# Patient Record
Sex: MALE | Race: WHITE | NOT HISPANIC OR LATINO | Employment: FULL TIME | URBAN - METROPOLITAN AREA
[De-identification: names, ages, dates, MRNs, and addresses within clinical notes are randomized per-mention and may not be internally consistent; named-entity substitution may affect disease eponyms.]

---

## 2017-05-05 ENCOUNTER — HOSPITAL ENCOUNTER (EMERGENCY)
Facility: CLINIC | Age: 57
Discharge: HOME | End: 2017-05-05
Attending: FAMILY MEDICINE | Admitting: FAMILY MEDICINE

## 2017-05-05 VITALS
TEMPERATURE: 97.9 F | OXYGEN SATURATION: 95 % | WEIGHT: 190 LBS | DIASTOLIC BLOOD PRESSURE: 105 MMHG | SYSTOLIC BLOOD PRESSURE: 153 MMHG | BODY MASS INDEX: 25.07 KG/M2 | RESPIRATION RATE: 18 BRPM

## 2017-05-05 DIAGNOSIS — R04.0 EPISTAXIS: ICD-10-CM

## 2017-05-05 DIAGNOSIS — I10 ESSENTIAL HYPERTENSION: ICD-10-CM

## 2017-05-05 LAB
ANION GAP SERPL CALCULATED.3IONS-SCNC: 9 MMOL/L (ref 3–14)
BASOPHILS # BLD AUTO: 0 10E9/L (ref 0–0.2)
BASOPHILS NFR BLD AUTO: 0.8 %
BUN SERPL-MCNC: 18 MG/DL (ref 7–30)
CALCIUM SERPL-MCNC: 8.9 MG/DL (ref 8.5–10.1)
CHLORIDE SERPL-SCNC: 102 MMOL/L (ref 94–109)
CO2 SERPL-SCNC: 27 MMOL/L (ref 20–32)
CREAT SERPL-MCNC: 0.85 MG/DL (ref 0.66–1.25)
DIFFERENTIAL METHOD BLD: ABNORMAL
EOSINOPHIL # BLD AUTO: 0.1 10E9/L (ref 0–0.7)
EOSINOPHIL NFR BLD AUTO: 1.7 %
ERYTHROCYTE [DISTWIDTH] IN BLOOD BY AUTOMATED COUNT: 13 % (ref 10–15)
GFR SERPL CREATININE-BSD FRML MDRD: ABNORMAL ML/MIN/1.7M2
GLUCOSE SERPL-MCNC: 103 MG/DL (ref 70–99)
HCT VFR BLD AUTO: 42.2 % (ref 40–53)
HGB BLD-MCNC: 14.3 G/DL (ref 13.3–17.7)
IMM GRANULOCYTES # BLD: 0 10E9/L (ref 0–0.4)
IMM GRANULOCYTES NFR BLD: 0.2 %
INR PPP: 1.06 (ref 0.86–1.14)
LYMPHOCYTES # BLD AUTO: 0.8 10E9/L (ref 0.8–5.3)
LYMPHOCYTES NFR BLD AUTO: 14.4 %
MCH RBC QN AUTO: 33.3 PG (ref 26.5–33)
MCHC RBC AUTO-ENTMCNC: 33.9 G/DL (ref 31.5–36.5)
MCV RBC AUTO: 98 FL (ref 78–100)
MONOCYTES # BLD AUTO: 0.5 10E9/L (ref 0–1.3)
MONOCYTES NFR BLD AUTO: 8.8 %
NEUTROPHILS # BLD AUTO: 3.9 10E9/L (ref 1.6–8.3)
NEUTROPHILS NFR BLD AUTO: 74.1 %
PLATELET # BLD AUTO: 177 10E9/L (ref 150–450)
POTASSIUM SERPL-SCNC: 4.3 MMOL/L (ref 3.4–5.3)
RBC # BLD AUTO: 4.29 10E12/L (ref 4.4–5.9)
SODIUM SERPL-SCNC: 138 MMOL/L (ref 133–144)
TSH SERPL DL<=0.05 MIU/L-ACNC: 0.88 MU/L (ref 0.4–4)
WBC # BLD AUTO: 5.2 10E9/L (ref 4–11)

## 2017-05-05 PROCEDURE — 99284 EMERGENCY DEPT VISIT MOD MDM: CPT | Mod: 25 | Performed by: FAMILY MEDICINE

## 2017-05-05 PROCEDURE — 80048 BASIC METABOLIC PNL TOTAL CA: CPT | Performed by: FAMILY MEDICINE

## 2017-05-05 PROCEDURE — 93010 ELECTROCARDIOGRAM REPORT: CPT | Performed by: FAMILY MEDICINE

## 2017-05-05 PROCEDURE — 85025 COMPLETE CBC W/AUTO DIFF WBC: CPT | Performed by: FAMILY MEDICINE

## 2017-05-05 PROCEDURE — 84443 ASSAY THYROID STIM HORMONE: CPT | Performed by: FAMILY MEDICINE

## 2017-05-05 PROCEDURE — 85610 PROTHROMBIN TIME: CPT | Performed by: FAMILY MEDICINE

## 2017-05-05 PROCEDURE — 99284 EMERGENCY DEPT VISIT MOD MDM: CPT

## 2017-05-05 PROCEDURE — 25000132 ZZH RX MED GY IP 250 OP 250 PS 637: Performed by: FAMILY MEDICINE

## 2017-05-05 PROCEDURE — 93005 ELECTROCARDIOGRAM TRACING: CPT

## 2017-05-05 RX ORDER — METOPROLOL SUCCINATE 50 MG/1
50 TABLET, EXTENDED RELEASE ORAL DAILY
Qty: 30 TABLET | Refills: 0 | Status: SHIPPED | OUTPATIENT
Start: 2017-05-05 | End: 2017-05-19

## 2017-05-05 RX ORDER — OXYMETAZOLINE HYDROCHLORIDE 0.05 G/100ML
2 SPRAY NASAL 2 TIMES DAILY
Status: DISCONTINUED | OUTPATIENT
Start: 2017-05-05 | End: 2017-05-05 | Stop reason: HOSPADM

## 2017-05-05 RX ADMIN — OXYMETAZOLINE HYDROCHLORIDE 2 SPRAY: 5 SPRAY NASAL at 10:27

## 2017-05-05 ASSESSMENT — ENCOUNTER SYMPTOMS
BLOOD IN STOOL: 0
ABDOMINAL PAIN: 0
SHORTNESS OF BREATH: 0
COUGH: 0
FREQUENCY: 0
DIAPHORESIS: 0
VOMITING: 0
CHILLS: 0
WHEEZING: 0
PALPITATIONS: 0
SORE THROAT: 0
SINUS PRESSURE: 0
NAUSEA: 0
DIARRHEA: 0
FEVER: 0
HEADACHES: 0
CONSTIPATION: 0
DYSURIA: 0

## 2017-05-05 NOTE — ED AVS SNAPSHOT
[facility] Emergency Department    [address] [facility] [address]    Phone:  [phone]    Fax:  [phone]                                       Roge Agarwal   MRN: [id number]    Department:  [facility]   Date of Visit:  5/5/2017           Patient Information     Date Of Birth          1960        Your diagnoses for this visit were:     Epistaxis This appears to  be controlled with afrin.  Although this can cause elevated blood pressure, short-term use should be safe.  Use the afrin twice daily for 3 days and then stop.  have the clamp available in case of rebleeding.  Avoid anything inside the nose.  avoid blowing nose.    Essential hypertension blood pressure was very high on presentation (>200). There are signs on EKG c/w LVH and this emphasizes the need for better control soon.  Stay on lisinopril and hctz and if the 2 meds you are taking are lisinopril and hctz (not carvedilol or metoprolol), then add back metoprolol xl in the evening at 50 mg. follow-up clinic.  due to the tachycardia and hypertension, electrolytes, thyroid and blood count were rechecked today and were reassuring.       You were seen by Caden Sanchez MD.      Follow-up Information     Follow up with primary doctor In 1 week.        Follow up with [facility].    Specialty:  EMERGENCY MEDICINE    Why:  As needed, If symptoms worsen    Contact information:    [address] [place] [id number] [phone]    Additional information:    The medical center is located at   [address] [facility]. (between [address]   [address] in [place], [place] miles north   of [place]).        Discharge Instructions         ICD-10-CM    1. Epistaxis R04.0 TSH    This appears to  be controlled with afrin.  Although this can cause elevated blood pressure, short-term use should be safe.  Use the afrin twice daily for 3 days and then stop.  have the clamp available in case of  rebleeding.  Avoid anything inside the nose.  avoid blowing nose.   2. Essential hypertension I10     blood pressure was very high on presentation (>200). There are signs on EKG c/w LVH and this emphasizes the need for better control soon.  Stay on lisinopril and hctz and if the 2 meds you are taking are lisinopril and hctz (not carvedilol or metoprolol), then add back metoprolol xl in the evening at 50 mg. follow-up clinic.  due to the tachycardia and hypertension, electrolytes, thyroid and blood count were rechecked today and were reassuring.         Discharge Instructions: Taking Your Blood Pressure  Blood pressure is the force of blood as it moves from the heart through the blood vessels. You can take your own blood pressure reading using a digital monitor. Take readings as often as your health care provider instructs. Take your readings each time in the same way, using the same arm. Here are guidelines for taking your blood pressure.  1. Relax      Wait at least a half hour after smoking, eating, or exercising. Do not drink coffee, tea, soda, or other caffeinated beverages before checking your blood pressure.     Sit comfortably at a table. Place the monitor near you.    Rest for a few minutes before you begin.  2. Wrap the cuff      Place your arm on the table, palm up. Put your arm in a position that is level with your heart. Wrap the cuff around your upper arm, just above your elbow. It s best to wrap the cuff on bare skin, not over clothing.    Make sure your cuff fits. If it doesn t wrap around your upper arm, order a larger cuff. A cuff that is too large or too small can result in an inaccurate blood pressure reading.  3. Inflate the cuff      Pump the cuff until the scale reads 200. If you have a self-inflating cuff, push the button that starts the pump.    The cuff will tighten, then loosen.    The numbers will change. When they stop changing, your blood pressure reading will appear.    If you get a  reading that is too high or too low for you, relax for a few minutes. Then do the test again.    4. Write down the results    Write down your blood pressure numbers. Kb the date and time. Keep your results in one place, such as a notebook.    Remove the cuff from your arm. Turn off the machine.       2941-3355 [employer]. [address] [place] [address], [place] [address]. All rights reserved. This information is not intended as a substitute for professional medical care. Always follow your healthcare professional's instructions.          Nosebleed (Adult)    Bleeding from the nose most commonly occurs due to injury or drying and cracking of the inner lining of the nose. Most nosebleeds are due to dry air or nose-picking. They can occur during a common cold or an allergy attack. They can also occur on a very hot day, or from dry air in the winter.  If the bleeding site is found, it may be cauterized (treated to cause a blood clot to form). This may be done with a chemical, heat, or electricity. If the bleeding continues after the site is cauterized, or if the site cannot be found, packing may be placed in your nose. This is to apply pressure and stop the bleeding. The packing may be made of gauze or sponge. A small balloon catheter is sometimes used. These must be removed by your doctor. Some types of packing dissolve on their own.  Home care    If packing was put in your nose, unless told otherwise, do not pull on it or try to remove it yourself. You will be given an appointment to have it removed. You may also have been given antibiotics to prevent a sinus infection. If so, finish all of the medicine.    Do not blow your nose for 12 hours after the bleeding stops. This will allow a strong blood clot to form. Do not pick your nose. This may restart bleeding.    Avoid drinking alcohol and hot liquids for the next two days. Alcohol or hot liquids in your mouth can dilate blood vessels in your nose. This can  cause bleeding to start again.    Do not take ibuprofen, naproxen, or aspirin-containing medicines. These thin the blood and may promote nose bleeding. You may take acetaminophen for pain, unless another pain medicine was prescribed.    If the bleeding starts again, sit up and lean forward to prevent swallowing blood. Pinch your nose tightly on both sides, as depicted above, for 10 to 15 minutes.  Time yourself, and don t release the pressure on your nose until 10 minutes is up. If bleeding is not controlled, continue to pinch your nose and call your health care provider or return to this facility.    If you have a cold, allergies, or dry nasal membranes, lubricate the nasal passages. Apply a small amount of petroleum jelly inside the nose with a cotton swab twice a day (morning and night).    Avoid overheating your home, which can dry the air and worsen your condition.    A humidifier in the room where you sleep will add moisture to the air.    Use a saline nasal spray to keep nasal passages moist.    Do not pick your nose. Keep fingernails trimmed to decrease risk of bleeds.  Follow-up care  Follow up with your health care provider, or as advised. Nasal packing should be rechecked or removed within 2 to 3 days.  When to seek medical advice  Call your health care provider right away if any of these occur.    Another nosebleed that you cannot control    Dizziness, weakness or fainting    You become tired or confused    Fever of 100.4 F (38 C) or higher, or as directed by your health care provider    Headache    Sinus or facial pain    Shortness of breath or trouble breathing    3800-6621 [employer] [address] [place] [address], [place] [address]. All rights reserved. This information is not intended as a substitute for professional medical care. Always follow your healthcare professional's instructions.          Future Appointments        Provider Department Dept Phone Center    5/9/2017 9:00 AM Thang Dawson  MD [facility] [phone] [facility]      24 Hour Appointment Hotline       To make an appointment at any [facility], call [phone] ([phone]). If you don't have a family doctor or clinic, we will help you find one. [facility] are conveniently located to serve the needs of you and your family.             Review of your medicines      START taking        Dose / Directions Last dose taken    metoprolol 50 MG 24 hr tablet   Commonly known as:  TOPROL-XL   Dose:  50 mg   Quantity:  30 tablet        Take 1 tablet (50 mg) by mouth daily   Refills:  0          Our records show that you are taking the medicines listed below. If these are incorrect, please call your family doctor or clinic.        Dose / Directions Last dose taken    HYDROCHLOROTHIAZIDE PO   Dose:  25 mg        Take 25 mg by mouth daily   Refills:  0        IBUPROFEN PO   Dose:  800 mg        Take 800 mg by mouth once   Refills:  0        LISINOPRIL PO   Dose:  20 mg        Take 20 mg by mouth daily   Refills:  0        meclizine 25 MG tablet   Commonly known as:  ANTIVERT   Dose:  25-50 mg   Quantity:  20 tablet        Take 1-2 tablets (25-50 mg) by mouth 3 times daily as needed for dizziness   Refills:  0                Prescriptions were sent or printed at these locations (1 Prescription)                   [id number] [place] [id number]    Telephone:  [phone]   Fax:  [phone]   Hours:                  E-Prescribed (1 of 1)         metoprolol (TOPROL-XL) 50 MG 24 hr tablet                Procedures and tests performed during your visit     Basic metabolic panel    CBC with platelets differential    EKG 12 lead    INR    TSH      Orders Needing Specimen Collection     None      Pending Results     No orders found from 5/3/2017 to 5/6/2017.            Pending Culture Results     No orders found from 5/3/2017 to 5/6/2017.            Pending  Results Instructions     If you had any lab results that were not finalized at the time of your Discharge, you can call the ED Lab Result RN at [phone]. You will be contacted by this team for any positive Lab results or changes in treatment. The nurses are available 7 days a week from 10A to 6:30P.  You can leave a message 24 hours per day and they will return your call.        Test Results From Your Hospital Stay        5/5/2017 10:54 AM      Component Results     Component Value Ref Range & Units Status    WBC 5.2 4.0 - 11.0 10e9/L Final    RBC Count 4.29 (L) 4.4 - 5.9 10e12/L Final    Hemoglobin 14.3 13.3 - 17.7 g/dL Final    Hematocrit 42.2 40.0 - 53.0 % Final    MCV 98 78 - 100 fl Final    MCH 33.3 (H) 26.5 - 33.0 pg Final    MCHC 33.9 31.5 - 36.5 g/dL Final    RDW 13.0 10.0 - 15.0 % Final    Platelet Count 177 150 - 450 10e9/L Final    Diff Method Automated Method  Final    % Neutrophils 74.1 % Final    % Lymphocytes 14.4 % Final    % Monocytes 8.8 % Final    % Eosinophils 1.7 % Final    % Basophils 0.8 % Final    % Immature Granulocytes 0.2 % Final    Absolute Neutrophil 3.9 1.6 - 8.3 10e9/L Final    Absolute Lymphocytes 0.8 0.8 - 5.3 10e9/L Final    Absolute Monocytes 0.5 0.0 - 1.3 10e9/L Final    Absolute Eosinophils 0.1 0.0 - 0.7 10e9/L Final    Absolute Basophils 0.0 0.0 - 0.2 10e9/L Final    Abs Immature Granulocytes 0.0 0 - 0.4 10e9/L Final         5/5/2017 11:03 AM      Component Results     Component Value Ref Range & Units Status    Sodium 138 133 - 144 mmol/L Final    Potassium 4.3 3.4 - 5.3 mmol/L Final    Chloride 102 94 - 109 mmol/L Final    Carbon Dioxide 27 20 - 32 mmol/L Final    Anion Gap 9 3 - 14 mmol/L Final    Glucose 103 (H) 70 - 99 mg/dL Final    Urea Nitrogen 18 7 - 30 mg/dL Final    Creatinine 0.85 0.66 - 1.25 mg/dL Final    GFR Estimate >90  Non  GFR Calc   >60 mL/min/1.7m2 Final    GFR Estimate If Black >90   GFR Calc   >60 mL/min/1.7m2 Final     "Calcium 8.9 8.5 - 10.1 mg/dL Final         2017 11:14 AM      Component Results     Component Value Ref Range & Units Status    TSH 0.88 0.40 - 4.00 mU/L Final         2017 11:00 AM      Component Results     Component Value Ref Range & Units Status    INR 1.06 0.86 - 1.14 Final                Thank you for choosing [facility]       Thank you for choosing [facility] for your care. Our goal is always to provide you with excellent care. Hearing back from our patients is one way we can continue to improve our services. Please take a few minutes to complete the written survey that you may receive in the mail after you visit with us. Thank you!        [employer] Information     [employer] lets you send messages to your doctor, view your test results, renew your prescriptions, schedule appointments and more. To sign up, go to [email or web address] . Click on \"Log in\" on the left side of the screen, which will take you to the Welcome page. Then click on \"Sign up Now\" on the right side of the page.     You will be asked to enter the access code listed below, as well as some personal information. Please follow the directions to create your username and password.     Your access code is: [email or web address]  Expires: 8/3/2017 11:30 AM     Your access code will  in 90 days. If you need help or a new code, please call your [facility] clinic or [phone].        Care EveryWhere ID     This is your Care EveryWhere ID. This could be used by other organizations to access your [facility] medical records  [id number]        After Visit Summary       This is your record. Keep this with you and show to your community pharmacist(s) and doctor(s) at your next visit.                  "

## 2017-05-05 NOTE — DISCHARGE INSTRUCTIONS
ICD-10-CM    1. Epistaxis R04.0 TSH    This appears to  be controlled with afrin.  Although this can cause elevated blood pressure, short-term use should be safe.  Use the afrin twice daily for 3 days and then stop.  have the clamp available in case of rebleeding.  Avoid anything inside the nose.  avoid blowing nose.   2. Essential hypertension I10     blood pressure was very high on presentation (>200). There are signs on EKG c/w LVH and this emphasizes the need for better control soon.  Stay on lisinopril and hctz and if the 2 meds you are taking are lisinopril and hctz (not carvedilol or metoprolol), then add back metoprolol xl in the evening at 50 mg. follow-up clinic.  due to the tachycardia and hypertension, electrolytes, thyroid and blood count were rechecked today and were reassuring.         Discharge Instructions: Taking Your Blood Pressure  Blood pressure is the force of blood as it moves from the heart through the blood vessels. You can take your own blood pressure reading using a digital monitor. Take readings as often as your health care provider instructs. Take your readings each time in the same way, using the same arm. Here are guidelines for taking your blood pressure.  1. Relax      Wait at least a half hour after smoking, eating, or exercising. Do not drink coffee, tea, soda, or other caffeinated beverages before checking your blood pressure.     Sit comfortably at a table. Place the monitor near you.    Rest for a few minutes before you begin.  2. Wrap the cuff      Place your arm on the table, palm up. Put your arm in a position that is level with your heart. Wrap the cuff around your upper arm, just above your elbow. It s best to wrap the cuff on bare skin, not over clothing.    Make sure your cuff fits. If it doesn t wrap around your upper arm, order a larger cuff. A cuff that is too large or too small can result in an inaccurate blood pressure reading.  3. Inflate the cuff      Pump the  cuff until the scale reads 200. If you have a self-inflating cuff, push the button that starts the pump.    The cuff will tighten, then loosen.    The numbers will change. When they stop changing, your blood pressure reading will appear.    If you get a reading that is too high or too low for you, relax for a few minutes. Then do the test again.    4. Write down the results    Write down your blood pressure numbers. Kb the date and time. Keep your results in one place, such as a notebook.    Remove the cuff from your arm. Turn off the machine.       3017-7390 [employer]. [address] [place] [address]. All rights reserved. This information is not intended as a substitute for professional medical care. Always follow your healthcare professional's instructions.          Nosebleed (Adult)    Bleeding from the nose most commonly occurs due to injury or drying and cracking of the inner lining of the nose. Most nosebleeds are due to dry air or nose-picking. They can occur during a common cold or an allergy attack. They can also occur on a very hot day, or from dry air in the winter.  If the bleeding site is found, it may be cauterized (treated to cause a blood clot to form). This may be done with a chemical, heat, or electricity. If the bleeding continues after the site is cauterized, or if the site cannot be found, packing may be placed in your nose. This is to apply pressure and stop the bleeding. The packing may be made of gauze or sponge. A small balloon catheter is sometimes used. These must be removed by your doctor. Some types of packing dissolve on their own.  Home care    If packing was put in your nose, unless told otherwise, do not pull on it or try to remove it yourself. You will be given an appointment to have it removed. You may also have been given antibiotics to prevent a sinus infection. If so, finish all of the medicine.    Do not blow your nose for 12 hours after the bleeding  stops. This will allow a strong blood clot to form. Do not pick your nose. This may restart bleeding.    Avoid drinking alcohol and hot liquids for the next two days. Alcohol or hot liquids in your mouth can dilate blood vessels in your nose. This can cause bleeding to start again.    Do not take ibuprofen, naproxen, or aspirin-containing medicines. These thin the blood and may promote nose bleeding. You may take acetaminophen for pain, unless another pain medicine was prescribed.    If the bleeding starts again, sit up and lean forward to prevent swallowing blood. Pinch your nose tightly on both sides, as depicted above, for 10 to 15 minutes.  Time yourself, and don t release the pressure on your nose until 10 minutes is up. If bleeding is not controlled, continue to pinch your nose and call your health care provider or return to this facility.    If you have a cold, allergies, or dry nasal membranes, lubricate the nasal passages. Apply a small amount of petroleum jelly inside the nose with a cotton swab twice a day (morning and night).    Avoid overheating your home, which can dry the air and worsen your condition.    A humidifier in the room where you sleep will add moisture to the air.    Use a saline nasal spray to keep nasal passages moist.    Do not pick your nose. Keep fingernails trimmed to decrease risk of bleeds.  Follow-up care  Follow up with your health care provider, or as advised. Nasal packing should be rechecked or removed within 2 to 3 days.  When to seek medical advice  Call your health care provider right away if any of these occur.    Another nosebleed that you cannot control    Dizziness, weakness or fainting    You become tired or confused    Fever of 100.4 F (38 C) or higher, or as directed by your health care provider    Headache    Sinus or facial pain    Shortness of breath or trouble breathing    2976-9841 [employer]. [address] [place] [address], [place] [address]. All rights  reserved. This information is not intended as a substitute for professional medical care. Always follow your healthcare professional's instructions.

## 2017-05-05 NOTE — ED PROVIDER NOTES
History     Chief Complaint   Patient presents with     Epistaxis     off and on x 3 days     HPI  Roge Agarwal is a 56 year old male who presents with on and off epistaxis for the last 3 days without nasal trauma and only from the left nare.  He has been placing a tissue within the nose and this is stopped the clots.  He then rebleeds and passes clots after removal.  This is happening recurrently during this time.  He has no bleeding from other sites.  Denies oral bleeding rectal bleeding or hematuria.  His right nare has not bled.   He has no associated upper respiratory congestion.  No known bleeding disorders.  He has a history of prior hemorrhagic CVA - but his insurance and has not been seen recently.  He is still on lisinopril and hydrochlorothiazide  but is not taking beta blocker previously prescribed.    Patient Active Problem List   Diagnosis     Alcohol abuse     Essential hypertension     Cerebral hemorrhage (H)     Tobacco use     Multiple-type hyperlipidemia     Current Outpatient Prescriptions   Medication Sig Dispense Refill     LISINOPRIL PO Take 20 mg by mouth daily       HYDROCHLOROTHIAZIDE PO Take 25 mg by mouth daily       IBUPROFEN PO Take 800 mg by mouth once       meclizine (ANTIVERT) 25 MG tablet Take 1-2 tablets (25-50 mg) by mouth 3 times daily as needed for dizziness 20 tablet 0      No Known Allergies      I have reviewed the Medications, Allergies, Past Medical and Surgical History, and Social History in the Epic system.    Review of Systems   Constitutional: Negative for chills, diaphoresis and fever.   HENT: Positive for nosebleeds. Negative for ear pain, sinus pressure and sore throat.    Eyes: Negative for visual disturbance.   Respiratory: Negative for cough, shortness of breath and wheezing.    Cardiovascular: Negative for chest pain and palpitations.   Gastrointestinal: Negative for abdominal pain, blood in stool, constipation, diarrhea, nausea and vomiting.   Genitourinary:  Negative for dysuria, frequency and urgency.   Skin: Negative for rash.   Neurological: Negative for headaches.   All other systems reviewed and are negative.      Physical Exam   BP: (!) 203/88  Heart Rate: 109  Temp: 97.9  F (36.6  C)  Resp: 18  Weight: 86.2 kg (190 lb)  SpO2: 97 %  Physical Exam   Constitutional: He is oriented to person, place, and time. No distress.   HENT:   Head: Atraumatic.   Nose: No rhinorrhea or nasal septal hematoma. No epistaxis.   Mouth/Throat: Oropharynx is clear and moist.   Eyes: Conjunctivae are normal.   Neck: Normal range of motion. Neck supple.   Cardiovascular: Normal rate, regular rhythm and normal heart sounds.  Exam reveals no gallop and no friction rub.    No murmur heard.  Pulmonary/Chest: Effort normal and breath sounds normal. No respiratory distress. He has no wheezes. He has no rales. He exhibits no tenderness.   Abdominal: Soft.   Musculoskeletal: He exhibits no edema.   Lymphadenopathy:     He has no cervical adenopathy.   Neurological: He is alert and oriented to person, place, and time.   Skin: Skin is warm and dry. No petechiae, no purpura and no rash noted. He is not diaphoretic.   Psychiatric: He has a normal mood and affect.   Vitals reviewed.   no obvious nasal lesions or sources of bleeding.      ED Course     ED Course     Procedures             Critical Care time:  none               Results for orders placed or performed during the hospital encounter of 05/05/17   CBC with platelets differential   Result Value Ref Range    WBC 5.2 4.0 - 11.0 10e9/L    RBC Count 4.29 (L) 4.4 - 5.9 10e12/L    Hemoglobin 14.3 13.3 - 17.7 g/dL    Hematocrit 42.2 40.0 - 53.0 %    MCV 98 78 - 100 fl    MCH 33.3 (H) 26.5 - 33.0 pg    MCHC 33.9 31.5 - 36.5 g/dL    RDW 13.0 10.0 - 15.0 %    Platelet Count 177 150 - 450 10e9/L    Diff Method Automated Method     % Neutrophils 74.1 %    % Lymphocytes 14.4 %    % Monocytes 8.8 %    % Eosinophils 1.7 %    % Basophils 0.8 %    %  Immature Granulocytes 0.2 %    Absolute Neutrophil 3.9 1.6 - 8.3 10e9/L    Absolute Lymphocytes 0.8 0.8 - 5.3 10e9/L    Absolute Monocytes 0.5 0.0 - 1.3 10e9/L    Absolute Eosinophils 0.1 0.0 - 0.7 10e9/L    Absolute Basophils 0.0 0.0 - 0.2 10e9/L    Abs Immature Granulocytes 0.0 0 - 0.4 10e9/L   Basic metabolic panel   Result Value Ref Range    Sodium 138 133 - 144 mmol/L    Potassium 4.3 3.4 - 5.3 mmol/L    Chloride 102 94 - 109 mmol/L    Carbon Dioxide 27 20 - 32 mmol/L    Anion Gap 9 3 - 14 mmol/L    Glucose 103 (H) 70 - 99 mg/dL    Urea Nitrogen 18 7 - 30 mg/dL    Creatinine 0.85 0.66 - 1.25 mg/dL    GFR Estimate >90  Non  GFR Calc   >60 mL/min/1.7m2    GFR Estimate If Black >90   GFR Calc   >60 mL/min/1.7m2    Calcium 8.9 8.5 - 10.1 mg/dL   TSH   Result Value Ref Range    TSH 0.88 0.40 - 4.00 mU/L   INR   Result Value Ref Range    INR 1.06 0.86 - 1.14     Patient Vitals for the past 24 hrs:   BP Temp Heart Rate Resp SpO2 Weight   05/05/17 1115 (!) 153/105 - - - 95 % -   05/05/17 1100 (!) 178/113 - - - 96 % -   05/05/17 1045 (!) 162/113 - - - 97 % -   05/05/17 1031 - - - - 96 % -   05/05/17 1030 (!) 170/107 - - - - -   05/05/17 1025 (!) 198/117 - - - - -   05/05/17 0933 (!) 203/88 97.9  F (36.6  C) 109 18 97 % 86.2 kg (190 lb)         EKG at 1028 hrs. demonstrates a sinus rhythm at 90 bpm with a forward axis and no ST change.  No T wave changes.  Poor R-wave progression V1 through V3 with no Q waves.  Normal conduction.  However there is possible LVH present.  Intervals are normal with exception of a slightly long .  No ectopy.  Impression sinus rhythm 90 beats per minute with first-degree AV block and no acute changes and no change from EKG done December 2016.  There is possible LVH.    Assessments & Plan (with Medical Decision Making)     MDM: Roge Agarwal is a 56 year old male Who presented with epistaxis that had been on and off for the last three days.  He had only  bleeding from the left near and no other bleeding sites. He has a history of daily alcohol use with alcohol abuse on his history at [facility]. On his presentation he had no active bleeding but had tissue within the left nare. This was removed and the clamp was applied.  Afrin nasal spray was applied within the nares and the clamp was removed and he was observed for re-bleeding. This did not occur. Examination of the nostril revealed no obvious source of bleeding or site that could be cauterized.      During this time I also observed elevated blood pressures that were As high as 203 systolic.  Although I do not suspect this is the reason for his nasal bleeding, he is not reestablish care with primary care and is not been taking all of the antihypertensives he was previously prescribed.  He has a history of prior CVA.On his presentation he was tachycardic to 109, and although his auscultated heart rate appeared to be regular I recommended that we check an EKG and basic labs including a thyroid, chemistry panel, hemoglobin.  These were reassuring. An EKG was also performed I restarted his metoprolol and asked him to follow up with primary care and we assisted him in making an appointment.     I have reviewed the nursing notes.    I have reviewed the findings, diagnosis, plan and need for follow up with the patient.    New Prescriptions    No medications on file       Final diagnoses:   Epistaxis - This appears to  be controlled with afrin.  Although this can cause elevated blood pressure, short-term use should be safe.  Use the afrin twice daily for 3 days and then stop.  have the clamp available in case of rebleeding.  Avoid anything inside the nose.  avoid blowing nose.   Essential hypertension - blood pressure was very high on presentation (>200). There are signs on EKG c/w LVH and this emphasizes the need for better control soon.  Stay on lisinopril and hctz and if the 2 meds you are taking are lisinopril and hctz (not  carvedilol or metoprolol), then add back metoprolol xl in the evening at 50 mg. follow-up clinic.  due to the tachycardia and hypertension, electrolytes, thyroid and blood count were rechecked today and were reassuring.       5/5/2017   [facility] EMERGENCY DEPARTMENT     Caden Sanchez MD  05/05/17 2003

## 2017-05-05 NOTE — ED AVS SNAPSHOT
[facility] Emergency Department    [address] [address]    Phone:  [phone]    Fax:  [phone]                                       Roge Agarwal   MRN: [id number]    Department:  [facility] Emergency Department   Date of Visit:  5/5/2017           After Visit Summary Signature Page     I have received my discharge instructions, and my questions have been answered. I have discussed any challenges I see with this plan with the nurse or doctor.    ..........................................................................................................................................  Patient/Patient Representative Signature      ..........................................................................................................................................  Patient Representative Print Name and Relationship to Patient    ..................................................               ................................................  Date                                            Time    ..........................................................................................................................................  Reviewed by Signature/Title    ...................................................              ..............................................  Date                                                            Time

## 2017-05-19 ENCOUNTER — OFFICE VISIT (OUTPATIENT)
Dept: FAMILY MEDICINE | Facility: CLINIC | Age: 57
End: 2017-05-19

## 2017-05-19 VITALS
DIASTOLIC BLOOD PRESSURE: 93 MMHG | HEART RATE: 73 BPM | BODY MASS INDEX: 27.13 KG/M2 | SYSTOLIC BLOOD PRESSURE: 179 MMHG | WEIGHT: 205.6 LBS

## 2017-05-19 DIAGNOSIS — I10 BENIGN ESSENTIAL HYPERTENSION: ICD-10-CM

## 2017-05-19 DIAGNOSIS — I50.22 CHRONIC SYSTOLIC CONGESTIVE HEART FAILURE (H): ICD-10-CM

## 2017-05-19 DIAGNOSIS — I48.0 PAROXYSMAL ATRIAL FIBRILLATION (H): ICD-10-CM

## 2017-05-19 DIAGNOSIS — R04.0 EPISTAXIS: Primary | ICD-10-CM

## 2017-05-19 PROCEDURE — 99203 OFFICE O/P NEW LOW 30 MIN: CPT | Performed by: INTERNAL MEDICINE

## 2017-05-19 RX ORDER — METOPROLOL SUCCINATE 50 MG/1
50 TABLET, EXTENDED RELEASE ORAL DAILY
Qty: 30 TABLET | Refills: 11 | Status: ON HOLD | OUTPATIENT
Start: 2017-05-19 | End: 2020-01-01

## 2017-05-19 RX ORDER — LOSARTAN POTASSIUM 50 MG/1
50 TABLET ORAL DAILY
Qty: 30 TABLET | Refills: 11 | Status: ON HOLD | OUTPATIENT
Start: 2017-05-19 | End: 2020-01-01

## 2017-05-19 RX ORDER — HYDROCHLOROTHIAZIDE 25 MG/1
25 TABLET ORAL DAILY
Qty: 30 TABLET | Refills: 11 | Status: ON HOLD | OUTPATIENT
Start: 2017-05-19 | End: 2020-01-01

## 2017-05-19 NOTE — PATIENT INSTRUCTIONS
You can try either a saline nasal spray to try to keep the nasal passage moist or Flonase which would help with allergic type symptoms.    You can use an over the counter antihistamine such as cetirizine (Zyrtec), loratadine (Claritin), or fexofenadine (Allegra) to help with allergy symptoms as well.        Return in about two weeks after starting your losartan to have labs drawn (electrolytes and kidney function) and for a nurse BP check.       Thank you for choosing [facility].  You may be receiving a survey in the mail from [employer] regarding your visit today.  Please take a few minutes to complete and return the survey to let us know how we are doing.  Our Clinic hours are:  Mondays    7:20 am - 7 pm  Tues -  Fri  7:20 am - 5 pm  Clinic Phone: [phone]  The clinic lab opens at 7:30 am Mon - Fri and appointments are required.  [facility]. [phone]  Monday-Thursday 8 am - 7pm  Tues/Wed/Fri 8 am - 5:30 pm

## 2017-05-19 NOTE — MR AVS SNAPSHOT
After Visit Summary   5/19/2017    Roge Agarwal    MRN: [id number]           Patient Information     Date Of Birth          1960        Visit Information        Provider Department      5/19/2017 7:20 AM Thang Hays MD [facility]        Today's Diagnoses     Epistaxis    -  1    Benign essential hypertension          Care Instructions    You can try either a saline nasal spray to try to keep the nasal passage moist or Flonase which would help with allergic type symptoms.    You can use an over the counter antihistamine such as cetirizine (Zyrtec), loratadine (Claritin), or fexofenadine (Allegra) to help with allergy symptoms as well.        Return in about two weeks after starting your losartan to have labs drawn (electrolytes and kidney function) and for a nurse BP check.       Thank you for choosing [facility].  You may be receiving a survey in the mail from [employer] [facility] regarding your visit today.  Please take a few minutes to complete and return the survey to let us know how we are doing.  Our Clinic hours are:  Mondays    7:20 am - 7 pm  Tues -  Fri  7:20 am - 5 pm  Clinic Phone: [phone]  The clinic lab opens at 7:30 am Mon - Fri and appointments are required.  [facility]. [phone]  Monday-Thursday 8 am - 7pm  Tues/Wed/Fri 8 am - 5:30 pm        Follow-ups after your visit        Additional Services     OTOLARYNGOLOGY REFERRAL       Your provider has referred you to: [facility]: [facility] [phone]   [email or web address]    Please be aware that coverage of these services is subject to the terms and limitations of your health insurance plan.  Call member services at your health plan with any benefit or coverage questions.      Please bring the following with you to your appointment:    (1) Any X-Rays, CTs or MRIs which have been performed.  Contact the facility where they were done to arrange for  " prior to your scheduled appointment.   (2) List of current medications  (3) This referral request   (4) Any documents/labs given to you for this referral                  Future tests that were ordered for you today     Open Future Orders        Priority Expected Expires Ordered    **Basic metabolic panel FUTURE 14d Routine 2017            Who to contact     If you have questions or need follow up information about today's clinic visit or your schedule please contact [facility] directly at [phone].  Normal or non-critical lab and imaging results will be communicated to you by [employer], letter or phone within 4 business days after the clinic has received the results. If you do not hear from us within 7 days, please contact the clinic through "[employer]t or phone. If you have a critical or abnormal lab result, we will notify you by phone as soon as possible.  Submit refill requests through [employer] or call your pharmacy and they will forward the refill request to us. Please allow 3 business days for your refill to be completed.          Additional Information About Your Visit        [employer] Information     [employer] lets you send messages to your doctor, view your test results, renew your prescriptions, schedule appointments and more. To sign up, go to [email or web address] . Click on \"Log in\" on the left side of the screen, which will take you to the Welcome page. Then click on \"Sign up Now\" on the right side of the page.     You will be asked to enter the access code listed below, as well as some personal information. Please follow the directions to create your username and password.     Your access code is: [email or web address]  Expires: 8/3/2017 11:30 AM     Your access code will  in 90 days. If you need help or a new code, please call your [facility] or [phone].        Care EveryWhere ID     This is your Care EveryWhere ID. This could be used by other " organizations to access your [employer] medical records  [id number]        Your Vitals Were     Pulse BMI (Body Mass Index)                73 27.13 kg/m2           Blood Pressure from Last 3 Encounters:   05/19/17 (!) 179/93   05/05/17 (!) 153/105   12/09/16 (!) 163/96    Weight from Last 3 Encounters:   05/19/17 205 lb 9.6 oz (93.3 kg)   05/05/17 190 lb (86.2 kg)   12/09/16 195 lb (88.5 kg)              We Performed the Following     OTOLARYNGOLOGY REFERRAL          Today's Medication Changes          These changes are accurate as of: 5/19/17  8:01 AM.  If you have any questions, ask your nurse or doctor.               Start taking these medicines.        Dose/Directions    losartan 50 MG tablet   Commonly known as:  COZAAR   Used for:  Benign essential hypertension        Dose:  50 mg   Take 1 tablet (50 mg) by mouth daily   Quantity:  30 tablet   Refills:  11         These medicines have changed or have updated prescriptions.        Dose/Directions    hydrochlorothiazide 25 MG tablet   Commonly known as:  HYDRODIURIL   This may have changed:  medication strength   Used for:  Benign essential hypertension        Dose:  25 mg   Take 1 tablet (25 mg) by mouth daily   Quantity:  30 tablet   Refills:  11         Stop taking these medicines if you haven't already. Please contact your care team if you have questions.     LISINOPRIL PO                Where to get your medicines      These medications were sent to [id number] - [place], [place] - [address] [place] [address]  [address] [address]     Phone:  [phone]     hydrochlorothiazide 25 MG tablet    losartan 50 MG tablet    metoprolol 50 MG 24 hr tablet                Primary Care Provider    Unknown Primary MD Andrea       No address on file        Thank you!     Thank you for choosing [facility]  for your care. Our goal is always to provide you with excellent care. Hearing back from our patients is one way we can continue to  improve our services. Please take a few minutes to complete the written survey that you may receive in the mail after your visit with us. Thank you!             Your Updated Medication List - Protect others around you: Learn how to safely use, store and throw away your medicines at www.disposemymeds.org.          This list is accurate as of: 5/19/17  8:01 AM.  Always use your most recent med list.                   Brand Name Dispense Instructions for use    hydrochlorothiazide 25 MG tablet    HYDRODIURIL    30 tablet    Take 1 tablet (25 mg) by mouth daily       IBUPROFEN PO      Take 800 mg by mouth once       losartan 50 MG tablet    COZAAR    30 tablet    Take 1 tablet (50 mg) by mouth daily       meclizine 25 MG tablet    ANTIVERT    20 tablet    Take 1-2 tablets (25-50 mg) by mouth 3 times daily as needed for dizziness       metoprolol 50 MG 24 hr tablet    TOPROL-XL    30 tablet    Take 1 tablet (50 mg) by mouth daily

## 2017-05-19 NOTE — PROGRESS NOTES
SUBJECTIVE:                                                    Roge Agarwal is a 56 year old male who presents to clinic today for the following health issues:    Chief Complaint   Patient presents with     RECHECK     ED follow up from 5/5/17 with left nose bleed x3 days. He did not experience any other bleeding but on monday this week he noticed slight blood in stool and then had another nose bleed on wednesday.      ED/ Followup:  Facility:  [place]  Date of visit: 5/5/17   Reason for visit: Left nose bleed on and off x3 days  Current Status: had blood in his stool on Monday and another nose bleed on Wednesday while at work.        Aamir was seen in the ED on 5/5 for recurrent epistaxis.  He was not bleeding at that time and no source of bleeding was found, so no packing or other treatment done at that time.  Labs were unremarkable.  His BP was quite elevated to 203/88.  He was taking lisinopril and HCTZ but not his beta blocker at that time, so the ED resumed his metoprolol.  Aamir has a history of hemorrhagic stroke 9 years ago.  He had an episode of atrial fibrillation requiring cardioversion last year, has not been known to be in a fib since.  His daughter reports he had a TTE with an EF of 30% last year, Care Everywhere notes say it was 15%.      Aamir has had recurrent epistaxis since leaving the ED.  These will last about 1-1.5 hours.  He did have some slight blood in the water in the toilet after having a BM a couple days ago- no pain, no further blood since.  ED recommended Afrin nasal spray for 3 days, so he did do that.      His BPs have always been high despite meds.  Reports a dry cough for years.      Problem list and histories reviewed & adjusted, as indicated.  Additional history: as documented    Current Outpatient Prescriptions   Medication Sig Dispense Refill     losartan (COZAAR) 50 MG tablet Take 1 tablet (50 mg) by mouth daily 30 tablet 11     metoprolol (TOPROL-XL) 50 MG 24 hr tablet Take 1  tablet (50 mg) by mouth daily 30 tablet 11     hydrochlorothiazide (HYDRODIURIL) 25 MG tablet Take 1 tablet (25 mg) by mouth daily 30 tablet 11     IBUPROFEN PO Take 800 mg by mouth once       [DISCONTINUED] metoprolol (TOPROL-XL) 50 MG 24 hr tablet Take 1 tablet (50 mg) by mouth daily 30 tablet 0     meclizine (ANTIVERT) 25 MG tablet Take 1-2 tablets (25-50 mg) by mouth 3 times daily as needed for dizziness (Patient not taking: Reported on 5/19/2017) 20 tablet 0     [DISCONTINUED] HYDROCHLOROTHIAZIDE PO Take 25 mg by mouth daily       No Known Allergies    Reviewed and updated as needed this visit by clinical staff  Allergies  Meds       Reviewed and updated as needed this visit by Provider         ROS:  Constitutional, HEENT, cardiovascular systems are negative, except as otherwise noted.    OBJECTIVE:                                                    BP (!) 179/93 (BP Location: Right arm, Patient Position: Chair, Cuff Size: Adult Regular)  Pulse 73  Wt 205 lb 9.6 oz (93.3 kg)  BMI 27.13 kg/m2  Body mass index is 27.13 kg/(m^2).  GENERAL: healthy, alert and no distress  HENT: right nare appears to have a bit of blood in it, but he reports that the nose bleeds are actually on the left where I can see no abnormalities  RESP: lungs clear to auscultation - no rales, rhonchi or wheezes  CV: regular rate and rhythm, normal S1 S2, no S3 or S4, no murmur, click or rub    Diagnostic Test Results:  none      ASSESSMENT/PLAN:                                                        1. Epistaxis    He continues to have recurrent epistaxis on the left side- I see no lesions on exam.  Blood work normal in ED.  Recommended trying saline nasal spray to see if that helps with bleeding.  (He also is concerned about allergies causing congestions, so can try Flonase and OTC antihistamine for that.)  Recommended f/u with ENT if continued bleeding.      - OTOLARYNGOLOGY REFERRAL    He did have one episode of noticing blood in the  toilet after a BM, very small amount in the water.  Will continue to monitor this and work-up further if it recurs.     2. Benign essential hypertension    BP not controlled and he is having a dry cough for many years- possibly from lisinopril.  Will stop lisinopril and try losartan instead.  Continue HCTZ and metoprolol.  Follow-up in 2 weeks for labs and RN BP check.      - losartan (COZAAR) 50 MG tablet; Take 1 tablet (50 mg) by mouth daily  Dispense: 30 tablet; Refill: 11  - metoprolol (TOPROL-XL) 50 MG 24 hr tablet; Take 1 tablet (50 mg) by mouth daily  Dispense: 30 tablet; Refill: 11  - hydrochlorothiazide (HYDRODIURIL) 25 MG tablet; Take 1 tablet (25 mg) by mouth daily  Dispense: 30 tablet; Refill: 11  - **Basic metabolic panel FUTURE 14d; Future    3. Paroxysmal atrial fibrillation (H)  4. Chronic systolic congestive heart failure (H)    His daughter reports that he has a follow-up TTE and cardiology visit coming up in the next week or so.      Thang Hays MD  [facility]

## 2017-05-19 NOTE — NURSING NOTE
"Chief Complaint   Patient presents with     RECHECK     ED follow up from 5/5/17 with left nose bleed x3 days. He did not experience any other bleeding but on monday this week he noticed slight blood in stool and then had another nose bleed on wednesday.      Initial BP (!) 179/93 (BP Location: Right arm, Patient Position: Chair, Cuff Size: Adult Regular)  Pulse 73  Wt 205 lb 9.6 oz (93.3 kg)  BMI 27.13 kg/m2 Estimated body mass index is 27.13 kg/(m^2) as calculated from the following:    Height as of 12/9/16: 6' 1\" (1.854 m).    Weight as of this encounter: 205 lb 9.6 oz (93.3 kg).  Medication Reconciliation: complete   Genie Aguillon CMA    "

## 2017-10-03 ENCOUNTER — APPOINTMENT (OUTPATIENT)
Dept: ULTRASOUND IMAGING | Facility: CLINIC | Age: 57
End: 2017-10-03
Attending: EMERGENCY MEDICINE

## 2017-10-03 ENCOUNTER — HOSPITAL ENCOUNTER (EMERGENCY)
Facility: CLINIC | Age: 57
Discharge: HOME | End: 2017-10-03
Attending: EMERGENCY MEDICINE | Admitting: EMERGENCY MEDICINE

## 2017-10-03 VITALS
WEIGHT: 218 LBS | OXYGEN SATURATION: 97 % | HEART RATE: 74 BPM | SYSTOLIC BLOOD PRESSURE: 133 MMHG | BODY MASS INDEX: 28.76 KG/M2 | DIASTOLIC BLOOD PRESSURE: 76 MMHG | RESPIRATION RATE: 20 BRPM | TEMPERATURE: 98.1 F

## 2017-10-03 DIAGNOSIS — R31.0 GROSS HEMATURIA: ICD-10-CM

## 2017-10-03 LAB
ALBUMIN SERPL-MCNC: 3.9 G/DL (ref 3.4–5)
ALBUMIN UR-MCNC: NEGATIVE MG/DL
ALP SERPL-CCNC: 62 U/L (ref 40–150)
ALT SERPL W P-5'-P-CCNC: 50 U/L (ref 0–70)
ANION GAP SERPL CALCULATED.3IONS-SCNC: 6 MMOL/L (ref 3–14)
APPEARANCE UR: CLEAR
APTT PPP: 26 SEC (ref 22–37)
APTT PPP: NORMAL SEC (ref 22–37)
AST SERPL W P-5'-P-CCNC: 40 U/L (ref 0–45)
BASOPHILS # BLD AUTO: 0 10E9/L (ref 0–0.2)
BASOPHILS # BLD AUTO: 0 10E9/L (ref 0–0.2)
BASOPHILS NFR BLD AUTO: 0.3 %
BASOPHILS NFR BLD AUTO: 0.3 %
BILIRUB SERPL-MCNC: 0.7 MG/DL (ref 0.2–1.3)
BILIRUB UR QL STRIP: NEGATIVE
BUN SERPL-MCNC: 15 MG/DL (ref 7–30)
CALCIUM SERPL-MCNC: 9.1 MG/DL (ref 8.5–10.1)
CHLORIDE SERPL-SCNC: 99 MMOL/L (ref 94–109)
CO2 SERPL-SCNC: 27 MMOL/L (ref 20–32)
COLOR UR AUTO: YELLOW
CREAT SERPL-MCNC: 0.85 MG/DL (ref 0.66–1.25)
DIFFERENTIAL METHOD BLD: ABNORMAL
DIFFERENTIAL METHOD BLD: ABNORMAL
EOSINOPHIL # BLD AUTO: 0.1 10E9/L (ref 0–0.7)
EOSINOPHIL # BLD AUTO: 0.1 10E9/L (ref 0–0.7)
EOSINOPHIL NFR BLD AUTO: 0.7 %
EOSINOPHIL NFR BLD AUTO: 0.8 %
ERYTHROCYTE [DISTWIDTH] IN BLOOD BY AUTOMATED COUNT: 12.2 % (ref 10–15)
ERYTHROCYTE [DISTWIDTH] IN BLOOD BY AUTOMATED COUNT: 12.6 % (ref 10–15)
GFR SERPL CREATININE-BSD FRML MDRD: >90 ML/MIN/1.7M2
GLUCOSE SERPL-MCNC: 101 MG/DL (ref 70–99)
GLUCOSE UR STRIP-MCNC: NEGATIVE MG/DL
HCT VFR BLD AUTO: 42.8 % (ref 40–53)
HCT VFR BLD AUTO: 46.8 % (ref 40–53)
HGB BLD-MCNC: 14.9 G/DL (ref 13.3–17.7)
HGB BLD-MCNC: 15.8 G/DL (ref 13.3–17.7)
HGB UR QL STRIP: ABNORMAL
IMM GRANULOCYTES # BLD: 0 10E9/L (ref 0–0.4)
IMM GRANULOCYTES # BLD: 0 10E9/L (ref 0–0.4)
IMM GRANULOCYTES NFR BLD: 0.3 %
IMM GRANULOCYTES NFR BLD: 0.5 %
INR PPP: 1 (ref 0.86–1.14)
INR PPP: NORMAL (ref 0.86–1.14)
KETONES UR STRIP-MCNC: NEGATIVE MG/DL
LEUKOCYTE ESTERASE UR QL STRIP: NEGATIVE
LYMPHOCYTES # BLD AUTO: 0.8 10E9/L (ref 0.8–5.3)
LYMPHOCYTES # BLD AUTO: 0.9 10E9/L (ref 0.8–5.3)
LYMPHOCYTES NFR BLD AUTO: 11.9 %
LYMPHOCYTES NFR BLD AUTO: 13.2 %
MCH RBC QN AUTO: 34.6 PG (ref 26.5–33)
MCH RBC QN AUTO: 34.7 PG (ref 26.5–33)
MCHC RBC AUTO-ENTMCNC: 33.8 G/DL (ref 31.5–36.5)
MCHC RBC AUTO-ENTMCNC: 34.8 G/DL (ref 31.5–36.5)
MCV RBC AUTO: 100 FL (ref 78–100)
MCV RBC AUTO: 102 FL (ref 78–100)
MONOCYTES # BLD AUTO: 0.5 10E9/L (ref 0–1.3)
MONOCYTES # BLD AUTO: 0.7 10E9/L (ref 0–1.3)
MONOCYTES NFR BLD AUTO: 8.4 %
MONOCYTES NFR BLD AUTO: 9.4 %
NEUTROPHILS # BLD AUTO: 4.6 10E9/L (ref 1.6–8.3)
NEUTROPHILS # BLD AUTO: 5.5 10E9/L (ref 1.6–8.3)
NEUTROPHILS NFR BLD AUTO: 76.8 %
NEUTROPHILS NFR BLD AUTO: 77.4 %
NITRATE UR QL: NEGATIVE
PH UR STRIP: 7 PH (ref 5–7)
PLATELET # BLD AUTO: 145 10E9/L (ref 150–450)
PLATELET # BLD AUTO: 187 10E9/L (ref 150–450)
POTASSIUM SERPL-SCNC: 4.3 MMOL/L (ref 3.4–5.3)
PROT SERPL-MCNC: 8.2 G/DL (ref 6.8–8.8)
RBC # BLD AUTO: 4.3 10E12/L (ref 4.4–5.9)
RBC # BLD AUTO: 4.57 10E12/L (ref 4.4–5.9)
RBC #/AREA URNS AUTO: 41 /HPF (ref 0–2)
SODIUM SERPL-SCNC: 132 MMOL/L (ref 133–144)
SOURCE: ABNORMAL
SP GR UR STRIP: 1.01 (ref 1–1.03)
UROBILINOGEN UR STRIP-MCNC: NORMAL MG/DL (ref 0–2)
WBC # BLD AUTO: 6.1 10E9/L (ref 4–11)
WBC # BLD AUTO: 7.1 10E9/L (ref 4–11)
WBC #/AREA URNS AUTO: 0 /HPF (ref 0–2)

## 2017-10-03 PROCEDURE — 87086 URINE CULTURE/COLONY COUNT: CPT | Performed by: EMERGENCY MEDICINE

## 2017-10-03 PROCEDURE — 99284 EMERGENCY DEPT VISIT MOD MDM: CPT | Performed by: EMERGENCY MEDICINE

## 2017-10-03 PROCEDURE — 76770 US EXAM ABDO BACK WALL COMP: CPT

## 2017-10-03 PROCEDURE — 80053 COMPREHEN METABOLIC PANEL: CPT | Performed by: EMERGENCY MEDICINE

## 2017-10-03 PROCEDURE — 85025 COMPLETE CBC W/AUTO DIFF WBC: CPT | Performed by: EMERGENCY MEDICINE

## 2017-10-03 PROCEDURE — 85610 PROTHROMBIN TIME: CPT | Performed by: EMERGENCY MEDICINE

## 2017-10-03 PROCEDURE — 85730 THROMBOPLASTIN TIME PARTIAL: CPT | Performed by: EMERGENCY MEDICINE

## 2017-10-03 PROCEDURE — 99284 EMERGENCY DEPT VISIT MOD MDM: CPT | Mod: 25

## 2017-10-03 PROCEDURE — 81001 URINALYSIS AUTO W/SCOPE: CPT | Performed by: EMERGENCY MEDICINE

## 2017-10-03 RX ORDER — HYDROCODONE BITARTRATE AND ACETAMINOPHEN 5; 325 MG/1; MG/1
1-2 TABLET ORAL EVERY 4 HOURS PRN
Qty: 15 TABLET | Refills: 0 | Status: SHIPPED | OUTPATIENT
Start: 2017-10-03 | End: 2019-12-31

## 2017-10-03 RX ORDER — CIPROFLOXACIN 500 MG/1
500 TABLET, FILM COATED ORAL 2 TIMES DAILY
Qty: 14 TABLET | Refills: 0 | Status: SHIPPED | OUTPATIENT
Start: 2017-10-03 | End: 2017-10-10

## 2017-10-03 RX ORDER — PHENAZOPYRIDINE HYDROCHLORIDE 200 MG/1
200 TABLET, FILM COATED ORAL 3 TIMES DAILY
Qty: 15 TABLET | Refills: 0 | Status: SHIPPED | OUTPATIENT
Start: 2017-10-03 | End: 2017-10-08

## 2017-10-03 NOTE — ED AVS SNAPSHOT
[facility] Emergency Department    [address] [address]    Phone:  [phone]    Fax:  [phone]                                       Roge Agarwal   MRN: [id number]    Department:  [facility] Emergency Department   Date of Visit:  10/3/2017           Patient Information     Date Of Birth          1960        Your diagnoses for this visit were:     Gross hematuria        You were seen by Caden Vanessa MD and Uday Hsu MD.      Follow-up Information     Schedule an appointment as soon as possible for a visit with [facility].    Specialty:  Urology    Contact information:    [address] [place]  [facility] [address] [phone]    Additional information:    The medical center is located at   [address] [place] [address] (between [address] in [place], [place] miles north   of [place]).        Schedule an appointment as soon as possible for a visit with Primary care clinic.        Discharge Instructions         Blood in the Urine    Blood in the urine (hematuria) has many possible causes. If it occurs after an injury (such as a car accident or fall), it is most often a sign of bruising to the kidney or bladder. Common causes of blood in the urine include urinary tract infections, kidney stones, inflammation, tumors, or certain other diseases of the kidney or bladder. Menstruation can cause blood to appear in the urine sample, although it is not coming from the urinary tract.  If only a trace amount of blood is present, it will show up on the urine test, even though the urine may be yellow and not pink or red. This may occur with any of the above conditions, as well as heavy exercise or high fever. In this case, your doctor may want to repeat the urine test on another day. This will show if the blood is still present. If it is, then other tests can be done to find out the cause.  Home care  Follow these home care guidelines:    If your  urine does not appear bloody (pink, brown or red) then you do not need to restrict your activity in any way.    If you can see blood in your urine, rest and avoid heavy exertion until your next exam. Do not use aspirin, blood thinners, or anti-platelet or anti-inflammatory medicines. These include ibuprofen and naproxen. These thin the blood and may increase bleeding.  Follow-up care  Follow up with your healthcare provider, or as advised. If you were injured and had blood in your urine, you should have a repeat urine test in 1 to 2 days. Contact your doctor for this test.  A radiologist will review any X-rays that were taken. You will be told of any new findings that may affect your care.  When to seek medical advice  Call your healthcare provider right away if any of these occur:    Bright red blood or blood clots in the urine (if you did not have this before)    Weakness, dizziness or fainting    New groin, abdominal, or back pain    Fever of 100.4 F (38 C) or higher, or as directed by your healthcare provider    Repeated vomiting    Bleeding from the nose or gums or easy bruising  Date Last Reviewed: 9/1/2016 2000-2017 [email or web address]. [address] [place] [address]. All rights reserved. This information is not intended as a substitute for professional medical care. Always follow your healthcare professional's instructions.          Hematuria: Possible Causes     Many things can lead to blood in the urine (hematuria). The blood may be seen with the eye (macroscopic or gross hematuria). Or it may only be seen when the urine is looked at under a microscope (microscopic hematuria). Some of the most common causes of blood in the urine are listed below. Often, no cause for the blood can be found. This is called idiopathic hematuria.    Kidney or bladder stones are collections of crystals. They form in the urine. Stones may be found anywhere in the urinary tract. But they form most often in the  kidneys or bladder. In addition to blood in the urine, they can cause severe pain.    BPH stands for benign prostatic hyperplasia. It is enlargement of the prostate gland. It happens as men age. BPH often causes problems with urination. It sometimes causes blood in the urine.    A urinary tract infection (UTI) is due to bacteria growing in the urinary tract. It can cause blood in the urine. Other symptoms include burning or pain with urination. You may need to urinate often or urgently. You may also have a fever.    Damage to the urinary tract may cause blood in the urine. This damage may be due to a blow or accident. It may also result from the use of a urinary catheter. Very hard exercise may sometimes irritate the urinary tract and cause bleeding.    Cancer may occur anywhere in the urinary tract. A tumor may sometimes cause no symptoms other than bleeding.     Other possible causes of bleeding include:    Prostatitis (infection of the prostate gland)    Taking anticoagulants    Blockage in the urinary tract    Disease or inflammation of the kidney    Cystic diseases of the kidneys    Sickle cell anemia    Vigorous exercise    Endometriosis  Date Last Reviewed: 12/1/2016 2000-2017 [employer]. [address] [place] [address]. All rights reserved. This information is not intended as a substitute for professional medical care. Always follow your healthcare professional's instructions.          Anatomy of the Male Urinary Tract  Your urinary tract helps to get rid of your body s liquid waste. The kidneys constantly filter the blood to collect unneeded chemicals and water, making urine. Urine travels through the ureters to the bladder. The bladder fills with urine, holding it until you re ready to release it. Signals from the brain tell the sphincter (muscles around the opening of the bladder) when to let urine flow out of the bladder. The urethra is the tube that carries urine from the  bladder out of the body. In men, the prostate gland wraps around the urethra near the bladder.     Front view of male urinary tract.     Date Last Reviewed: 1/1/2017 2000-2017 [employer]. [address] [place] [address], [place] [address]. All rights reserved. This information is not intended as a substitute for professional medical care. Always follow your healthcare professional's instructions.          What is Hematuria?  Blood in your urine is a condition known as hematuria. Most of the time, the cause of hematuria is not serious. But, never ignore blood in the urine. Your doctor can evaluate you to find the cause of the bleeding and treat it, if needed.  Types of hematuria    Gross hematuria means that the blood can be seen by the naked eye. The urine may look pinkish, brownish, or bright red.    Microscopic hematuria means that the urine is clear, but blood cells can be seen when urine is looked at under a microscope or tested in a lab.  Both types of hematuria can have the same causes. Neither one is necessarily more serious than the other. With either type, you may have other symptoms, such as pain, pressure, or burning when you urinate, abdominal pain, or back pain. Or, you may not have any other symptoms. No matter how much blood is found, the cause of the bleeding needs to be identified.  Finding the cause of hematuria  To evaluate your condition, your doctor will first confirm that blood is indeed present. Then other tests are done to pinpoint where the blood is coming from and why. Your doctor will decide which tests will best determine the cause of your hematuria. Some common tests are listed below.    History and physical exam    Lab tests may include urinalysis, a urine culture, a urine cytology, and various blood tests    Cystoscopy    Computed tomography (CT) or CT urography    Magnetic resonance imaging (MRI) or MR urography    Ultrasound of the kidney    Kidney biopsy  Causes of  hematuria include the very benign (exercised induced hematuria) to the very severe (cancer of the urinary system). A variety of treatments are available depending on the cause.  Date Last Reviewed: 12/2/2016 2000-2017 [employer]. [address] [place] [address], [place] [address]. All rights reserved. This information is not intended as a substitute for professional medical care. Always follow your healthcare professional's instructions.          Discharge References/Attachments     BLADDER INFECTION, MALE (ADULT) (ENGLISH)      24 Hour Appointment Hotline       To make an appointment at any [facility], call [phone] ([phone]). If you don't have a family doctor or clinic, we will help you find one. [facility] clinics are conveniently located to serve the needs of you and your family.             Review of your medicines      START taking        Dose / Directions Last dose taken    ciprofloxacin 500 MG tablet   Commonly known as:  CIPRO   Dose:  500 mg   Quantity:  14 tablet        Take 1 tablet (500 mg) by mouth 2 times daily for 7 days   Refills:  0        phenazopyridine 200 MG tablet   Commonly known as:  PYRIDIUM   Dose:  200 mg   Quantity:  15 tablet        Take 1 tablet (200 mg) by mouth 3 times daily for 15 doses   Refills:  0          Our records show that you are taking the medicines listed below. If these are incorrect, please call your family doctor or clinic.        Dose / Directions Last dose taken    hydrochlorothiazide 25 MG tablet   Commonly known as:  HYDRODIURIL   Dose:  25 mg   Quantity:  30 tablet        Take 1 tablet (25 mg) by mouth daily   Refills:  11        IBUPROFEN PO   Dose:  800 mg        Take 800 mg by mouth once   Refills:  0        losartan 50 MG tablet   Commonly known as:  COZAAR   Dose:  50 mg   Quantity:  30 tablet        Take 1 tablet (50 mg) by mouth daily   Refills:  11        metoprolol 50 MG 24 hr tablet   Commonly known as:  TOPROL-XL   Dose:  50 mg    Quantity:  30 tablet        Take 1 tablet (50 mg) by mouth daily   Refills:  11                Prescriptions were sent or printed at these locations (2 Prescriptions)                   [id number] - [place], [place] - [address] [facility]   [address] [address]    Telephone:  [phone]   Fax:  [phone]   Hours:                  E-Prescribed (2 of 2)         ciprofloxacin (CIPRO) 500 MG tablet               phenazopyridine (PYRIDIUM) 200 MG tablet                Procedures and tests performed during your visit     Procedure/Test Number of Times Performed    CBC with platelets, differential 2    Comprehensive metabolic panel 1    INR 2    PTT 2    Retroperitoneal US 1    UA reflex to Microscopic 1    Urine Culture 1      Orders Needing Specimen Collection     None      Pending Results     Date and Time Order Name Status Description    10/3/2017 1225 Urine Culture In process             Pending Culture Results     Date and Time Order Name Status Description    10/3/2017 1225 Urine Culture In process             Pending Results Instructions     If you had any lab results that were not finalized at the time of your Discharge, you can call the ED Lab Result RN at [phone]. You will be contacted by this team for any positive Lab results or changes in treatment. The nurses are available 7 days a week from 10A to 6:30P.  You can leave a message 24 hours per day and they will return your call.        Test Results From Your Hospital Stay        10/3/2017 12:19 PM      Component Results     Component Value Ref Range & Units Status    Color Urine Yellow  Final    Appearance Urine Clear  Final    Glucose Urine Negative NEG^Negative mg/dL Final    Bilirubin Urine Negative NEG^Negative Final    Ketones Urine Negative NEG^Negative mg/dL Final    Specific Gravity Urine 1.008 1.003 - 1.035 Final    Blood Urine Moderate (A) NEG^Negative Final    pH Urine 7.0 5.0 - 7.0 pH Final    Protein Albumin  Urine Negative NEG^Negative mg/dL Final    Urobilinogen mg/dL Normal 0.0 - 2.0 mg/dL Final    Nitrite Urine Negative NEG^Negative Final    Leukocyte Esterase Urine Negative NEG^Negative Final    Source Midstream Urine  Final    RBC Urine 41 (H) 0 - 2 /HPF Final    WBC Urine 0 0 - 2 /HPF Final         10/3/2017  1:14 PM      Component Results     Component Value Ref Range & Units Status    WBC 6.1 4.0 - 11.0 10e9/L Final    RBC Count 4.57 4.4 - 5.9 10e12/L Final    Hemoglobin 15.8 13.3 - 17.7 g/dL Final    Hematocrit 46.8 40.0 - 53.0 % Final     (H) 78 - 100 fl Final    MCH 34.6 (H) 26.5 - 33.0 pg Final    MCHC 33.8 31.5 - 36.5 g/dL Final    RDW 12.6 10.0 - 15.0 % Final    Platelet Count 145 (L) 150 - 450 10e9/L Final    Diff Method Automated Method  Final    % Neutrophils 76.8 % Final    % Lymphocytes 13.2 % Final    % Monocytes 8.4 % Final    % Eosinophils 0.8 % Final    % Basophils 0.3 % Final    % Immature Granulocytes 0.5 % Final    Absolute Neutrophil 4.6 1.6 - 8.3 10e9/L Final    Absolute Lymphocytes 0.8 0.8 - 5.3 10e9/L Final    Absolute Monocytes 0.5 0.0 - 1.3 10e9/L Final    Absolute Eosinophils 0.1 0.0 - 0.7 10e9/L Final    Absolute Basophils 0.0 0.0 - 0.2 10e9/L Final    Abs Immature Granulocytes 0.0 0 - 0.4 10e9/L Final         10/3/2017  1:28 PM      Component Results     Component Value Ref Range & Units Status    Sodium 132 (L) 133 - 144 mmol/L Final    Potassium 4.3 3.4 - 5.3 mmol/L Final    Specimen slightly hemolyzed, potassium may be falsely elevated    Chloride 99 94 - 109 mmol/L Final    Carbon Dioxide 27 20 - 32 mmol/L Final    Anion Gap 6 3 - 14 mmol/L Final    Glucose 101 (H) 70 - 99 mg/dL Final    Urea Nitrogen 15 7 - 30 mg/dL Final    Creatinine 0.85 0.66 - 1.25 mg/dL Final    GFR Estimate >90 >60 mL/min/1.7m2 Final    Non  GFR Calc    GFR Estimate If Black >90 >60 mL/min/1.7m2 Final    African American GFR Calc    Calcium 9.1 8.5 - 10.1 mg/dL Final    Bilirubin Total  0.7 0.2 - 1.3 mg/dL Final    Albumin 3.9 3.4 - 5.0 g/dL Final    Protein Total 8.2 6.8 - 8.8 g/dL Final    Alkaline Phosphatase 62 40 - 150 U/L Final    ALT 50 0 - 70 U/L Final    AST 40 0 - 45 U/L Final         10/3/2017 12:52 PM      Component Results     Component Value Ref Range & Units Status    INR Canceled, Test credited 0.86 - 1.14 Final    Quantity not sufficient         10/3/2017 12:52 PM      Component Results     Component Value Ref Range & Units Status    PTT Canceled, Test credited 22 - 37 sec Final    Quantity not sufficient         10/3/2017 12:33 PM         10/3/2017  1:59 PM      Narrative     US RENAL COMPLETE 10/3/2017 1:55 PM    HISTORY: Back pain. Hematuria.    COMPARISON: None.    FINDINGS: The right kidney measures 11.3 x 7.4 x 6.8 cm with a  cortical thickness of 1.2 cm.  The left kidney measures 11.6 x 6.7 x  5.7cm with a cortical thickness of 1.2 cm. The kidneys are normal in  appearance for the patient's age. There is no hydronephrosis. The  bladder is moderately well distended and shows no gross abnormality.        Impression     IMPRESSION: Normal renal ultrasound for the patient's age.    BAYLEE CASAS MD         10/3/2017  1:40 PM      Component Results     Component Value Ref Range & Units Status    PTT 26 22 - 37 sec Final         10/3/2017  1:40 PM      Component Results     Component Value Ref Range & Units Status    INR 1.00 0.86 - 1.14 Final         10/3/2017  1:37 PM      Component Results     Component Value Ref Range & Units Status    WBC 7.1 4.0 - 11.0 10e9/L Final    RBC Count 4.30 (L) 4.4 - 5.9 10e12/L Final    Hemoglobin 14.9 13.3 - 17.7 g/dL Final    Hematocrit 42.8 40.0 - 53.0 % Final     78 - 100 fl Final    MCH 34.7 (H) 26.5 - 33.0 pg Final    MCHC 34.8 31.5 - 36.5 g/dL Final    RDW 12.2 10.0 - 15.0 % Final    Platelet Count 187 150 - 450 10e9/L Final    Diff Method Automated Method  Final    % Neutrophils 77.4 % Final    % Lymphocytes 11.9 % Final    %  "Monocytes 9.4 % Final    % Eosinophils 0.7 % Final    % Basophils 0.3 % Final    % Immature Granulocytes 0.3 % Final    Absolute Neutrophil 5.5 1.6 - 8.3 10e9/L Final    Absolute Lymphocytes 0.9 0.8 - 5.3 10e9/L Final    Absolute Monocytes 0.7 0.0 - 1.3 10e9/L Final    Absolute Eosinophils 0.1 0.0 - 0.7 10e9/L Final    Absolute Basophils 0.0 0.0 - 0.2 10e9/L Final    Abs Immature Granulocytes 0.0 0 - 0.4 10e9/L Final                Thank you for choosing [facility]       Thank you for choosing [facility] for your care. Our goal is always to provide you with excellent care. Hearing back from our patients is one way we can continue to improve our services. Please take a few minutes to complete the written survey that you may receive in the mail after you visit with us. Thank you!        [facility] Information     [employer] lets you send messages to your doctor, view your test results, renew your prescriptions, schedule appointments and more. To sign up, go to [email or web address] . Click on \"Log in\" on the left side of the screen, which will take you to the Welcome page. Then click on \"Sign up Now\" on the right side of the page.     You will be asked to enter the access code listed below, as well as some personal information. Please follow the directions to create your username and password.     Your access code is: [email or web address]  Expires: 2018  2:00 PM     Your access code will  in 90 days. If you need help or a new code, please call your [facility] clinic or [phone].        Care EveryWhere ID     This is your Care EveryWhere ID. This could be used by other organizations to access your [facility] medical records  [id number]        Equal Access to Services     MARINE CARRENO : Dawn Patel, wamario weiss, qakori gomez, bhavesh myles. [facility] [phone].    ATENCIÓN: Si habla español, tiene a foster disposición servicios gratuitos de asistencia lingüística. " Vahid borrego [phone].    We comply with applicable federal civil rights laws and [place] laws. We do not discriminate on the basis of race, color, national origin, age, disability, sex, sexual orientation, or gender identity.            After Visit Summary       This is your record. Keep this with you and show to your community pharmacist(s) and doctor(s) at your next visit.

## 2017-10-03 NOTE — ED NOTES
"Pt began experiencing bilateral flank pain on Sunday. On Monday he noticed blood in his urine and was having pain with urination. He is also experiencing frequency, urgency and hesitancy although he says this is not new for him. Pt takes \"a green natural pill\" for the urinate frequency.   "

## 2017-10-03 NOTE — ED PROVIDER NOTES
History     Chief Complaint   Patient presents with     Hematuria     bilat flank pain Sunday, blood in urine     HPI  History is obtained through the patient, daughter and review of previous records.   Roge Agarwal is a 57 year old male who presents to the Emergency Deparment for concerns of hematuria and low back pain. Patient developed mild intermittent, dull, achy, bilateral low back pain two nights ago. The pain was severe enough to keep him up at night Sunday night. Yesterday, patient developed intermittent hematuria. There is associated dysuria, and patient reports chronic urinary frequency, urgency and hesitancy. Patient's back pain was absent this morning, but was recurrent when he urinated in the ED. He denies lower abdominal pain. He denies fever, chills, cough, sore throat, diarrhea, constipation, vomiting or rash. Patient denies history of hematuria. He reports no family history of kidney, bladder or prostate cancer. Patient reports positive family history of kidney stones, daughter.     Previous Records Reviewed:   Patient seen 5/5/17 for nosebleeds with normal CBC and INR. Patient seen 12/9/16 for dysequilibrium and his workup included normal INR and PTT.     I have reviewed the Medications, Allergies, Past Medical and Surgical History, and Social History in the Epic system.    Patient Active Problem List   Diagnosis     Alcohol abuse     Essential hypertension     Cerebral hemorrhage (H)     Tobacco use     Multiple-type hyperlipidemia     Atrial fibrillation (H)     Systolic CHF (H)       Current Outpatient Prescriptions   Medication Sig Dispense Refill     ciprofloxacin (CIPRO) 500 MG tablet Take 1 tablet (500 mg) by mouth 2 times daily for 7 days 14 tablet 0     phenazopyridine (PYRIDIUM) 200 MG tablet Take 1 tablet (200 mg) by mouth 3 times daily for 15 doses 15 tablet 0     HYDROcodone-acetaminophen (NORCO) 5-325 MG per tablet Take 1-2 tablets by mouth every 4 hours as needed for moderate to  severe pain 15 tablet 0     losartan (COZAAR) 50 MG tablet Take 1 tablet (50 mg) by mouth daily 30 tablet 11     metoprolol (TOPROL-XL) 50 MG 24 hr tablet Take 1 tablet (50 mg) by mouth daily 30 tablet 11     hydrochlorothiazide (HYDRODIURIL) 25 MG tablet Take 1 tablet (25 mg) by mouth daily 30 tablet 11     IBUPROFEN PO Take 800 mg by mouth once         No Known Allergies    Social History   Substance Use Topics     Smoking status: Never Smoker     Smokeless tobacco: Current User     Types: Chew     Alcohol use Not on file       Review of Systems  As mentioned above in the history present illness. All other systems were reviewed and are negative.    Physical Exam   BP: (!) 186/119  Pulse: 74  RR: 16  Temp: 98.1  F (36.7  C)  Weight: 98.9 kg (218 lb)  SpO2: 98 %    Physical Exam   Constitutional: He is oriented to person, place, and time. He appears well-developed and well-nourished. No distress.   HENT:   Head: Normocephalic and atraumatic.   Mouth/Throat: Oropharynx is clear and moist.   Eyes: Conjunctivae and EOM are normal. No scleral icterus.   Neck: Normal range of motion. Neck supple.   Cardiovascular: Normal rate, regular rhythm and normal heart sounds.  Exam reveals no gallop and no friction rub.    No murmur heard.  Pulmonary/Chest: Effort normal and breath sounds normal. No respiratory distress. He has no wheezes. He has no rales.   Abdominal: Soft. Bowel sounds are normal. He exhibits no distension. There is no tenderness. There is no CVA tenderness.   Musculoskeletal: Normal range of motion. He exhibits no edema.   Neurological: He is alert and oriented to person, place, and time.   Skin: Skin is warm and dry. No rash noted. He is not diaphoretic. No erythema. No pallor.   Psychiatric: He has a normal mood and affect. His behavior is normal.   Nursing note and vitals reviewed.      ED Course     ED Course     Procedures         Results for orders placed or performed during the hospital encounter of  10/03/17   Retroperitoneal US    Narrative    US RENAL COMPLETE 10/3/2017 1:55 PM    HISTORY: Back pain. Hematuria.    COMPARISON: None.    FINDINGS: The right kidney measures 11.3 x 7.4 x 6.8 cm with a  cortical thickness of 1.2 cm.  The left kidney measures 11.6 x 6.7 x  5.7cm with a cortical thickness of 1.2 cm. The kidneys are normal in  appearance for the patient's age. There is no hydronephrosis. The  bladder is moderately well distended and shows no gross abnormality.      Impression    IMPRESSION: Normal renal ultrasound for the patient's age.    BAYLEE CASAS MD   UA reflex to Microscopic   Result Value Ref Range    Color Urine Yellow     Appearance Urine Clear     Glucose Urine Negative NEG^Negative mg/dL    Bilirubin Urine Negative NEG^Negative    Ketones Urine Negative NEG^Negative mg/dL    Specific Gravity Urine 1.008 1.003 - 1.035    Blood Urine Moderate (A) NEG^Negative    pH Urine 7.0 5.0 - 7.0 pH    Protein Albumin Urine Negative NEG^Negative mg/dL    Urobilinogen mg/dL Normal 0.0 - 2.0 mg/dL    Nitrite Urine Negative NEG^Negative    Leukocyte Esterase Urine Negative NEG^Negative    Source Midstream Urine     RBC Urine 41 (H) 0 - 2 /HPF    WBC Urine 0 0 - 2 /HPF   CBC with platelets, differential   Result Value Ref Range    WBC 6.1 4.0 - 11.0 10e9/L    RBC Count 4.57 4.4 - 5.9 10e12/L    Hemoglobin 15.8 13.3 - 17.7 g/dL    Hematocrit 46.8 40.0 - 53.0 %     (H) 78 - 100 fl    MCH 34.6 (H) 26.5 - 33.0 pg    MCHC 33.8 31.5 - 36.5 g/dL    RDW 12.6 10.0 - 15.0 %    Platelet Count 145 (L) 150 - 450 10e9/L    Diff Method Automated Method     % Neutrophils 76.8 %    % Lymphocytes 13.2 %    % Monocytes 8.4 %    % Eosinophils 0.8 %    % Basophils 0.3 %    % Immature Granulocytes 0.5 %    Absolute Neutrophil 4.6 1.6 - 8.3 10e9/L    Absolute Lymphocytes 0.8 0.8 - 5.3 10e9/L    Absolute Monocytes 0.5 0.0 - 1.3 10e9/L    Absolute Eosinophils 0.1 0.0 - 0.7 10e9/L    Absolute Basophils 0.0 0.0 - 0.2 10e9/L     Abs Immature Granulocytes 0.0 0 - 0.4 10e9/L   Comprehensive metabolic panel   Result Value Ref Range    Sodium 132 (L) 133 - 144 mmol/L    Potassium 4.3 3.4 - 5.3 mmol/L    Chloride 99 94 - 109 mmol/L    Carbon Dioxide 27 20 - 32 mmol/L    Anion Gap 6 3 - 14 mmol/L    Glucose 101 (H) 70 - 99 mg/dL    Urea Nitrogen 15 7 - 30 mg/dL    Creatinine 0.85 0.66 - 1.25 mg/dL    GFR Estimate >90 >60 mL/min/1.7m2    GFR Estimate If Black >90 >60 mL/min/1.7m2    Calcium 9.1 8.5 - 10.1 mg/dL    Bilirubin Total 0.7 0.2 - 1.3 mg/dL    Albumin 3.9 3.4 - 5.0 g/dL    Protein Total 8.2 6.8 - 8.8 g/dL    Alkaline Phosphatase 62 40 - 150 U/L    ALT 50 0 - 70 U/L    AST 40 0 - 45 U/L   INR   Result Value Ref Range    INR Canceled, Test credited 0.86 - 1.14   PTT   Result Value Ref Range    PTT Canceled, Test credited 22 - 37 sec   PTT   Result Value Ref Range    PTT 26 22 - 37 sec   INR   Result Value Ref Range    INR 1.00 0.86 - 1.14   CBC with platelets, differential   Result Value Ref Range    WBC 7.1 4.0 - 11.0 10e9/L    RBC Count 4.30 (L) 4.4 - 5.9 10e12/L    Hemoglobin 14.9 13.3 - 17.7 g/dL    Hematocrit 42.8 40.0 - 53.0 %     78 - 100 fl    MCH 34.7 (H) 26.5 - 33.0 pg    MCHC 34.8 31.5 - 36.5 g/dL    RDW 12.2 10.0 - 15.0 %    Platelet Count 187 150 - 450 10e9/L    Diff Method Automated Method     % Neutrophils 77.4 %    % Lymphocytes 11.9 %    % Monocytes 9.4 %    % Eosinophils 0.7 %    % Basophils 0.3 %    % Immature Granulocytes 0.3 %    Absolute Neutrophil 5.5 1.6 - 8.3 10e9/L    Absolute Lymphocytes 0.9 0.8 - 5.3 10e9/L    Absolute Monocytes 0.7 0.0 - 1.3 10e9/L    Absolute Eosinophils 0.1 0.0 - 0.7 10e9/L    Absolute Basophils 0.0 0.0 - 0.2 10e9/L    Abs Immature Granulocytes 0.0 0 - 0.4 10e9/L   Urine Culture   Result Value Ref Range    Specimen Description Unspecified Urine     Special Requests Specimen received in preservative     Culture Micro No growth           Medications - No data to display      Assessments &  Plan (with Medical Decision Making)   57-year-old male with gross hematuria with dysuria which is probably secondary to the hematuria itself.  Doubt UTI.  Chronic symptoms suggestive of BPH/prostatic hypertrophy.  No CVA tenderness and negative renal ultrasound study today.  Laboratory evaluation was unremarkable. No coagulopathy.  Do not feel that CT imaging is emergently indicated and he appears stable for discharge home with Urology clinic follow-up.  Doubt kidney stone or pyelonephritis.  I will rx prophylactic Cipro x 7 days (and Pyridium for comfort) for possible hemorrhagic cystitis. Rx Norco to use for low pain refractory to NSAID.  I recommended primary care clinic follow-up in the next week to review urine culture results and to facilitate his further workup and follow-up.  Patient was provided instructions for supportive care and will return as needed for worsened condition or worsening symptoms, or new problems or concerns.    I have reviewed the nursing notes.    I have reviewed the findings, diagnosis, plan and need for follow up with the patient.      Discharge Medication List as of 10/3/2017  2:04 PM      START taking these medications    Details   ciprofloxacin (CIPRO) 500 MG tablet Take 1 tablet (500 mg) by mouth 2 times daily for 7 days, Disp-14 tablet, R-0, E-Prescribe      phenazopyridine (PYRIDIUM) 200 MG tablet Take 1 tablet (200 mg) by mouth 3 times daily for 15 doses, Disp-15 tablet, R-0, E-Prescribe             Final diagnoses:   Gross hematuria     This document serves as a record of the services and decisions personally performed and made by Uday Hsu MD. It was created on his behalf by Yasmeen Avila, a trained medical scribe. The creation of this document is based the provider's statements to the medical scribe.  Yasmeen Avila 12:22 PM 10/3/2017    Provider:   The information in this document, created by the medical scribe for me, accurately reflects the services I  personally performed and the decisions made by me. I have reviewed and approved this document for accuracy prior to leaving the patient care area.  Uday Hsu MD 12:22 PM 10/3/2017    10/3/2017   [facility] EMERGENCY DEPARTMENT     Uday Hsu MD  10/06/17 [id number]

## 2017-10-03 NOTE — ED NOTES
Pt started having some pain in both flanks on Sunday.  Noticed blood tinge to urine.  Pain kept him up on Sunday night.  Pain tolerable right now.  Drinking normal amount of fluids.  Eating as normal for him.  Pt wouldn't be here except he had blood in his urine.  Doesn't always see blood in urine.

## 2017-10-03 NOTE — ED AVS SNAPSHOT
[facility] Emergency Department    [address] [address]    Phone:  [phone]    Fax:  [phone]                                       Roge Agarwal   MRN: [id number]    Department:  [facility] Emergency Department   Date of Visit:  10/3/2017           After Visit Summary Signature Page     I have received my discharge instructions, and my questions have been answered. I have discussed any challenges I see with this plan with the nurse or doctor.    ..........................................................................................................................................  Patient/Patient Representative Signature      ..........................................................................................................................................  Patient Representative Print Name and Relationship to Patient    ..................................................               ................................................  Date                                            Time    ..........................................................................................................................................  Reviewed by Signature/Title    ...................................................              ..............................................  Date                                                            Time

## 2017-10-03 NOTE — DISCHARGE INSTRUCTIONS
Blood in the Urine    Blood in the urine (hematuria) has many possible causes. If it occurs after an injury (such as a car accident or fall), it is most often a sign of bruising to the kidney or bladder. Common causes of blood in the urine include urinary tract infections, kidney stones, inflammation, tumors, or certain other diseases of the kidney or bladder. Menstruation can cause blood to appear in the urine sample, although it is not coming from the urinary tract.  If only a trace amount of blood is present, it will show up on the urine test, even though the urine may be yellow and not pink or red. This may occur with any of the above conditions, as well as heavy exercise or high fever. In this case, your doctor may want to repeat the urine test on another day. This will show if the blood is still present. If it is, then other tests can be done to find out the cause.  Home care  Follow these home care guidelines:    If your urine does not appear bloody (pink, brown or red) then you do not need to restrict your activity in any way.    If you can see blood in your urine, rest and avoid heavy exertion until your next exam. Do not use aspirin, blood thinners, or anti-platelet or anti-inflammatory medicines. These include ibuprofen and naproxen. These thin the blood and may increase bleeding.  Follow-up care  Follow up with your healthcare provider, or as advised. If you were injured and had blood in your urine, you should have a repeat urine test in 1 to 2 days. Contact your doctor for this test.  A radiologist will review any X-rays that were taken. You will be told of any new findings that may affect your care.  When to seek medical advice  Call your healthcare provider right away if any of these occur:    Bright red blood or blood clots in the urine (if you did not have this before)    Weakness, dizziness or fainting    New groin, abdominal, or back pain    Fever of 100.4 F (38 C) or higher, or as directed by  your healthcare provider    Repeated vomiting    Bleeding from the nose or gums or easy bruising  Date Last Reviewed: 9/1/2016 2000-2017 [employer]. [address] [place] [address], [place] [address]. All rights reserved. This information is not intended as a substitute for professional medical care. Always follow your healthcare professional's instructions.          Hematuria: Possible Causes     Many things can lead to blood in the urine (hematuria). The blood may be seen with the eye (macroscopic or gross hematuria). Or it may only be seen when the urine is looked at under a microscope (microscopic hematuria). Some of the most common causes of blood in the urine are listed below. Often, no cause for the blood can be found. This is called idiopathic hematuria.    Kidney or bladder stones are collections of crystals. They form in the urine. Stones may be found anywhere in the urinary tract. But they form most often in the kidneys or bladder. In addition to blood in the urine, they can cause severe pain.    BPH stands for benign prostatic hyperplasia. It is enlargement of the prostate gland. It happens as men age. BPH often causes problems with urination. It sometimes causes blood in the urine.    A urinary tract infection (UTI) is due to bacteria growing in the urinary tract. It can cause blood in the urine. Other symptoms include burning or pain with urination. You may need to urinate often or urgently. You may also have a fever.    Damage to the urinary tract may cause blood in the urine. This damage may be due to a blow or accident. It may also result from the use of a urinary catheter. Very hard exercise may sometimes irritate the urinary tract and cause bleeding.    Cancer may occur anywhere in the urinary tract. A tumor may sometimes cause no symptoms other than bleeding.     Other possible causes of bleeding include:    Prostatitis (infection of the prostate gland)    Taking  anticoagulants    Blockage in the urinary tract    Disease or inflammation of the kidney    Cystic diseases of the kidneys    Sickle cell anemia    Vigorous exercise    Endometriosis  Date Last Reviewed: 12/1/2016 2000-2017 [employer]. [address] [place] [address]. All rights reserved. This information is not intended as a substitute for professional medical care. Always follow your healthcare professional's instructions.          Anatomy of the Male Urinary Tract  Your urinary tract helps to get rid of your body s liquid waste. The kidneys constantly filter the blood to collect unneeded chemicals and water, making urine. Urine travels through the ureters to the bladder. The bladder fills with urine, holding it until you re ready to release it. Signals from the brain tell the sphincter (muscles around the opening of the bladder) when to let urine flow out of the bladder. The urethra is the tube that carries urine from the bladder out of the body. In men, the prostate gland wraps around the urethra near the bladder.     Front view of male urinary tract.     Date Last Reviewed: 1/1/2017 2000-2017 [employer]. [address] [place] [address]. All rights reserved. This information is not intended as a substitute for professional medical care. Always follow your healthcare professional's instructions.          What is Hematuria?  Blood in your urine is a condition known as hematuria. Most of the time, the cause of hematuria is not serious. But, never ignore blood in the urine. Your doctor can evaluate you to find the cause of the bleeding and treat it, if needed.  Types of hematuria    Gross hematuria means that the blood can be seen by the naked eye. The urine may look pinkish, brownish, or bright red.    Microscopic hematuria means that the urine is clear, but blood cells can be seen when urine is looked at under a microscope or tested in a lab.  Both types of  hematuria can have the same causes. Neither one is necessarily more serious than the other. With either type, you may have other symptoms, such as pain, pressure, or burning when you urinate, abdominal pain, or back pain. Or, you may not have any other symptoms. No matter how much blood is found, the cause of the bleeding needs to be identified.  Finding the cause of hematuria  To evaluate your condition, your doctor will first confirm that blood is indeed present. Then other tests are done to pinpoint where the blood is coming from and why. Your doctor will decide which tests will best determine the cause of your hematuria. Some common tests are listed below.    History and physical exam    Lab tests may include urinalysis, a urine culture, a urine cytology, and various blood tests    Cystoscopy    Computed tomography (CT) or CT urography    Magnetic resonance imaging (MRI) or MR urography    Ultrasound of the kidney    Kidney biopsy  Causes of hematuria include the very benign (exercised induced hematuria) to the very severe (cancer of the urinary system). A variety of treatments are available depending on the cause.  Date Last Reviewed: 12/2/2016 2000-2017 [employer]. [address] [place] [address], [place] [address]. All rights reserved. This information is not intended as a substitute for professional medical care. Always follow your healthcare professional's instructions.

## 2017-10-04 LAB
BACTERIA SPEC CULT: NO GROWTH
Lab: NORMAL
SPECIMEN SOURCE: NORMAL

## 2017-10-05 ENCOUNTER — HOSPITAL ENCOUNTER (EMERGENCY)
Facility: CLINIC | Age: 57
Discharge: HOME | End: 2017-10-05
Attending: FAMILY MEDICINE | Admitting: FAMILY MEDICINE

## 2017-10-05 ENCOUNTER — TELEPHONE (OUTPATIENT)
Dept: UROLOGY | Facility: CLINIC | Age: 57
End: 2017-10-05

## 2017-10-05 ENCOUNTER — APPOINTMENT (OUTPATIENT)
Dept: CT IMAGING | Facility: CLINIC | Age: 57
End: 2017-10-05
Attending: FAMILY MEDICINE

## 2017-10-05 VITALS
WEIGHT: 213 LBS | TEMPERATURE: 98.7 F | RESPIRATION RATE: 16 BRPM | HEIGHT: 73 IN | BODY MASS INDEX: 28.23 KG/M2 | DIASTOLIC BLOOD PRESSURE: 91 MMHG | SYSTOLIC BLOOD PRESSURE: 158 MMHG | OXYGEN SATURATION: 92 %

## 2017-10-05 DIAGNOSIS — N32.89 BLADDER MASS: ICD-10-CM

## 2017-10-05 DIAGNOSIS — N28.89 KIDNEY MASS: Primary | ICD-10-CM

## 2017-10-05 DIAGNOSIS — R31.9 HEMATURIA, UNSPECIFIED TYPE: ICD-10-CM

## 2017-10-05 DIAGNOSIS — N28.89 RENAL MASS: ICD-10-CM

## 2017-10-05 LAB
ALBUMIN UR-MCNC: NEGATIVE MG/DL
APPEARANCE UR: CLEAR
BILIRUB UR QL STRIP: NEGATIVE
COLOR UR AUTO: ABNORMAL
GLUCOSE UR STRIP-MCNC: NEGATIVE MG/DL
HGB UR QL STRIP: ABNORMAL
KETONES UR STRIP-MCNC: NEGATIVE MG/DL
LEUKOCYTE ESTERASE UR QL STRIP: NEGATIVE
MUCOUS THREADS #/AREA URNS LPF: PRESENT /LPF
NITRATE UR QL: NEGATIVE
PH UR STRIP: 6 PH (ref 5–7)
RBC #/AREA URNS AUTO: >182 /HPF (ref 0–2)
SOURCE: ABNORMAL
SP GR UR STRIP: 1.01 (ref 1–1.03)
UROBILINOGEN UR STRIP-MCNC: NORMAL MG/DL (ref 0–2)
WBC #/AREA URNS AUTO: 0 /HPF (ref 0–2)

## 2017-10-05 PROCEDURE — 99284 EMERGENCY DEPT VISIT MOD MDM: CPT | Mod: 25

## 2017-10-05 PROCEDURE — 99285 EMERGENCY DEPT VISIT HI MDM: CPT | Performed by: FAMILY MEDICINE

## 2017-10-05 PROCEDURE — 87086 URINE CULTURE/COLONY COUNT: CPT | Performed by: FAMILY MEDICINE

## 2017-10-05 PROCEDURE — 74176 CT ABD & PELVIS W/O CONTRAST: CPT

## 2017-10-05 PROCEDURE — 81001 URINALYSIS AUTO W/SCOPE: CPT | Performed by: FAMILY MEDICINE

## 2017-10-05 NOTE — DISCHARGE INSTRUCTIONS
Return to the Emergency Room if the following occurs:     Worsened pain, fever >101, or for any concern at anytime.    Or, follow-up with the following provider as we discussed:     Return to urology as discussed/scheduled.    Medications discussed:    None new.  No changes.    If you received pain-relieving or sedating medication during your time in the ER, avoid alcohol, driving automobiles, or working with machinery.  Also, a responsible adult must stay with you.      If you had X-rays or labs done we will attempt to contact you if there is a change needed in your care.      Call the Nurse Advice Line at [phone] or [phone] for any concern at anytime.

## 2017-10-05 NOTE — ED NOTES
Had bilateral flank pain on Sunday. Minimal pain.  Monday noted small amount of blood in urine. Seen on Tuesday, started on antibiotics. Since then has noted increase blood in urine. Having clots in urine. Not on blood thinners, does not take asa.  Minimizes any  flank pain. No abd pain but feels he is bloated. No diarrhea or constipation. No fever.

## 2017-10-05 NOTE — ED AVS SNAPSHOT
[facility] Emergency Department    [address] [address]    Phone:  [phone]    Fax:  [phone]                                       Roge Agarwal   MRN: [id number]    Department:  [facility] Emergency Department   Date of Visit:  10/5/2017           After Visit Summary Signature Page     I have received my discharge instructions, and my questions have been answered. I have discussed any challenges I see with this plan with the nurse or doctor.    ..........................................................................................................................................  Patient/Patient Representative Signature      ..........................................................................................................................................  Patient Representative Print Name and Relationship to Patient    ..................................................               ................................................  Date                                            Time    ..........................................................................................................................................  Reviewed by Signature/Title    ...................................................              ..............................................  Date                                                            Time

## 2017-10-05 NOTE — ED AVS SNAPSHOT
[facility] Emergency Department    [address] [address]    Phone:  [phone]    Fax:  [phone]                                       Roge Agarwal   MRN: [id number]    Department:  [facility] Emergency Department   Date of Visit:  10/5/2017           Patient Information     Date Of Birth          1960        Your diagnoses for this visit were:     Bladder mass     Hematuria, unspecified type     Renal mass        You were seen by Johnny Chacko MD.        Discharge Instructions       Return to the Emergency Room if the following occurs:     Worsened pain, fever >101, or for any concern at anytime.    Or, follow-up with the following provider as we discussed:     Return to urology as discussed/scheduled.    Medications discussed:    None new.  No changes.    If you received pain-relieving or sedating medication during your time in the ER, avoid alcohol, driving automobiles, or working with machinery.  Also, a responsible adult must stay with you.      If you had X-rays or labs done we will attempt to contact you if there is a change needed in your care.      Call the Nurse Advice Line at [phone] or [phone] for any concern at anytime.      24 Hour Appointment Hotline       To make an appointment at any [facility] clinic, call [phone] ([phone]). If you don't have a family doctor or clinic, we will help you find one. [facility] clinics are conveniently located to serve the needs of you and your family.             Review of your medicines      Our records show that you are taking the medicines listed below. If these are incorrect, please call your family doctor or clinic.        Dose / Directions Last dose taken    ciprofloxacin 500 MG tablet   Commonly known as:  CIPRO   Dose:  500 mg   Quantity:  14 tablet        Take 1 tablet (500 mg) by mouth 2 times daily for 7 days   Refills:  0        hydrochlorothiazide 25 MG tablet   Commonly known as:   HYDRODIURIL   Dose:  25 mg   Quantity:  30 tablet        Take 1 tablet (25 mg) by mouth daily   Refills:  11        HYDROcodone-acetaminophen 5-325 MG per tablet   Commonly known as:  NORCO   Dose:  1-2 tablet   Quantity:  15 tablet        Take 1-2 tablets by mouth every 4 hours as needed for moderate to severe pain   Refills:  0        IBUPROFEN PO   Dose:  800 mg        Take 800 mg by mouth once   Refills:  0        losartan 50 MG tablet   Commonly known as:  COZAAR   Dose:  50 mg   Quantity:  30 tablet        Take 1 tablet (50 mg) by mouth daily   Refills:  11        metoprolol 50 MG 24 hr tablet   Commonly known as:  TOPROL-XL   Dose:  50 mg   Quantity:  30 tablet        Take 1 tablet (50 mg) by mouth daily   Refills:  11        phenazopyridine 200 MG tablet   Commonly known as:  PYRIDIUM   Dose:  200 mg   Quantity:  15 tablet        Take 1 tablet (200 mg) by mouth 3 times daily for 15 doses   Refills:  0                Procedures and tests performed during your visit     Abd/pelvis CT - no contrast - Stone Protocol    UA with Microscopic    Urine Culture Aerobic Bacterial      Orders Needing Specimen Collection     None      Pending Results     Date and Time Order Name Status Description    10/5/2017 1114 Urine Culture Aerobic Bacterial In process             Pending Culture Results     Date and Time Order Name Status Description    10/5/2017 1114 Urine Culture Aerobic Bacterial In process             Pending Results Instructions     If you had any lab results that were not finalized at the time of your Discharge, you can call the ED Lab Result RN at [phone]. You will be contacted by this team for any positive Lab results or changes in treatment. The nurses are available 7 days a week from 10A to 6:30P.  You can leave a message 24 hours per day and they will return your call.        Test Results From Your Hospital Stay        10/5/2017 12:26 PM      Narrative     CT ABDOMEN PELVIS W/O CONTRAST 10/5/2017  11:31 AM    HISTORY: Hematuria.    TECHNIQUE: CT imaging of the abdomen and pelvis is performed without  IV contrast.    This study is particularly suited for assessing for urolithiasis or  other pathological calcifications. Abdominal organs, pelvic organs,  bowel, aorta, retroperitoneum, and abdominal wall are also assessed to  the limits of no IV contrast.  Pelvic anatomy is also assessed to the  limits of no IV contrast.    Radiation dose for this scan is reduced using automated exposure  control, adjustment of the mA and/ kV according to patient size, or  iterative reconstruction technique.     COMPARISON:  None.    FINDINGS:    Abdomen: There is a a 4 cm right kidney lesion on axial image 30. A 2  mm calcified fecalith is suggested in the proximal to mid appendix.  The appendix is of normal size with no periappendiceal inflammatory  change. There is no urolithiasis or hydronephrosis.    Pelvis : No bladder calculi are demonstrated. A 3.2 cm right  posterolateral bladder diverticulum is present. On image #74 there is  some vague nodularity within the bladder diverticulum that measures  0.9 cm. Bilateral fat-containing inguinal hernias are present.        Impression     IMPRESSION:    1. Nodularity in a right sided bladder diverticulum could relate to a  mass and/or adherent debris.  2. There is an indeterminant 4 cm right kidney lesion. Correlation  with ultrasound is recommended.    BAYLEE CASAS MD         10/5/2017 11:18 AM         10/5/2017 11:51 AM      Component Results     Component Value Ref Range & Units Status    Color Urine Orange  Final    Appearance Urine Clear  Final    Glucose Urine Negative NEG^Negative mg/dL Final    Bilirubin Urine Negative NEG^Negative Final    Ketones Urine Negative NEG^Negative mg/dL Final    Specific Gravity Urine 1.006 1.003 - 1.035 Final    Blood Urine Moderate (A) NEG^Negative Final    pH Urine 6.0 5.0 - 7.0 pH Final    Protein Albumin Urine Negative NEG^Negative mg/dL  "Final    Urobilinogen mg/dL Normal 0.0 - 2.0 mg/dL Final    Nitrite Urine Negative NEG^Negative Final    Leukocyte Esterase Urine Negative NEG^Negative Final    Source Unspecified Urine  Final    WBC Urine 0 0 - 2 /HPF Final    RBC Urine >182 (H) 0 - 2 /HPF Final    Mucous Urine Present (A) NEG^Negative /LPF Final                Thank you for choosing [facility]       Thank you for choosing [facility] for your care. Our goal is always to provide you with excellent care. Hearing back from our patients is one way we can continue to improve our services. Please take a few minutes to complete the written survey that you may receive in the mail after you visit with us. Thank you!        [employer] Information     [employer] lets you send messages to your doctor, view your test results, renew your prescriptions, schedule appointments and more. To sign up, go to [email or web address] . Click on \"Log in\" on the left side of the screen, which will take you to the Welcome page. Then click on \"Sign up Now\" on the right side of the page.     You will be asked to enter the access code listed below, as well as some personal information. Please follow the directions to create your username and password.     Your access code is: [email or web address]  Expires: 2018  2:00 PM     Your access code will  in 90 days. If you need help or a new code, please call your [facility] clinic or [phone].        Care EveryWhere ID     This is your Care EveryWhere ID. This could be used by other organizations to access your [facility] medical records  [id number]        Equal Access to Services     MARINE CARRENO : Hadii tiarra bankso Soaster, waaxda luqadaha, qaybta kaalmada adebriannayarena, bhavesh myles. So [facility] [phone].    ATENCIÓN: Si habla español, tiene a foster disposición servicios gratuitos de asistencia lingüística. Llame al [phone].    We comply with applicable federal civil rights laws and [place] laws. We do " not discriminate on the basis of race, color, national origin, age, disability, sex, sexual orientation, or gender identity.            After Visit Summary       This is your record. Keep this with you and show to your community pharmacist(s) and doctor(s) at your next visit.

## 2017-10-05 NOTE — TELEPHONE ENCOUNTER
I left a message on pts daughters phone to call our clinic, he needs a ct urogram and a cysto. . jeannine pacheco LPN

## 2017-10-05 NOTE — ED PROVIDER NOTES
"HPI  Patient is a 57-year-old male presenting with persistent/worsened hematuria.  He was seen here in the ED three days ago for similar.  His urinalysis showed obvious blood.  There was no evidence for leukocytosis or other indications of UTI.  However, the patient was having urgency and frequency and some mild burning so ciprofloxacin was prescribed.  He has taken three doses.  No prior history of ureteral stones.  No prior history of seeing a urologist.  He does have some urgency intermittently at baseline.    The patient describes persistent hematuria.  His urine is pink in color.  He has seen small clots.  The clots are new.  He reports persistent low back discomfort.  This is been present since four days ago.  No dysuria.  He does have urgency and mild frequency.  No changes in bowels.  He does feel bloated.  No flank pain described.  No fever.  No nausea or vomiting.  He denies trauma or injury.    ROS: All other review of systems are negative other than that noted above.   PMH: Reviewed.  SH: Reviewed.  FH: Reviewed.      PHYSICAL  BP (!) 194/105  Temp 98.7  F (37.1  C) (Oral)  Resp 16  Ht 1.854 m (6' 1\")  Wt 96.6 kg (213 lb)  SpO2 99%  BMI 28.1 kg/m2  General: Patient is alert and in mild to moderate distress.  Concerned.  Neurological: Alert.  Moving upper and lower extremities equally, bilaterally.  Head / Neck: Atraumatic.  Ears: Not done.  Eyes: Pupils are equal, round, and reactive.  Normal conjunctiva.  Nose: Midline.  No epistaxis.  Mouth / Throat: No ulcerations or lesions.  Upper pharynx is not erythematous.  Moist.  Respiratory: No respiratory distress. CTA B.  Cardiovascular: Regular rhythm.  Peripheral extremities are warm.  No edema.  No calf tenderness.  Abdomen / Pelvis: Mild tenderness in the pelvis.  Mild distention.  Soft throughout.  Genitalia: Not done.  Musculoskeletal: No tenderness over major muscles and joints.  Skin: No evidence of rash or trauma.        PHYSICIAN  1116.  The " patient has persistent/worsened hematuria.  Repeat urinalysis pending.  He scan without contrast pending.    Labs Ordered and Resulted from Time of ED Arrival Up to the Time of Departure from the ED   ROUTINE UA WITH MICROSCOPIC - Abnormal; Notable for the following:        Result Value    Blood Urine Moderate (*)     RBC Urine >182 (*)     Mucous Urine Present (*)     All other components within normal limits   URINE CULTURE AEROBIC BACTERIAL       IMAGING  Images reviewed by me.  Radiology report also reviewed.  Abd/pelvis CT - no contrast - Stone Protocol   Final Result   IMPRESSION:     1. Nodularity in a right sided bladder diverticulum could relate to a   mass and/or adherent debris.   2. There is an indeterminant 4 cm right kidney lesion. Correlation   with ultrasound is recommended.      BAYLEE CASAS MD        1241.  CT imaging shows a bladder mass and a renal mass.  Low concern for urinary retention.  He needs follow up with urology, appointment made here in the ED.  No prescription medicines added.  I did recommend he discontinue the ciprofloxacin as the urine culture was negative and we have an alternative reason for his hematuria.      IMPRESSION    ICD-10-CM    1. Bladder mass N32.89    2. Hematuria, unspecified type R31.9    3. Renal mass N28.89            Critical Care time:  none                      Johnny Chacko MD  10/05/17 1816

## 2017-10-06 LAB
BACTERIA SPEC CULT: NO GROWTH
Lab: NORMAL
SPECIMEN SOURCE: NORMAL

## 2017-10-06 NOTE — TELEPHONE ENCOUNTER
Daughter advised of the scheduled appt on Thursday next week for the Cysto in clinic.    Paper message placed on [employer] desk to keep track of patient appts and what they are aware of at this time.  Mariam Arzate RN  [facility]

## 2017-10-12 ENCOUNTER — OFFICE VISIT (OUTPATIENT)
Dept: UROLOGY | Facility: CLINIC | Age: 57
End: 2017-10-12

## 2017-10-12 DIAGNOSIS — R31.0 GROSS HEMATURIA: Primary | ICD-10-CM

## 2017-10-12 PROCEDURE — 88112 CYTOPATH CELL ENHANCE TECH: CPT | Performed by: UROLOGY

## 2017-10-12 PROCEDURE — 88305 TISSUE EXAM BY PATHOLOGIST: CPT | Performed by: UROLOGY

## 2017-10-12 PROCEDURE — 51798 US URINE CAPACITY MEASURE: CPT | Performed by: UROLOGY

## 2017-10-12 PROCEDURE — 99203 OFFICE O/P NEW LOW 30 MIN: CPT | Mod: 25 | Performed by: UROLOGY

## 2017-10-12 NOTE — MR AVS SNAPSHOT
"              After Visit Summary   10/12/2017    Roge Agarwal    MRN: [id number]           Patient Information     Date Of Birth          1960        Visit Information        Provider Department      10/12/2017 12:00 PM Brijesh Courtney MD [facility]        Today's Diagnoses     Gross hematuria    -  1       Follow-ups after your visit        Your next 10 appointments already scheduled     Nov 02, 2017  9:30 AM CDT   [facility] Visit with Brijesh Courtney MD   [facility]    [address] [address]   [phone]              Who to contact     If you have questions or need follow up information about today's clinic visit or your schedule please contact [facility] directly at [phone].  Normal or non-critical lab and imaging results will be communicated to you by evOLEDhart, letter or phone within 4 business days after the clinic has received the results. If you do not hear from us within 7 days, please contact the clinic through MyChart or phone. If you have a critical or abnormal lab result, we will notify you by phone as soon as possible.  Submit refill requests through [employer] or call your pharmacy and they will forward the refill request to us. Please allow 3 business days for your refill to be completed.          Additional Information About Your Visit        MyChart Information     [employer] lets you send messages to your doctor, view your test results, renew your prescriptions, schedule appointments and more. To sign up, go to [email or web address] . Click on \"Log in\" on the left side of the screen, which will take you to the Welcome page. Then click on \"Sign up Now\" on the right side of the page.     You will be asked to enter the access code listed below, as well as some personal information. Please follow the directions to create your username and password.     Your access code is: " [email or web address]  Expires: 2018  2:00 PM     Your access code will  in 90 days. If you need help or a new code, please call your [facility] clinic or [phone].        Care EveryWhere ID     This is your Care EveryWhere ID. This could be used by other organizations to access your [facility] medical records  [id number]         Blood Pressure from Last 3 Encounters:   10/05/17 (!) 158/91   10/03/17 133/76   17 (!) 179/93    Weight from Last 3 Encounters:   10/05/17 96.6 kg (213 lb)   10/03/17 98.9 kg (218 lb)   17 93.3 kg (205 lb 9.6 oz)              We Performed the Following     Cystoscopy w/ Biopsy (25852)     Cytology non gyn     Cytology non gyn     MEASURE POST-VOID RESIDUAL URINE/BLADDER CAPACITY, US NON-IMAGING     Surgical pathology exam [[id number]]        Primary Care Provider    None Specified       No primary provider on file.        Equal Access to Services     MARINE CARRENO : Hadii tiarra ku hadasho Soomaali, waaxda luqadaha, qaybta kaalmada adeegyada, waxay srinivas alvarado . So [facility] [phone].    ATENCIÓN: Si habla español, tiene a foster disposición servicios gratuitos de asistencia lingüística. Llame al [phone].    We comply with applicable federal civil rights laws and [place] laws. We do not discriminate on the basis of race, color, national origin, age, disability, sex, sexual orientation, or gender identity.            Thank you!     Thank you for choosing [facility]  for your care. Our goal is always to provide you with excellent care. Hearing back from our patients is one way we can continue to improve our services. Please take a few minutes to complete the written survey that you may receive in the mail after your visit with us. Thank you!             Your Updated Medication List - Protect others around you: Learn how to safely use, store and throw away your medicines at www.disposemymeds.org.          This list is accurate as of: 10/12/17   7:02 PM.  Always use your most recent med list.                   Brand Name Dispense Instructions for use Diagnosis    hydrochlorothiazide 25 MG tablet    HYDRODIURIL    30 tablet    Take 1 tablet (25 mg) by mouth daily    Benign essential hypertension       HYDROcodone-acetaminophen 5-325 MG per tablet    NORCO    15 tablet    Take 1-2 tablets by mouth every 4 hours as needed for moderate to severe pain        IBUPROFEN PO      Take 800 mg by mouth once        losartan 50 MG tablet    COZAAR    30 tablet    Take 1 tablet (50 mg) by mouth daily    Benign essential hypertension       metoprolol 50 MG 24 hr tablet    TOPROL-XL    30 tablet    Take 1 tablet (50 mg) by mouth daily    Benign essential hypertension

## 2017-10-12 NOTE — NURSING NOTE
Active order to obtain bladder scan? Yes   Name of ordering provider:  Dr Courtney  Bladder scan preformed post void Yes: 200ml.  Bladder scan reveled 66ML  Provider notified?  Yes    Faina Carepnter CMA

## 2017-10-13 NOTE — PROGRESS NOTES
PROCEDURE NOTE:  Roge Agarwal.  After informed consent was obtained, the patient was prepped and draped in standard sterile fashion.  A flexible cystoscope was introduced into the patient's bladder without difficulty.  Inspection of the bladder revealed orthotopic UOs.  Off the right lateral wall, there was a diverticulum that we were able to drive into.  There were no papillary growths noted within the diverticulum, though there was some ruffled mucosa in a couple of locations.  We attempted to take bladder biopsies with a cold cup biopsy forceps but we were only able to get minimal tissue, but there were again no jose alfredo papillary growths noted.  The remainder of the bladder showed some mild to moderate trabeculations.  There were no other mucosal lesions, stones or foreign objects in the bladder.  There were no strictures in the urethra.      ASSESSMENT AND PLAN:  I suggested to Mr. Agarwal that we are pleased to see that there were no obvious papillary growths within his bladder.  The patient may simply have spontaneous bleed within the bladder for some reason and that this clot that was interpreted as possible mass.  In any case, the patient will call us back next week to get the results of his cytology and his bladder biopsies if they are interpretable.  The patient does also have some significant lower urinary tract symptoms demonstrated by an American Urological Association symptom score today at 23/35 with a bothersome index of 6.  The patient did void 200 today and a postvoid residual was measured at 66 cc.  The patient notes he has a longstanding history of urgency, frequency and sometimes voids 6-7 times an hour.  We will see the patient back in several weeks for further discussions of his lower urinary tract symptoms as well.         PAOLO MCCORD MD             D: 10/12/2017 16:35   T: 10/13/2017 09:44   MT: [id number]      Name:     ROGE AGARWAL   MRN:      [id number]        Account:       [id number]   :      1960           Visit Date:   10/12/2017      Document: [id number]

## 2017-10-13 NOTE — PROGRESS NOTES
REASON FOR VISIT TODAY:  Gross hematuria.      BRIEF COURSE:  Mr. Roge Agarwal is a 57-year-old gentleman who comes to see us today for a history of gross hematuria.  The patient was seen in the ER on 10/05/2017 for gross hematuria.  He underwent a CT scan in the ER that noted a right-sided bladder diverticulum with some degree of debris concerning for mass inside the diverticulum.  There is also a 4 cm indeterminate kidney lesion.  The patient presents today in followup.      PAST MEDICAL HISTORY:  The patient's past medical history is otherwise significant for hypertension as well as a history of stroke.  He has had CHF and afib in the past, but not currently, history of alcohol abuse.      PAST SURGICAL HISTORY:  Includes surgery to fix amputated fingers from a work injury.      MEDICATIONS:  Norco, Cozaar, Toprol, hydrochlorothiazide, ibuprofen.      ALLERGIES:  No known drug allergies.      FAMILY HISTORY:  Negative for urologic malignancies.      SOCIAL HISTORY:  The patient does not smoke tobacco but chews tobacco and again has a history of alcohol use, currently drinking 4-5 beers per day.      REVIEW OF SYSTEMS:  Negative for fevers, chills, sweats, nausea, vomiting or unexplained weight changes.      PHYSICAL EXAMINATION:   VITAL SIGNS:  On exam his blood pressure is 179/93, pulse is 73.   GENERAL:  In no acute distress.      CT scan from 10/05/2017 is as noted above.  The patient also has a renal ultrasound from 10/03/2017 that does not show any renal masses.  The patient has a negative urine culture from 10/05/2017.  UA from that day showed greater than 182 red blood cells and 0 white blood cells.      ASSESSMENT AND PLAN:  Over half of today's 30-minute visit was spent counseling the patient regarding his gross hematuria.  I suggested to Mr. Agarwal that when we do need to go ahead and collect a urine for cytology today and we need to perform a cystoscopy to complete his gross hematuria eval.  The  patient is in agreement with the plan.       We were able to find a separate time to do a cystoscopy to complete the patient's hematuria evaluation.         PAOLO MCCORD MD             D: 10/12/2017 16:35   T: 10/13/2017 09:36   MT: [id number]      Name:     BRENNEN MONTES   MRN:      [id number]        Account:      [id number]   :      1960           Visit Date:   10/12/2017      Document: [id number]

## 2017-10-16 LAB — COPATH REPORT: NORMAL

## 2017-10-17 LAB — COPATH REPORT: NORMAL

## 2017-10-26 ENCOUNTER — TELEPHONE (OUTPATIENT)
Dept: UROLOGY | Facility: CLINIC | Age: 57
End: 2017-10-26

## 2017-11-02 ENCOUNTER — OFFICE VISIT (OUTPATIENT)
Dept: UROLOGY | Facility: CLINIC | Age: 57
End: 2017-11-02

## 2017-11-02 VITALS
RESPIRATION RATE: 16 BRPM | WEIGHT: 213 LBS | HEART RATE: 83 BPM | DIASTOLIC BLOOD PRESSURE: 94 MMHG | HEIGHT: 73 IN | SYSTOLIC BLOOD PRESSURE: 189 MMHG | BODY MASS INDEX: 28.23 KG/M2

## 2017-11-02 DIAGNOSIS — N40.0 BENIGN PROSTATIC HYPERPLASIA, UNSPECIFIED WHETHER LOWER URINARY TRACT SYMPTOMS PRESENT: Primary | ICD-10-CM

## 2017-11-02 PROCEDURE — 99213 OFFICE O/P EST LOW 20 MIN: CPT | Performed by: UROLOGY

## 2017-11-02 RX ORDER — TAMSULOSIN HYDROCHLORIDE 0.4 MG/1
0.4 CAPSULE ORAL DAILY
Qty: 90 CAPSULE | Refills: 3 | Status: SHIPPED | OUTPATIENT
Start: 2017-11-02 | End: 2019-12-31

## 2017-11-02 NOTE — NURSING NOTE
"Chief Complaint   Patient presents with     RECHECK     hematuria, lower urinary tract sx       Initial BP (!) 189/94 (BP Location: Left arm, Patient Position: Chair, Cuff Size: Adult Large)  Pulse 83  Resp 16  Ht 1.854 m (6' 1\")  Wt 96.6 kg (213 lb)  BMI 28.1 kg/m2 Estimated body mass index is 28.1 kg/(m^2) as calculated from the following:    Height as of this encounter: 1.854 m (6' 1\").    Weight as of this encounter: 96.6 kg (213 lb).  BP completed using cuff size: large      Faina Carpenter CMA     "

## 2017-11-02 NOTE — PROGRESS NOTES
REASONS FOR VISIT TODAY:  Hematuria and lower urinary tract symptoms.      BRIEF COURSE:  Mr. Roge Agarwal is a 57-year-old gentleman who has been followed in our clinic previously for gross hematuria.  The patient has some lower urinary tract symptoms as well and is noted to have a relatively large bladder diverticulum.  There was some ruffled mucosa in the base of the diverticulum which was biopsied when the patient was here last; however, the biopsy was nondiagnostic.  The patient comes in today noting that he continues to have blood intermittently in his urine, including passing of a few small clots since we last saw him.  These episodes remain painless.      PHYSICAL EXAMINATION:   VITAL:  His blood pressure is 189/94, heart rate 83.   GENERAL:  He is in no acute distress.       LABORATORY DATA:  The patient's urine cytology from 10/12/2017 was negative for high-grade malignancy, and again, biopsy from 10/12/2017 was nondiagnostic.      ASSESSMENT AND PLAN:  Over half of today's 15-minute visit was spent counseling the patient regarding his hematuria and lower urinary tract symptoms.  I suggested to Mr. Agarwal that we will go ahead and get him started on Flomax to help with his lower urinary tract symptoms and he was given a prescription today for 0.4 mg p.o. daily.  In addition, the patient will go ahead and get his CT urogram scheduled for the next couple of weeks to provide a formal evaluation of the patient's upper tracts.  He did have an indeterminate lesion seen on a noncontrast CT scan, though a followup ultrasound did not show a worrisome lesion.  However, given the fact that the patient's hematuria continues to occur, a full CT urogram would be very reasonable at this time.  We will plan on seeing the patient back in the next few weeks after he has had a CT and been on the Flomax for a few weeks.         PAOLO MCCORD MD             D: 11/02/2017 10:20   T: 11/02/2017 14:59   MT: [id number]      Name:      JYOTI BRENNEN   MRN:      [id number]        Account:      [id number]   :      1960           Visit Date:   2017      Document: [id number]

## 2017-11-02 NOTE — MR AVS SNAPSHOT
"              After Visit Summary   2017    Roge Agarwal    MRN: [id number]           Patient Information     Date Of Birth          1960        Visit Information        Provider Department      2017 9:30 AM Brijesh Courtnye MD [facility]        Today's Diagnoses     Benign prostatic hyperplasia, unspecified whether lower urinary tract symptoms present    -  1      Care Instructions    Per Physician's instructions            Follow-ups after your visit        Who to contact     If you have questions or need follow up information about today's clinic visit or your schedule please contact [facility] directly at [phone].  Normal or non-critical lab and imaging results will be communicated to you by QuadWranglehart, letter or phone within 4 business days after the clinic has received the results. If you do not hear from us within 7 days, please contact the clinic through MyChart or phone. If you have a critical or abnormal lab result, we will notify you by phone as soon as possible.  Submit refill requests through [employer] or call your pharmacy and they will forward the refill request to us. Please allow 3 business days for your refill to be completed.          Additional Information About Your Visit        MyChart Information     [employer] lets you send messages to your doctor, view your test results, renew your prescriptions, schedule appointments and more. To sign up, go to [email or web address] . Click on \"Log in\" on the left side of the screen, which will take you to the Welcome page. Then click on \"Sign up Now\" on the right side of the page.     You will be asked to enter the access code listed below, as well as some personal information. Please follow the directions to create your username and password.     Your access code is: [email or web address]  Expires: 2018  2:00 PM     Your access code will  in 90 days. If you need help or a new code, please call your " "[facility] or [phone].        Care EveryWhere ID     This is your Care EveryWhere ID. This could be used by other organizations to access your [place] medical records  [id number]        Your Vitals Were     Pulse Respirations Height BMI (Body Mass Index)          83 16 1.854 m (6' 1\") 28.1 kg/m2         Blood Pressure from Last 3 Encounters:   11/02/17 (!) 189/94   10/05/17 (!) 158/91   10/03/17 133/76    Weight from Last 3 Encounters:   11/02/17 96.6 kg (213 lb)   10/05/17 96.6 kg (213 lb)   10/03/17 98.9 kg (218 lb)              Today, you had the following     No orders found for display         Today's Medication Changes          These changes are accurate as of: 11/2/17 10:21 AM.  If you have any questions, ask your nurse or doctor.               Start taking these medicines.        Dose/Directions    tamsulosin 0.4 MG capsule   Commonly known as:  FLOMAX   Used for:  Benign prostatic hyperplasia, unspecified whether lower urinary tract symptoms present   Started by:  Brijesh Courtney MD        Dose:  0.4 mg   Take 1 capsule (0.4 mg) by mouth daily   Quantity:  90 capsule   Refills:  3            Where to get your medicines      These medications were sent to [id number] - [place], [place] - [address] [place] [id number]     Phone:  [phone]     tamsulosin 0.4 MG capsule                Primary Care Provider Office Phone # Fax #    [facility] [phone] [address] [facility] [address]        Equal Access to Services     NASIR CARRENO AH: Hadii aad ku hadasho Soomaali, waaxda luqadaha, qaybta kaalmada bhavesh gomez. So [facility] [phone].    ATENCIÓN: Si habla español, tiene a foster disposición servicios gratuitos de asistencia lingüística. Llame al [phone].    We comply with applicable federal civil rights laws and [place] laws. We do not discriminate on the basis " of race, color, national origin, age, disability, sex, sexual orientation, or gender identity.            Thank you!     Thank you for choosing [facility]  for your care. Our goal is always to provide you with excellent care. Hearing back from our patients is one way we can continue to improve our services. Please take a few minutes to complete the written survey that you may receive in the mail after your visit with us. Thank you!             Your Updated Medication List - Protect others around you: Learn how to safely use, store and throw away your medicines at www.disposemymeds.org.          This list is accurate as of: 11/2/17 10:21 AM.  Always use your most recent med list.                   Brand Name Dispense Instructions for use Diagnosis    hydrochlorothiazide 25 MG tablet    HYDRODIURIL    30 tablet    Take 1 tablet (25 mg) by mouth daily    Benign essential hypertension       HYDROcodone-acetaminophen 5-325 MG per tablet    NORCO    15 tablet    Take 1-2 tablets by mouth every 4 hours as needed for moderate to severe pain        IBUPROFEN PO      Take 800 mg by mouth once        losartan 50 MG tablet    COZAAR    30 tablet    Take 1 tablet (50 mg) by mouth daily    Benign essential hypertension       metoprolol 50 MG 24 hr tablet    TOPROL-XL    30 tablet    Take 1 tablet (50 mg) by mouth daily    Benign essential hypertension       tamsulosin 0.4 MG capsule    FLOMAX    90 capsule    Take 1 capsule (0.4 mg) by mouth daily    Benign prostatic hyperplasia, unspecified whether lower urinary tract symptoms present

## 2019-03-29 NOTE — TELEPHONE ENCOUNTER
[place] - daughter calling to let the MD know that the RX-flomax never got called in at Aamir's last office visit.    Please advise -    Jewels Corrigan  [facility] [place]    
RN unable to find any mention of medication in notes.  Advised that no HIPPA found and RN unable to share but did advise it will need to await provider return on Nov 2nd.    Daughter states they have appt on Nov 2nd and will discuss at the appt.  Mariam Arzate RN  [facility]    
None

## 2019-12-31 ENCOUNTER — HOSPITAL ENCOUNTER (INPATIENT)
Facility: CLINIC | Age: 59
LOS: 1 days | Discharge: ANOTHER HOSPITAL | End: 2020-01-03
Attending: FAMILY MEDICINE | Admitting: INTERNAL MEDICINE
Payer: MEDICAID

## 2019-12-31 ENCOUNTER — APPOINTMENT (OUTPATIENT)
Dept: GENERAL RADIOLOGY | Facility: CLINIC | Age: 59
End: 2019-12-31
Attending: FAMILY MEDICINE
Payer: MEDICAID

## 2019-12-31 ENCOUNTER — APPOINTMENT (OUTPATIENT)
Dept: CT IMAGING | Facility: CLINIC | Age: 59
End: 2019-12-31
Attending: FAMILY MEDICINE
Payer: MEDICAID

## 2019-12-31 DIAGNOSIS — I48.92 ATRIAL FLUTTER WITH RAPID VENTRICULAR RESPONSE (H): Primary | ICD-10-CM

## 2019-12-31 DIAGNOSIS — I48.91 ATRIAL FIBRILLATION WITH RVR (H): ICD-10-CM

## 2019-12-31 DIAGNOSIS — F10.10 ALCOHOL ABUSE: ICD-10-CM

## 2019-12-31 DIAGNOSIS — R79.89 ELEVATED TROPONIN: ICD-10-CM

## 2019-12-31 DIAGNOSIS — I48.20 CHRONIC ATRIAL FIBRILLATION (H): ICD-10-CM

## 2019-12-31 PROBLEM — R04.0 EPISTAXIS: Status: ACTIVE | Noted: 2017-06-02

## 2019-12-31 PROBLEM — R07.89 ATYPICAL CHEST PAIN: Status: ACTIVE | Noted: 2019-12-31

## 2019-12-31 LAB
ALBUMIN SERPL-MCNC: 3.9 G/DL (ref 3.4–5)
ALP SERPL-CCNC: 68 U/L (ref 40–150)
ALT SERPL W P-5'-P-CCNC: 37 U/L (ref 0–70)
ANION GAP SERPL CALCULATED.3IONS-SCNC: 8 MMOL/L (ref 3–14)
AST SERPL W P-5'-P-CCNC: 32 U/L (ref 0–45)
BASOPHILS # BLD AUTO: 0.1 10E9/L (ref 0–0.2)
BASOPHILS NFR BLD AUTO: 0.8 %
BILIRUB SERPL-MCNC: 1.1 MG/DL (ref 0.2–1.3)
BUN SERPL-MCNC: 17 MG/DL (ref 7–30)
CALCIUM SERPL-MCNC: 9.2 MG/DL (ref 8.5–10.1)
CHLORIDE SERPL-SCNC: 100 MMOL/L (ref 94–109)
CO2 SERPL-SCNC: 26 MMOL/L (ref 20–32)
CREAT SERPL-MCNC: 0.93 MG/DL (ref 0.66–1.25)
D DIMER PPP FEU-MCNC: 0.9 UG/ML FEU (ref 0–0.5)
DIFFERENTIAL METHOD BLD: ABNORMAL
EOSINOPHIL # BLD AUTO: 0 10E9/L (ref 0–0.7)
EOSINOPHIL NFR BLD AUTO: 0.3 %
ERYTHROCYTE [DISTWIDTH] IN BLOOD BY AUTOMATED COUNT: 13.9 % (ref 10–15)
GFR SERPL CREATININE-BSD FRML MDRD: 89 ML/MIN/{1.73_M2}
GLUCOSE SERPL-MCNC: 94 MG/DL (ref 70–99)
HCT VFR BLD AUTO: 45.7 % (ref 40–53)
HGB BLD-MCNC: 15.7 G/DL (ref 13.3–17.7)
IMM GRANULOCYTES # BLD: 0 10E9/L (ref 0–0.4)
IMM GRANULOCYTES NFR BLD: 0.3 %
LACTATE BLD-SCNC: 2.3 MMOL/L (ref 0.7–2)
LIPASE SERPL-CCNC: 43 U/L (ref 73–393)
LYMPHOCYTES # BLD AUTO: 0.7 10E9/L (ref 0.8–5.3)
LYMPHOCYTES NFR BLD AUTO: 7.3 %
MCH RBC QN AUTO: 34.4 PG (ref 26.5–33)
MCHC RBC AUTO-ENTMCNC: 34.4 G/DL (ref 31.5–36.5)
MCV RBC AUTO: 100 FL (ref 78–100)
MONOCYTES # BLD AUTO: 0.7 10E9/L (ref 0–1.3)
MONOCYTES NFR BLD AUTO: 7.4 %
NEUTROPHILS # BLD AUTO: 7.7 10E9/L (ref 1.6–8.3)
NEUTROPHILS NFR BLD AUTO: 83.9 %
NRBC # BLD AUTO: 0 10*3/UL
NRBC BLD AUTO-RTO: 0 /100
NT-PROBNP SERPL-MCNC: 2401 PG/ML (ref 0–900)
PLATELET # BLD AUTO: 150 10E9/L (ref 150–450)
PLATELET # BLD AUTO: 181 10E9/L (ref 150–450)
POTASSIUM SERPL-SCNC: 4.6 MMOL/L (ref 3.4–5.3)
PROT SERPL-MCNC: 8.2 G/DL (ref 6.8–8.8)
RBC # BLD AUTO: 4.57 10E12/L (ref 4.4–5.9)
SODIUM SERPL-SCNC: 134 MMOL/L (ref 133–144)
TROPONIN I SERPL-MCNC: 0.04 UG/L (ref 0–0.04)
TROPONIN I SERPL-MCNC: 0.05 UG/L (ref 0–0.04)
TSH SERPL DL<=0.005 MIU/L-ACNC: 1.29 MU/L (ref 0.4–4)
WBC # BLD AUTO: 9.2 10E9/L (ref 4–11)

## 2019-12-31 PROCEDURE — 25000125 ZZHC RX 250: Performed by: FAMILY MEDICINE

## 2019-12-31 PROCEDURE — 99220 ZZC INITIAL OBSERVATION CARE,LEVL III: CPT | Performed by: PHYSICIAN ASSISTANT

## 2019-12-31 PROCEDURE — 99285 EMERGENCY DEPT VISIT HI MDM: CPT | Mod: 25

## 2019-12-31 PROCEDURE — 93005 ELECTROCARDIOGRAM TRACING: CPT

## 2019-12-31 PROCEDURE — 84484 ASSAY OF TROPONIN QUANT: CPT | Performed by: FAMILY MEDICINE

## 2019-12-31 PROCEDURE — 83690 ASSAY OF LIPASE: CPT | Performed by: FAMILY MEDICINE

## 2019-12-31 PROCEDURE — G0378 HOSPITAL OBSERVATION PER HR: HCPCS

## 2019-12-31 PROCEDURE — 25000128 H RX IP 250 OP 636: Performed by: FAMILY MEDICINE

## 2019-12-31 PROCEDURE — 85379 FIBRIN DEGRADATION QUANT: CPT | Performed by: FAMILY MEDICINE

## 2019-12-31 PROCEDURE — 96366 THER/PROPH/DIAG IV INF ADDON: CPT

## 2019-12-31 PROCEDURE — 36415 COLL VENOUS BLD VENIPUNCTURE: CPT | Performed by: PHYSICIAN ASSISTANT

## 2019-12-31 PROCEDURE — 25000128 H RX IP 250 OP 636: Performed by: PHYSICIAN ASSISTANT

## 2019-12-31 PROCEDURE — 83605 ASSAY OF LACTIC ACID: CPT | Performed by: PHYSICIAN ASSISTANT

## 2019-12-31 PROCEDURE — 71275 CT ANGIOGRAPHY CHEST: CPT

## 2019-12-31 PROCEDURE — 76604 US EXAM CHEST: CPT | Mod: 26 | Performed by: FAMILY MEDICINE

## 2019-12-31 PROCEDURE — 84484 ASSAY OF TROPONIN QUANT: CPT | Performed by: PHYSICIAN ASSISTANT

## 2019-12-31 PROCEDURE — 25000132 ZZH RX MED GY IP 250 OP 250 PS 637: Performed by: FAMILY MEDICINE

## 2019-12-31 PROCEDURE — 84443 ASSAY THYROID STIM HORMONE: CPT | Performed by: FAMILY MEDICINE

## 2019-12-31 PROCEDURE — 85049 AUTOMATED PLATELET COUNT: CPT | Performed by: PHYSICIAN ASSISTANT

## 2019-12-31 PROCEDURE — 40000275 ZZH STATISTIC RCP TIME EA 10 MIN

## 2019-12-31 PROCEDURE — 71046 X-RAY EXAM CHEST 2 VIEWS: CPT

## 2019-12-31 PROCEDURE — 76604 US EXAM CHEST: CPT

## 2019-12-31 PROCEDURE — 25800030 ZZH RX IP 258 OP 636: Performed by: FAMILY MEDICINE

## 2019-12-31 PROCEDURE — 80053 COMPREHEN METABOLIC PANEL: CPT | Performed by: FAMILY MEDICINE

## 2019-12-31 PROCEDURE — 96365 THER/PROPH/DIAG IV INF INIT: CPT

## 2019-12-31 PROCEDURE — 93010 ELECTROCARDIOGRAM REPORT: CPT | Mod: Z6 | Performed by: FAMILY MEDICINE

## 2019-12-31 PROCEDURE — 96375 TX/PRO/DX INJ NEW DRUG ADDON: CPT

## 2019-12-31 PROCEDURE — 99285 EMERGENCY DEPT VISIT HI MDM: CPT | Mod: 25 | Performed by: FAMILY MEDICINE

## 2019-12-31 PROCEDURE — 85025 COMPLETE CBC W/AUTO DIFF WBC: CPT | Performed by: FAMILY MEDICINE

## 2019-12-31 PROCEDURE — 83880 ASSAY OF NATRIURETIC PEPTIDE: CPT | Performed by: FAMILY MEDICINE

## 2019-12-31 RX ORDER — ONDANSETRON 2 MG/ML
4 INJECTION INTRAMUSCULAR; INTRAVENOUS EVERY 6 HOURS PRN
Status: DISCONTINUED | OUTPATIENT
Start: 2019-12-31 | End: 2020-01-03 | Stop reason: HOSPADM

## 2019-12-31 RX ORDER — PROCHLORPERAZINE 25 MG
25 SUPPOSITORY, RECTAL RECTAL EVERY 12 HOURS PRN
Status: DISCONTINUED | OUTPATIENT
Start: 2019-12-31 | End: 2020-01-03 | Stop reason: HOSPADM

## 2019-12-31 RX ORDER — ACETAMINOPHEN 650 MG/1
650 SUPPOSITORY RECTAL EVERY 4 HOURS PRN
Status: DISCONTINUED | OUTPATIENT
Start: 2019-12-31 | End: 2020-01-03 | Stop reason: HOSPADM

## 2019-12-31 RX ORDER — ONDANSETRON 4 MG/1
4 TABLET, ORALLY DISINTEGRATING ORAL EVERY 6 HOURS PRN
Status: DISCONTINUED | OUTPATIENT
Start: 2019-12-31 | End: 2020-01-03 | Stop reason: HOSPADM

## 2019-12-31 RX ORDER — SODIUM CHLORIDE 9 MG/ML
1000 INJECTION, SOLUTION INTRAVENOUS CONTINUOUS
Status: DISCONTINUED | OUTPATIENT
Start: 2019-12-31 | End: 2020-01-03 | Stop reason: HOSPADM

## 2019-12-31 RX ORDER — IOPAMIDOL 755 MG/ML
80 INJECTION, SOLUTION INTRAVASCULAR ONCE
Status: COMPLETED | OUTPATIENT
Start: 2019-12-31 | End: 2019-12-31

## 2019-12-31 RX ORDER — METOPROLOL TARTRATE 1 MG/ML
5 INJECTION, SOLUTION INTRAVENOUS EVERY 5 MIN PRN
Status: DISCONTINUED | OUTPATIENT
Start: 2019-12-31 | End: 2020-01-03 | Stop reason: HOSPADM

## 2019-12-31 RX ORDER — DILTIAZEM HYDROCHLORIDE 5 MG/ML
20 INJECTION INTRAVENOUS ONCE
Status: COMPLETED | OUTPATIENT
Start: 2019-12-31 | End: 2019-12-31

## 2019-12-31 RX ORDER — SODIUM CHLORIDE 9 MG/ML
1000 INJECTION, SOLUTION INTRAVENOUS CONTINUOUS
Status: DISCONTINUED | OUTPATIENT
Start: 2019-12-31 | End: 2019-12-31

## 2019-12-31 RX ORDER — DILTIAZEM HCL 60 MG
120 TABLET ORAL EVERY 6 HOURS SCHEDULED
Status: DISCONTINUED | OUTPATIENT
Start: 2020-01-01 | End: 2020-01-01

## 2019-12-31 RX ORDER — SODIUM CHLORIDE 9 MG/ML
INJECTION, SOLUTION INTRAVENOUS CONTINUOUS
Status: DISCONTINUED | OUTPATIENT
Start: 2019-12-31 | End: 2019-12-31

## 2019-12-31 RX ORDER — NALOXONE HYDROCHLORIDE 0.4 MG/ML
.1-.4 INJECTION, SOLUTION INTRAMUSCULAR; INTRAVENOUS; SUBCUTANEOUS
Status: DISCONTINUED | OUTPATIENT
Start: 2019-12-31 | End: 2020-01-03 | Stop reason: HOSPADM

## 2019-12-31 RX ORDER — PROCHLORPERAZINE MALEATE 10 MG
10 TABLET ORAL EVERY 6 HOURS PRN
Status: DISCONTINUED | OUTPATIENT
Start: 2019-12-31 | End: 2020-01-03 | Stop reason: HOSPADM

## 2019-12-31 RX ORDER — METOPROLOL TARTRATE 50 MG
50 TABLET ORAL ONCE
Status: COMPLETED | OUTPATIENT
Start: 2019-12-31 | End: 2019-12-31

## 2019-12-31 RX ORDER — DIPHENHYDRAMINE HCL 25 MG
50 CAPSULE ORAL
Status: DISCONTINUED | OUTPATIENT
Start: 2019-12-31 | End: 2019-12-31 | Stop reason: CLARIF

## 2019-12-31 RX ORDER — ACETAMINOPHEN 325 MG/1
650 TABLET ORAL EVERY 4 HOURS PRN
Status: DISCONTINUED | OUTPATIENT
Start: 2019-12-31 | End: 2020-01-03 | Stop reason: HOSPADM

## 2019-12-31 RX ADMIN — DILTIAZEM HYDROCHLORIDE 20 MG: 5 INJECTION INTRAVENOUS at 13:34

## 2019-12-31 RX ADMIN — METOPROLOL TARTRATE 50 MG: 50 TABLET ORAL at 11:30

## 2019-12-31 RX ADMIN — IOPAMIDOL 80 ML: 755 INJECTION, SOLUTION INTRAVENOUS at 12:17

## 2019-12-31 RX ADMIN — METOPROLOL TARTRATE 5 MG: 5 INJECTION INTRAVENOUS at 11:32

## 2019-12-31 RX ADMIN — SODIUM CHLORIDE 1000 ML: 9 INJECTION, SOLUTION INTRAVENOUS at 12:40

## 2019-12-31 RX ADMIN — SODIUM CHLORIDE 80 ML: 9 INJECTION, SOLUTION INTRAVENOUS at 12:17

## 2019-12-31 RX ADMIN — ENOXAPARIN SODIUM 90 MG: 100 INJECTION SUBCUTANEOUS at 20:34

## 2019-12-31 RX ADMIN — SODIUM CHLORIDE 1000 ML: 9 INJECTION, SOLUTION INTRAVENOUS at 15:25

## 2019-12-31 RX ADMIN — METOPROLOL TARTRATE 5 MG: 5 INJECTION INTRAVENOUS at 11:42

## 2019-12-31 RX ADMIN — DILTIAZEM HYDROCHLORIDE 10 MG/HR: 5 INJECTION INTRAVENOUS at 12:40

## 2019-12-31 ASSESSMENT — ENCOUNTER SYMPTOMS
FREQUENCY: 0
SINUS PRESSURE: 0
FEVER: 0
BLOOD IN STOOL: 0
PALPITATIONS: 1
NAUSEA: 0
CHILLS: 0
DYSURIA: 0
DIARRHEA: 0
COUGH: 0
CONSTIPATION: 0
HEADACHES: 0
SHORTNESS OF BREATH: 1
SORE THROAT: 0
VOMITING: 0
DIAPHORESIS: 0
ABDOMINAL PAIN: 0
WHEEZING: 0

## 2019-12-31 ASSESSMENT — MIFFLIN-ST. JEOR: SCORE: 1747.88

## 2019-12-31 ASSESSMENT — PAIN DESCRIPTION - DESCRIPTORS: DESCRIPTORS: HEAVINESS

## 2019-12-31 NOTE — PROGRESS NOTES
Notified PA at 1620 PM regarding lab results.      Lactate 2.3    Spoke with: LUCRECIA Macdonald    Orders: Protocols and plan of care is in place.       Bernadette Tomlin RN on 12/31/2019 at 4:21 PM

## 2019-12-31 NOTE — LETTER
[facility]  Medical U  [phone]    2020    Roge Aagrwal  [address] [id number]  [phone] (home)     : 1960          To Whom it May Concern:    Roge Agarwal  Was admitted to the hospital from 2019 to .       Sincerely,       Nina Love MD

## 2019-12-31 NOTE — PROGRESS NOTES
Sepsis Evaluation Progress Note    I was called to see Roge Agarwal due to abnormal vital signs triggering the Sepsis SIRS screening alert. He is not known to have an infection.     Physical Exam   Vital Signs:  Temp: 97.8  F (36.6  C) Temp src: Axillary BP: 99/86 Pulse: 112 Heart Rate: 112 Resp: 22 SpO2: 96 % O2 Device: None (Room air)      Lab:  Lactate for Sepsis Protocol   Date Value Ref Range Status   12/31/2019 2.3 (H) 0.7 - 2.0 mmol/L Final     Comment:     Significant value called to and read back by  MARYBETH SOMMER 12/31/19 1618 AK         The patient is at baseline mental status.     The rest of their physical exam is significant for atrial flutter, previously in RVR but now controlled.  Lungs are clear.  Fingers and toes are pink and warm.    Assessment & Plan   NO EVIDENCE OF SEPSIS at this time.  Vital sign, physical exam, and lab findings are likely due to Atrial flutter with RVR and hypotension from diltiazem.    Disposition: The patient will remain on the current unit. We will continue to monitor this patient closely.  Cornel Zamora PA-C    Sepsis Criteria   Sepsis: 2+ SIRS criteria due to infection  Severe Sepsis: Sepsis AND 1+ new sign of acute organ dysfunction (Note: lactate >2 is organ dysfunction)  Septic Shock: Sepsis AND hypotension despite volume resuscitation with 30 ml/kg crystalloid

## 2019-12-31 NOTE — PROGRESS NOTES
"Patient initially hypotensive upon arrival to ICU, (SBP 60-70), patient asymptomatic with these blood pressures.  BP taken on opposite arm with normotensive reading.      Patient has ~10-15 pt difference in SBP between right and left upper extremities.      Right upper extremity with notably higher SBP than left upper extremity.      120/90's in RUE  /80's in LUE    Manual BP taken on LUE with reading of 102/88.      Patient states, \"my mother was the same way.\"      LUCRECIA Scott has been updated regarding above.      Bernadette Tomlin RN on 12/31/2019 at 4:48 PM    "

## 2019-12-31 NOTE — LETTER
[facility]  Medical ICU  [phone]    2020    Roge Agarwal  [address] [id number] [phone] (home)     : 1960          To Whom it May Concern:     Roge Agarwal was in the hospital from 2019-. He can return to work on 2020.          Sincerely,     Nina Love MD

## 2019-12-31 NOTE — PROGRESS NOTES
[facility] [place]    Patient admitted to room 1004 at approximately 1545 via cart from emergency room. Patient was accompanied by nurse and daughter.     Verbal SBAR report received from JOHANA Dey prior to patient arrival.     Patient trasferred to bed via self. Patient alert and oriented X 3. The patient is not having any pain.  . Admission vital signs: Blood pressure (!) 68/56, pulse 63, temperature 98.6  F (37  C), temperature source Oral, resp. rate 20, weight 96.6 kg (213 lb), SpO2 97 %. Patient was oriented to plan of care, call light, bed controls, tv, telephone, bathroom and visiting hours.     Risk Assessment    The following safety risks were identified during admission: none. Yellow risk band applied: NO.     Skin Initial Assessment    This writer admitted this patient and completed a full skin assessment and Kaushal score in the Adult PCS flowsheet. Appropriate interventions initiated as needed.     Secondary skin check completed by JOHANA Smith.             Bernadette Tomlin RN

## 2019-12-31 NOTE — H&P
[facility]    History and Physical - Hospitalist Service       Date of Admission:  12/31/2019    Assessment & Plan   Roge Agarwal is a 59 year old male admitted on 12/31/2019. He presents with chest pain and dyspnea.    Atrial flutter with rapid ventricular response  H/O atrial fibrillation  Unknown how long he was tachycardic. Denies palpitations. Dyspnea and pleuritic chest pain since last night.  No response to metoprolol in the ED.  Heart rate improved with diltiazem but blood pressure dropped to 70s/80s and diltiazem was stopped. Currently HR is in the 80s off diltiazem.  With a history of chronic alcohol use and mildly elevated serum lactate, there is some suspicion that he may be somewhat hypovolemic although no history suggestive of this.    Only known previous episode of A. fib was during a hospitalization in 2016 for traumatic amputation of the fingers, was found to be in RVR. Cardioverted after RAISSA. EF at that time was 15%, with RVR a likely culprit.  Extremely inconsistent in taking his metoprolol.      Not currently anticoagulated. [id number] of 1 or 2 (pending results of echo). Distant h/o spontaneous ICH, thought to be due to uncontrolled hypertension.  - Telemetry  - Lovenox 1 mg/kg q12h  - Echo   - 500 mL bolus, then resume diltiazem at lowest rate  - If BP drops again with diltiazem, will try amiodarone (150 mg bolus, then 1 mg/min x 6 hrs, then 0.5 mg/min.  - Addendum 11PM: On diltiazem 15 mg/min, heart rate now 70s to 80s with good blood pressure.  Will initiate oral diltiazem at 120 mg q6h and begin titrating down the drip.      Systolic CHF  Non-ischemic cardiomyopathy  Echo 6/1/2017 shows normal LV size with EF of 50%, significantly improved from the 2016 echo which showed an EF of 15%. At that time, cardiology suspected multifactorial cardiomyopathy due to alcohol abuse, physiologic stress (traumatic finger amputation), and tachycardia. He was cardioverted after  the 2016 echo.  Currently acknowledges mild dyspnea but lungs are clear, flat neck veins, and no dependent edema. CT shows possible interstitial edema and small bilateral pleural effusions. NT-BNP 2401.  - Echo    Atypical chest pain  Troponin 0.044.  ECG is nonischemic.  Acknowledged sharp left-sided chest pain that was aggravated with deep inspiration, rotation of the torso, or direct pressure.  Almost completely gone now.  Troponin elevation likely due to strain from RVR.  - Follow troponin    Elevated lactic acid  Lactic acid 2.3, etiology unclear, does not appear to be sepsis. Hypotension and tachycardia triggered sepsis BPA. No fevers, chills, sweats. No infectious symptoms.     Elevated D-Dimer  D-Dimer 0.9. CTA PE study negative for clot. No lower extremity pain or swelling.  - PE ruled out, no further action.    Essential hypertension  Poorly controlled.  Hypertensive on arrival to the ED, hypotensive after receiving diltiazem.  Home medications include hydrochlorothiazide 25 mg, losartan 50 mg, and metoprolol succinate 50 mg.  He takes his medications very infrequently, likely 1-2 times per week.  His current supply was filled January 2019 with a 90-day supply.  - Holding home meds due to hypotension.    Alcohol use  Daily alcohol consumption, 3-4 beers per day.  No history of withdrawal.  No evidence of active withdrawal.  Last drink was last night. Appears well-nourished.  Normal electrolytes. Normal LFTs. No withdrawal during last hospitalization in 2016. No indication for CIWA or vitamin supplementation at this time.   - Monitor for signs of withdrawal.    Tobacco abuse  Daily use of chewing tobacco. Has never smoked.          Diet: Regular  DVT Prophylaxis: Enoxaparin (treatment dose, for a-flutter)  Lima Catheter: not present  Code Status: Full    Disposition Plan   Expected discharge: Tomorrow, recommended to prior living arrangement once HR well controlled.  Entered: Cornel Zamora PA-C  12/31/2019, 3:10 PM     The patient's care was discussed with the Attending Physician, Dr. Nina Love, Patient and Patient's Family.    Cornel Zamora PA-C  [facility]    ______________________________________________________________________    Chief Complaint   Chest pain and dyspnea    History is obtained from the patient    History of Present Illness   Roge Agarwal is a 59 year old male with a history of poorly controlled hypertension, daily alcohol use who presents with dyspnea and chest pain.    He was in his normal state of health until last night when he felt restless, eventually becoming dyspneic, worse with exertion.  Denies palpitations.  By morning he developed sharp pain in his left chest that was aggravated with deep inspiration, rotation of the torso, or direct pressure.  He continued with his normal morning activities but was visibly dyspneic and coworkers told him he must go to the hospital.  On arrival in the ED he exhibited a heart rate in the 130s that appear to be atrial fib versus atrial flutter.  He received oral and IV metoprolol with no change in his heart rate.  He then received diltiazem bolus and drip, which successfully brought his heart rate down into the 60s.  Unfortunately he also became hypotensive, with SBP in the 70s to 80s.    Denies recent fevers, chills, sweats, nausea, vomiting, diarrhea, abdominal pain, dysuria, or other symptoms concerning for infection.    He continues to chew tobacco daily and drinks 3-4 beers every evening.    Works as a .    Review of Systems    The 10 point Review of Systems is negative other than noted in the HPI or here.     Past Medical History    I have reviewed this patient's medical history and updated it with pertinent information if needed.   Past Medical History:   Diagnosis Date     Atrial fibrillation (H) 2016    One time episode noted after his finger accident, required cardioversion      Hemorrhagic stroke (H) 12/16/2007    2.3 X 1.5 CM PARENCHYMAL HEMORRHAGE IN THE RIGHT BASAL GANGLIA     HTN (hypertension)      Systolic CHF (H)     EF of 15-30% in 2016       Past Surgical History   I have reviewed this patient's surgical history and updated it with pertinent information if needed.  Past Surgical History:   Procedure Laterality Date     AMPUTATE FINGER(S) Right     Right 3rd distal finger after work injury       Social History   I have reviewed this patient's social history and updated it with pertinent information if needed.  Social History     Tobacco Use     Smoking status: Never Smoker     Smokeless tobacco: Current User     Types: Chew   Substance Use Topics     Alcohol use: Yes     Drug use: Not on file       Family History   I have reviewed this patient's family history and updated it with pertinent information if needed.   Family History   Problem Relation Age of Onset     Heart Disease Mother      Osteoporosis Mother      LUNG DISEASE Mother      Heart Disease Father      Hypertension Brother        Prior to Admission Medications   Prior to Admission Medications   Prescriptions Last Dose Informant Patient Reported? Taking?   IBUPROFEN PO More than a month at Unknown time Self Yes No   Sig: Take 400 mg by mouth once    hydrochlorothiazide (HYDRODIURIL) 25 MG tablet  Self No No   Sig: Take 1 tablet (25 mg) by mouth daily   losartan (COZAAR) 50 MG tablet  Self No No   Sig: Take 1 tablet (50 mg) by mouth daily   metoprolol (TOPROL-XL) 50 MG 24 hr tablet  Self No No   Sig: Take 1 tablet (50 mg) by mouth daily      Facility-Administered Medications: None     Allergies   No Known Allergies    Physical Exam   Vital Signs: Temp: 98.6  F (37  C) Temp src: Oral BP: (!) 172/95 Pulse: 71   Resp: 16 SpO2: 96 %      Weight: 213 lbs 0 oz    General: Appears calm, comfortable, nontoxic. Answers questions quickly and appropriately with clear speech. No apparent distress.  Skin: Pink, warm, dry.  Eyes:  PERRL. Sclera are white.  HENT:  Normocephalic, atraumatic. Oropharynx is moist, without lesions or exudate.  Lymph/Hematologic: No occipital, anterior/posterior cervical, supraclavicular, or axillary lymphadenopathy.  CV: RRR, clear S1/S2 without murmur, rub, or gallop. Radial pulses equal bilaterally. No lower extremity edema.  Respiratory: CTA and equal bilaterally. Normal respiratory effort.  GI: Soft, nontender. Normal bowel sounds.  Musculoskeletal: Slight tenderness on left chest wall, level with nipple on anterior axillary line. Moving all extremities well. Normal muscle bulk and tone. Multiple finger amputations on right hand.  Neuro: Memory and cognition appear normal. CN II - XII grossly intact. Symmetrical extremity strength.  Psych: Normal mood and affect.    Data   Data reviewed today: I reviewed all medications, new labs and imaging results over the last 24 hours. I personally reviewed the EKG tracing showing a-fib/flutter with RVR.    Recent Labs   Lab 12/31/19  1130   WBC 9.2   HGB 15.7            POTASSIUM 4.6   CHLORIDE 100   CO2 26   BUN 17   CR 0.93   ANIONGAP 8   BAO 9.2   GLC 94   ALBUMIN 3.9   PROTTOTAL 8.2   BILITOTAL 1.1   ALKPHOS 68   ALT 37   AST 32   LIPASE 43*   TROPI 0.044     Recent Results (from the past 24 hour(s))   CT Chest Pulmonary Embolism w Contrast    Narrative    CT CHEST PULMONARY EMBOLISM W CONTRAST 12/31/2019 12:26 PM    HISTORY: Pleuritic chest pain.    CONTRAST:  80mL Isovue-370.    TECHNIQUE: CT of the chest is performed with IV contrast per pulmonary  embolus protocol.    This study is particularly tailored to assess the pulmonary arteries  and their branch vessels.  Other assessed structures include the  lungs, mediastinum, pleura, and chest wall.    Radiation dose for this scan is reduced using automated exposure  control, adjustment of the mA and/or kV according to patient size, or  iterative reconstruction technique.    COMPARISON:  None.    FINDINGS: Small bilateral pleural effusions are present. Moderate  coronary artery calcification is seen. There is interstitial  prominence centrally and thickening of the interlobular septa. This is  of concern for interstitial pulmonary edema, especially given other  findings. The thoracic aorta is normal in caliber. Cardiomegaly is  seen. There is reflux of contrast down the IVC into the hepatic veins.  This can be seen with right sided cardiac decompensation and/or  tricuspid valvular disease. The upper visualized portions of the  abdomen show a partially imaged cyst in the right kidney that measures  3.7 cm.      Impression    IMPRESSION:  1.  Early congestive heart failure is suggested, as described above.  2. No CT evidence of pulmonary embolus.    BAYLEE CASAS MD   XR Chest 2 Views    Narrative    XR CHEST 2 VW 12/31/2019 12:35 PM    HISTORY: Chest pain.    COMPARISON: None.      Impression    IMPRESSION: 2 views of the chest show mild cardiomegaly. A few  Kerley's B lines are questioned laterally at the right lung base.  Blunting of the posterior costophrenic angles on the lateral view  raises the possibility of minimal pleural fluid. Combination of  findings could relate to early interstitial pulmonary edema from CHF,  in the appropriate clinical setting.    BAYLEE CASAS MD

## 2019-12-31 NOTE — ED PROVIDER NOTES
History     Chief Complaint   Patient presents with     Chest Pain     HPI  Roge Agarwal is a 59 year old male who resents with a history of atrial fibrillation and on metoprolol but does not consistently use this alcohol abuse and uses at least daily alcohol 3/day as well as tobacco chewing hyperlipidemia hypertension presenting with a sense of palpitations anterior chest pain starting this morning although does not know how long is been in atrial fibrillation currently and is not on anticoagulation.  He is in his usual state of health last night.  Awoke at 430 this morning with anterior chest pain lateral aspect and pleuritic.    Significant dyspnea on exertion.  No VTE risk factors.      Denies recent prolonged travel (>3 hours) by car or plane, history or FHx of venous thromboembolism, recent surgery (last 4 weeks), active cancer history, hypercoagulable state, estrogen or other medications/conditions causing VTE or  new unilateral swelling or pain in the legs or calves.    Allergies:  No Known Allergies    Problem List:    Patient Active Problem List    Diagnosis Date Noted     Systolic CHF (H)      Priority: Medium     EF of 15-30% in 2016       Atrial fibrillation (H) 01/01/2016     Priority: Medium     One time episode noted after his finger accident, required cardioversion       Multiple-type hyperlipidemia 06/21/2011     Priority: Medium     Alcohol abuse 12/16/2007     Priority: Medium     Essential hypertension 12/16/2007     Priority: Medium     Cerebral hemorrhage (H) 12/16/2007     Priority: Medium     Overview:   CT HEAD W/O CONTRAST, 12/14/07    IMPRESSION:  1.  THERE IS A 2.3 X 1.5 CM PARENCHYMAL HEMORRHAGE IN THE RIGHT BASAL  GANGLIA WITH SURROUNDING EDEMA.       Tobacco use 12/16/2007     Priority: Medium        Past Medical History:    Past Medical History:   Diagnosis Date     Atrial fibrillation (H) 2016     Hemorrhagic stroke (H) 12/16/2007     HTN (hypertension)      Systolic CHF (H)         Past Surgical History:    Past Surgical History:   Procedure Laterality Date     AMPUTATE FINGER(S) Right     Right 3rd distal finger after work injury       Family History:    No family history on file.    Social History:  Marital Status:  Single [1]  Social History     Tobacco Use     Smoking status: Never Smoker     Smokeless tobacco: Current User     Types: Chew   Substance Use Topics     Alcohol use: Yes     Drug use: Not on file        Medications:    hydrochlorothiazide (HYDRODIURIL) 25 MG tablet  HYDROcodone-acetaminophen (NORCO) 5-325 MG per tablet  IBUPROFEN PO  losartan (COZAAR) 50 MG tablet  metoprolol (TOPROL-XL) 50 MG 24 hr tablet  tamsulosin (FLOMAX) 0.4 MG capsule          Review of Systems   Constitutional: Negative for chills, diaphoresis and fever.   HENT: Negative for ear pain, sinus pressure and sore throat.    Eyes: Negative for visual disturbance.   Respiratory: Positive for shortness of breath. Negative for cough and wheezing.    Cardiovascular: Positive for chest pain and palpitations.   Gastrointestinal: Negative for abdominal pain, blood in stool, constipation, diarrhea, nausea and vomiting.   Genitourinary: Negative for dysuria, frequency and urgency.   Skin: Negative for rash.   Neurological: Negative for headaches.   All other systems reviewed and are negative.       Physical Exam   BP: (!) 172/95  Pulse: 71  Temp: 98.6  F (37  C)  Resp: 16  Weight: 96.6 kg (213 lb)  SpO2: 96 %      Physical Exam  Constitutional:       General: He is in acute distress.      Appearance: He is not ill-appearing.   HENT:      Nose: Nose normal.      Mouth/Throat:      Pharynx: Oropharynx is clear.   Eyes:      Conjunctiva/sclera: Conjunctivae normal.   Neck:      Musculoskeletal: Neck supple.   Cardiovascular:      Rate and Rhythm: Regular rhythm. Tachycardia present.      Pulses: Normal pulses.      Heart sounds: Normal heart sounds. No murmur.   Pulmonary:      Effort: Pulmonary effort is normal.  No respiratory distress.      Breath sounds: Normal breath sounds. No stridor. No wheezing or rhonchi.   Abdominal:      General: Abdomen is flat. Bowel sounds are normal. There is no distension.      Palpations: Abdomen is soft. There is no mass.      Tenderness: There is no abdominal tenderness.   Musculoskeletal:      Right lower leg: No edema.      Left lower leg: No edema.   Skin:     Findings: No rash.   Neurological:      Mental Status: He is alert.         ED Course        Procedures                    EKG Interpretation:      Interpreted by Caden Sanchez MD  EKG done at 1111 hrs. demonstrates what appears to be in atrial fibrillation at 130 bpm with a leftward axis.  No ST change.  There is T wave flattening laterally.  Poor R progression V1 through V3.  Q waves inferiorly.  No ectopy.  Normal conduction.  Impression atrial fibrillation rapid ventricular rate and 138 bpm.        Critical Care time:  none               Results for orders placed or performed during the hospital encounter of 12/31/19 (from the past 24 hour(s))   CBC with platelets differential   Result Value Ref Range    WBC 9.2 4.0 - 11.0 10e9/L    RBC Count 4.57 4.4 - 5.9 10e12/L    Hemoglobin 15.7 13.3 - 17.7 g/dL    Hematocrit 45.7 40.0 - 53.0 %     78 - 100 fl    MCH 34.4 (H) 26.5 - 33.0 pg    MCHC 34.4 31.5 - 36.5 g/dL    RDW 13.9 10.0 - 15.0 %    Platelet Count 181 150 - 450 10e9/L    Diff Method Automated Method     % Neutrophils 83.9 %    % Lymphocytes 7.3 %    % Monocytes 7.4 %    % Eosinophils 0.3 %    % Basophils 0.8 %    % Immature Granulocytes 0.3 %    Nucleated RBCs 0 0 /100    Absolute Neutrophil 7.7 1.6 - 8.3 10e9/L    Absolute Lymphocytes 0.7 (L) 0.8 - 5.3 10e9/L    Absolute Monocytes 0.7 0.0 - 1.3 10e9/L    Absolute Eosinophils 0.0 0.0 - 0.7 10e9/L    Absolute Basophils 0.1 0.0 - 0.2 10e9/L    Abs Immature Granulocytes 0.0 0 - 0.4 10e9/L    Absolute Nucleated RBC 0.0    Comprehensive metabolic panel   Result Value  Ref Range    Sodium 134 133 - 144 mmol/L    Potassium 4.6 3.4 - 5.3 mmol/L    Chloride 100 94 - 109 mmol/L    Carbon Dioxide 26 20 - 32 mmol/L    Anion Gap 8 3 - 14 mmol/L    Glucose 94 70 - 99 mg/dL    Urea Nitrogen 17 7 - 30 mg/dL    Creatinine 0.93 0.66 - 1.25 mg/dL    GFR Estimate 89 >60 mL/min/[1.73_m2]    GFR Estimate If Black >90 >60 mL/min/[1.73_m2]    Calcium 9.2 8.5 - 10.1 mg/dL    Bilirubin Total 1.1 0.2 - 1.3 mg/dL    Albumin 3.9 3.4 - 5.0 g/dL    Protein Total 8.2 6.8 - 8.8 g/dL    Alkaline Phosphatase 68 40 - 150 U/L    ALT 37 0 - 70 U/L    AST 32 0 - 45 U/L   Troponin I   Result Value Ref Range    Troponin I ES 0.044 0.000 - 0.045 ug/L   D dimer quantitative   Result Value Ref Range    D Dimer 0.9 (H) 0.0 - 0.50 ug/ml FEU   Lipase   Result Value Ref Range    Lipase 43 (L) 73 - 393 U/L   TSH with free T4 reflex   Result Value Ref Range    TSH 1.29 0.40 - 4.00 mU/L   NT pro BNP   Result Value Ref Range    N-Terminal Pro BNP Inpatient 2,401 (H) 0 - 900 pg/mL   CT Chest Pulmonary Embolism w Contrast    Narrative    CT CHEST PULMONARY EMBOLISM W CONTRAST 12/31/2019 12:26 PM    HISTORY: Pleuritic chest pain.    CONTRAST:  80mL [id number].    TECHNIQUE: CT of the chest is performed with IV contrast per pulmonary  embolus protocol.    This study is particularly tailored to assess the pulmonary arteries  and their branch vessels.  Other assessed structures include the  lungs, mediastinum, pleura, and chest wall.    Radiation dose for this scan is reduced using automated exposure  control, adjustment of the mA and/or kV according to patient size, or  iterative reconstruction technique.    COMPARISON: None.    FINDINGS: Small bilateral pleural effusions are present. Moderate  coronary artery calcification is seen. There is interstitial  prominence centrally and thickening of the interlobular septa. This is  of concern for interstitial pulmonary edema, especially given other  findings. The thoracic aorta is  normal in caliber. Cardiomegaly is  seen. There is reflux of contrast down the IVC into the hepatic veins.  This can be seen with right sided cardiac decompensation and/or  tricuspid valvular disease. The upper visualized portions of the  abdomen show a partially imaged cyst in the right kidney that measures  3.7 cm.      Impression    IMPRESSION:  1.  Early congestive heart failure is suggested, as described above.  2. No CT evidence of pulmonary embolus.    BAYLEE CASAS MD   XR Chest 2 Views    Narrative    XR CHEST 2 VW 12/31/2019 12:35 PM    HISTORY: Chest pain.    COMPARISON: None.      Impression    IMPRESSION: 2 views of the chest show mild cardiomegaly. A few  Kerley's B lines are questioned laterally at the right lung base.  Blunting of the posterior costophrenic angles on the lateral view  raises the possibility of minimal pleural fluid. Combination of  findings could relate to early interstitial pulmonary edema from CHF,  in the appropriate clinical setting.    BAYLEE CASAS MD   POC US CHEST B-SCAN    Impression    [facility] Procedure Note      Limited Bedside ED Cardiac Ultrasound:    PROCEDURE: PERFORMED BY: Dr. Caden Sanchez MD  INDICATIONS/SYMPTOM:  Chest Pain  PROBE: Cardiac phased array probe and High frequency linear probe  BODY LOCATION: Chest  FINDINGS:   The ultrasound was performed utilizing the subcostal, parasternal long axis, parasternal short axis and apical 4 chamber views.  Cardiac contractility:  Present  Gross estimation of cardiac kinesis: hyperkinetic - but left ventricular contractility appears reduced   Pericardial Effusion:  None  RV:LV ratio: LV > RV  IVC:    Diameter:  IVC diameter expiration (IVCe) 2+ cm                                                   IVC diameter inspiration (IVCi) 1 cm                                                       Collapsibility:  IVC collapses > 50% with inspiration  INTERPRETATION:    Chamber size and motion were grossly normal with LV >  RV, asymmetric cardiac kinesis - decreased left ventricular contracility.  No pericardial effusion was found.  IVC visualized and findings indicate normovolemia.  IMAGE DOCUMENTATION: Images were archived to PACs system.        [facility] Procedure Note      Limited Bedside ED Ultrasound of Thorax:    PROCEDURE: PERFORMED BY: Dr. Caden Sanchez MD  INDICATIONS/SYMPTOM:  Chest pain  PROBE: High frequency linear probe  BODY LOCATION: Chest  FINDINGS:  Images of both lung hemithoracies taken in 2D in multiple rib spaces        Right side:  Lung sliding artifact  Present     Comet tail artifacts  Present   Left side:  Lung sliding artifact  Present     Comet tail artifacts  Absent   Hemothorax: Right side Absent     Left side Absent   Pleural effusion: Right side Absent      Left side Absent    INTERPRETATION: The exam was normal. There was no free fluid identified in the chest cavity. No evidence of pneumothorax, hemothorax or pleural effusion.  IMAGE DOCUMENTATION: Images were archived to PACs system.       Lactic acid level STAT   Result Value Ref Range    Lactate for Sepsis Protocol 2.3 (H) 0.7 - 2.0 mmol/L   Troponin I   Result Value Ref Range    Troponin I ES 0.052 (H) 0.000 - 0.045 ug/L   Platelet count   Result Value Ref Range    Platelet Count 150 150 - 450 10e9/L       Medications   metoprolol (LOPRESSOR) injection 5 mg (5 mg Intravenous Given 12/31/19 1142)   diltiazem (CARDIZEM) 125 mg in sodium chloride 0.9 % 125 mL infusion (has no administration in time range)   metoprolol tartrate (LOPRESSOR) tablet 50 mg (50 mg Oral Given 12/31/19 1130)       Assessments & Plan (with Medical Decision Making)     MDM: Roge Agarwal is a 59 year old male with presents with a history of alcohol abuse, prior atrial fibrillation who had onset this morning approximately 430 of anterior left chest pain presenting to the emergency department in atrial fibrillation with rapid ventricular rate that was refractory to  metoprolol x2 doses IV and metoprolol 50 mg orally which he is prescribed but does not take on a consistent basis.  His heart rate remained in the 130s despite this management.  His blood pressure dropped into the 110 range systolic.  He was initiated on a low-dose diltiazem drip and when his systolic blood pressure reached 140 he was given 20 mg IV diltiazem which brought his heart rate down to the 70-80 range and clearly demonstrated an atrial flutter.  The patient was not aware that he was in atrial fibrillation   and the timing of his onset is unknown and therefore synchronized cardioversion was not recommended.      While in the emergency department he was also given rally pack including thiamine.  Secondary causes for atrial fibrillation including thyroid testing were obtained.  A d-dimer was also elevated and with his initial refractory tachycardia and chest pain that was pleuritic a CT chest was performed demonstrating no obvious pulmonary embolism.  He did have signs of pulmonary congestion on his CT and a bedside ultrasound was performed demonstrating an IVC that collapsed easily with inspiration and demonstrated no signs of fluid overload.  Other findings were mixed on his bedside ultrasound demonstrating some B line artifacts on the right side compared to the left, and also with less contractility of the left ventricle.      His systolic blood pressure did drop into the 80s on his diltiazem and this was therefore held while his heart rate remained in the 60-80 range.    As a transfer to the intensive care unit for his further management I rechecked him there and his systolic blood pressure is increased to 110 and his heart rate remained in the 80/min range.  Discussed this further with Tyler, hospitalist who was seeing the patient.    I have reviewed the nursing notes.    I have reviewed the findings, diagnosis, plan and need for follow up with the patient.       ED to Inpatient Handoff:    Discussed with  Dr. Jovel   Patient accepted for Inpatient Stay  Pending studies include none  Code Status: Full Code           New Prescriptions    No medications on file       Final diagnoses:   Atrial fibrillation with RVR (H)   Alcohol abuse       12/31/2019   [facility] EMERGENCY DEPARTMENT     Caden Sanchez MD  12/31/19 2050

## 2020-01-01 LAB
ANION GAP SERPL CALCULATED.3IONS-SCNC: 7 MMOL/L (ref 3–14)
BUN SERPL-MCNC: 16 MG/DL (ref 7–30)
CALCIUM SERPL-MCNC: 8.4 MG/DL (ref 8.5–10.1)
CHLORIDE SERPL-SCNC: 102 MMOL/L (ref 94–109)
CO2 SERPL-SCNC: 25 MMOL/L (ref 20–32)
CREAT SERPL-MCNC: 0.98 MG/DL (ref 0.66–1.25)
ERYTHROCYTE [DISTWIDTH] IN BLOOD BY AUTOMATED COUNT: 13.9 % (ref 10–15)
FOLATE SERPL-MCNC: 13.6 NG/ML
GFR SERPL CREATININE-BSD FRML MDRD: 83 ML/MIN/{1.73_M2}
GLUCOSE SERPL-MCNC: 82 MG/DL (ref 70–99)
HCT VFR BLD AUTO: 39 % (ref 40–53)
HGB BLD-MCNC: 13 G/DL (ref 13.3–17.7)
LACTATE BLD-SCNC: 1 MMOL/L (ref 0.7–2)
MCH RBC QN AUTO: 33.8 PG (ref 26.5–33)
MCHC RBC AUTO-ENTMCNC: 33.3 G/DL (ref 31.5–36.5)
MCV RBC AUTO: 101 FL (ref 78–100)
MRSA DNA SPEC QL NAA+PROBE: NEGATIVE
PLATELET # BLD AUTO: 143 10E9/L (ref 150–450)
POTASSIUM SERPL-SCNC: 4.2 MMOL/L (ref 3.4–5.3)
RBC # BLD AUTO: 3.85 10E12/L (ref 4.4–5.9)
SODIUM SERPL-SCNC: 134 MMOL/L (ref 133–144)
SPECIMEN SOURCE: NORMAL
TROPONIN I SERPL-MCNC: 0.04 UG/L (ref 0–0.04)
VIT B12 SERPL-MCNC: 530 PG/ML (ref 193–986)
WBC # BLD AUTO: 6.3 10E9/L (ref 4–11)

## 2020-01-01 PROCEDURE — 25000128 H RX IP 250 OP 636: Performed by: PHYSICIAN ASSISTANT

## 2020-01-01 PROCEDURE — 85049 AUTOMATED PLATELET COUNT: CPT | Performed by: INTERNAL MEDICINE

## 2020-01-01 PROCEDURE — 25800030 ZZH RX IP 258 OP 636: Performed by: PHYSICIAN ASSISTANT

## 2020-01-01 PROCEDURE — 87640 STAPH A DNA AMP PROBE: CPT | Performed by: INTERNAL MEDICINE

## 2020-01-01 PROCEDURE — 82607 VITAMIN B-12: CPT | Performed by: INTERNAL MEDICINE

## 2020-01-01 PROCEDURE — 25000132 ZZH RX MED GY IP 250 OP 250 PS 637: Performed by: INTERNAL MEDICINE

## 2020-01-01 PROCEDURE — 36415 COLL VENOUS BLD VENIPUNCTURE: CPT | Performed by: INTERNAL MEDICINE

## 2020-01-01 PROCEDURE — 25000132 ZZH RX MED GY IP 250 OP 250 PS 637: Performed by: PHYSICIAN ASSISTANT

## 2020-01-01 PROCEDURE — 87641 MR-STAPH DNA AMP PROBE: CPT | Performed by: INTERNAL MEDICINE

## 2020-01-01 PROCEDURE — 36415 COLL VENOUS BLD VENIPUNCTURE: CPT | Performed by: PHYSICIAN ASSISTANT

## 2020-01-01 PROCEDURE — 80048 BASIC METABOLIC PNL TOTAL CA: CPT | Performed by: PHYSICIAN ASSISTANT

## 2020-01-01 PROCEDURE — 96361 HYDRATE IV INFUSION ADD-ON: CPT

## 2020-01-01 PROCEDURE — 82746 ASSAY OF FOLIC ACID SERUM: CPT | Performed by: INTERNAL MEDICINE

## 2020-01-01 PROCEDURE — G0008 ADMIN INFLUENZA VIRUS VAC: HCPCS

## 2020-01-01 PROCEDURE — G0378 HOSPITAL OBSERVATION PER HR: HCPCS

## 2020-01-01 PROCEDURE — 83605 ASSAY OF LACTIC ACID: CPT | Performed by: INTERNAL MEDICINE

## 2020-01-01 PROCEDURE — 90682 RIV4 VACC RECOMBINANT DNA IM: CPT | Performed by: INTERNAL MEDICINE

## 2020-01-01 PROCEDURE — 99226 ZZC SUBSEQUENT OBSERVATION CARE,LEVEL III: CPT | Performed by: INTERNAL MEDICINE

## 2020-01-01 PROCEDURE — 85027 COMPLETE CBC AUTOMATED: CPT | Performed by: PHYSICIAN ASSISTANT

## 2020-01-01 PROCEDURE — 84484 ASSAY OF TROPONIN QUANT: CPT | Performed by: INTERNAL MEDICINE

## 2020-01-01 PROCEDURE — 25000125 ZZHC RX 250: Performed by: PHYSICIAN ASSISTANT

## 2020-01-01 PROCEDURE — 96366 THER/PROPH/DIAG IV INF ADDON: CPT

## 2020-01-01 PROCEDURE — 25000128 H RX IP 250 OP 636: Performed by: INTERNAL MEDICINE

## 2020-01-01 RX ORDER — DILTIAZEM HYDROCHLORIDE 360 MG/1
360 CAPSULE, EXTENDED RELEASE ORAL DAILY
Qty: 30 CAPSULE | Refills: 0 | Status: SHIPPED | OUTPATIENT
Start: 2020-01-01 | End: 2020-01-03

## 2020-01-01 RX ORDER — LORAZEPAM 0.5 MG/1
0.5 TABLET ORAL EVERY 4 HOURS PRN
Status: DISCONTINUED | OUTPATIENT
Start: 2020-01-01 | End: 2020-01-03 | Stop reason: HOSPADM

## 2020-01-01 RX ORDER — DABIGATRAN ETEXILATE 75 MG/1
150 CAPSULE ORAL 2 TIMES DAILY
Status: DISCONTINUED | OUTPATIENT
Start: 2020-01-01 | End: 2020-01-03 | Stop reason: HOSPADM

## 2020-01-01 RX ORDER — ASPIRIN 81 MG/1
81 TABLET ORAL DAILY
Status: DISCONTINUED | OUTPATIENT
Start: 2020-01-01 | End: 2020-01-01

## 2020-01-01 RX ORDER — DILTIAZEM HYDROCHLORIDE 180 MG/1
360 CAPSULE, COATED, EXTENDED RELEASE ORAL DAILY
Status: DISCONTINUED | OUTPATIENT
Start: 2020-01-01 | End: 2020-01-03 | Stop reason: HOSPADM

## 2020-01-01 RX ORDER — METOPROLOL SUCCINATE 25 MG/1
25 TABLET, EXTENDED RELEASE ORAL DAILY
Status: DISCONTINUED | OUTPATIENT
Start: 2020-01-01 | End: 2020-01-02

## 2020-01-01 RX ADMIN — DABIGATRAN ETEXILATE MESYLATE 150 MG: 75 CAPSULE ORAL at 20:34

## 2020-01-01 RX ADMIN — ASPIRIN 81 MG: 81 TABLET, COATED ORAL at 09:26

## 2020-01-01 RX ADMIN — METOPROLOL SUCCINATE 25 MG: 25 TABLET, EXTENDED RELEASE ORAL at 14:08

## 2020-01-01 RX ADMIN — DILTIAZEM HYDROCHLORIDE 120 MG: 60 TABLET, FILM COATED ORAL at 00:06

## 2020-01-01 RX ADMIN — Medication 1 MG: at 00:16

## 2020-01-01 RX ADMIN — LORAZEPAM 0.5 MG: 0.5 TABLET ORAL at 02:14

## 2020-01-01 RX ADMIN — DILTIAZEM HYDROCHLORIDE 120 MG: 60 TABLET, FILM COATED ORAL at 06:11

## 2020-01-01 RX ADMIN — DILTIAZEM HYDROCHLORIDE 360 MG: 180 CAPSULE, COATED, EXTENDED RELEASE ORAL at 10:52

## 2020-01-01 RX ADMIN — INFLUENZA A VIRUS A/BRISBANE/02/2018 (H1N1) RECOMBINANT HEMAGGLUTININ ANTIGEN, INFLUENZA A VIRUS A/KANSAS/14/2017 (H3N2) RECOMBINANT HEMAGGLUTININ ANTIGEN, INFLUENZA B VIRUS B/PHUKET/3073/2013 RECOMBINANT HEMAGGLUTININ ANTIGEN, AND INFLUENZA B VIRUS B/MARYLAND/15/2016 RECOMBINANT HEMAGGLUTININ ANTIGEN 0.5 ML: 45; 45; 45; 45 INJECTION INTRAMUSCULAR at 09:22

## 2020-01-01 RX ADMIN — DILTIAZEM HYDROCHLORIDE 15 MG/HR: 5 INJECTION INTRAVENOUS at 00:06

## 2020-01-01 RX ADMIN — ENOXAPARIN SODIUM 90 MG: 100 INJECTION SUBCUTANEOUS at 08:36

## 2020-01-01 ASSESSMENT — MIFFLIN-ST. JEOR: SCORE: 1753.88

## 2020-01-01 NOTE — PROGRESS NOTES
Patient remains free of chest pain or discomfort HR tend to rise  To 120's with activity, walking in room.MD spoke to patient and daughter again , and patient decided to stay as MD recommended. Plan for cardiac  testing  tomorrow and monitor VITAL SIGNS for changes.Patient  Up and ambulatory in halls, HR now 70 to 80's after dose of metoprolol given. Will watch for any changes.

## 2020-01-01 NOTE — PROGRESS NOTES
Pt received 120 mg po Cardizem at 0000, his Cardizem drip was decreased to 10 mg/hr.  HR has been in the 70-80's with his SBP in the 1 teen's.  Will continue to monitor close for changes.

## 2020-01-01 NOTE — PROGRESS NOTES
Addendum    Pt has decided to stay in the hospital and is willing to have testing done here. I did speak to cardiologist at [facility]. He rec : controlling afib, anticoagilating as the pt will efevtually need to be cardioverted, adenosine cardiolite once aflutter controled.  Per nursing, hr 90's at rest but up to 130's with exercise. Will add metoprolol ad can titrate up as needed. Will start pradaxa which he was on in the past, stop asa. Echo abby cardiolite in am    Nina Love MD

## 2020-01-01 NOTE — PROGRESS NOTES
"Pt has been off the Cardizem drip since 0245, he has received 2 doses of oral Cardizem 120 mg each.  Pt's HR has been in the 60's-80's, he remains in A-Flutter, his SBP has been between 105-127. Pt stated he \"slept well after taking the pill you gave me (Ativan 0.5 mg po).\"   Will continue to monitor close for changes.   "

## 2020-01-01 NOTE — DISCHARGE SUMMARY
[facility]    Discharge Summary  Hospital Medicine    Date of Admission:  12/31/2019  Date of Discharge:  1/1/2020   Discharging Provider: Nina Love  Date of Service: 1/1/2020        Roge Agarwal is a 59 year old male admitted on 12/31/2019. He presents with chest pain and dyspnea.     Atrial flutter with rapid ventricular response  H/O atrial fibrillation  Unknown how long he was tachycardic. Denies palpitations. Dyspnea and pleuritic chest pain since night PTA.  No response to metoprolol in the ED.  Heart rate improved with diltiazem but blood pressure dropped to 70s/80s and diltiazem was stopped.HR subsequently increased again to 130's. He was then given IVf bolus. He was restarted on dilt drip and titrated up to 15. He then was started on oral diltiazem and was able to be weaned off dilt drip.  I have started long acting diltiazem 360 mg and pt is willing to stay until 4 pm to see if hr controlled. He says he has no cp or sob now and that it lasted until his hr slowed in ed, about 5 hours. He tells me he will leave the hospital today no matter what as he has things he has to take care of. Pt would likely benefit from an ep consult and possible ablation of his aflutter. I have rec he see his cardiologist to discuss. Of note in hospital he had nl tsh of 1.29. He had an elevated ddimer and subsequent ct pulm angio that did not show any pe.  Lytes were nl. Because of the holiday no echo was able to be obtained here. Intially he was started on lovenox. He has hx ICH spontaneous thaought related to uncontrolled htn in 2007. The risk of rebleed with anticoag is 2-3% per year. It is hard to calculate his umer vasc score but depending on his score ( 2-5) his risk of embolic cva is somewhere between 2-7% per year. At this point I have opted to not start anticoagulation and will leave that up to his cardiologist once a ramires assessment has been done and his risk can be adequately  calcultated         CHF- possible acute secondary to HFPEF  Hx Non-ischemic cardiomyopathy  Echo 6/1/2017 shows normal LV size with EF of 50%, significantly improved from the 2016 echo which showed an EF of 15%. At that time, cardiology suspected multifactorial cardiomyopathy due to alcohol abuse, physiologic stress (traumatic finger amputation), and tachycardia. He was cardioverted after the 2016 echo.  Currently acknowledges mild dyspnea but lungs are clear, flat neck veins, and no dependent edema. CT shows possible interstitial edema and small bilateral pleural effusions. NT-BNP 2401.This may be chf secondary to diastolic dysfunction and rapid aflutter. He is asymptomatic at discharge and did not receive any lasix       Elevated troponin, peak 0.52-   This was in setting of aflutter with hr 130's. He did have assoc cp that was described as pleuritic and also sob.ON review of the chart I could not find any stress testing or cor angiogram results and do not think this has ever been done. He does have coronary ca that is visible on his chest ct so it is concerning that he may have significant underlying cor disease that has previously bee undiagnosed. My plan had been to call and speak to a cardiologist on the phone and possible have pt transferred for further eval but pt refused and said he was going home today no mater what. He will be discharge home on asa. His pulse/BP has been intermittently borderline with attempts to control his aflutter so not sure he would tolerate metoprolol on top of diltiazem and the metoprolol did not control his heart rate.     Elevated lactic acid, resolved  Lactic acid 2.3. Once he got ivf and heart rate was controlled, lactate came down to 1        Essential hypertension  Poorly controlled at home. He has been irregularly taking metoprolol and hydrochlorothiazide. Not on any other drugs.  Here on diltiazem as that is the drug that controlled his aflutter rate    Alcohol use  Daily  alcohol consumption, 3-4 beers per day.  No history of withdrawal.  No evidence of active withdrawal.  Last drink was last night. Appears well-nourished.  Normal electrolytes. Normal LFTs. No withdrawal during last hospitalization in 2016. No indication for CIWA or vitamin supplementation at this time. LFT normal.  MCV borderline high. He does plan on stopping alcohol in case that is contributing to his heart problems     Macrocytosis- mcv 101- b12/folate pending. Lft, tsh ok    Tobacco abuse  Daily use of chewing tobacco. Has never smoked  Discharge Disposition   To home. discharge AMA    Discharge Orders      Reason for your hospital stay    New aflutter with rapid ventricular response, elevated troponin in setting of rapid heart rate-possible nonstemi     Follow-up and recommended labs and tests     Need to call your cardiologist 1/2/2020 and get an appointment to be seem within the week     Discharge Instructions    If recurrence of chest pain or sob, need to go to hospital     Discharge Medications   Current Discharge Medication List      START taking these medications    Details   aspirin (ASA) 81 MG EC tablet Take 1 tablet (81 mg) by mouth daily  Qty: 100 tablet, Refills: 0    Associated Diagnoses: Elevated troponin      diltiazem ER COATED BEADS (CARDIZEM CD) 360 MG 24 hr capsule Take 1 capsule (360 mg) by mouth daily  Qty: 30 capsule, Refills: 0    Associated Diagnoses: Atrial flutter with rapid ventricular response (H)         STOP taking these medications       hydrochlorothiazide (HYDRODIURIL) 25 MG tablet Comments:   Reason for Stopping:         IBUPROFEN PO Comments:   Reason for Stopping:         losartan (COZAAR) 50 MG tablet Comments:   Reason for Stopping:         metoprolol (TOPROL-XL) 50 MG 24 hr tablet Comments:   Reason for Stopping:             Allergies   No Known Allergies    Consultations This Hospital Stay     None    Significant Results and Procedures   Procedures        Data   Results  for orders placed or performed during the hospital encounter of 12/31/19   XR Chest 2 Views    Narrative    XR CHEST 2 VW 12/31/2019 12:35 PM    HISTORY: Chest pain.    COMPARISON: None.      Impression    IMPRESSION: 2 views of the chest show mild cardiomegaly. A few  Kerley's B lines are questioned laterally at the right lung base.  Blunting of the posterior costophrenic angles on the lateral view  raises the possibility of minimal pleural fluid. Combination of  findings could relate to early interstitial pulmonary edema from CHF,  in the appropriate clinical setting.    BAYLEE CASAS MD   CT Chest Pulmonary Embolism w Contrast    Narrative    CT CHEST PULMONARY EMBOLISM W CONTRAST 12/31/2019 12:26 PM    HISTORY: Pleuritic chest pain.    CONTRAST:  80mL Isovue-370.    TECHNIQUE: CT of the chest is performed with IV contrast per pulmonary  embolus protocol.    This study is particularly tailored to assess the pulmonary arteries  and their branch vessels.  Other assessed structures include the  lungs, mediastinum, pleura, and chest wall.    Radiation dose for this scan is reduced using automated exposure  control, adjustment of the mA and/or kV according to patient size, or  iterative reconstruction technique.    COMPARISON: None.    FINDINGS: Small bilateral pleural effusions are present. Moderate  coronary artery calcification is seen. There is interstitial  prominence centrally and thickening of the interlobular septa. This is  of concern for interstitial pulmonary edema, especially given other  findings. The thoracic aorta is normal in caliber. Cardiomegaly is  seen. There is reflux of contrast down the IVC into the hepatic veins.  This can be seen with right sided cardiac decompensation and/or  tricuspid valvular disease. The upper visualized portions of the  abdomen show a partially imaged cyst in the right kidney that measures  3.7 cm.      Impression    IMPRESSION:  1.  Early congestive heart failure is  suggested, as described above.  2. No CT evidence of pulmonary embolus.    BAYLEE CASAS MD   POC US CHEST B-SCAN    Impression    [facility] Procedure Note      Limited Bedside ED Cardiac Ultrasound:    PROCEDURE: PERFORMED BY: Dr. Caden Sanchez MD  INDICATIONS/SYMPTOM:  Chest Pain  PROBE: Cardiac phased array probe and High frequency linear probe  BODY LOCATION: Chest  FINDINGS:   The ultrasound was performed utilizing the subcostal, parasternal long axis, parasternal short axis and apical 4 chamber views.  Cardiac contractility:  Present  Gross estimation of cardiac kinesis: hyperkinetic - but left ventricular contractility appears reduced   Pericardial Effusion:  None  RV:LV ratio: LV > RV  IVC:    Diameter:  IVC diameter expiration (IVCe) 2+ cm                                                   IVC diameter inspiration (IVCi) 1 cm                                                       Collapsibility:  IVC collapses > 50% with inspiration  INTERPRETATION:    Chamber size and motion were grossly normal with LV > RV, asymmetric cardiac kinesis - decreased left ventricular contracility.  No pericardial effusion was found.  IVC visualized and findings indicate normovolemia.  IMAGE DOCUMENTATION: Images were archived to PACs system.        [facility] Procedure Note      Limited Bedside ED Ultrasound of Thorax:    PROCEDURE: PERFORMED BY: Dr. Caden Sanchez MD  INDICATIONS/SYMPTOM:  Chest pain  PROBE: High frequency linear probe  BODY LOCATION: Chest  FINDINGS:  Images of both lung hemithoracies taken in 2D in multiple rib spaces        Right side:  Lung sliding artifact  Present     Comet tail artifacts  Present   Left side:  Lung sliding artifact  Present     Comet tail artifacts  Absent   Hemothorax: Right side Absent     Left side Absent   Pleural effusion: Right side Absent      Left side Absent    INTERPRETATION: The exam was normal. There was no free fluid identified in the chest cavity. No  evidence of pneumothorax, hemothorax or pleural effusion.  IMAGE DOCUMENTATION: Images were archived to PACs system.           Pending Results   Unresulted Labs Ordered in the Past 30 Days of this Admission     Date and Time Order Name Status Description    1/1/2020 0716 Folate In process     1/1/2020 0716 Vitamin B12 In process           Physical Exam   Temp:  [97.7  F (36.5  C)-98.6  F (37  C)] 98.1  F (36.7  C)  Pulse:  [] 81  Heart Rate:  [] 107  Resp:  [13-38] 20  BP: ()/() 135/86  SpO2:  [93 %-98 %] 98 %  Vitals:    12/31/19 1106 12/31/19 1600 01/01/20 0600   Weight: 96.6 kg (213 lb) 88.4 kg (194 lb 14.2 oz) 89 kg (196 lb 3.4 oz)       Constitutional: nad  CV: Regular  Respiratory: CTA bilaterally  GI: Soft, nontender  Skin: Warm and dry      The discharge plan was discussed with the patient     Total time on this discharge was  40 min    Nina Love MD

## 2020-01-01 NOTE — PROGRESS NOTES
Patient using urinal at bedside independently.  Voiding small amounts, patient states this is typical for him.      HR remains afib/flutter rate 110-130bpm, diltiazem drip titrated to 15mg/hr.  SBP stable.  Patient remains asymptomatic with irregular HR.  Will continue to monitor.      Bernadette Tomlin RN on 12/31/2019 at 9:16 PM

## 2020-01-01 NOTE — PROGRESS NOTES
Patient's HR has decreased to 70's-90's aflutter.      Patient remains on 15mg/hr diltiazem.      Bernadette Tomlin RN on 12/31/2019 at 9:25 PM

## 2020-01-02 ENCOUNTER — APPOINTMENT (OUTPATIENT)
Dept: CARDIOLOGY | Facility: CLINIC | Age: 60
End: 2020-01-02
Attending: INTERNAL MEDICINE
Payer: MEDICAID

## 2020-01-02 ENCOUNTER — APPOINTMENT (OUTPATIENT)
Dept: NUCLEAR MEDICINE | Facility: CLINIC | Age: 60
End: 2020-01-02
Attending: INTERNAL MEDICINE
Payer: MEDICAID

## 2020-01-02 PROBLEM — I48.92 ATRIAL FLUTTER (H): Status: ACTIVE | Noted: 2020-01-02

## 2020-01-02 PROCEDURE — G0378 HOSPITAL OBSERVATION PER HR: HCPCS

## 2020-01-02 PROCEDURE — 25500064 ZZH RX 255 OP 636: Performed by: INTERNAL MEDICINE

## 2020-01-02 PROCEDURE — 25000132 ZZH RX MED GY IP 250 OP 250 PS 637: Performed by: INTERNAL MEDICINE

## 2020-01-02 PROCEDURE — 40000264 ECHOCARDIOGRAM COMPLETE

## 2020-01-02 PROCEDURE — 93018 CV STRESS TEST I&R ONLY: CPT | Mod: 52 | Performed by: INTERNAL MEDICINE

## 2020-01-02 PROCEDURE — 20000003 ZZH R&B ICU

## 2020-01-02 PROCEDURE — 34300033 ZZH RX 343: Performed by: INTERNAL MEDICINE

## 2020-01-02 PROCEDURE — 25800030 ZZH RX IP 258 OP 636: Performed by: INTERNAL MEDICINE

## 2020-01-02 PROCEDURE — 93306 TTE W/DOPPLER COMPLETE: CPT | Mod: 26 | Performed by: INTERNAL MEDICINE

## 2020-01-02 PROCEDURE — 78452 HT MUSCLE IMAGE SPECT MULT: CPT | Mod: 52 | Performed by: INTERNAL MEDICINE

## 2020-01-02 PROCEDURE — 99233 SBSQ HOSP IP/OBS HIGH 50: CPT | Performed by: INTERNAL MEDICINE

## 2020-01-02 PROCEDURE — 78452 HT MUSCLE IMAGE SPECT MULT: CPT | Mod: 52

## 2020-01-02 PROCEDURE — A9502 TC99M TETROFOSMIN: HCPCS | Performed by: INTERNAL MEDICINE

## 2020-01-02 PROCEDURE — 25000128 H RX IP 250 OP 636: Performed by: INTERNAL MEDICINE

## 2020-01-02 RX ORDER — LISINOPRIL 2.5 MG/1
2.5 TABLET ORAL DAILY
Status: DISCONTINUED | OUTPATIENT
Start: 2020-01-02 | End: 2020-01-02

## 2020-01-02 RX ORDER — LISINOPRIL 5 MG/1
5 TABLET ORAL DAILY
Status: DISCONTINUED | OUTPATIENT
Start: 2020-01-02 | End: 2020-01-03 | Stop reason: HOSPADM

## 2020-01-02 RX ORDER — REGADENOSON 0.08 MG/ML
0.4 INJECTION, SOLUTION INTRAVENOUS ONCE
Status: DISCONTINUED | OUTPATIENT
Start: 2020-01-02 | End: 2020-01-03 | Stop reason: HOSPADM

## 2020-01-02 RX ORDER — METOPROLOL SUCCINATE 25 MG/1
25 TABLET, EXTENDED RELEASE ORAL 2 TIMES DAILY
Status: DISCONTINUED | OUTPATIENT
Start: 2020-01-02 | End: 2020-01-02

## 2020-01-02 RX ORDER — METOPROLOL SUCCINATE 25 MG/1
25 TABLET, EXTENDED RELEASE ORAL 2 TIMES DAILY
Status: DISCONTINUED | OUTPATIENT
Start: 2020-01-02 | End: 2020-01-03

## 2020-01-02 RX ADMIN — AMIODARONE HYDROCHLORIDE 150 MG: 1.5 INJECTION, SOLUTION INTRAVENOUS at 13:28

## 2020-01-02 RX ADMIN — LORAZEPAM 0.5 MG: 0.5 TABLET ORAL at 19:44

## 2020-01-02 RX ADMIN — HUMAN ALBUMIN MICROSPHERES AND PERFLUTREN 2 ML: 10; .22 INJECTION, SOLUTION INTRAVENOUS at 08:21

## 2020-01-02 RX ADMIN — TETROFOSMIN 10.9 MCI.: 1.38 INJECTION, POWDER, LYOPHILIZED, FOR SOLUTION INTRAVENOUS at 11:23

## 2020-01-02 RX ADMIN — DILTIAZEM HYDROCHLORIDE 360 MG: 180 CAPSULE, COATED, EXTENDED RELEASE ORAL at 12:35

## 2020-01-02 RX ADMIN — METOPROLOL SUCCINATE 25 MG: 25 TABLET, EXTENDED RELEASE ORAL at 19:44

## 2020-01-02 RX ADMIN — AMIODARONE HYDROCHLORIDE 0.5 MG/MIN: 50 INJECTION, SOLUTION INTRAVENOUS at 18:23

## 2020-01-02 RX ADMIN — DABIGATRAN ETEXILATE MESYLATE 150 MG: 75 CAPSULE ORAL at 19:47

## 2020-01-02 RX ADMIN — AMIODARONE HYDROCHLORIDE 1 MG/MIN: 50 INJECTION, SOLUTION INTRAVENOUS at 13:40

## 2020-01-02 RX ADMIN — LISINOPRIL 5 MG: 5 TABLET ORAL at 13:39

## 2020-01-02 RX ADMIN — DABIGATRAN ETEXILATE MESYLATE 150 MG: 75 CAPSULE ORAL at 09:29

## 2020-01-02 RX ADMIN — METOPROLOL SUCCINATE 25 MG: 25 TABLET, EXTENDED RELEASE ORAL at 09:54

## 2020-01-02 ASSESSMENT — ACTIVITIES OF DAILY LIVING (ADL)
ADLS_ACUITY_SCORE: 10
ADLS_ACUITY_SCORE: 10

## 2020-01-02 NOTE — PROGRESS NOTES
Pt asking to utilize telemetry versus the hardwire cardiac monitoring as he does not like being tethered to the bed, verbal okay obtained Dr. Love.

## 2020-01-02 NOTE — PROGRESS NOTES
Care transitions note:    Observation Brochure     Patient and Family informed of observation status based on provider's order.  Observation brochure was given and stated understanding. Questions answered.    Patient does not have current insurance.  Call made to [employer] financial counselor ([phone]).  Edwin plans to visit with family and patient today.      Loyda CASSIDYN RN  Inpatient Care Coordinator  [facility] [phone]  [facility] [phone]

## 2020-01-02 NOTE — PROGRESS NOTES
Patient arrived for nuclear stress testing. He was placed on heart monitor for resting scans.  HR was 130's irregular but primary 130's. Discussed with YOSSI Love MD. Ok to finish resting images but patient will return to ICU following resting images and will complete test when HR is under 100 per min. GERALD Zavala RN

## 2020-01-02 NOTE — PROGRESS NOTES
Addendum    Received call from stress lab. When they initially picked up pt he was in nsr rate 90. Then in lab hr 130 and in flutter. Since pt is already going in and out of aflutter and I am having difficulty controlling hr, will start amiodarone iv. Stress lab will do rest images today. If hr can be controlled to max of 100, they can do the rest of the test.      Echo-   The visual ejection fraction is estimated at 25-30%.  Moderately decreased right ventricular systolic function  There is moderate biatrial enlargement.  There is mild (1+) mitral regurgitation.  Probable aflutter with rapid ventricular response.  There is no comparison study available    Impression  1. Aflutter, intermittent, in and out- diff to control HR. Start amiodarone. Cont toprol and dilt for now  2. Cardiomyopthy- may be from tachycardia from uncontrolled aflutter. Will garry further eval by cards and hopefully if control hr can finish stress test.  Will start lisinopril     Nina Love MD

## 2020-01-02 NOTE — PROGRESS NOTES
[facility] Medicine Progress Note  Date of Service: 01/02/2020    Roge Agarwal is a 59 year old male admitted on 12/31/2019. He presents with chest pain and dyspnea.     Atrial flutter with rapid ventricular response  H/O atrial fibrillation  Unknown how long he was tachycardic. Denies palpitations. Dyspnea and pleuritic chest pain since night PTA.  No response to metoprolol in the ED.  Heart rate improved with diltiazem but blood pressure dropped to 70s/80s and diltiazem was stopped.HR subsequently increased again to 130's. He was then given IVf bolus. He was restarted on dilt drip and titrated up to 15. He then was started on oral diltiazem and was able to be weaned off dilt drip.  I have started long acting diltiazem 360 mg . ON this HR still intermittently 110-130. I started toprol 25 mg 1/2 pm and this has helped but hr still intermittently up to 130. I will increase toprol to 25 bid today.    He says he has no cp or sob now and that it lasted until his hr slowed in ed, about 5 hours. He tells me he will leave the hospital today no matter what as he has things he has to take care of. . Of note in hospital he had nl tsh of 1.29. He had an elevated ddimer and subsequent ct pulm angio that did not show any pe.  Lytes were nl.He has hx ICH spontaneous thaought related to uncontrolled htn in 2007. The risk of rebleed with anticoag is 2-3% per year. It is hard to calculate his umer vasc score but depending on his score ( 2-5) his risk of embolic cva is somewhere between 2-7% per year. When I spoke to the cardiologist 1/1 they rec starting anticoag. Since he has been on pradaxa I past I restarted this.       Plan- cont dilt cd 360, increase toprol as tolerates to control hr, cont pradaxa,will eventually need card eval for cardioversion/ablation      CHF- possible acute secondary to HFPEF  Hx Non-ischemic cardiomyopathy  Echo 6/1/2017 shows normal LV size with EF of 50%,  significantly improved from the 2016 echo which showed an EF of 15%. At that time, cardiology suspected multifactorial cardiomyopathy due to alcohol abuse, physiologic stress (traumatic finger amputation), and tachycardia. He was cardioverted after the 2016 echo.  Currently acknowledges mild dyspnea but lungs are clear, flat neck veins, and no dependent edema. CT shows possible interstitial edema and small bilateral pleural effusions. NT-BNP 2401.This may be chf secondary to diastolic dysfunction and rapid aflutter. He is asymptomatic  and did not receive any lasix. Echo pending        Elevated troponin, peak 0.52-   This was in setting of aflutter with hr 130's. He did have assoc cp that was described as pleuritic and also sob.ON review of the chart I could not find any stress testing or cor angiogram results and do not think this has ever been done. He does have coronary ca that is visible on his chest ct so it is concerning that he may have significant underlying cor disease that has previously bee undiagnosed. I did speak to a cardiologist 1/1 who rec pharmacologic nuc study. He is now on pradaxa. Bc of his hx of hemorhgic cva 2007, I  Have stopped asa at this time. Echo and stress test pending  Elevated lactic acid, resolved  Lactic acid 2.3. Once he got ivf and heart rate was controlled, lactate came down to 1        Essential hypertension  Poorly controlled at home. He has been irregularly taking metoprolol and hydrochlorothiazide. Not on any other drugs.  Here on diltiazem as that is the drug that controlled his aflutter rate. Metoprolol added on top of this as hr not controlled and htn seems controlled     Alcohol use  Daily alcohol consumption, 3-4 beers per day.  No history of withdrawal.  No evidence of active withdrawal.  Last drink was last night. Appears well-nourished.  Normal electrolytes. Normal LFTs. No withdrawal during last hospitalization in 2016. No indication for CIWA or vitamin  supplementation at this time. LFT normal.  MCV borderline high. He does plan on stopping alcohol in case that is contributing to his heart problems      Macrocytosis, borderline- mcv 101- b12/folate  nl. Lft, tsh ok     Tobacco abuse  Daily use of chewing tobacco. Has never smoked    DVT proph- on pradaxa  Code status-full  Iv- piv    Nina Love MD       Interval History    Pt feels good. NO sob or cp    Physical Exam   Temp:  [97.6  F (36.4  C)-98.5  F (36.9  C)] 97.6  F (36.4  C)  Heart Rate:  [] 130  Resp:  [18-20] 18  BP: (112-141)/(79-98) 114/93  SpO2:  [94 %-98 %] 97 %  Tele- shows intermittent episodes of tachycardia with hr up to 130 alternating with hr as low as 40.and then this am hr consistently 110-130.    Weights:   Vitals:    12/31/19 1106 12/31/19 1600 01/01/20 0600   Weight: 96.6 kg (213 lb) 88.4 kg (194 lb 14.2 oz) 89 kg (196 lb 3.4 oz)    Body mass index is 25.89 kg/m .    Constitutional: nad  CV: Regular  Respiratory: CTA bilaterally  GI: Soft, nontender  Skin: Warm and dry  Musculoskeletal:no edema    Data   Recent Labs   Lab 01/01/20  0738 01/01/20  0454 12/31/19  1831 12/31/19  1130   WBC  --  6.3  --  9.2   HGB  --  13.0*  --  15.7   MCV  --  101*  --  100   PLT  --  143* 150 181   NA  --  134  --  134   POTASSIUM  --  4.2  --  4.6   CHLORIDE  --  102  --  100   CO2  --  25  --  26   BUN  --  16  --  17   CR  --  0.98  --  0.93   ANIONGAP  --  7  --  8   BAO  --  8.4*  --  9.2   GLC  --  82  --  94   ALBUMIN  --   --   --  3.9   PROTTOTAL  --   --   --  8.2   BILITOTAL  --   --   --  1.1   ALKPHOS  --   --   --  68   ALT  --   --   --  37   AST  --   --   --  32   LIPASE  --   --   --  43*   TROPI 0.037  --  0.052* 0.044       Recent Labs   Lab 01/01/20  0454 12/31/19  1130   GLC 82 94        Medications     diltiazem (CARDIZEM) infusion ADULT Stopped (01/01/20 0245)     sodium chloride Stopped (01/01/20 0620)       dabigatran ANTICOAGULANT  150 mg Oral BID     diltiazem ER COATED  BEADS  360 mg Oral Daily     metoprolol succinate ER  25 mg Oral BID     regadenoson  0.4 mg Intravenous Once     sodium chloride (PF)  10 mL Intracatheter Q8H       Nina Love MD

## 2020-01-02 NOTE — PLAN OF CARE
Pt stable overnight. VSS. Resting and sleeping in between cares. Pt anxious regarding testing to be done later today; emotional support given and questions answered. Will continue to monitor and reassess.

## 2020-01-03 ENCOUNTER — HOSPITAL ENCOUNTER (INPATIENT)
Facility: CLINIC | Age: 60
LOS: 5 days | Discharge: HOME | End: 2020-01-08
Attending: INTERNAL MEDICINE | Admitting: INTERNAL MEDICINE
Payer: MEDICAID

## 2020-01-03 VITALS
HEIGHT: 73 IN | OXYGEN SATURATION: 98 % | BODY MASS INDEX: 26.3 KG/M2 | SYSTOLIC BLOOD PRESSURE: 104 MMHG | WEIGHT: 198.41 LBS | HEART RATE: 118 BPM | RESPIRATION RATE: 18 BRPM | DIASTOLIC BLOOD PRESSURE: 46 MMHG | TEMPERATURE: 98.5 F

## 2020-01-03 DIAGNOSIS — Z78.9 ALCOHOL USE: ICD-10-CM

## 2020-01-03 DIAGNOSIS — I48.92 ATRIAL FLUTTER, UNSPECIFIED TYPE (H): Primary | ICD-10-CM

## 2020-01-03 DIAGNOSIS — I48.4 ATYPICAL ATRIAL FLUTTER (H): ICD-10-CM

## 2020-01-03 DIAGNOSIS — I48.92 ATRIAL FLUTTER, UNSPECIFIED TYPE (H): ICD-10-CM

## 2020-01-03 DIAGNOSIS — I50.21 ACUTE SYSTOLIC CONGESTIVE HEART FAILURE (H): ICD-10-CM

## 2020-01-03 LAB
ANION GAP SERPL CALCULATED.3IONS-SCNC: 8 MMOL/L (ref 3–14)
BUN SERPL-MCNC: 19 MG/DL (ref 7–30)
CALCIUM SERPL-MCNC: 8.8 MG/DL (ref 8.5–10.1)
CHLORIDE SERPL-SCNC: 100 MMOL/L (ref 94–109)
CO2 SERPL-SCNC: 23 MMOL/L (ref 20–32)
CREAT SERPL-MCNC: 1.02 MG/DL (ref 0.66–1.25)
ERYTHROCYTE [DISTWIDTH] IN BLOOD BY AUTOMATED COUNT: 13.9 % (ref 10–15)
GFR SERPL CREATININE-BSD FRML MDRD: 79 ML/MIN/{1.73_M2}
GLUCOSE SERPL-MCNC: 102 MG/DL (ref 70–99)
HCT VFR BLD AUTO: 44.5 % (ref 40–53)
HGB BLD-MCNC: 14.9 G/DL (ref 13.3–17.7)
INR PPP: 1.19 (ref 0.86–1.14)
MAGNESIUM SERPL-MCNC: 2 MG/DL (ref 1.6–2.3)
MCH RBC QN AUTO: 33.9 PG (ref 26.5–33)
MCHC RBC AUTO-ENTMCNC: 33.5 G/DL (ref 31.5–36.5)
MCV RBC AUTO: 101 FL (ref 78–100)
PHOSPHATE SERPL-MCNC: 3.2 MG/DL (ref 2.5–4.5)
PLATELET # BLD AUTO: 146 10E9/L (ref 150–450)
PLATELET # BLD AUTO: 150 10E9/L (ref 150–450)
POTASSIUM SERPL-SCNC: 4.3 MMOL/L (ref 3.4–5.3)
RBC # BLD AUTO: 4.4 10E12/L (ref 4.4–5.9)
SODIUM SERPL-SCNC: 131 MMOL/L (ref 133–144)
STRESS ECHO TARGET HR: 160
WBC # BLD AUTO: 9.7 10E9/L (ref 4–11)

## 2020-01-03 PROCEDURE — 25000132 ZZH RX MED GY IP 250 OP 250 PS 637: Performed by: INTERNAL MEDICINE

## 2020-01-03 PROCEDURE — 83735 ASSAY OF MAGNESIUM: CPT | Performed by: STUDENT IN AN ORGANIZED HEALTH CARE EDUCATION/TRAINING PROGRAM

## 2020-01-03 PROCEDURE — 93005 ELECTROCARDIOGRAM TRACING: CPT

## 2020-01-03 PROCEDURE — 99239 HOSP IP/OBS DSCHRG MGMT >30: CPT | Performed by: INTERNAL MEDICINE

## 2020-01-03 PROCEDURE — 84100 ASSAY OF PHOSPHORUS: CPT | Performed by: STUDENT IN AN ORGANIZED HEALTH CARE EDUCATION/TRAINING PROGRAM

## 2020-01-03 PROCEDURE — 25000132 ZZH RX MED GY IP 250 OP 250 PS 637: Performed by: STUDENT IN AN ORGANIZED HEALTH CARE EDUCATION/TRAINING PROGRAM

## 2020-01-03 PROCEDURE — 21400000 ZZH R&B CCU UMMC

## 2020-01-03 PROCEDURE — 25000128 H RX IP 250 OP 636: Performed by: INTERNAL MEDICINE

## 2020-01-03 PROCEDURE — 36415 COLL VENOUS BLD VENIPUNCTURE: CPT | Performed by: STUDENT IN AN ORGANIZED HEALTH CARE EDUCATION/TRAINING PROGRAM

## 2020-01-03 PROCEDURE — 93010 ELECTROCARDIOGRAM REPORT: CPT | Performed by: INTERNAL MEDICINE

## 2020-01-03 PROCEDURE — 25800030 ZZH RX IP 258 OP 636: Performed by: INTERNAL MEDICINE

## 2020-01-03 PROCEDURE — 85610 PROTHROMBIN TIME: CPT | Performed by: STUDENT IN AN ORGANIZED HEALTH CARE EDUCATION/TRAINING PROGRAM

## 2020-01-03 PROCEDURE — 80048 BASIC METABOLIC PNL TOTAL CA: CPT | Performed by: STUDENT IN AN ORGANIZED HEALTH CARE EDUCATION/TRAINING PROGRAM

## 2020-01-03 PROCEDURE — 40000141 ZZH STATISTIC PERIPHERAL IV START W/O US GUIDANCE

## 2020-01-03 PROCEDURE — 85027 COMPLETE CBC AUTOMATED: CPT | Performed by: STUDENT IN AN ORGANIZED HEALTH CARE EDUCATION/TRAINING PROGRAM

## 2020-01-03 RX ORDER — AMIODARONE HYDROCHLORIDE 200 MG/1
400 TABLET ORAL 2 TIMES DAILY
Status: DISCONTINUED | OUTPATIENT
Start: 2020-01-03 | End: 2020-01-03 | Stop reason: HOSPADM

## 2020-01-03 RX ORDER — ALUMINA, MAGNESIA, AND SIMETHICONE 2400; 2400; 240 MG/30ML; MG/30ML; MG/30ML
30 SUSPENSION ORAL EVERY 4 HOURS PRN
Status: DISCONTINUED | OUTPATIENT
Start: 2020-01-03 | End: 2020-01-08 | Stop reason: HOSPADM

## 2020-01-03 RX ORDER — MULTIPLE VITAMINS W/ MINERALS TAB 9MG-400MCG
1 TAB ORAL DAILY
Status: DISCONTINUED | OUTPATIENT
Start: 2020-01-04 | End: 2020-01-08 | Stop reason: HOSPADM

## 2020-01-03 RX ORDER — LIDOCAINE 40 MG/G
CREAM TOPICAL
Status: DISCONTINUED | OUTPATIENT
Start: 2020-01-03 | End: 2020-01-08 | Stop reason: HOSPADM

## 2020-01-03 RX ORDER — LISINOPRIL 5 MG/1
5 TABLET ORAL DAILY
Status: DISCONTINUED | OUTPATIENT
Start: 2020-01-04 | End: 2020-01-06

## 2020-01-03 RX ORDER — ONDANSETRON 4 MG/1
4 TABLET, ORALLY DISINTEGRATING ORAL EVERY 6 HOURS PRN
Status: DISCONTINUED | OUTPATIENT
Start: 2020-01-03 | End: 2020-01-08 | Stop reason: HOSPADM

## 2020-01-03 RX ORDER — AMIODARONE HYDROCHLORIDE 200 MG/1
400 TABLET ORAL 2 TIMES DAILY
Status: DISCONTINUED | OUTPATIENT
Start: 2020-01-03 | End: 2020-01-06

## 2020-01-03 RX ORDER — FOLIC ACID 1 MG/1
1 TABLET ORAL DAILY
Status: DISCONTINUED | OUTPATIENT
Start: 2020-01-04 | End: 2020-01-08 | Stop reason: HOSPADM

## 2020-01-03 RX ORDER — NITROGLYCERIN 0.4 MG/1
0.4 TABLET SUBLINGUAL EVERY 5 MIN PRN
Status: DISCONTINUED | OUTPATIENT
Start: 2020-01-03 | End: 2020-01-08 | Stop reason: HOSPADM

## 2020-01-03 RX ORDER — LANOLIN ALCOHOL/MO/W.PET/CERES
100 CREAM (GRAM) TOPICAL DAILY
Status: COMPLETED | OUTPATIENT
Start: 2020-01-04 | End: 2020-01-08

## 2020-01-03 RX ORDER — METOPROLOL SUCCINATE 25 MG/1
25 TABLET, EXTENDED RELEASE ORAL ONCE
Status: COMPLETED | OUTPATIENT
Start: 2020-01-03 | End: 2020-01-03

## 2020-01-03 RX ORDER — LORAZEPAM 0.5 MG/1
0.5 TABLET ORAL EVERY 4 HOURS PRN
Status: DISCONTINUED | OUTPATIENT
Start: 2020-01-03 | End: 2020-01-05

## 2020-01-03 RX ORDER — DILTIAZEM HYDROCHLORIDE 180 MG/1
360 CAPSULE, COATED, EXTENDED RELEASE ORAL DAILY
Status: DISCONTINUED | OUTPATIENT
Start: 2020-01-04 | End: 2020-01-04

## 2020-01-03 RX ORDER — ACETAMINOPHEN 325 MG/1
650 TABLET ORAL EVERY 4 HOURS PRN
Status: DISCONTINUED | OUTPATIENT
Start: 2020-01-03 | End: 2020-01-08 | Stop reason: HOSPADM

## 2020-01-03 RX ORDER — DABIGATRAN ETEXILATE 150 MG/1
150 CAPSULE ORAL 2 TIMES DAILY
Status: DISCONTINUED | OUTPATIENT
Start: 2020-01-03 | End: 2020-01-08 | Stop reason: HOSPADM

## 2020-01-03 RX ORDER — LANOLIN ALCOHOL/MO/W.PET/CERES
3 CREAM (GRAM) TOPICAL
Status: DISCONTINUED | OUTPATIENT
Start: 2020-01-03 | End: 2020-01-08 | Stop reason: HOSPADM

## 2020-01-03 RX ORDER — ONDANSETRON 2 MG/ML
4 INJECTION INTRAMUSCULAR; INTRAVENOUS EVERY 6 HOURS PRN
Status: DISCONTINUED | OUTPATIENT
Start: 2020-01-03 | End: 2020-01-08 | Stop reason: HOSPADM

## 2020-01-03 RX ORDER — METOPROLOL SUCCINATE 50 MG/1
50 TABLET, EXTENDED RELEASE ORAL 2 TIMES DAILY
Status: DISCONTINUED | OUTPATIENT
Start: 2020-01-03 | End: 2020-01-03 | Stop reason: HOSPADM

## 2020-01-03 RX ORDER — DIAZEPAM 5 MG
10 TABLET ORAL EVERY 30 MIN PRN
Status: DISCONTINUED | OUTPATIENT
Start: 2020-01-03 | End: 2020-01-05

## 2020-01-03 RX ORDER — ACETAMINOPHEN 650 MG/1
650 SUPPOSITORY RECTAL EVERY 4 HOURS PRN
Status: DISCONTINUED | OUTPATIENT
Start: 2020-01-03 | End: 2020-01-08 | Stop reason: HOSPADM

## 2020-01-03 RX ORDER — ASPIRIN 81 MG/1
324 TABLET, CHEWABLE ORAL ONCE
Status: DISCONTINUED | OUTPATIENT
Start: 2020-01-03 | End: 2020-01-03

## 2020-01-03 RX ADMIN — DABIGATRAN ETEXILATE MESYLATE 150 MG: 75 CAPSULE ORAL at 08:43

## 2020-01-03 RX ADMIN — DABIGATRAN ETEXILATE MESYLATE 150 MG: 150 CAPSULE ORAL at 22:54

## 2020-01-03 RX ADMIN — LORAZEPAM 0.5 MG: 0.5 TABLET ORAL at 22:56

## 2020-01-03 RX ADMIN — DILTIAZEM HYDROCHLORIDE 360 MG: 180 CAPSULE, COATED, EXTENDED RELEASE ORAL at 12:08

## 2020-01-03 RX ADMIN — AMIODARONE HYDROCHLORIDE 0.5 MG/MIN: 50 INJECTION, SOLUTION INTRAVENOUS at 02:55

## 2020-01-03 RX ADMIN — AMIODARONE HYDROCHLORIDE 400 MG: 200 TABLET ORAL at 08:34

## 2020-01-03 RX ADMIN — LORAZEPAM 0.5 MG: 0.5 TABLET ORAL at 02:56

## 2020-01-03 RX ADMIN — METOPROLOL SUCCINATE 25 MG: 25 TABLET, EXTENDED RELEASE ORAL at 08:34

## 2020-01-03 RX ADMIN — METOPROLOL SUCCINATE 25 MG: 25 TABLET, EXTENDED RELEASE ORAL at 10:30

## 2020-01-03 RX ADMIN — LISINOPRIL 5 MG: 5 TABLET ORAL at 08:34

## 2020-01-03 RX ADMIN — AMIODARONE HYDROCHLORIDE 400 MG: 200 TABLET ORAL at 22:54

## 2020-01-03 ASSESSMENT — ACTIVITIES OF DAILY LIVING (ADL)
ADLS_ACUITY_SCORE: 10

## 2020-01-03 ASSESSMENT — MIFFLIN-ST. JEOR: SCORE: 1763.88

## 2020-01-03 NOTE — LETTER
[address] [address]  Phone: [phone]       To whom it may concern,    Please accommodate work scheduling for Geneva Agarwal as she was a vital member of a [facility]ir care team caring for a patient from 01/03/2020-01/08/2020.     Thank you.            Radha De Leon MD  Internal Medicine

## 2020-01-03 NOTE — PROGRESS NOTES
[facility] Medicine Progress Note  Date of Service: 01/03/2020    Roge Agarwal is a 59 year old male admitted on 12/31/2019. He presents with chest pain and dyspnea.     Atrial flutter with rapid ventricular response  H/O atrial fibrillation in past- unable to control aflutter rate with Diltiazem cd 360 mg and toprol 25 mg bid, Since pt had at least one episode of conversion to nsr yesterday am noted by stress lab nurse, I decided to start him on am iodarone iv yesterday. He cont in aflutter today. HR range 90's to 120 still. Will increase toprol to 50 bid ( give extra 25 mg this am) and change to po amiodarone so that he can try to have his stress test today.  If unable to slow him enough, will need to call cardiology and discuss options  Unknown how long he was tachycardic. Denies palpitations. Dyspnea and pleuritic chest pain since night PTA.  No response to metoprolol in the ED.  Heart rate improved with diltiazem but blood pressure dropped to 70s/80s and diltiazem was stopped.HR subsequently increased again to 130's. He was then given IVf bolus. He was restarted on dilt drip and titrated up to 15. He then was started on oral diltiazem and was able to be weaned off dilt drip.  I  started long acting diltiazem 360 mg . On this HR still intermittently 110-130. I started toprol  1/1 and have been titrating up    He says he has no cp or sob now and that it lasted until his hr slowed in ed, about 5 hours.Of note in hospital he had nl tsh of 1.29. He had an elevated ddimer and subsequent ct pulm angio that did not show any pe.  Lytes were nl.He has hx ICH spontaneous thaought related to uncontrolled htn in 2007. The risk of rebleed with anticoag is 2-3% per year.umer vasc 2 score  Is 4 so his risk of embolic cva is  11 %  per year. When I spoke to the cardiologist on 1/1 they rec starting anticoag. Since he has been on pradaxa I past I restarted this.        CHF- possible  acute secondary to HFPEF with aflutter, rvr on admit, now resolved  Hx Non-ischemic cardiomyopathy- now with new decline in ef 25-30%, global  Hx 2016 cardiomyopathy 15% in setting of afib rvr. Pt was cardioverted and followed and ef returned to nl with Echo 6/1/2017 shows normal LV size with EF of 50%. At that time, cardiology suspected multifactorial cardiomyopathy due to alcohol abuse, physiologic stress (traumatic finger amputation), and tachycardia. Now with ef 20-25% that is in the setting of aflutter, rvr that existed for unclear amount of time, plus had been drinking 3-4 beers a day. Also slight trop elevation in setting of rapid afib.  Trying to obtain pharmacologic stress nuc but need hr max 100 to do this.Did have rest images 1/2. started on toprol 1/1on toprol, started lisinopril  1/2.     Elevated troponin, peak 0.52-   This was in setting of aflutter with hr 130's. He did have assoc cp that was described as pleuritic and also sob.ON review of the chart I could not find any stress testing or cor angiogram results and do not think this has ever been done. He does have coronary ca that is visible on his chest ct so it is concerning that he may have significant underlying cor disease that has previously bee undiagnosed. I did speak to a cardiologist 1/1 who rec pharmacologic nuc study. He is now on pradaxa. Bc of his hx of hemorhgic cva 2007, I  Have stopped asa at this time. Echo  With new cardiomyopathy ef 20-25%. Trying to get pharmacologic nuc testing but need hr to be 100 max and so far, diff to control hr.     Elevated lactic acid, resolved  Lactic acid 2.3. Once he got ivf and heart rate was controlled, lactate came down to 1        Essential hypertension  Poorly controlled at home. He has been irregularly taking metoprolol and hydrochlorothiazide. Not on any other drugs.  Here on diltiazem , toprol and lisinopril   Alcohol use  Daily alcohol consumption, 3-4 beers per day.  No history of  withdrawal.  No evidence of active withdrawal.  Last drink was last night. Appears well-nourished.  Normal electrolytes. Normal LFTs. No withdrawal during last hospitalization in 2016. No indication for CIWA or vitamin supplementation at this time. LFT normal.  MCV borderline high. He does plan on stopping alcohol in case that is contributing to his heart problems      Macrocytosis, borderline- mcv 101- b12/folate  nl. Lft, tsh ok     Tobacco abuse  Daily use of chewing tobacco. Has never smoked    Soc- will ask soc service to see for financial concerns/insurance    DVT proph- on pradaxa  Code status-full  Iv- piv    Nina Love MD       Interval History    Pt feels good. No sob or cp. Anxious to get eval done and get out of hospital. Has financial concerns with no insurance    Physical Exam   Temp:  [97.6  F (36.4  C)-98.2  F (36.8  C)] 97.8  F (36.6  C)  Pulse:  [] 75  Heart Rate:  [] 69  Resp:  [16-20] 18  BP: ()/() 114/101  SpO2:  [93 %-100 %] 95 %  Tele- shows aflutter hr variable with max about 120  Weights:   Vitals:    12/31/19 1600 01/01/20 0600 01/03/20 0557   Weight: 88.4 kg (194 lb 14.2 oz) 89 kg (196 lb 3.4 oz) 90 kg (198 lb 6.6 oz)    Body mass index is 26.18 kg/m .    Constitutional: nad  CV: Regular  Respiratory: CTA bilaterally  GI: Soft, nontender  Skin: Warm and dry  Musculoskeletal:no edema    Data   Recent Labs   Lab 01/01/20  0738 01/01/20  0454 12/31/19  1831 12/31/19  1130   WBC  --  6.3  --  9.2   HGB  --  13.0*  --  15.7   MCV  --  101*  --  100   PLT  --  143* 150 181   NA  --  134  --  134   POTASSIUM  --  4.2  --  4.6   CHLORIDE  --  102  --  100   CO2  --  25  --  26   BUN  --  16  --  17   CR  --  0.98  --  0.93   ANIONGAP  --  7  --  8   BAO  --  8.4*  --  9.2   GLC  --  82  --  94   ALBUMIN  --   --   --  3.9   PROTTOTAL  --   --   --  8.2   BILITOTAL  --   --   --  1.1   ALKPHOS  --   --   --  68   ALT  --   --   --  37   AST  --   --   --  32   LIPASE  --    --   --  43*   TROPI 0.037  --  0.052* 0.044       Recent Labs   Lab 01/01/20  0454 12/31/19  1130   GLC 82 94        Medications     diltiazem (CARDIZEM) infusion ADULT Stopped (01/01/20 0245)     sodium chloride Stopped (01/01/20 0620)       amiodarone  400 mg Oral BID     dabigatran ANTICOAGULANT  150 mg Oral BID     diltiazem ER COATED BEADS  360 mg Oral Daily     lisinopril  5 mg Oral Daily     metoprolol succinate ER  25 mg Oral BID     regadenoson  0.4 mg Intravenous Once     sodium chloride (PF)  10 mL Intracatheter Q8H       Nina Love MD

## 2020-01-03 NOTE — CONSULTS
"Care Transitions:    Care Transitions is consulted for no insurance listed in the EMR.  Per Edwin, Financial Counselor \"Patient Roge Agarwal in ICU room #4 is self-pay.  He is over income for [place] and [employer].\"    Chart reviewed and plan of care discussed in Interdisciplinary Rounds.  There are no discharge needs identified.    Plan:  Home      Lucero Ruiz RN, Care Coordinator [phone]  "

## 2020-01-03 NOTE — PROGRESS NOTES
"Pt voicing frustration with not being able to get HR under control, explanation given on a-fib, amio, dilt, metoprolol, education sheets also given, pt states he does not want to read them right now, \"I'm to overwhelmed\". HR remains a-flutter in the one teens. Metoprolol increased. Oral amiodarone given to pt, drip stopped at 1025, No chest pain, states he continues to have intermittent short of air with activity, sats are stable. Up ambulating in room independently.   "

## 2020-01-03 NOTE — PROGRESS NOTES
Pt had requested ativan earlier to help relax and sleep. BP tending to be soft due to ativan, Amiodarone drip and evening Metoprolol dose. Pt remains in controled Atrial Flutter at rest, and continues to have increased HR with activity. Will continue to monitor pt.

## 2020-01-03 NOTE — PROGRESS NOTES
Pt requests Ativan again, 0.5 mg po given to help him go back to sleep. Pt states comfort and denies pain. Will continue to monitor.

## 2020-01-03 NOTE — PROGRESS NOTES
Amiodarone drip started per MD orders, HR has been 70-80s at rest (a flutter). Pt continues to deny chest pain or palpitations. Hopefully he can complete stress test tomorrow. Denies any questions or concerns. Call light within reach.

## 2020-01-03 NOTE — PROGRESS NOTES
Pt states poor sleep again last night despite taking Ativan. Pt has many worries, one of which is no health insurance and mounting bills. Amiodarone drip continues at 0.5 mg/hr=30 ml/hr. HR has been 's overnight. Will continue to monitor.

## 2020-01-03 NOTE — LETTER
[address] [address]  Phone: [phone]       To whom it may concern,    Roge Agarwal was under the care of [facility] from 01/03/2020 - 01/08/2020. He may require lessen work load for medical reasons. Lifting is restricted to less than 20 lbs at this time. He is also restricted in standing time and please accommodate as able.    Thank you,         Radha De Leon MD  Internal Medicine

## 2020-01-03 NOTE — PROGRESS NOTES
Remains in a-flutter, rate one teens to low 120's. Unable to complete stress test, plan to transfer to  [facility], pt and daughter aware.

## 2020-01-03 NOTE — DISCHARGE SUMMARY
[facility]    Discharge Summary  Hospital Medicine    Date of Admission:  12/31/2019  Date of Discharge:  1/3/2020   Discharging Provider: Nina Love  Date of Service: 1/3/2020   Roge Agarwal is a 59 year old male admitted on 12/31/2019. He presents with chest pain and dyspnea.     Atrial flutter with rapid ventricular response  H/O atrial fibrillation in past- unable to control aflutter rate with Diltiazem cd 360 mg and toprol 25 mg bid, Since pt had at least one episode of conversion to nsr yesterday am noted by stress lab nurse, I decided to start him on am iodarone iv yesterday. He cont in aflutter today. HR range 90's to 120 still. Will increase toprol to 50 bid ( give extra 25 mg this am) and change to po amiodarone so that he can try to have his stress test today.  If unable to slow him enough, will need to call cardiology and discuss options  Unknown how long he was tachycardic. Denies palpitations. Dyspnea and pleuritic chest pain since night PTA.  No response to metoprolol in the ED.  Heart rate improved with diltiazem but blood pressure dropped to 70s/80s and diltiazem was stopped.HR subsequently increased again to 130's. He was then given IVf bolus. He was restarted on dilt drip and titrated up to 15. He then was started on oral diltiazem and was able to be weaned off dilt drip.  I  started long acting diltiazem 360 mg . On this HR still intermittently 110-130. I started toprol  1/1 and have been titrating up. His Hr cont very variable 's. Because I can't control his heart rate I spoke to DR Brantley cardiology. She rec tx to [facility] for cardioversion or ablation.       He says he has no cp or sob now and that it lasted until his hr slowed in ed, about 5 hours.Of note in hospital he had nl tsh of 1.29. He had an elevated ddimer and subsequent ct pulm angio that did not show any pe.  Lytes were nl.He has hx ICH spontaneous thought related to uncontrolled htn in 2007. The  risk of rebleed with anticoag is 2-3% per year.umer vasc 2 score  Is 4 so his risk of embolic cva is  11 %  per year. When I spoke to the cardiologist on 1/1 they rec starting anticoag. Since he has been on pradaxa in past I restarted this.        CHF- possible acute secondary to HFPEF with aflutter, rvr on admit, now resolved  Hx Non-ischemic cardiomyopathy- now with new decline in ef 25-30%, global  Hx 2016 cardiomyopathy 15% in setting of afib rvr. Pt was cardioverted and followed and ef returned to nl with Echo 6/1/2017 shows normal LV size with EF of 50%. At that time, cardiology suspected multifactorial cardiomyopathy due to alcohol abuse, physiologic stress (traumatic finger amputation), and tachycardia. Now with ef 20-25% that is in the setting of aflutter, rvr that existed for unclear amount of time, plus had been drinking 3-4 beers a day. Also slight trop elevation in setting of rapid afib.  Trying to obtain pharmacologic stress nuc but need hr max 100 to do this and unable to control to this extent..Did have rest images 1/2. started on toprol 1/1on toprol, started lisinopril  1/2.     Elevated troponin, peak 0.52-   This was in setting of aflutter with hr 130's. He did have assoc cp that was described as pleuritic and also sob.ON review of the chart I could not find any stress testing or cor angiogram results and do not think this has ever been done. He does have coronary ca that is visible on his chest ct so it is concerning that he may have significant underlying cor disease that has previously bee undiagnosed. I did speak to a cardiologist 1/1 who rec pharmacologic nuc study. He is now on pradaxa. Bc of his hx of hemorhgic cva 2007, I  Have stopped asa at this time. Echo  With new cardiomyopathy ef 20-25%. Trying to get pharmacologic nuc testing but need hr to be 100 max and so far, diff to control hr. Was able to get rest images but no stress images     Elevated lactic acid, resolved  Lactic acid 2.3.  Once he got ivf and heart rate was controlled, lactate came down to 1        Essential hypertension  Poorly controlled at home. He has been irregularly taking metoprolol and hydrochlorothiazide. Not on any other drugs.  Here on diltiazem , toprol and lisinopril and BP still high  Alcohol use  Daily alcohol consumption, 3-4 beers per day.  No history of withdrawal.  No evidence of active withdrawal.  Last drink was last night. Appears well-nourished.  Normal electrolytes. Normal LFTs. No withdrawal during last hospitalization in 2016. No indication for CIWA or vitamin supplementation at this time. LFT normal.  MCV borderline high. He does plan on stopping alcohol in case that is contributing to his heart problems      Macrocytosis, borderline- mcv 101- b12/folate  nl. Lft, tsh ok     Tobacco abuse  Daily use of chewing tobacco. Has never smoked     Soc-  financial concerns/ no insurance. I asked sox service to see but not sure if he will get seen before transfer  Discharge Disposition    to [facility] Cardiology      Allergies   No Known Allergies    Consultations This Hospital Stay     SOCIAL WORK IP CONSULT  CARE TRANSITION RN/SW IP CONSULT    Significant Results and Procedures   Procedures        Data   Results for orders placed or performed during the hospital encounter of 12/31/19   XR Chest 2 Views    Narrative    XR CHEST 2 VW 12/31/2019 12:35 PM    HISTORY: Chest pain.    COMPARISON: None.      Impression    IMPRESSION: 2 views of the chest show mild cardiomegaly. A few  Kerley's B lines are questioned laterally at the right lung base.  Blunting of the posterior costophrenic angles on the lateral view  raises the possibility of minimal pleural fluid. Combination of  findings could relate to early interstitial pulmonary edema from CHF,  in the appropriate clinical setting.    BAYLEE CASAS MD   CT Chest Pulmonary Embolism w Contrast    Narrative    CT CHEST PULMONARY EMBOLISM W CONTRAST 12/31/2019 12:26  PM    HISTORY: Pleuritic chest pain.    CONTRAST:  80mL [id number].    TECHNIQUE: CT of the chest is performed with IV contrast per pulmonary  embolus protocol.    This study is particularly tailored to assess the pulmonary arteries  and their branch vessels.  Other assessed structures include the  lungs, mediastinum, pleura, and chest wall.    Radiation dose for this scan is reduced using automated exposure  control, adjustment of the mA and/or kV according to patient size, or  iterative reconstruction technique.    COMPARISON: None.    FINDINGS: Small bilateral pleural effusions are present. Moderate  coronary artery calcification is seen. There is interstitial  prominence centrally and thickening of the interlobular septa. This is  of concern for interstitial pulmonary edema, especially given other  findings. The thoracic aorta is normal in caliber. Cardiomegaly is  seen. There is reflux of contrast down the IVC into the hepatic veins.  This can be seen with right sided cardiac decompensation and/or  tricuspid valvular disease. The upper visualized portions of the  abdomen show a partially imaged cyst in the right kidney that measures  3.7 cm.      Impression    IMPRESSION:  1.  Early congestive heart failure is suggested, as described above.  2. No CT evidence of pulmonary embolus.    BAYLEE CASAS MD   POC US CHEST B-SCAN    Impression    [facility] Procedure Note      Limited Bedside ED Cardiac Ultrasound:    PROCEDURE: PERFORMED BY: Dr. Caden Sanchez MD  INDICATIONS/SYMPTOM:  Chest Pain  PROBE: Cardiac phased array probe and High frequency linear probe  BODY LOCATION: Chest  FINDINGS:   The ultrasound was performed utilizing the subcostal, parasternal long axis, parasternal short axis and apical 4 chamber views.  Cardiac contractility:  Present  Gross estimation of cardiac kinesis: hyperkinetic - but left ventricular contractility appears reduced   Pericardial Effusion:  None  RV:LV ratio: LV >  RV  IVC:    Diameter:  IVC diameter expiration (IVCe) 2+ cm                                                   IVC diameter inspiration (IVCi) 1 cm                                                       Collapsibility:  IVC collapses > 50% with inspiration  INTERPRETATION:    Chamber size and motion were grossly normal with LV > RV, asymmetric cardiac kinesis - decreased left ventricular contracility.  No pericardial effusion was found.  IVC visualized and findings indicate normovolemia.  IMAGE DOCUMENTATION: Images were archived to PACs system.        [facility] Procedure Note      Limited Bedside ED Ultrasound of Thorax:    PROCEDURE: PERFORMED BY: Dr. Caden Sanchez MD  INDICATIONS/SYMPTOM:  Chest pain  PROBE: High frequency linear probe  BODY LOCATION: Chest  FINDINGS:  Images of both lung hemithoracies taken in 2D in multiple rib spaces        Right side:  Lung sliding artifact  Present     Comet tail artifacts  Present   Left side:  Lung sliding artifact  Present     Comet tail artifacts  Absent   Hemothorax: Right side Absent     Left side Absent   Pleural effusion: Right side Absent      Left side Absent    INTERPRETATION: The exam was normal. There was no free fluid identified in the chest cavity. No evidence of pneumothorax, hemothorax or pleural effusion.  IMAGE DOCUMENTATION: Images were archived to PACs system.       Echocardiogram Complete    Narrative    [id number]  [id number]  [id number]  [id number]^[id number]           [facility]  Echocardiography Laboratory  [address] [place] [address]        Name: BRENNEN MONTES  MRN: [id number]  : 1960  Study Date: 2020 08:00 AM  Age: 59 yrs  Gender: Male  Patient Location: [facility]  Reason For Study: Atrial Flutter, Other, Please Specify in Comments  Ordering Physician: NASIMA SERRANO  Performed By: Taisha Ny     BSA: 2.2 m2  Height: 73 in  Weight: 213 lb  HR: 132  BP: 81/62  mmHg  _____________________________________________________________________________  __        Procedure  Complete Portable Echo Adult. Optison (NDC #[id number]) given intravenously.  _____________________________________________________________________________  __        Interpretation Summary     The visual ejection fraction is estimated at 25-30%.  Moderately decreased right ventricular systolic function  There is moderate biatrial enlargement.  There is mild (1+) mitral regurgitation.  Probable aflutter with rapid ventricular response.  There is no comparison study available.  _____________________________________________________________________________  __        Left Ventricle  The left ventricle is mildly dilated. Left ventricular hypertrophy: asymmetric  with no LVOT obstruction. The visual ejection fraction is estimated at 25-30%.  Left ventricular diastolic function is abnormal.     Right Ventricle  The right ventricle is mild to moderately dilated. Moderately decreased right  ventricular systolic function.     Atria  There is moderate biatrial enlargement.     Mitral Valve  The mitral valve leaflets are mildly thickened. There is mild (1+) mitral  regurgitation.        Tricuspid Valve  Normal tricuspid valve. There is trace tricuspid regurgitation.     Aortic Valve  Normal tricuspid aortic valve.     Pulmonic Valve  The pulmonic valve is not well visualized.     Vessels  The aortic root is normal size. Normal size ascending aorta.     Pericardium  There is no pericardial effusion.        Rhythm  The rhythm was undetermined.  _____________________________________________________________________________  __  MMode/2D Measurements & Calculations     IVSd: 1.1 cm  LVIDd: 6.0 cm  LVIDs: 5.4 cm  LVPWd: 1.4 cm  FS: 10.0 %  LV mass(C)d: 320.5 grams  LV mass(C)dI: 145.0 grams/m2  asc Aorta Diam: 3.5 cm  LA Volume (BP): 98.0 ml  LA Volume Index (BP): 44.3 ml/m2     LA Volume Indexed (AL/bp): 46.8 ml/m2  RWT: 0.46         Doppler Measurements & Calculations  MV E max cisco: 72.1 cm/sec  MV dec time: 0.14 sec  PA acc time: 0.08 sec  E/E' av.6  Lateral E/e': 8.9  Medial E/e': 24.4              _____________________________________________________________________________  __        Report approved by: Renee Washington 2020 11:03 AM      NM MPI w Lexiscan     Value     Lab Results   Component Value Date    WBC 6.3 2020    HGB 13.0 (L) 2020    HCT 39.0 (L) 2020     (L) 2020    ALT 37 2019    AST 32 2019     2020    BUN 16 2020    CO2 25 2020    TSH 1.29 2019    INR 1.00 10/03/2017       Physical Exam   Temp:  [97.4  F (36.3  C)-98.2  F (36.8  C)] 98.2  F (36.8  C)  Pulse:  [] 120  Heart Rate:  [] 86  Resp:  [16-18] 16  BP: ()/() 130/101  SpO2:  [93 %-100 %] 97 %  Vitals:    19 1600 20 0600 20 0557   Weight: 88.4 kg (194 lb 14.2 oz) 89 kg (196 lb 3.4 oz) 90 kg (198 lb 6.6 oz)       Constitutional:  nad  CV:  Irreg, tachy  Respiratory: CTA bilaterally  GI: Soft, nontender  Skin: Warm and dry  No le edema    The discharge plan was discussed with the patient    Total time on this discharge was  40 min    Nina Love MD

## 2020-01-04 LAB
ANION GAP SERPL CALCULATED.3IONS-SCNC: 8 MMOL/L (ref 3–14)
BUN SERPL-MCNC: 15 MG/DL (ref 7–30)
CALCIUM SERPL-MCNC: 8.9 MG/DL (ref 8.5–10.1)
CHLORIDE SERPL-SCNC: 102 MMOL/L (ref 94–109)
CO2 SERPL-SCNC: 23 MMOL/L (ref 20–32)
CREAT SERPL-MCNC: 0.93 MG/DL (ref 0.66–1.25)
ERYTHROCYTE [DISTWIDTH] IN BLOOD BY AUTOMATED COUNT: 13.8 % (ref 10–15)
GFR SERPL CREATININE-BSD FRML MDRD: 89 ML/MIN/{1.73_M2}
GLUCOSE SERPL-MCNC: 91 MG/DL (ref 70–99)
HCT VFR BLD AUTO: 44.6 % (ref 40–53)
HGB BLD-MCNC: 15.3 G/DL (ref 13.3–17.7)
INR PPP: 1.31 (ref 0.86–1.14)
MCH RBC QN AUTO: 34.1 PG (ref 26.5–33)
MCHC RBC AUTO-ENTMCNC: 34.3 G/DL (ref 31.5–36.5)
MCV RBC AUTO: 99 FL (ref 78–100)
PLATELET # BLD AUTO: 153 10E9/L (ref 150–450)
POTASSIUM SERPL-SCNC: 4.4 MMOL/L (ref 3.4–5.3)
RBC # BLD AUTO: 4.49 10E12/L (ref 4.4–5.9)
SODIUM SERPL-SCNC: 133 MMOL/L (ref 133–144)
WBC # BLD AUTO: 8.8 10E9/L (ref 4–11)

## 2020-01-04 PROCEDURE — 36415 COLL VENOUS BLD VENIPUNCTURE: CPT | Performed by: PHYSICIAN ASSISTANT

## 2020-01-04 PROCEDURE — 99221 1ST HOSP IP/OBS SF/LOW 40: CPT | Mod: GC | Performed by: INTERNAL MEDICINE

## 2020-01-04 PROCEDURE — 85027 COMPLETE CBC AUTOMATED: CPT | Performed by: PHYSICIAN ASSISTANT

## 2020-01-04 PROCEDURE — 21400000 ZZH R&B CCU UMMC

## 2020-01-04 PROCEDURE — 99222 1ST HOSP IP/OBS MODERATE 55: CPT | Mod: GC | Performed by: INTERNAL MEDICINE

## 2020-01-04 PROCEDURE — 25000132 ZZH RX MED GY IP 250 OP 250 PS 637: Performed by: STUDENT IN AN ORGANIZED HEALTH CARE EDUCATION/TRAINING PROGRAM

## 2020-01-04 PROCEDURE — 85610 PROTHROMBIN TIME: CPT | Performed by: PHYSICIAN ASSISTANT

## 2020-01-04 PROCEDURE — 80048 BASIC METABOLIC PNL TOTAL CA: CPT | Performed by: PHYSICIAN ASSISTANT

## 2020-01-04 RX ORDER — METOPROLOL TARTRATE 50 MG
50 TABLET ORAL 2 TIMES DAILY
Status: DISCONTINUED | OUTPATIENT
Start: 2020-01-04 | End: 2020-01-06

## 2020-01-04 RX ADMIN — DABIGATRAN ETEXILATE MESYLATE 150 MG: 150 CAPSULE ORAL at 07:57

## 2020-01-04 RX ADMIN — Medication 100 MG: at 07:57

## 2020-01-04 RX ADMIN — MULTIPLE VITAMINS W/ MINERALS TAB 1 TABLET: TAB at 07:57

## 2020-01-04 RX ADMIN — AMIODARONE HYDROCHLORIDE 400 MG: 200 TABLET ORAL at 07:57

## 2020-01-04 RX ADMIN — DABIGATRAN ETEXILATE MESYLATE 150 MG: 150 CAPSULE ORAL at 19:45

## 2020-01-04 RX ADMIN — METOPROLOL TARTRATE 50 MG: 50 TABLET, FILM COATED ORAL at 07:57

## 2020-01-04 RX ADMIN — AMIODARONE HYDROCHLORIDE 400 MG: 200 TABLET ORAL at 19:45

## 2020-01-04 RX ADMIN — METOPROLOL TARTRATE 50 MG: 50 TABLET, FILM COATED ORAL at 19:45

## 2020-01-04 RX ADMIN — FOLIC ACID 1 MG: 1 TABLET ORAL at 07:57

## 2020-01-04 RX ADMIN — LISINOPRIL 5 MG: 5 TABLET ORAL at 07:57

## 2020-01-04 ASSESSMENT — ACTIVITIES OF DAILY LIVING (ADL)
ADLS_ACUITY_SCORE: 11

## 2020-01-04 NOTE — PLAN OF CARE
D: Pt admitted from outside hospital for management of atrial flutter with RVR. Hx of atrial fibrillation s/p DCCV in 2016, hemorrhagic stroke, HTN, and HFrEF with recovered EF.     I/A: Vital signs stable, afebrile, A&Ox4, aflutter 60s-100s. Denied pain. PRN ativan given once for anxiety. Up independently. Voiding adequate amounts. NPO at midnight. Slept well between cares.     P: EP consult today to determine treatment plan for aflutter. Continue to monitor and notify MD with pertinent changes.

## 2020-01-04 NOTE — PROGRESS NOTES
"S: \"Aamir\" (he/him/his pronouns) was transferred from [facility] 1/3 for atrial flutter with RVR.    B: medical history of atrial fibrillation s/p cardioversion (2016), hemorrhagic stroke, HTN, and HFrEF with recovered EF     A: Monitored vitals and assessed pt status.   Changed: EP consult today  Running: none  PRN: none  Tele: atrial flutter 70s-100s  O2: room air  Mobility: up ad jacy     Neuro: A&O x 4, calm and cooperative  Cardiac: apical pulse irregular  Respiratory: WDL  GI/: voiding adequately; BM 1/4  Diet/appetite: regular diet, tolerating; appetite good  Activity: up ad jacy  Pain: none reported  Skin: no areas of concern noted  LDAs: L PIV, saline locked    R: RAISSA and ablation anticipated for early next week. Continue to monitor Pt status and report changes to treatment team - cards 1.    "

## 2020-01-04 NOTE — CONSULTS
"  Electrophysiology Consultation Note   EP Attending:   Reason for consultation: Atrial flutter.   Provider requesting consultation: Dr. Radha De Leon.  Date of Service: 1/4/2020      HPI:   60yo m had been feeling fatigued in 4/16 when he amputated two of his fingers with his wood saw. At the ED, he was found in a rapid rhythm and with EF 15%. He's not been aware of arrhythmia. ECG reports from 4/16 all state \"atrial flutter/tach\" although notes state pt had atrial fibrillation. Tracings are not scanned. He had RAISSA/CV and started warfarin. His EF returned to 55% by several months later. He came off warfarin a month after CV.    For about two months, he's been feeling very fatigued and increasingly SOB, to the point of SOB at rest. He's noticed chest pain. He has not had palpitations or racing. He denies orthopnea, pnd, or edema. He presented to local hospital and was found in atrial flutter. With any movement, his HR was said to be 140s. Echo finds EF back to 30%.    He drinks heavy alcohol, \"many beers\" a day. He is motivated to quit as he agrees this is contributing to his illness.    In 2007, he had a hemorrhagic stroke. He was start on dabigatran at outside hospital and is now on apixaban and aspirin.    Past Medical History:   Past Medical History:   Diagnosis Date     Atrial fibrillation (H) 2016    One time episode noted after his finger accident, required cardioversion     Atrial fibrillation with RVR (H) 4/6/2016     Hemorrhagic stroke (H) 12/16/2007    2.3 X 1.5 CM PARENCHYMAL HEMORRHAGE IN THE RIGHT BASAL GANGLIA     HTN (hypertension)      Systolic CHF (H)     EF of 15-30% in 2016     Past Surgical History:   Past Surgical History:   Procedure Laterality Date     AMPUTATE FINGER(S) Right     Right 3rd distal finger after work injury     Allergies: Per MAR   No Known Allergies  Medications:   Per MAR current outpatient cardiovascular medications include:   No medications prior to admission. "     No current outpatient medications on file.     Current Facility-Administered Medications   Medication Dose Route Frequency     amiodarone  400 mg Oral BID     dabigatran ANTICOAGULANT  150 mg Oral BID     folic acid  1 mg Oral Daily     lisinopril  5 mg Oral Daily     metoprolol tartrate  50 mg Oral BID     multivitamin w/minerals  1 tablet Oral Daily     sodium chloride (PF)  3 mL Intracatheter Q8H     vitamin B1  100 mg Oral Daily     Family History:   Family History   Problem Relation Age of Onset     Heart Disease Mother      Osteoporosis Mother      LUNG DISEASE Mother      Heart Disease Father      Hypertension Brother      Social History:   Social History     Tobacco Use     Smoking status: Never Smoker     Smokeless tobacco: Current User     Types: Chew   Substance Use Topics     Alcohol use: Yes       ROS:   A comprehensive 10 point ROS was negative other than as mentioned in HPI. He's not had alcohol for five days and says he's not had withdrawal sx.    Physical Examination:   VITALS: /79 (BP Location: Right arm)   Temp 98.4  F (36.9  C) (Oral)   Resp 18   Wt 85.8 kg (189 lb 1.6 oz)   SpO2 98%   BMI 24.95 kg/m    Mouth- poor dentition  GENERAL APPEARANCE: AxO, NAD   HEENT: NCAT, EOMI, MMM.   NECK: Supple. No JVD or bruit. Good carotid upstroke.   CHEST: CTAB   CARDIOVASCULAR: irregularly irregular normal rate no m/r/g  ABDOMEN: BS+, soft, No pulsatile masses or bruits.   EXTREMITIES: No pedal edema. Distal pulses intact.   NEURO: Grossly nonfocal.   PSYCH: Normal affect.  SKIN: Warm and dry.   Data:   Labs:  BMP  Recent Labs   Lab 01/04/20  0617 01/03/20 2115 01/01/20 0454 12/31/19  1130    131* 134 134   POTASSIUM 4.4 4.3 4.2 4.6   CHLORIDE 102 100 102 100   BAO 8.9 8.8 8.4* 9.2   CO2 23 23 25 26   BUN 15 19 16 17   CR 0.93 1.02 0.98 0.93   GLC 91 102* 82 94     CBC  Recent Labs   Lab 01/04/20  0617 01/03/20 2115 01/01/20  0454 12/31/19  1831 12/31/19  1130   WBC 8.8 9.7 6.3  --   9.2   RBC 4.49 4.40 3.85*  --  4.57   HGB 15.3 14.9 13.0*  --  15.7   HCT 44.6 44.5 39.0*  --  45.7   MCV 99 101* 101*  --  100   MCH 34.1* 33.9* 33.8*  --  34.4*   MCHC 34.3 33.5 33.3  --  34.4   RDW 13.8 13.9 13.9  --  13.9    150 146*  143* 150 181     INR  Recent Labs   Lab 01/04/20  0617 01/03/20  2115   INR 1.31* 1.19*     No results found for: CKTOTAL, CKMB, TROPN  No results found for: CHOL, CHOLHDLRATIO, HDL, LDL  EKG: Atrial flutter with flutter waves negative inferiorly and positive V1 along with baseline artifact. Rate 80s. LAD.  Tele: Typical appearing flutter with sawtooth flutter waves inferiorly.  ECHO 1/2/19:    The visual ejection fraction is estimated at 25-30%.  Moderately decreased right ventricular systolic function  There is moderate biatrial enlargement.  There is mild (1+) mitral regurgitation.  Probable aflutter with rapid ventricular response.  There is no comparison study available.    Assessment:   58yo m with alcohol use disorder and recurrent atrial arrhythmia with cardiomyopathy. Has atrial flutter now. Suspect 2016 was also atrial flutter. Could have Afib and atypical flutter as well. Cardiomyopathy previously reversed after return to SR. History of cerebral hemorrhage 2007, tolerant of anticoagulants without bleeding after that.   EP Recommendations:  Abstinence from all alcohol, which is likely both trigger for cardiomyopathy and for atrial arrhythmia.  Anticoagulation with apixaban.  Rec rhythm control with ablation. Disc'd in detail with pt and daughter. Risks and benefits as well as alternatives (medical management) discussed. Pt wishes to proceed.    The patient and his daughter states understanding and is agreeable with plan.   Thank you for allowing us to participate in the care of this patient.    We will work to arrange RAISSA/flutter ablation for early next week. Pt is fine to remain in hospital for this.    The patient was discussed w/ Dr. Romeo.  The above note  reflects our joint plan.    Althea Goldstein MD [facility]  EP Fellow    EP STAFF NOTE  Patient seen and examined and management plan personally reviewed with the patient. I agree with the note above by the CV/EP fellow.  AFL, on pradaxa, RAISSA, EFL ablation, previous recurrences look like AFL rather than AF.  Jan Romeo MD [facility]  Cardiology - Electrophysiology

## 2020-01-04 NOTE — H&P
[facility]    History and Physical - Cardiology Night Float Service   Patient Name: Roge Agarwal   Date of Admission:  1/3/2020    Assessment & Plan   Roge Agarwal is a 59 year old male with a PMH of atrial fibrillation s/p cardioversion (2016), hemorrhagic stroke, HTN, and HFrEF with recovered EF who was transferred to [facility] on 1/03/2020 for atrial flutter with RVR      # atrial flutter with RVR  # hx of atrial fibrillation s/p cardioversion (2016)  He was started on a diltiazem gtt and amiodarone gtt and subsequently transitioned to PO medications with HR of 90-120s prior to transfer to [facility].  Patient is currently hemodynamically stable.  CHADSVASC of 2 (HTN and CHF) and HAS-BLED of 2-3 (alcohol use,prior major bleeding, and hemorrhagic stroke). Patient started on dabigatran at OSH.   - continue amiodarone 400mg PO BID  - continue diltiazem 360mg PO daily  - metoprolol tartrate 50mg BID  - continue dabigatran at this time. Should readdress long term anticoagulation vs risk of bleeding prior to discharge  - telemetry  - EP consult in the AM      # HFrEF  1/02/19 TTE with an EF of 25-30% and moderately decreased right ventricular systolic function, and mild MR. (Per careeverywhere, the patient has a hx of HFrEF with TTE on 4/2016 with EF of 15% and normal RV systolic function which subsequently recovered on 6/1/2017 TTE with EF of 50% and normal RV systolic function).  Etiology is likely multifactorial, given tachyarrhythmia and hx of alcohol abuse. No coronary angiogram per chart review, but given elevated troponin in the setting of his aflutter with RVR, patient likely has some degree of CAD  - ACEI/ARB: continue lisinopril 5mg daily  - Beta Blocker: HR management as above  - Aldosterone Antagonist: not on PTA  - diuresis: currently Euvolemic on exam, no diuretics PTA  - Telemetry  - Strict Is/Os, daily weights    # HTN  Was on metoprolol and hydrochlorothiazide prior to  OSH admission. hydrochlorothiazide was discontinued and patient was started on diltiazem and lisinopril   - continue lisinopril and diltiazem as above    CHRONIC MEDICAL PROBLEMS  # Alcohol abuse: [facility] protocol  # Anxiety: was on 0.5mg ativan PO q4h at OSH    Diet: regular diet, NPO at MN  DVT Prophylaxis: dabigatran   Lima Catheter: not present  Code Status: Full Code      Disposition Plan   Admit to Cardiology I service  Entered: Manuel Hunt MD  01/03/2020, 10:19 PM     Patient to be staffed in the AM    Manuel Hunt MD  Internal Medicine, PGY-2  Cardiology Night Float Service  [facility]  Pager: [phone]  Please see sticky note for cross cover information  ______________________________________________________________________    Chief Complaint   Atrial flutter with RVR    History is obtained from the patient and electronic health record    History of Present Illness   Roge Agarwal is a 59 year old male with a PMH of atrial fibrillation s/p cardioversion (2016), hemorrhagic stroke, HTN, and HFrEF with recovered EF who was transferred to [facility] on 1/03/2020 for atrial flutter with RVR.  Patient initially presented to [facility] on 12/31/19 with left sided substernal chest pain with associated SOB and without radiation, where he was found to be in atrial flutter with RVR and had an elevated troponin. His troponin peaked at 0.052.  He was started on a diltiazem gtt and amiodarone gtt and subsequently transitioned to PO medications with HR of 90-120s.  His chest pain and SOB have since resolved. Due to his persistent tachycardia, patient was transferred to [facility] for further evaluation and management with possible cardioversion vs ablation.   In addition, the team attempted to obtain a stress test given in his elevated troponin but was unable to obtain it due to his tachycardia.  He denies fevers, chills, palpitations, lightheadedness, dizziness, abdominal pain, n/v,  constipation, diarrhea, dysuria, hematuria, joint pain, leg swelling.    Review of Systems   The 10 point Review of Systems is negative other than noted in the HPI.    Past Medical History   Past Medical History:   Diagnosis Date     Atrial fibrillation (H) 2016    One time episode noted after his finger accident, required cardioversion     Atrial fibrillation with RVR (H) 4/6/2016     Hemorrhagic stroke (H) 12/16/2007    2.3 X 1.5 CM PARENCHYMAL HEMORRHAGE IN THE RIGHT BASAL GANGLIA     HTN (hypertension)      Systolic CHF (H)     EF of 15-30% in 2016       Past Surgical History   Past Surgical History:   Procedure Laterality Date     AMPUTATE FINGER(S) Right     Right 3rd distal finger after work injury       Social History   Social History     Tobacco Use     Smoking status: Never Smoker     Smokeless tobacco: Current User     Types: Chew   Substance Use Topics     Alcohol use: Yes     Drug use: Not on file       Family History   Family History   Problem Relation Age of Onset     Heart Disease Mother      Osteoporosis Mother      LUNG DISEASE Mother      Heart Disease Father      Hypertension Brother        Prior to Admission Medications   - metoprolol succinate 50mg daily  - hydrochlorothiazide 25mg daily    Allergies    No Known Allergies    Physical Exam   Vital Signs: Temp: 98  F (36.7  C) Temp src: Oral BP: (!) 121/91   Heart Rate: 95 Resp: 18 SpO2: 99 % O2 Device: None (Room air)    Weight: 193 lbs 9.02 oz      GENERAL: Alert, interactive, NAD  HEENT: AT/NC, sclera anicteric, EOMI  RESP: clear to auscultation bilaterally, no crackles or wheezes  CARDIAC: tachycardic, irregular rhythm   ABDOMEN: Soft, nontender, nondistended. +BS  EXTREMITIES: No LE edema bilaterally  NEURO: 5/5 upper and lower extremity strength bilaterally  PSYCH: alert, appropriate mood, normal speech      Data     Results for orders placed or performed during the hospital encounter of 01/03/20 (from the past 24 hour(s))   EKG 12-lead,  tracing only   Result Value Ref Range    Interpretation ECG Click View Image link to view waveform and result    Basic metabolic panel   Result Value Ref Range    Sodium 131 (L) 133 - 144 mmol/L    Potassium 4.3 3.4 - 5.3 mmol/L    Chloride 100 94 - 109 mmol/L    Carbon Dioxide 23 20 - 32 mmol/L    Anion Gap 8 3 - 14 mmol/L    Glucose 102 (H) 70 - 99 mg/dL    Urea Nitrogen 19 7 - 30 mg/dL    Creatinine 1.02 0.66 - 1.25 mg/dL    GFR Estimate 79 >60 mL/min/[1.73_m2]    GFR Estimate If Black >90 >60 mL/min/[1.73_m2]    Calcium 8.8 8.5 - 10.1 mg/dL   CBC with platelets   Result Value Ref Range    WBC 9.7 4.0 - 11.0 10e9/L    RBC Count 4.40 4.4 - 5.9 10e12/L    Hemoglobin 14.9 13.3 - 17.7 g/dL    Hematocrit 44.5 40.0 - 53.0 %     (H) 78 - 100 fl    MCH 33.9 (H) 26.5 - 33.0 pg    MCHC 33.5 31.5 - 36.5 g/dL    RDW 13.9 10.0 - 15.0 %    Platelet Count 150 150 - 450 10e9/L   Magnesium   Result Value Ref Range    Magnesium 2.0 1.6 - 2.3 mg/dL   Phosphorus   Result Value Ref Range    Phosphorus 3.2 2.5 - 4.5 mg/dL   INR   Result Value Ref Range    INR 1.19 (H) 0.86 - 1.14

## 2020-01-04 NOTE — PROGRESS NOTES
[facility], [place]    Progress Note - Cards 1 Service        Date of Admission:  1/3/2020    Assessment & Plan      Roge Agarwal is a 59 year old male with a PMH of atrial fibrillation s/p cardioversion (2016), hemorrhagic stroke, HTN, and HFrEF with recovered EF who was transferred to [facility] on 1/03/2020 for atrial flutter with RVR      Atrial flutter with RVR  Hx of atrial fibrillation s/p cardioversion (2016)  He was started on a diltiazem gtt and amiodarone gtt and subsequently transitioned to PO medications with HR of 90-120s prior to transfer to [facility].  Patient is currently hemodynamically stable.  CHADSVASC of 2 (HTN and CHF) and HAS-BLED of 2-3 (alcohol use,prior major bleeding, and hemorrhagic stroke). Patient started on dabigatran at OSH.    - Continue amiodarone 400mg PO BID    - Discontinue diltiazem given decreased EF.    - Continue metoprolol tartrate 50mg BID   - Continue dabigatran at this time. Should readdress long term anticoagulation vs risk of bleeding prior to discharge   - Tele   - EP consulted, appreciate assistance   - Recommend abstinence from ETOH   - Continued anticoagulation   - Plan to proceed with ablation/RAISSA, possibly mon/tues pending scheduling.      Chronic HFrEF (EF 25-30%)  1/02/19 TTE with an EF of 25-30% and moderately decreased right ventricular systolic function, and mild MR. (Per careeverywhere, the patient has a hx of HFrEF with TTE on 4/2016 with EF of 15% and normal RV systolic function which subsequently recovered on 6/1/2017 TTE with EF of 50% and normal RV systolic function).  Etiology is likely multifactorial, given tachyarrhythmia and hx of alcohol abuse. No coronary angiogram per chart review, but given elevated troponin in the setting of his aflutter with RVR, patient likely has some degree of CAD.    - ACEI/ARB: continue lisinopril 5mg daily    - Beta Blocker: continue metoprolol 50mg BID   - Aldosterone Antagonist: not on PTA, will  readdress prior to discharge after ablation.    - diuresis: currently Euvolemic on exam, no diuretics PTA, will readdress prior to discharge after ablation.    - Telemetry   - Strict Is/Os, daily weights     HTN  Was on metoprolol and hydrochlorothiazide prior to OSH admission. hydrochlorothiazide was discontinued and patient was started on diltiazem and lisinopril   - continue lisinopril     CHRONIC MEDICAL PROBLEMS  Alcohol abuse: CIWA protocol  Anxiety: was on 0.5mg ativan PO q4h at OSH     Diet: Regular Diet Adult No Caffeinated Beverages    DVT Prophylaxis: Dabigatran  Code Status: Full Code      Disposition Plan   Expected discharge: 4 - 7 days, recommended to prior living arrangement once cardiac work up complete - ablation done. .  Entered: Radha De Leon MD 01/04/2020, 1:55 PM       The patient's care was discussed with the Attending Physician, Dr. Boss.    Radha De Leon MD  [facility] 1 Service  [facility] [place]  Pager: [phone]  Please see sticky note for cross cover information  ______________________________________________________________________    Interval History   Admitted overnight from OSH for atrial flutter. Feeling much improved on oral medications. Denies chest pain or shortness of breath. Is missing dog significantly.     Data reviewed today: I reviewed all medications, new labs and imaging results over the last 24 hours.    Physical Exam   Vital Signs: Temp: 98.3  F (36.8  C) Temp src: Oral BP: 102/89   Heart Rate: 88 Resp: 18 SpO2: 96 % O2 Device: None (Room air)    Weight: 189 lbs 1.6 oz    Gen: laying in bed comfortably, NAD, alert, pleasant, cooperative, non-toxic  HEENT: EOMI, no scleral icterus, tracking appropriately  Resp: CTAB, no crackles or wheezes, no increased WOB  Cardiac: irregularly irregular, no S3/S4, no M/R/G appreciated  Ext: WWP, trace edema bilaterally, spontaneous movement in all 4  Neuro: AOx3, CN 2-12 grossly intact, appropriate  mentation    Data   Reviewed in Epic, pertinent discussed in A&P above.    [facility] Smokers Quitline ([id number])

## 2020-01-04 NOTE — PROGRESS NOTES
"SPIRITUAL HEALTH SERVICES  SPIRITUAL ASSESSMENT Progress Note  [facility] ([place]) 6C   ON-CALL VISIT    REFERRAL SOURCE: Responded to a hospital  request    Brief visit with Aamir in which he described the difficulty that sitting in a hospital is for him. He noted that he is a active outdoors yandel and uncomfortable just sitting. Aamir noted that it is difficult to be away from his normal routines and that the distance his bothering him adding \"even my dog seems upset by it all.\" I validated his frustration and shifted the conversation towards reflecting on the support he receives from his family. He added that his grandkids are his motivation for healing.     PLAN: I will inform the unit  of our visit.     Prateek Iyer, [facility], [facility]  Staff   Pager [phone]      "

## 2020-01-04 NOTE — PROGRESS NOTES
Admission Note:    Admitted from: [facility] CV ICU  For: Atrial flutter management  Admission profile, care plan and education complete.   Oriented to room/unit, call dont fall and plan  Belongings: Socks, pants, underwear  Tele: Rate controlled aflutter  2 RN skin assessment completed with Marlene LEDBETTER RN. Flushed cheeks and red feet, no other skin issues noted.     Will continue to monitor

## 2020-01-05 LAB
ANION GAP SERPL CALCULATED.3IONS-SCNC: 6 MMOL/L (ref 3–14)
BUN SERPL-MCNC: 23 MG/DL (ref 7–30)
CALCIUM SERPL-MCNC: 8.5 MG/DL (ref 8.5–10.1)
CHLORIDE SERPL-SCNC: 107 MMOL/L (ref 94–109)
CO2 SERPL-SCNC: 22 MMOL/L (ref 20–32)
CREAT SERPL-MCNC: 0.96 MG/DL (ref 0.66–1.25)
ERYTHROCYTE [DISTWIDTH] IN BLOOD BY AUTOMATED COUNT: 13.9 % (ref 10–15)
GFR SERPL CREATININE-BSD FRML MDRD: 85 ML/MIN/{1.73_M2}
GLUCOSE SERPL-MCNC: 100 MG/DL (ref 70–99)
HCT VFR BLD AUTO: 44.8 % (ref 40–53)
HGB BLD-MCNC: 14.9 G/DL (ref 13.3–17.7)
MCH RBC QN AUTO: 34 PG (ref 26.5–33)
MCHC RBC AUTO-ENTMCNC: 33.3 G/DL (ref 31.5–36.5)
MCV RBC AUTO: 102 FL (ref 78–100)
PLATELET # BLD AUTO: 150 10E9/L (ref 150–450)
POTASSIUM SERPL-SCNC: 4.2 MMOL/L (ref 3.4–5.3)
RBC # BLD AUTO: 4.38 10E12/L (ref 4.4–5.9)
SODIUM SERPL-SCNC: 135 MMOL/L (ref 133–144)
WBC # BLD AUTO: 6.5 10E9/L (ref 4–11)

## 2020-01-05 PROCEDURE — 25000132 ZZH RX MED GY IP 250 OP 250 PS 637: Performed by: STUDENT IN AN ORGANIZED HEALTH CARE EDUCATION/TRAINING PROGRAM

## 2020-01-05 PROCEDURE — 85027 COMPLETE CBC AUTOMATED: CPT | Performed by: PHYSICIAN ASSISTANT

## 2020-01-05 PROCEDURE — 21400000 ZZH R&B CCU UMMC

## 2020-01-05 PROCEDURE — 99232 SBSQ HOSP IP/OBS MODERATE 35: CPT | Mod: GC | Performed by: INTERNAL MEDICINE

## 2020-01-05 PROCEDURE — 80048 BASIC METABOLIC PNL TOTAL CA: CPT | Performed by: PHYSICIAN ASSISTANT

## 2020-01-05 PROCEDURE — 36415 COLL VENOUS BLD VENIPUNCTURE: CPT | Performed by: PHYSICIAN ASSISTANT

## 2020-01-05 RX ORDER — LORAZEPAM 0.5 MG/1
0.5 TABLET ORAL
Status: COMPLETED | OUTPATIENT
Start: 2020-01-05 | End: 2020-01-05

## 2020-01-05 RX ADMIN — METOPROLOL TARTRATE 50 MG: 50 TABLET, FILM COATED ORAL at 19:53

## 2020-01-05 RX ADMIN — DABIGATRAN ETEXILATE MESYLATE 150 MG: 150 CAPSULE ORAL at 19:53

## 2020-01-05 RX ADMIN — METOPROLOL TARTRATE 50 MG: 50 TABLET, FILM COATED ORAL at 08:30

## 2020-01-05 RX ADMIN — Medication 100 MG: at 08:30

## 2020-01-05 RX ADMIN — AMIODARONE HYDROCHLORIDE 400 MG: 200 TABLET ORAL at 19:53

## 2020-01-05 RX ADMIN — LISINOPRIL 5 MG: 5 TABLET ORAL at 08:30

## 2020-01-05 RX ADMIN — FOLIC ACID 1 MG: 1 TABLET ORAL at 08:30

## 2020-01-05 RX ADMIN — AMIODARONE HYDROCHLORIDE 400 MG: 200 TABLET ORAL at 08:30

## 2020-01-05 RX ADMIN — DABIGATRAN ETEXILATE MESYLATE 150 MG: 150 CAPSULE ORAL at 08:30

## 2020-01-05 RX ADMIN — MULTIPLE VITAMINS W/ MINERALS TAB 1 TABLET: TAB at 08:30

## 2020-01-05 RX ADMIN — LORAZEPAM 0.5 MG: 0.5 TABLET ORAL at 19:55

## 2020-01-05 ASSESSMENT — ACTIVITIES OF DAILY LIVING (ADL)
ADLS_ACUITY_SCORE: 11

## 2020-01-05 NOTE — PROGRESS NOTES
"S: \"Aamir\" (he/him/his pronouns) was transferred from [facility] 1/3 for atrial flutter with RVR.     B: medical history of atrial fibrillation s/p cardioversion (2016), hemorrhagic stroke, HTN, and HFrEF with recovered EF      A: Monitored vitals and assessed pt status.   Changed: CIWA discontinued  Running: none  PRN: none  Tele: atrial flutter 80s-120s  O2: room air  Mobility: up ad jacy     Neuro: A&O x 4, calm and cooperative  Cardiac: apical pulse irregular  Respiratory: WDL  GI/: voiding adequately; BM 1/4  Diet/appetite: regular diet, tolerating; appetite good  Activity: up ad jacy  Pain: none reported  Skin: no areas of concern noted; shower and shampoo tonight  LDAs: L PIV, saline locked     R: NPO at 0000 for RAISSA and ablation tomorrow AM. Continue to monitor Pt status and report changes to treatment team - cards 1.  "

## 2020-01-05 NOTE — PROGRESS NOTES
[facility] [place]    Progress Note - Cards 1 Service        Date of Admission:  1/3/2020    Assessment & Plan      Roge Agarwal is a 59 year old male with a PMH of atrial fibrillation s/p cardioversion (2016), hemorrhagic stroke, HTN, and HFrEF with recovered EF who was transferred to [facility] on 1/03/2020 for atrial flutter with RVR.    Changes today:   - Remains in A flutter, rates controlled with amio.    - Plan for possible RAISSA and cardioversion in AM   - NPO at midnight    - Discontinue CIWA      Atrial flutter with RVR  Hx of atrial fibrillation s/p cardioversion (2016)  He was started on a diltiazem gtt and amiodarone gtt and subsequently transitioned to PO medications with HR of 90-120s prior to transfer to [facility].  Patient is currently hemodynamically stable.  CHADSVASC of 2 (HTN and CHF) and HAS-BLED of 2-3 (alcohol use,prior major bleeding, and hemorrhagic stroke). Patient started on dabigatran at OSH.    - Continue amiodarone 400mg PO BID    - Avoid diltiazem given decreased EF.    - Continue metoprolol tartrate 50mg BID   - Continue dabigatran at this time. Should readdress long term anticoagulation vs risk of bleeding prior to discharge   - Tele   - EP consulted, appreciate assistance   - Recommend abstinence from ETOH   - Continued anticoagulation   - Plan to proceed with ablation/RAISSA, possibly mon/tues pending scheduling.      Chronic HFrEF (EF 25-30%)  1/02/19 TTE with an EF of 25-30% and moderately decreased right ventricular systolic function, and mild MR. (Per careeverywhere, the patient has a hx of HFrEF with TTE on 4/2016 with EF of 15% and normal RV systolic function which subsequently recovered on 6/1/2017 TTE with EF of 50% and normal RV systolic function).  Etiology is likely multifactorial, given tachyarrhythmia and hx of alcohol abuse. No coronary angiogram per chart review, but given elevated troponin in the setting of his aflutter with RVR, patient likely  has some degree of CAD.    - ACEI/ARB: continue lisinopril 5mg daily    - Beta Blocker: continue metoprolol 50mg BID   - Aldosterone Antagonist: not on PTA, will readdress prior to discharge after ablation.    - diuresis: currently euvolemic on exam, no diuretics PTA, will readdress prior to discharge after ablation.    - Telemetry   - Strict Is/Os, daily weights     HTN  Was on metoprolol and hydrochlorothiazide prior to OSH admission. hydrochlorothiazide was discontinued and patient was started on diltiazem and lisinopril   - continue lisinopril     CHRONIC MEDICAL PROBLEMS  Alcohol abuse  Anxiety     Diet: Regular Diet Adult No Caffeinated Beverages    DVT Prophylaxis: Dabigatran  Code Status: Full Code      Disposition Plan   Expected discharge: 2 - 3 days, recommended to prior living arrangement once cardiac work up complete - ablation done. .  Entered: Radha De Leon MD 01/05/2020, 11:16 AM       The patient's care was discussed with the Attending Physician, Dr. Boss.    Radha De Leon MD  Cards 1 Service  [facility] [employer]  Pager: [phone]  Please see sticky note for cross cover information  ______________________________________________________________________    Interval History   Nursing notes reviewed. No acute events overnight. Well appearing this AM. Feeling improved. Hopeful procedure can be done early in week. Denies chest pain or dyspnea.     Data reviewed today: I reviewed all medications, new labs and imaging results over the last 24 hours.    Physical Exam   Vital Signs: Temp: 97  F (36.1  C) Temp src: Oral BP: (!) 117/99 Pulse: 114 Heart Rate: 86 Resp: 16 SpO2: 98 % O2 Device: None (Room air)    Weight: 188 lbs 8 oz    Gen: laying in bed comfortably, NAD, alert, pleasant, cooperative, non-toxic  HEENT: EOMI, no scleral icterus, tracking appropriately  Resp: CTAB, no crackles or wheezes, no increased WOB  Cardiac: irregularly irregular - normal rate when  examined, no S3/S4, no M/R/G appreciated  Ext: WWP, no edema appreciated bilaterally, spontaneous movement in all 4  Neuro: AOx3, CN 2-12 grossly intact, appropriate mentation    Data   Reviewed in Epic, pertinent discussed in A&P above.

## 2020-01-05 NOTE — PLAN OF CARE
D: Pt admitted from outside hospital for management of atrial flutter with RVR. Hx of atrial fibrillation s/p DCCV in 2016, hemorrhagic stroke, HTN, and HFrEF with recovered EF.      I/A: Vital signs stable, afebrile, A&Ox4, aflutter 60s-100s. Denied pain. Up independently. Re-education given for using urinal. Slept well between cares.      P: Possible RAISSA and ablation early this week (Monday/Tuesday). Continue to monitor and notify MD with pertinent changes.

## 2020-01-06 ENCOUNTER — APPOINTMENT (OUTPATIENT)
Dept: CT IMAGING | Facility: CLINIC | Age: 60
End: 2020-01-06
Attending: INTERNAL MEDICINE
Payer: MEDICAID

## 2020-01-06 ENCOUNTER — APPOINTMENT (OUTPATIENT)
Dept: CARDIOLOGY | Facility: CLINIC | Age: 60
End: 2020-01-06
Attending: INTERNAL MEDICINE
Payer: MEDICAID

## 2020-01-06 ENCOUNTER — APPOINTMENT (OUTPATIENT)
Dept: MRI IMAGING | Facility: CLINIC | Age: 60
End: 2020-01-06
Attending: INTERNAL MEDICINE
Payer: MEDICAID

## 2020-01-06 PROBLEM — I48.92 ATRIAL FLUTTER, UNSPECIFIED TYPE (H): Status: ACTIVE | Noted: 2020-01-03

## 2020-01-06 LAB
ANION GAP SERPL CALCULATED.3IONS-SCNC: 6 MMOL/L (ref 3–14)
BUN SERPL-MCNC: 19 MG/DL (ref 7–30)
CALCIUM SERPL-MCNC: 8.4 MG/DL (ref 8.5–10.1)
CHLORIDE SERPL-SCNC: 106 MMOL/L (ref 94–109)
CO2 SERPL-SCNC: 25 MMOL/L (ref 20–32)
CREAT SERPL-MCNC: 1.04 MG/DL (ref 0.66–1.25)
ERYTHROCYTE [DISTWIDTH] IN BLOOD BY AUTOMATED COUNT: 13.9 % (ref 10–15)
GFR SERPL CREATININE-BSD FRML MDRD: 77 ML/MIN/{1.73_M2}
GLUCOSE SERPL-MCNC: 91 MG/DL (ref 70–99)
HCT VFR BLD AUTO: 43.7 % (ref 40–53)
HGB BLD-MCNC: 14.6 G/DL (ref 13.3–17.7)
INR PPP: 1.58 (ref 0.86–1.14)
INTERPRETATION ECG - MUSE: NORMAL
MCH RBC QN AUTO: 34 PG (ref 26.5–33)
MCHC RBC AUTO-ENTMCNC: 33.4 G/DL (ref 31.5–36.5)
MCV RBC AUTO: 102 FL (ref 78–100)
PLATELET # BLD AUTO: 157 10E9/L (ref 150–450)
POTASSIUM SERPL-SCNC: 4 MMOL/L (ref 3.4–5.3)
RBC # BLD AUTO: 4.29 10E12/L (ref 4.4–5.9)
SODIUM SERPL-SCNC: 137 MMOL/L (ref 133–144)
WBC # BLD AUTO: 6.2 10E9/L (ref 4–11)

## 2020-01-06 PROCEDURE — 80048 BASIC METABOLIC PNL TOTAL CA: CPT | Performed by: PHYSICIAN ASSISTANT

## 2020-01-06 PROCEDURE — 93325 DOPPLER ECHO COLOR FLOW MAPG: CPT | Mod: 26 | Performed by: INTERNAL MEDICINE

## 2020-01-06 PROCEDURE — 99153 MOD SED SAME PHYS/QHP EA: CPT

## 2020-01-06 PROCEDURE — 21400000 ZZH R&B CCU UMMC

## 2020-01-06 PROCEDURE — 85027 COMPLETE CBC AUTOMATED: CPT | Performed by: PHYSICIAN ASSISTANT

## 2020-01-06 PROCEDURE — 71275 CT ANGIOGRAPHY CHEST: CPT

## 2020-01-06 PROCEDURE — 25000128 H RX IP 250 OP 636: Performed by: INTERNAL MEDICINE

## 2020-01-06 PROCEDURE — 76376 3D RENDER W/INTRP POSTPROCES: CPT

## 2020-01-06 PROCEDURE — 99232 SBSQ HOSP IP/OBS MODERATE 35: CPT | Mod: 25 | Performed by: INTERNAL MEDICINE

## 2020-01-06 PROCEDURE — 25800030 ZZH RX IP 258 OP 636: Performed by: INTERNAL MEDICINE

## 2020-01-06 PROCEDURE — 93010 ELECTROCARDIOGRAM REPORT: CPT | Mod: 76 | Performed by: INTERNAL MEDICINE

## 2020-01-06 PROCEDURE — 75563 CARD MRI W/STRESS IMG & DYE: CPT

## 2020-01-06 PROCEDURE — 99152 MOD SED SAME PHYS/QHP 5/>YRS: CPT | Mod: 26 | Performed by: INTERNAL MEDICINE

## 2020-01-06 PROCEDURE — 25000132 ZZH RX MED GY IP 250 OP 250 PS 637: Performed by: PHYSICIAN ASSISTANT

## 2020-01-06 PROCEDURE — 93005 ELECTROCARDIOGRAM TRACING: CPT

## 2020-01-06 PROCEDURE — 25000128 H RX IP 250 OP 636: Performed by: STUDENT IN AN ORGANIZED HEALTH CARE EDUCATION/TRAINING PROGRAM

## 2020-01-06 PROCEDURE — 36415 COLL VENOUS BLD VENIPUNCTURE: CPT | Performed by: PHYSICIAN ASSISTANT

## 2020-01-06 PROCEDURE — 99152 MOD SED SAME PHYS/QHP 5/>YRS: CPT

## 2020-01-06 PROCEDURE — 93320 DOPPLER ECHO COMPLETE: CPT

## 2020-01-06 PROCEDURE — A9585 GADOBUTROL INJECTION: HCPCS | Performed by: INTERNAL MEDICINE

## 2020-01-06 PROCEDURE — 85610 PROTHROMBIN TIME: CPT | Performed by: PHYSICIAN ASSISTANT

## 2020-01-06 PROCEDURE — 25000128 H RX IP 250 OP 636: Performed by: RADIOLOGY

## 2020-01-06 PROCEDURE — 25000132 ZZH RX MED GY IP 250 OP 250 PS 637: Performed by: INTERNAL MEDICINE

## 2020-01-06 PROCEDURE — B236ZZZ MAGNETIC RESONANCE IMAGING (MRI) OF RIGHT AND LEFT HEART: ICD-10-PCS | Performed by: RADIOLOGY

## 2020-01-06 PROCEDURE — 25500064 ZZH RX 255 OP 636: Performed by: INTERNAL MEDICINE

## 2020-01-06 PROCEDURE — 93312 ECHO TRANSESOPHAGEAL: CPT | Mod: 26 | Performed by: INTERNAL MEDICINE

## 2020-01-06 PROCEDURE — 25000125 ZZHC RX 250: Performed by: INTERNAL MEDICINE

## 2020-01-06 PROCEDURE — 93320 DOPPLER ECHO COMPLETE: CPT | Mod: 26 | Performed by: INTERNAL MEDICINE

## 2020-01-06 PROCEDURE — 25000132 ZZH RX MED GY IP 250 OP 250 PS 637: Performed by: STUDENT IN AN ORGANIZED HEALTH CARE EDUCATION/TRAINING PROGRAM

## 2020-01-06 RX ORDER — ONDANSETRON 2 MG/ML
4 INJECTION INTRAMUSCULAR; INTRAVENOUS
Status: DISCONTINUED | OUTPATIENT
Start: 2020-01-06 | End: 2020-01-06

## 2020-01-06 RX ORDER — DIGOXIN 125 MCG
125 TABLET ORAL ONCE
Status: COMPLETED | OUTPATIENT
Start: 2020-01-06 | End: 2020-01-07

## 2020-01-06 RX ORDER — NALOXONE HYDROCHLORIDE 0.4 MG/ML
.1-.4 INJECTION, SOLUTION INTRAMUSCULAR; INTRAVENOUS; SUBCUTANEOUS
Status: DISCONTINUED | OUTPATIENT
Start: 2020-01-06 | End: 2020-01-06 | Stop reason: HOSPADM

## 2020-01-06 RX ORDER — DIPHENHYDRAMINE HCL 25 MG
25 CAPSULE ORAL
Status: DISCONTINUED | OUTPATIENT
Start: 2020-01-06 | End: 2020-01-06

## 2020-01-06 RX ORDER — IOPAMIDOL 755 MG/ML
64 INJECTION, SOLUTION INTRAVASCULAR ONCE
Status: COMPLETED | OUTPATIENT
Start: 2020-01-06 | End: 2020-01-06

## 2020-01-06 RX ORDER — DIAZEPAM 5 MG
5 TABLET ORAL EVERY 30 MIN PRN
Status: DISCONTINUED | OUTPATIENT
Start: 2020-01-06 | End: 2020-01-06

## 2020-01-06 RX ORDER — AMINOPHYLLINE 25 MG/ML
100 INJECTION, SOLUTION INTRAVENOUS ONCE
Status: DISCONTINUED | OUTPATIENT
Start: 2020-01-06 | End: 2020-01-06

## 2020-01-06 RX ORDER — DIPHENHYDRAMINE HYDROCHLORIDE 50 MG/ML
25-50 INJECTION INTRAMUSCULAR; INTRAVENOUS
Status: DISCONTINUED | OUTPATIENT
Start: 2020-01-06 | End: 2020-01-06

## 2020-01-06 RX ORDER — LIDOCAINE HYDROCHLORIDE 20 MG/ML
15 SOLUTION OROPHARYNGEAL ONCE
Status: COMPLETED | OUTPATIENT
Start: 2020-01-06 | End: 2020-01-06

## 2020-01-06 RX ORDER — ALBUTEROL SULFATE 90 UG/1
2 AEROSOL, METERED RESPIRATORY (INHALATION) EVERY 5 MIN PRN
Status: DISCONTINUED | OUTPATIENT
Start: 2020-01-06 | End: 2020-01-06

## 2020-01-06 RX ORDER — CEFAZOLIN SODIUM 1 G/3ML
1 INJECTION, POWDER, FOR SOLUTION INTRAMUSCULAR; INTRAVENOUS
Status: DISCONTINUED | OUTPATIENT
Start: 2020-01-06 | End: 2020-01-06 | Stop reason: HOSPADM

## 2020-01-06 RX ORDER — GADOBUTROL 604.72 MG/ML
10 INJECTION INTRAVENOUS ONCE
Status: COMPLETED | OUTPATIENT
Start: 2020-01-06 | End: 2020-01-06

## 2020-01-06 RX ORDER — LISINOPRIL 10 MG/1
10 TABLET ORAL DAILY
Status: DISCONTINUED | OUTPATIENT
Start: 2020-01-07 | End: 2020-01-08 | Stop reason: HOSPADM

## 2020-01-06 RX ORDER — SODIUM CHLORIDE 9 MG/ML
INJECTION, SOLUTION INTRAVENOUS CONTINUOUS
Status: DISCONTINUED | OUTPATIENT
Start: 2020-01-06 | End: 2020-01-06 | Stop reason: HOSPADM

## 2020-01-06 RX ORDER — DIGOXIN 125 MCG
375 TABLET ORAL ONCE
Status: COMPLETED | OUTPATIENT
Start: 2020-01-06 | End: 2020-01-06

## 2020-01-06 RX ORDER — FENTANYL CITRATE 50 UG/ML
50 INJECTION, SOLUTION INTRAMUSCULAR; INTRAVENOUS ONCE
Status: DISCONTINUED | OUTPATIENT
Start: 2020-01-06 | End: 2020-01-06 | Stop reason: HOSPADM

## 2020-01-06 RX ORDER — LIDOCAINE 40 MG/G
CREAM TOPICAL
Status: DISCONTINUED | OUTPATIENT
Start: 2020-01-06 | End: 2020-01-06 | Stop reason: HOSPADM

## 2020-01-06 RX ORDER — ACYCLOVIR 200 MG/1
0-1 CAPSULE ORAL
Status: DISCONTINUED | OUTPATIENT
Start: 2020-01-06 | End: 2020-01-08 | Stop reason: HOSPADM

## 2020-01-06 RX ORDER — METHYLPREDNISOLONE SODIUM SUCCINATE 125 MG/2ML
125 INJECTION, POWDER, LYOPHILIZED, FOR SOLUTION INTRAMUSCULAR; INTRAVENOUS
Status: DISCONTINUED | OUTPATIENT
Start: 2020-01-06 | End: 2020-01-06

## 2020-01-06 RX ORDER — FLUMAZENIL 0.1 MG/ML
0.2 INJECTION, SOLUTION INTRAVENOUS
Status: DISCONTINUED | OUTPATIENT
Start: 2020-01-06 | End: 2020-01-06 | Stop reason: HOSPADM

## 2020-01-06 RX ORDER — DIGOXIN 125 MCG
125 TABLET ORAL ONCE
Status: COMPLETED | OUTPATIENT
Start: 2020-01-06 | End: 2020-01-06

## 2020-01-06 RX ORDER — DIGOXIN 125 MCG
0.5 TABLET ORAL ONCE
Status: DISCONTINUED | OUTPATIENT
Start: 2020-01-06 | End: 2020-01-06

## 2020-01-06 RX ORDER — FENTANYL CITRATE 50 UG/ML
25 INJECTION, SOLUTION INTRAMUSCULAR; INTRAVENOUS
Status: DISCONTINUED | OUTPATIENT
Start: 2020-01-06 | End: 2020-01-06 | Stop reason: HOSPADM

## 2020-01-06 RX ORDER — DOBUTAMINE HYDROCHLORIDE 200 MG/100ML
5-40 INJECTION INTRAVENOUS CONTINUOUS PRN
Status: DISCONTINUED | OUTPATIENT
Start: 2020-01-06 | End: 2020-01-06 | Stop reason: HOSPADM

## 2020-01-06 RX ORDER — METOPROLOL SUCCINATE 100 MG/1
100 TABLET, EXTENDED RELEASE ORAL DAILY
Status: DISCONTINUED | OUTPATIENT
Start: 2020-01-06 | End: 2020-01-08 | Stop reason: HOSPADM

## 2020-01-06 RX ORDER — REGADENOSON 0.08 MG/ML
0.4 INJECTION, SOLUTION INTRAVENOUS ONCE
Status: COMPLETED | OUTPATIENT
Start: 2020-01-06 | End: 2020-01-06

## 2020-01-06 RX ORDER — DIGOXIN 125 MCG
0.25 TABLET ORAL ONCE
Status: DISCONTINUED | OUTPATIENT
Start: 2020-01-06 | End: 2020-01-06

## 2020-01-06 RX ORDER — SODIUM CHLORIDE 9 MG/ML
INJECTION, SOLUTION INTRAVENOUS CONTINUOUS PRN
Status: DISCONTINUED | OUTPATIENT
Start: 2020-01-06 | End: 2020-01-06 | Stop reason: HOSPADM

## 2020-01-06 RX ORDER — DIGOXIN 125 MCG
125 TABLET ORAL DAILY
Status: DISCONTINUED | OUTPATIENT
Start: 2020-01-07 | End: 2020-01-08 | Stop reason: HOSPADM

## 2020-01-06 RX ADMIN — MIDAZOLAM 0.5 MG: 1 INJECTION INTRAMUSCULAR; INTRAVENOUS at 09:27

## 2020-01-06 RX ADMIN — LISINOPRIL 5 MG: 5 TABLET ORAL at 08:00

## 2020-01-06 RX ADMIN — GADOBUTROL 10 ML: 604.72 INJECTION INTRAVENOUS at 13:48

## 2020-01-06 RX ADMIN — LIDOCAINE HYDROCHLORIDE 30 ML: 20 SOLUTION ORAL; TOPICAL at 08:49

## 2020-01-06 RX ADMIN — DABIGATRAN ETEXILATE MESYLATE 150 MG: 150 CAPSULE ORAL at 19:38

## 2020-01-06 RX ADMIN — IOPAMIDOL 64 ML: 755 INJECTION, SOLUTION INTRAVENOUS at 14:06

## 2020-01-06 RX ADMIN — MIDAZOLAM 0.5 MG: 1 INJECTION INTRAMUSCULAR; INTRAVENOUS at 09:06

## 2020-01-06 RX ADMIN — Medication 100 MG: at 08:00

## 2020-01-06 RX ADMIN — REGADENOSON 0.4 MG: 0.08 INJECTION, SOLUTION INTRAVENOUS at 13:27

## 2020-01-06 RX ADMIN — MULTIPLE VITAMINS W/ MINERALS TAB 1 TABLET: TAB at 07:59

## 2020-01-06 RX ADMIN — FENTANYL CITRATE 50 MCG: 50 INJECTION, SOLUTION INTRAMUSCULAR; INTRAVENOUS at 09:14

## 2020-01-06 RX ADMIN — FENTANYL CITRATE 50 MCG: 50 INJECTION, SOLUTION INTRAMUSCULAR; INTRAVENOUS at 08:56

## 2020-01-06 RX ADMIN — DIGOXIN 375 MCG: 125 TABLET ORAL at 12:00

## 2020-01-06 RX ADMIN — FENTANYL CITRATE 25 MCG: 50 INJECTION, SOLUTION INTRAMUSCULAR; INTRAVENOUS at 09:27

## 2020-01-06 RX ADMIN — MIDAZOLAM 2 MG: 1 INJECTION INTRAMUSCULAR; INTRAVENOUS at 08:55

## 2020-01-06 RX ADMIN — MELATONIN TAB 3 MG 3 MG: 3 TAB at 19:44

## 2020-01-06 RX ADMIN — DABIGATRAN ETEXILATE MESYLATE 150 MG: 150 CAPSULE ORAL at 08:00

## 2020-01-06 RX ADMIN — DIGOXIN 125 MCG: 125 TABLET ORAL at 17:53

## 2020-01-06 RX ADMIN — FOLIC ACID 1 MG: 1 TABLET ORAL at 08:00

## 2020-01-06 RX ADMIN — METOPROLOL SUCCINATE 100 MG: 100 TABLET, EXTENDED RELEASE ORAL at 16:16

## 2020-01-06 RX ADMIN — AMIODARONE HYDROCHLORIDE 400 MG: 200 TABLET ORAL at 07:59

## 2020-01-06 RX ADMIN — METOPROLOL TARTRATE 50 MG: 50 TABLET, FILM COATED ORAL at 08:00

## 2020-01-06 RX ADMIN — TOPICAL ANESTHETIC 0.5 ML: 200 SPRAY DENTAL; PERIODONTAL at 08:49

## 2020-01-06 RX ADMIN — MIDAZOLAM 1 MG: 1 INJECTION INTRAMUSCULAR; INTRAVENOUS at 09:12

## 2020-01-06 RX ADMIN — SODIUM CHLORIDE 400 ML: 9 INJECTION, SOLUTION INTRAVENOUS at 09:46

## 2020-01-06 ASSESSMENT — ACTIVITIES OF DAILY LIVING (ADL)
ADLS_ACUITY_SCORE: 11

## 2020-01-06 NOTE — PRE-PROCEDURE
GENERAL PRE-PROCEDURE:   Procedure:  Transesophageal echocardiogram and atrial flutter ablation  Date/Time:  1/6/2020 8:45 AM    Verbal consent obtained?: Yes    Written consent obtained?: Yes    Risks and benefits: Risks, benefits and alternatives were discussed    Consent given by:  Patient  Patient states understanding of procedure being performed: Yes    Patient's understanding of procedure matches consent: Yes    Procedure consent matches procedure scheduled: Yes    Appropriately NPO:  Yes  ASA Class:  Class 2- mild systemic disease, no acute problems, no functional limitations  Mallampati  :  Grade 2- soft palate, base of uvula, tonsillar pillars, and portion of posterior pharyngeal wall visible  Lungs:  Lungs clear with good breath sounds bilaterally  Heart:  Normal heart sounds and rate  History & Physical reviewed:  History and physical reviewed and no updates needed  Statement of review:  I have reviewed the lab findings, diagnostic data, medications, and the plan for sedation    DANY Muro CNP  Electrophysiology Consult Service  Pager: [phone]

## 2020-01-06 NOTE — PROGRESS NOTES
[facility], [place]    Progress Note - Cards 1 Service        Date of Admission:  1/3/2020    Assessment & Plan      Roge Agarwal is a 59 year old male with a PMH of atrial fibrillation s/p cardioversion (2016), hemorrhagic stroke, HTN, and HFrEF with recovered EF who was transferred to [facility] on 1/03/2020 for atrial flutter with RVR.    Changes today:   - Remains in A flutter, rates controlled with amio.    - RAISSA and cardioversion      Atrial flutter with RVR  Hx of atrial fibrillation s/p cardioversion (2016)  He was started on a diltiazem gtt and amiodarone gtt and subsequently transitioned to PO medications with HR of 90-120s prior to transfer to [facility].  Patient is currently hemodynamically stable.  CHADSVASC of 2 (HTN and CHF) and HAS-BLED of 2-3 (alcohol use,prior major bleeding, and hemorrhagic stroke). Patient started on dabigatran at OSH.    - Continue amiodarone 400mg PO BID    - Avoid diltiazem given decreased EF.    - Continue metoprolol tartrate 50mg BID   - Continue dabigatran at this time. Should readdress long term anticoagulation vs risk of bleeding prior to discharge   - Tele   - EP consulted, appreciate assistance   - Recommend abstinence from ETOH   - Continued anticoagulation   - Plan to proceed with ablation/RAISSA 1/6.      Chronic HFrEF (EF 25-30%)  1/02/19 TTE with an EF of 25-30% and moderately decreased right ventricular systolic function, and mild MR. (Per careeverywhere, the patient has a hx of HFrEF with TTE on 4/2016 with EF of 15% and normal RV systolic function which subsequently recovered on 6/1/2017 TTE with EF of 50% and normal RV systolic function).  Etiology is likely multifactorial, given tachyarrhythmia and hx of alcohol abuse. No coronary angiogram per chart review, but given elevated troponin in the setting of his aflutter with RVR, patient likely has some degree of CAD.    - ACEI/ARB: continue lisinopril 5mg daily    - Beta Blocker: continue  metoprolol 50mg BID   - Aldosterone Antagonist: not on PTA, will readdress prior to discharge after ablation.    - diuresis: currently euvolemic on exam, no diuretics PTA, will readdress prior to discharge after ablation.    - Telemetry   - Strict Is/Os, daily weights     HTN  Was on metoprolol and hydrochlorothiazide prior to OSH admission. hydrochlorothiazide was discontinued and patient was started on diltiazem and lisinopril   - continue lisinopril     CHRONIC MEDICAL PROBLEMS  Alcohol abuse  Anxiety     Diet: NPO per Anesthesia Guidelines for Procedure/Surgery Except for: Meds, Ice Chips    DVT Prophylaxis: Dabigatran  Code Status: Full Code      Disposition Plan   Expected discharge: 1-2 days, recommended to prior living arrangement once cardiac work up complete - ablation done. .  Entered: Radha De Leon MD 01/06/2020, 7:22 AM       The patient's care was discussed with the Attending Physician, Dr. Hui.    Radha De Leon MD  Cards 1 Service  [facility], [place]  Pager: [phone]  Please see sticky note for cross cover information    Attending: Patient seen and examined with Dr. De Leon. The history and physical findings are accurate as recorded. My additional findings, if any, have been incorporated into the body of the note. All labs, imaging studies, ECG and telemetry data have been reviewed personally. The assessment and recommendations outlined reflect our joint decision making.    See addendum note from today as well.     Portions of the note were dictated using speech recognition software. The note has been reviewed but some errors may have been overlooked.    Jorge Hui MD    ______________________________________________________________________    Interval History   Nursing notes reviewed. No acute events overnight. Anxious regarding procedure. Denies chest pain and SOB. Glad with how quickly process is moving.     Data reviewed today: I reviewed all medications,  new labs and imaging results over the last 24 hours.    Physical Exam   Vital Signs: Temp: 98.5  F (36.9  C) Temp src: Oral BP: 120/76 Pulse: 118 Heart Rate: 103 Resp: 18 SpO2: 98 % O2 Device: None (Room air)    Weight: 187 lbs 3.2 oz    Gen: laying in bed comfortably, NAD, alert, pleasant, cooperative, non-toxic  HEENT: EOMI, no scleral icterus, tracking appropriately  Resp: CTAB, no crackles or wheezes, no increased WOB  Cardiac: irregularly irregular, tachycardic, no S3/S4, no M/R/G appreciated  Ext: WWP, no edema appreciated bilaterally, spontaneous movement in all 4  Neuro: AOx3, CN 2-12 grossly intact, appropriate mentation    Data   Reviewed in Epic, pertinent discussed in A&P above.

## 2020-01-06 NOTE — PROGRESS NOTES
Care Coordinator - Discharge Planning    Admission Date/Time:  1/3/2020  Attending MD:  KATIE Boss MD   Data  Chart reviewed, discussed with interdisciplinary team.   Patient was admitted for:   1. Atrial flutter, unspecified type (H)    2. Atrial flutter, unspecified type (H)    Assessment   Concerns with insurance coverage for discharge needs: Per Financial Counselor, pt is without insurance and is currently self pay. Lindsey, Financial Counselor plans to see pt in his room tomorrow to discuss speaking to a [facility]   Current Living Situation: Patient lives alone.  Support System: Supportive and Involved Geneva martin.   Services Involved: none currently.   Transportation at Discharge: Pt's daughterGeneva will provide pt with a ride home.   Transportation to Medical Appointments:    - Name of caregiver: self or daughter.   Barriers to Discharge:safe discharge plan.   Coordination of Care and Referrals: Provided patient/family with options for DME and new PCP. Unable to complete discharge planning at this time.     Per MD, pt will need to be fitted with a home LifeVest; pt is aware, pt given [employer] pamphlet. Pt wants to discuss finances with [facility]. This writer spoke with Joan Mcnair (Ph: [phone]) and she will either call pt in his room or see pt tomorrow afternoon as she is currently out of town.      Pt also will need a primary care provider appt arranged. Pt would like to be seen at the [facility]. This writer will make appts when pt's insurance issues are more clarified.   Plan  Anticipated Discharge Date:  TBD  Anticipated Discharge Plan:  Discharge to home with outpt f/u.     MAURICE SUMMERS, RN BSN  Care Coordinator Unit   [phone]

## 2020-01-06 NOTE — PROGRESS NOTES
Pt arrived in ECHO lab for scheduled RAISSA.     Procedure explained, consent form signed by pt and MD, and discharge instructions discussed with patient.     Throat numbed at 0850, pt informed to stay NPO until 2 hours after     Pt given 4 mg IV versed and 125 mcg IV fentanyl for conscious sedation.     Probe number [id number] used for procedure    Pt denied chest pain and throat pain after procedure completed. Post monitoring completed and vitals stable throughout.       Report called to vesna VAUGHN

## 2020-01-06 NOTE — PLAN OF CARE
D: Pt admitted from outside hospital for management of atrial flutter with RVR. Hx of atrial fibrillation s/p DCCV in 2016, hemorrhagic stroke, HTN, and HFrEF with recovered EF.      I/A: Vital signs stable, afebrile, A&Ox4, aflutter 60s-100s. Denied pain. Up independently. Slept well between cares. NPO at midnight. Pleasant and cooperative. PRN ativan given at HS.      P: Plan for RAISSA and aflutter ablation today. Continue to monitor and notify MD with pertinent changes.

## 2020-01-06 NOTE — PROGRESS NOTES
Brief Cardiology Progress Note    RAISSA showing biatrial clots, unable to do ablation due to clot burden and concern for embolism. RAISSA also with worsening EF at 15-20%. Transitioned from amiodarone to digoxin due to concern for chemical cardioversion with amiodarone. Currently on dabigatran for anticoagulation. Cardiac MRI performed to evaluate for coronary disease as well as concern for scarring - showing intraseptal thickening and globally decreased function, not consistent with ischemic disease. This is consistent with ETOH use, of which he has a significant history. Discussed abstinence as well as optimization of medications for worsening heart failure.  Will optimize beta blocker and ACEi. Also will contiue with anticoagulation for clot burden with plan for repeat RAISSA to evaluate clot in 4 weeks - with possible cardioversion post. Discussed concern for sudden cardiac death given abnormal rhythm and severely decreased heart function - would defer ICD placement pending improvement with optimization of medication and management of atrial flutter - however a lifevest would be a reasonable consideration. Patient will consider lifevest but would like to discuss with vendor regarding details, specifically cost.      - Increase lisinopril to 10mg daily   - Start digoxin   - Stop amiodarone    Patient seen and discussed with Cardiology staff, Dr. Hui.      - Metoprolol 100mg daily    Radha De Leon MD  Internal Medicine, PGY 3  P    Attending: Patient seen and examined with Dr. De Leon. The history and physical findings are accurate as recorded. My additional findings, if any, have been incorporated into the body of the note. All labs, imaging studies, ECG and telemetry data have been reviewed personally. The assessment and recommendations outlined reflect our joint decision making.    - EtOH abstinence   - discontinue amiodarone  - increase beta blocker dose  - add digoxin  - discussed option of LifeVest for  protection from sudden cardiac arrest pending med titration/hr control    Portions of the note were dictated using speech recognition software. The note has been reviewed but some errors may have been overlooked.    Jogre Hui MD

## 2020-01-06 NOTE — PLAN OF CARE
[id number]:     Neuro:  A/O x 4. Call light appropriate.   Cardiac:  A flutter on telemetry. BP slightly elevated; other VSS.     Respiratory:  O2 saturation > 92% on RA.   GI/:  Adequate UO. LBM 1/5.   Diet/appetite:  NPO most of shift. Good appetite on 2g Na diet this evening.   Activity: Independent  Pain: Mild sore throat post-RAISSA; otherwise denies pain.   Skin:  WDL  LDA's:  2 PIV- SL.   Plan:  RAISSA this am showing biatrial clots and worsening EF 15-20%; unable to do ablation. Chest CT (-) for PEs. Cardiac MRI showing decreased cardiac function consistent with ETOH use per Cards 1. Plan to medically optimize pt. Increased lisinopril and metoprolol. Stopped amiodarone and started digoxin. Core clinic consulted.

## 2020-01-06 NOTE — CONSULTS
Consult received.  Will follow up with pt when he returns to the unit.    SEGUN Griffith, LCSW  6C Unit   Phone: [phone]  Pager: [phone]  Unit: [phone]

## 2020-01-07 ENCOUNTER — DOCUMENTATION ONLY (OUTPATIENT)
Dept: CARE COORDINATION | Facility: CLINIC | Age: 60
End: 2020-01-07

## 2020-01-07 ENCOUNTER — TELEPHONE (OUTPATIENT)
Dept: CARDIOLOGY | Facility: CLINIC | Age: 60
End: 2020-01-07

## 2020-01-07 LAB
ANION GAP SERPL CALCULATED.3IONS-SCNC: 6 MMOL/L (ref 3–14)
BUN SERPL-MCNC: 18 MG/DL (ref 7–30)
CALCIUM SERPL-MCNC: 8.3 MG/DL (ref 8.5–10.1)
CHLORIDE SERPL-SCNC: 105 MMOL/L (ref 94–109)
CO2 SERPL-SCNC: 24 MMOL/L (ref 20–32)
CREAT SERPL-MCNC: 1.07 MG/DL (ref 0.66–1.25)
DIGOXIN SERPL-MCNC: 0.9 UG/L (ref 0.5–2)
ERYTHROCYTE [DISTWIDTH] IN BLOOD BY AUTOMATED COUNT: 13.6 % (ref 10–15)
GFR SERPL CREATININE-BSD FRML MDRD: 75 ML/MIN/{1.73_M2}
GLUCOSE SERPL-MCNC: 96 MG/DL (ref 70–99)
HCT VFR BLD AUTO: 45.3 % (ref 40–53)
HGB BLD-MCNC: 15.3 G/DL (ref 13.3–17.7)
INTERPRETATION ECG - MUSE: NORMAL
INTERPRETATION ECG - MUSE: NORMAL
MCH RBC QN AUTO: 34 PG (ref 26.5–33)
MCHC RBC AUTO-ENTMCNC: 33.8 G/DL (ref 31.5–36.5)
MCV RBC AUTO: 101 FL (ref 78–100)
PLATELET # BLD AUTO: 175 10E9/L (ref 150–450)
POTASSIUM SERPL-SCNC: 4.1 MMOL/L (ref 3.4–5.3)
RBC # BLD AUTO: 4.5 10E12/L (ref 4.4–5.9)
SODIUM SERPL-SCNC: 135 MMOL/L (ref 133–144)
WBC # BLD AUTO: 6.5 10E9/L (ref 4–11)

## 2020-01-07 PROCEDURE — 25000125 ZZHC RX 250: Performed by: INTERNAL MEDICINE

## 2020-01-07 PROCEDURE — 25000132 ZZH RX MED GY IP 250 OP 250 PS 637: Performed by: STUDENT IN AN ORGANIZED HEALTH CARE EDUCATION/TRAINING PROGRAM

## 2020-01-07 PROCEDURE — 80048 BASIC METABOLIC PNL TOTAL CA: CPT | Performed by: PHYSICIAN ASSISTANT

## 2020-01-07 PROCEDURE — 21400000 ZZH R&B CCU UMMC

## 2020-01-07 PROCEDURE — 99232 SBSQ HOSP IP/OBS MODERATE 35: CPT | Mod: GC | Performed by: INTERNAL MEDICINE

## 2020-01-07 PROCEDURE — 80162 ASSAY OF DIGOXIN TOTAL: CPT | Performed by: PHYSICIAN ASSISTANT

## 2020-01-07 PROCEDURE — 25000132 ZZH RX MED GY IP 250 OP 250 PS 637: Performed by: INTERNAL MEDICINE

## 2020-01-07 PROCEDURE — 36415 COLL VENOUS BLD VENIPUNCTURE: CPT | Performed by: PHYSICIAN ASSISTANT

## 2020-01-07 PROCEDURE — 85027 COMPLETE CBC AUTOMATED: CPT | Performed by: PHYSICIAN ASSISTANT

## 2020-01-07 RX ORDER — DIGOXIN 125 MCG
125 TABLET ORAL DAILY
Qty: 90 TABLET | Refills: 0 | Status: SHIPPED | OUTPATIENT
Start: 2020-01-08 | End: 2020-02-07

## 2020-01-07 RX ORDER — METOPROLOL TARTRATE 1 MG/ML
5 INJECTION, SOLUTION INTRAVENOUS ONCE
Status: COMPLETED | OUTPATIENT
Start: 2020-01-07 | End: 2020-01-07

## 2020-01-07 RX ORDER — FOLIC ACID 1 MG/1
1 TABLET ORAL DAILY
Qty: 90 TABLET | Refills: 0 | Status: ON HOLD | OUTPATIENT
Start: 2020-01-08 | End: 2020-02-25

## 2020-01-07 RX ORDER — LANOLIN ALCOHOL/MO/W.PET/CERES
100 CREAM (GRAM) TOPICAL DAILY
Qty: 90 TABLET | Refills: 0 | Status: ON HOLD | OUTPATIENT
Start: 2020-01-08 | End: 2020-02-25

## 2020-01-07 RX ORDER — METOPROLOL SUCCINATE 100 MG/1
100 TABLET, EXTENDED RELEASE ORAL DAILY
Qty: 90 TABLET | Refills: 0 | Status: SHIPPED | OUTPATIENT
Start: 2020-01-08 | End: 2020-02-07

## 2020-01-07 RX ORDER — LISINOPRIL 10 MG/1
10 TABLET ORAL DAILY
Qty: 90 TABLET | Refills: 0 | Status: SHIPPED | OUTPATIENT
Start: 2020-01-08 | End: 2020-01-23

## 2020-01-07 RX ORDER — DABIGATRAN ETEXILATE 150 MG/1
150 CAPSULE ORAL 2 TIMES DAILY
Qty: 180 CAPSULE | Refills: 0 | Status: SHIPPED | OUTPATIENT
Start: 2020-01-07 | End: 2020-02-07

## 2020-01-07 RX ADMIN — METOPROLOL SUCCINATE 100 MG: 100 TABLET, EXTENDED RELEASE ORAL at 08:48

## 2020-01-07 RX ADMIN — LISINOPRIL 10 MG: 10 TABLET ORAL at 08:48

## 2020-01-07 RX ADMIN — METOPROLOL TARTRATE 5 MG: 5 INJECTION INTRAVENOUS at 11:55

## 2020-01-07 RX ADMIN — DABIGATRAN ETEXILATE MESYLATE 150 MG: 150 CAPSULE ORAL at 19:38

## 2020-01-07 RX ADMIN — MULTIPLE VITAMINS W/ MINERALS TAB 1 TABLET: TAB at 08:48

## 2020-01-07 RX ADMIN — DIGOXIN 125 MCG: 125 TABLET ORAL at 08:48

## 2020-01-07 RX ADMIN — Medication 100 MG: at 08:48

## 2020-01-07 RX ADMIN — DIGOXIN 125 MCG: 125 TABLET ORAL at 00:07

## 2020-01-07 RX ADMIN — FOLIC ACID 1 MG: 1 TABLET ORAL at 08:48

## 2020-01-07 RX ADMIN — DABIGATRAN ETEXILATE MESYLATE 150 MG: 150 CAPSULE ORAL at 08:48

## 2020-01-07 ASSESSMENT — ACTIVITIES OF DAILY LIVING (ADL)
ADLS_ACUITY_SCORE: 11

## 2020-01-07 NOTE — PROGRESS NOTES
Care Coordinator - Discharge Planning    Admission Date/Time:  1/3/2020  Attending MD:  KATIE Boss MD   Data  Date of initial CC assessment:  1/6/2020  Chart reviewed, discussed with interdisciplinary team.   Patient was admitted for:   1. Atrial flutter, unspecified type (H)    2. Atrial flutter, unspecified type (H)    Assessment   Full assessment completed in previous note   Concerns with insurance coverage for discharge needs: Lindsey, Financial Counselor told this writer that per [facility] staff, pt's MA is active but ID/PMi has not yet been generated.   Current Living Situation: Patient lives alone.  Support System: Supportive and Involved daughterGeneva.   Services Involved: none currently.   Transportation at Discharge: Pt's daughterGeneva will provide pt with a ride home.   Transportation to Medical Appointments:    - Name of caregiver: self or daughter.   Barriers to Discharge:safe discharge plan.     Coordination of Care and Referrals: Provided patient/family with options for Provided patient/family with options for DME and new PCP. Unable to complete discharge planning at this time until insurance ID is available.    Pt had already spoke earlier by phone with Joan Mcnair (Ph: [phone]), [employer] Mgr and expressed concern about cost of home LifeVest. Pt given [employer] Patient Assistance Program Application form. Pt and pt's daughter requesting to speak again with MD regarding need for LifeVest; Dr. De Leon is aware.     Plan  Anticipated Discharge Date:  1/8/2020  Anticipated Discharge Plan:  Discharge to home with outpt f/u.   MAURICE SUMMERS RN BSN  Care Coordinator Unit   [phone]

## 2020-01-07 NOTE — PROGRESS NOTES
[facility], [place]    Progress Note - Cards 1 Service        Date of Admission:  1/3/2020    Assessment & Plan      Roge Agarwal is a 59 year old male with a PMH of atrial fibrillation s/p cardioversion (2016), hemorrhagic stroke, HTN, and HFrEF with recovered EF who was transferred to [facility] on 1/03/2020 for atrial flutter with RVR.    Changes today:   - Remains in A flutter, controlling rates with digoxin   - Will need to evaluate HR when walking      Atrial flutter with RVR  Hx of atrial fibrillation s/p cardioversion (2016)  He was started on a diltiazem gtt and amiodarone gtt and subsequently transitioned to PO medications with HR of 90-120s prior to transfer to [facility].  Patient is currently hemodynamically stable.  CHADSVASC of 2 (HTN and CHF) and HAS-BLED of 2-3 (alcohol use,prior major bleeding, and hemorrhagic stroke). Patient started on dabigatran at OSH. RAISSA showing biatrial clots, unable to do ablation, also with EF 15-20% with diffuse hypokinesis. Cardiac MRI with septal scarring and no sign of ischemia per preliminary report - final report pending. CT PE negative for pulmonary embolism. S/p amiodarone, discontinued with presence of clots given concern for spontaneous cardioversion and embolization.    - Continue digoxin 125mcg daily    - Avoid diltiazem given decreased EF.    - Metoprolol tartrate 100mg BID for attempt to rate control   - Continue dabigatran   - Tele   - EP consulted, appreciate assistance   - Recommend abstinence from ETOH   - Continued anticoagulation   - Will need repeat RAISSA in 4 weeks to evaluate clot     Acute on Chronic HFrEF (EF 15-20% 1/6/20)  1/02/19 TTE with an EF of 25-30% and moderately decreased right ventricular systolic function, and mild MR. (Per careeverywhere, the patient has a hx of HFrEF with TTE on 4/2016 with EF of 15% and normal RV systolic function which subsequently recovered on 6/1/2017 TTE with EF of 50% and normal RV systolic  function).  Etiology is likely multifactorial, given tachyarrhythmia and hx of alcohol abuse. Cardiac MRI without sign of ischemia. RAISSA on 1/6 showing diffuse hypokinesis and worsened EF of 15-20%. Given EF, there is concern for sudden cardiac arrest- discussed life vest given that ICD placement during atrial flutter and presence of clot would be not ideal. Patient would like more information about life vest but is quite concerned about cost given his lack of insurance.    - ACEI/ARB: Lisinopril 10mg daily    - Beta Blocker: Metoprolol 100 mg BID   - Aldosterone Antagonist: not on PTA, will attempt to maximize ACE and beta blockers.    - diuresis: currently euvolemic on exam, no diuretics PTA.    - Telemetry   - Strict Is/Os, daily weights   - CORE clinic follow up      HTN  Was on metoprolol and hydrochlorothiazide prior to OSH admission. hydrochlorothiazide was discontinued and patient was started on diltiazem and lisinopril   - continue lisinopril and metoprolol      CHRONIC MEDICAL PROBLEMS  Alcohol abuse  Anxiety     Diet: Regular Diet Adult    DVT Prophylaxis: Dabigatran  Code Status: Full Code      Disposition Plan   Expected discharge: Tomorrow, recommended to prior living arrangement  Entered: Radha De Leon MD 01/07/2020, 1:45 PM       The patient's care was discussed with the Attending Physician, Dr. Hui.    Radha De Leon MD  Cards 1 Service  [facility], [place]  Pager: [phone]  Please see sticky note for cross cover information    Attending: Patient seen and examined with Dr. De Leon. The history and physical findings are accurate as recorded. My additional findings, if any, have been incorporated into the body of the note. All labs, imaging studies, ECG and telemetry data have been reviewed personally. The assessment and recommendations outlined reflect our joint decision making.    Portions of the note were dictated using speech recognition software. The note has been  reviewed but some errors may have been overlooked.    Jorge Hui MD    _____________________________________________________________________    Interval History   Nursing notes reviewed. Busy day yesterday with a lot of tests and new diagnoses. Patient seems quite overwhelmed - unable to tell how much he is processing. He is quite concerned about the cost for all the therapies given he is self pay. Also concerned about going back to work. Denies chest pain and shortness of breath.     Data reviewed today: I reviewed all medications, new labs and imaging results over the last 24 hours.    Physical Exam   Vital Signs: Temp: 99  F (37.2  C) Temp src: Oral BP: 123/89 Pulse: 68 Heart Rate: 89 Resp: 18 SpO2: 100 % O2 Device: None (Room air)    Weight: 187 lbs 11.2 oz    Gen: laying in bed comfortably, NAD, alert, cooperative  HEENT: EOMI, tracking appropriately  Resp: CTAB, no crackles or wheezes, no increased WOB  Cardiac: irregularly irregular, regular rate on exam, no S3/S4, no M/R/G appreciated  Ext: WWP, no edema appreciated bilaterally, spontaneous movement in all 4  Neuro: AOx3, CN 2-12 grossly intact, appropriate mentation    Data   Reviewed in Epic, pertinent discussed in A&P above.

## 2020-01-07 NOTE — PROGRESS NOTES
Neuro: A&Ox4.   Cardiac: Afebrile, VSS, flutter- rate controlled.   Respiratory: RA   GI/: Voiding spontaneously. No BM this shift.   Diet/appetite: Tolerating 2g Na diet. Denies nausea   Activity: Up ad jacy     Pain: . Denies   Skin: No new deficits noted.  Lines: PIV x2 saline locked     Pt has been resting comfortably throughout night, will continue to monitor and follow plan of care.

## 2020-01-07 NOTE — TELEPHONE ENCOUNTER
----- Message from Ariela Hooper RN sent at 1/7/2020 12:04 PM CST -----  Regarding: Needs Post Hospital FU  Please schedule pt for held appt on 1/23 with Maida at 1230 in [facility]. Pt will also need lab appt.   Pt discharges today, please schedule ASAP so it will show on his discharge paperwork.       Thanks!    Ariela

## 2020-01-07 NOTE — PROGRESS NOTES
CLINICAL NUTRITION SERVICES    Reason for Assessment:  CHF nutrition education    Diet History:  Pt reports no history of receiving low-sodium or heart healthy nutrition education in the past.  Pt reports that he usually eats 1 meal a day. He gets pre-made food from [employer]. Pt reports usually having a meat, mashed potatoes, corn, pizza, bbq chicken, etc. On the weekends Roge cooks hamburgers, sloppy joes, etc. He will also have desserts like ice cream on the weekend.   Pt reports that he does not want to change the convenience of his current food choices.      Nutrition Diagnosis:  Food- and nutrition-related knowledge deficit r/t no previous knowledge of low-sodium diet AEB pt report of no previous formal low-sodium nutrition education.    Nutrition Prescription/Recs:  Consider changing current diet to 2g Na diet     Interventions:  Nutrition Education  1. Provided verbal instruction on low-sodium meal planning. Encouraged less than 2,000 mg of sodium per day.   2. Provided the following handouts: Low-Sodium Foods and Drinks and heart healthy guidelines.  3. Encouraged pt to look at nutrition content of foods from the grocery store [employer]. Also encouraged pt to focus on fruit and vegetable intake as well as lean meats.   -Pt reports that he is interested in making changes to his diet and will be discussing information and handouts with his daughter.     Goals:    Pt will verbalize at least five high sodium foods and the importance of avoiding added salt to foods for cooking or seasoning foods.     Follow-up:   Patient to ask any further nutrition-related questions before discharge. In addition, pt may request outpatient RD appointment.    Missy Guaman RD, LD  6C RD pager [phone]

## 2020-01-07 NOTE — TELEPHONE ENCOUNTER
I have scheduled a CORE enrollment appointment for 1/23/20 at 12:30 at [facility]. Lab appointment scheduled 1/22/20 at 11:00am at the [facility]. Appointment letter mailed to patient. REHAN Maldonado

## 2020-01-07 NOTE — CONSULTS
"59 year old male presenting with hf exacerbation. CORE consult requested. Aamir is currently admitted with HF    Aamir was provided with a CHF book.  I reviewed the importance of daily weights, 2 gm sodium diet, 2L fluid restriction and compliance with medications upon hospital discharge.  CORE contact phone numbers provided and patient is encouraged to call with any questions or concerns, including any weight gain or loss of 2 or more pounds in 24 hours or 5 or more pounds in 1 week.      Appointment made in CORE clinic on Thursday  at  with Maida Abdi NP.  I will follow-up with the patient at that time, sooner if needed.  Thank you for the consult.    Ariela Hooper RN  Cardiology Care Coordinator [facility]  Questions and [phone]  First press #1 for the [place] and then press #3 for \"Medical Advise\" to reach us Cardiology Nurses.     "

## 2020-01-08 ENCOUNTER — PATIENT OUTREACH (OUTPATIENT)
Dept: CARE COORDINATION | Facility: CLINIC | Age: 60
End: 2020-01-08

## 2020-01-08 VITALS
SYSTOLIC BLOOD PRESSURE: 141 MMHG | BODY MASS INDEX: 24.46 KG/M2 | HEART RATE: 44 BPM | RESPIRATION RATE: 16 BRPM | OXYGEN SATURATION: 97 % | TEMPERATURE: 98.6 F | WEIGHT: 185.4 LBS | DIASTOLIC BLOOD PRESSURE: 78 MMHG

## 2020-01-08 LAB
ANION GAP SERPL CALCULATED.3IONS-SCNC: 5 MMOL/L (ref 3–14)
BUN SERPL-MCNC: 17 MG/DL (ref 7–30)
CALCIUM SERPL-MCNC: 8.7 MG/DL (ref 8.5–10.1)
CHLORIDE SERPL-SCNC: 106 MMOL/L (ref 94–109)
CO2 SERPL-SCNC: 26 MMOL/L (ref 20–32)
CREAT SERPL-MCNC: 1.12 MG/DL (ref 0.66–1.25)
ERYTHROCYTE [DISTWIDTH] IN BLOOD BY AUTOMATED COUNT: 13.5 % (ref 10–15)
GFR SERPL CREATININE-BSD FRML MDRD: 71 ML/MIN/{1.73_M2}
GLUCOSE SERPL-MCNC: 86 MG/DL (ref 70–99)
HCT VFR BLD AUTO: 48.5 % (ref 40–53)
HGB BLD-MCNC: 16.5 G/DL (ref 13.3–17.7)
MCH RBC QN AUTO: 34 PG (ref 26.5–33)
MCHC RBC AUTO-ENTMCNC: 34 G/DL (ref 31.5–36.5)
MCV RBC AUTO: 100 FL (ref 78–100)
PLATELET # BLD AUTO: 201 10E9/L (ref 150–450)
POTASSIUM SERPL-SCNC: 4.4 MMOL/L (ref 3.4–5.3)
RBC # BLD AUTO: 4.85 10E12/L (ref 4.4–5.9)
SODIUM SERPL-SCNC: 136 MMOL/L (ref 133–144)
WBC # BLD AUTO: 6.8 10E9/L (ref 4–11)

## 2020-01-08 PROCEDURE — 25000132 ZZH RX MED GY IP 250 OP 250 PS 637: Performed by: STUDENT IN AN ORGANIZED HEALTH CARE EDUCATION/TRAINING PROGRAM

## 2020-01-08 PROCEDURE — 99238 HOSP IP/OBS DSCHRG MGMT 30/<: CPT | Mod: GC | Performed by: INTERNAL MEDICINE

## 2020-01-08 PROCEDURE — 80048 BASIC METABOLIC PNL TOTAL CA: CPT | Performed by: PHYSICIAN ASSISTANT

## 2020-01-08 PROCEDURE — 25000132 ZZH RX MED GY IP 250 OP 250 PS 637: Performed by: INTERNAL MEDICINE

## 2020-01-08 PROCEDURE — 36415 COLL VENOUS BLD VENIPUNCTURE: CPT | Performed by: PHYSICIAN ASSISTANT

## 2020-01-08 PROCEDURE — 85027 COMPLETE CBC AUTOMATED: CPT | Performed by: PHYSICIAN ASSISTANT

## 2020-01-08 RX ADMIN — LISINOPRIL 10 MG: 10 TABLET ORAL at 08:47

## 2020-01-08 RX ADMIN — Medication 100 MG: at 08:47

## 2020-01-08 RX ADMIN — METOPROLOL SUCCINATE 100 MG: 100 TABLET, EXTENDED RELEASE ORAL at 08:47

## 2020-01-08 RX ADMIN — MULTIPLE VITAMINS W/ MINERALS TAB 1 TABLET: TAB at 08:47

## 2020-01-08 RX ADMIN — DIGOXIN 125 MCG: 125 TABLET ORAL at 08:47

## 2020-01-08 RX ADMIN — DABIGATRAN ETEXILATE MESYLATE 150 MG: 150 CAPSULE ORAL at 08:47

## 2020-01-08 RX ADMIN — FOLIC ACID 1 MG: 1 TABLET ORAL at 08:47

## 2020-01-08 ASSESSMENT — ACTIVITIES OF DAILY LIVING (ADL)
ADLS_ACUITY_SCORE: 11

## 2020-01-08 NOTE — DISCHARGE SUMMARY
[facility], [place]  Discharge Summary - Medicine & Pediatrics       Date of Admission:  1/3/2020  Date of Discharge:  1/8/2020 12:02 PM  Discharging Provider: Dr. Hui  Discharge Service: Cards 1    Discharge Diagnoses       Atrial flutter with RVR  Hx of atrial fibrillation s/p cardioversion (2016)  Acute on Chronic HFrEF (EF 15-20% 1/6/20)  HTN  Alcohol abuse  Anxiety    Follow-ups Needed After Discharge   Follow-up Appointments     Adult [facility] Follow-up and recommended labs and tests      Follow up with primary care provider within 7 days, to evaluate   medication change, for hospital follow- up, regarding new diagnosis and to   follow up on results. No follow up labs or test are needed.     Appointments on [place] and/or [facility] (with [facility] or [facility]   provider or service). Call [phone] if you haven't heard regarding   these appointments within 7 days of discharge.            Follow up:   - CORE clinic follow up    - Establish with primary care provider when MA established    Unresulted Labs Ordered in the Past 30 Days of this Admission     No orders found from 12/4/2019 to 1/4/2020.      These results will be followed up by n/a    Discharge Disposition   Discharged to home  Condition at discharge: Fair    Hospital Course   Roge Agarwal was admitted on 1/3/2020 for atrial flutter with RVR.  The following problems were addressed during his hospitalization:    Atrial flutter with RVR  Hx of atrial fibrillation s/p cardioversion (2016)  He was started on a diltiazem gtt and amiodarone gtt and subsequently transitioned to PO medications with HR of 90-120s prior to transfer to [facility].   CHADSVASC of 2 (HTN and CHF) and HAS-BLED of 2-3 (alcohol use,prior major bleeding, and hemorrhagic stroke). Patient started on dabigatran at OSH. RAISSA showing biatrial clots, unable to do ablation, also with EF 15-20% with diffuse hypokinesis. Cardiac MRI with septal scarring and no  sign of ischemia on preliminary read - final report pending. CT PE negative for pulmonary embolism. S/p amiodarone, discontinued with presence of clots given concern for spontaneous cardioversion and embolization.    - Continue digoxin 125mcg daily    - Avoid diltiazem given decreased EF.    - Metoprolol tartrate 100mg BID for attempt to rate control   - Continue dabigatran   - EP consulted, appreciate assistance               - Recommend abstinence from ETOH               - Continued anticoagulation   - Will need repeat RAISSA in 4 weeks to evaluate clot     Acute on Chronic HFrEF (EF 15-20% 1/6/20)  1/02/19 TTE with an EF of 25-30% and moderately decreased right ventricular systolic function, and mild MR. (Per careeverywhere, the patient has a hx of HFrEF with TTE on 4/2016 with EF of 15% and normal RV systolic function which subsequently recovered on 6/1/2017 TTE with EF of 50% and normal RV systolic function).  Etiology is likely multifactorial, given tachyarrhythmia and hx of alcohol abuse. Cardiac MRI without sign of ischemia. RAISSA on 1/6 showing diffuse hypokinesis and worsened EF of 15-20%. Given EF, there is concern for sudden cardiac arrest- discussed life vest given that ICD placement during atrial flutter and presence of clot would be not ideal. Patient would like more information about life vest but is quite concerned about cost given his lack of insurance. Declined life vest at time of discharge.    - ACEI/ARB: Lisinopril 10mg daily    - Beta Blocker: Metoprolol 100 mg BID   - Aldosterone Antagonist: not on PTA, will attempt to maximize ACE and beta blockers.    - diuresis: currently euvolemic on exam, no diuretics PTA.    - CORE clinic follow up      HTN  Was on metoprolol and hydrochlorothiazide prior to OSH admission. hydrochlorothiazide was discontinued and patient was started on diltiazem and lisinopril   - continue lisinopril and metoprolol     Consultations This Hospital Stay   VASCULAR ACCESS CARE  ADULT IP CONSULT  SOCIAL WORK IP CONSULT  CARDIOLOGY ELECTROPHYSIOLOGY (EP) IP CONSULT  VASCULAR ACCESS CARE ADULT IP CONSULT  CORE CLINIC EVALUATION IP CONSULT  NUTRITION SERVICES ADULT IP CONSULT  SMOKING CESSATION PROGRAM IP CONSULT    Code Status   Full Code       The patient was discussed with Dr. Ez De Leon MD  Cards 1 Service  [facility], [place]  Pager: [phone]    Staff: patient seen and examined prior to discharge. Discharge instructions (medications and f/u appts) reviewed with the patient. The patient reports understanding these instructions and denies any questions at this time.     Personal time spent on discharge: 15 minutes    Jorge Hui MD  Clinic number: [phone]       ______________________________________________________________________    Physical Exam   Vital Signs: Temp: 99.2  F (37.3  C) Temp src: Oral BP: (!) 131/108 Pulse: 68 Heart Rate: 114 Resp: 18 SpO2: 95 % O2 Device: None (Room air)    Weight: 187 lbs 11.2 oz    Gen: sitting up in bed, NAD, alert, cooperative  HEENT: EOMI, tracking appropriately  Resp: CTAB, no crackles or wheezes, no increased WOB  Cardiac: irregularly irregular, regular rate on exam, no S3/S4, no M/R/G appreciated  Ext: WWP, no edema appreciated bilaterally, spontaneous movement in all 4, missing finger on right hand at PIP joint  Neuro: AOx3, CN 2-12 grossly intact, appropriate mentation      Primary Care Physician   Physician No Ref-Primary    Discharge Orders      Reason for your hospital stay    Dear Roge Agarwal    Your were hospitalized at [facility] with an abnormal heart rhythm called Atrial Flutter. You were also found to have heart failure with reduced ejection fraction (reduced squeeze of the heart) and treated with medications to slow your heart rate. Initially the plan was to perform a procedure to change the rhythm of your heart back into a normal rhythm, however you  Satisfactory had blood clots in your heart which would make that procedure unsafe due to risk of stroke. You were started on multiple medications to improve your heart function and slow down how fast your heart is beating.    Over your hospitalization your heart rate improved and today you are ready to be discharged home.  If you continue taking your blood thinners - your clot should resolve. If you keep taking your digoxin and metoprolol, your heart rate should stay controlled. If you continue to not drink alcohol, your heart function will also hopefully improve.  If you develop fever, shortness of breath, light headedness, chest pain, leg swelling, worsening tiredness, try to take a break and see if it resolves - if it does not - please seek medical attention.    We are suggesting the following medication changes:   - START:      - Digoxin 125mg daily      - Metoprolol 100mg twice daily      - Lisinopril 10mg daily      - Folic acid 1mg daily      - Thiamine 100mg daily      - Dabigatran (blood thinner) 150mg twice daily      Please get the following tests done:   - Transesophageal echocardiogram (ultrasound of heart down throat) within 4 weeks - to be coordinated with visit with Dr. Romeo    Please set up an appointment with:   - Dr. Romeo with electrophysiology to coordinate with your echocardiogram listed above to work on fixing your atrial flutter.     It was a pleasure meeting with you today. Thank you for allowing me and my team the privilege of caring for you today. You are the reason we are here, and I truly hope we provided you with the excellent service you deserve. Please let us know if there is anything else we can do for you so that we can be sure you are leaving completely satisfied with your care experience.    Your hospital unit at the time of discharge is 6C so if you have any questions please call the hospital at [phone] and ask to talk to a nurse on 6C.    Take care!    Radha De Leon MD  Internal  Medicine Resident  [facility]     Adult [facility] Follow-up and recommended labs and tests    Follow up with primary care provider within 7 days, to evaluate medication change, for hospital follow- up, regarding new diagnosis and to follow up on results. No follow up labs or test are needed.     Appointments on [place] and/or [facility] (with [facility] or [facility] provider or service). Call [phone] if you haven't heard regarding these appointments within 7 days of discharge.     Activity    Your activity upon discharge: activity as tolerated     Full Code     Diet    Follow this diet upon discharge: Orders Placed This Encounter      Regular Diet Adult      Sodium goal <2,000 mg daily       Significant Results and Procedures     Cardiac MRI 1/6/20:  Results pending     RAISSA 1/5/20:  Interpretation Summary  Left and right atrial appendage thrombi, measuring 1.7 x 1.4 cm and 1.5 x 1.4  cm, respectively.     Mildly dilated left ventricle with normal wall thickness. Severely decreased  LV systolic function with EF 15%-20%. Severe diffuse hypokinesis.  Global right ventricular function is moderately reduced.    Discharge Medications   Current Discharge Medication List      START taking these medications    Details   dabigatran ANTICOAGULANT (PRADAXA) 150 MG capsule Take 1 capsule (150 mg) by mouth 2 times daily Store in original 's bottle or blister pack; use within 120 days of opening.  Qty: 180 capsule, Refills: 0    Associated Diagnoses: Atypical atrial flutter (H)      digoxin (LANOXIN) 125 MCG tablet Take 1 tablet (125 mcg) by mouth daily  Qty: 90 tablet, Refills: 0    Associated Diagnoses: Atypical atrial flutter (H)      folic acid (FOLVITE) 1 MG tablet Take 1 tablet (1 mg) by mouth daily  Qty: 90 tablet, Refills: 0    Associated Diagnoses: Alcohol use      lisinopril (PRINIVIL/ZESTRIL) 10 MG tablet Take 1 tablet (10 mg) by mouth daily  Qty: 90 tablet, Refills: 0    Associated Diagnoses:  Acute systolic congestive heart failure (H)      metoprolol succinate ER (TOPROL-XL) 100 MG 24 hr tablet Take 1 tablet (100 mg) by mouth daily  Qty: 90 tablet, Refills: 0    Associated Diagnoses: Acute systolic congestive heart failure (H)      vitamin B1 (THIAMINE) 100 MG tablet Take 1 tablet (100 mg) by mouth daily  Qty: 90 tablet, Refills: 0    Associated Diagnoses: Alcohol use           Allergies   No Known Allergies

## 2020-01-08 NOTE — DISCHARGE INSTRUCTIONS
We look forward to seeing you at the [facility]   [address]  [address] [place] [address]    Please don't hesitate to call if you have questions before your appointment.                      Angely Robert RN Care Coordinator  Heart Failure CORE     Monitor Your Weight and Symptoms    Contact us if you:      Gain 2 pounds in one day or 5 pounds in one week    Feel more short of breath    Notice more leg swelling    Feel lightheadeded   Change your lifestyle    Limit Salt or Sodium:    2000 mg  Limit Fluids:    2000 mL or approximately 64 ounces  Eat a Heart Healthy Diet    Low in saturated fats  Stay Active:    Aim to move at least 150 minutes every  week         To Contact us    During Business Hours:  [phone], option # 1 (University)  Then option # 4 (medical questions)     After hours, weekends or holidays:   [phone], Option #4  Ask to speak to the On-Call Cardiologist. Inform them you are a CORE/heart failure patient at the [facility].     Use [employer] allows you to communicate directly with your heart team through secure messaging.    [employer] ([employer]) can be accessed any time on your phone, computer, or tablet.    If you need assistance, we'd be happy to help!         Keep your Heart Appointments:    CORE appointment with Maida Abdi NP on 1/23/20 at 12:30pm    Labs to be drawn for the appointment on 1/22/20 at 11:00 at [facility]         PLEASE NOTE:   In order to make a primary care provider appoinment at [facility] (Phone: [phone]), you will have to call Dami Koo the Financial Counselor (Phone: [phone]) to discuss the potential financial cost of the appoinment as your medial health insurance plan is not yet on file.

## 2020-01-08 NOTE — PROGRESS NOTES
Care Coordinator - Discharge Planning    Admission Date/Time:  1/3/2020  Attending MD:  KATIE Boss MD   Data  Date of initial CC assessment:  1/6/2020, 1/7/2020  Chart reviewed, discussed with interdisciplinary team.   Patient was admitted for:   1. Atrial flutter, unspecified type (H)    2. Atrial flutter, unspecified type (H)    3. Atypical atrial flutter (H)    4. Alcohol use    5. Acute systolic congestive heart failure (H)    Assessment   Full assessment completed in previous note   Concerns with insurance coverage for discharge needs: [place] Financial Counselor told this writer that per [facility] staff, pt's MA is active but ID/PMI has not yet been generated.   Current Living Situation: Patient lives alone.  Support System: Supportive and Involved daughterGeneva.   Services Involved: none currently.   Transportation at Discharge: Pt's [place] martin will provide pt with a ride home.   Transportation to Medical Appointments:    - Name of caregiver: self or daughter.     Coordination of Care and Referrals: Provided patient/family with options for DME and new PCP.      This writer spoke again to pt regarding discharge planning today. Pt told this writer that he still has the [employer] Patient Assistance Program Application form but pt said that he does not want to be fitted for the home LifeVest. Joan Mcnair (Ph: [phone]), [facility] notified of pt's decision to not be fitted for the home LifeVest.      This writer also explained to pt that in order to make a PCP appt at [facility] (Ph: [phone]), he will have to call Dami Koo Financial Counselor (Ph: [phone]) to discuss the financial cost of the appt as pt's [facility] insurance has not yet been verified with an ID number.     Plan  Anticipated Discharge Date:  1/8/2020  Anticipated Discharge Plan:  Discharge to home.     CTS Handoff completed:  NO    MAURICE SUMMERS, RN BSN  Care Coordinator Unit  6c [phone]

## 2020-01-08 NOTE — PLAN OF CARE
DISCHARGE   Discharged to: Home  Via: Automobile  Accompanied by: Daughter  Discharge Instructions: diet, activity, medications, follow up appointments, when to call the MD, and what to watchout for (i.e. s/s of infection, increasing SOB, palpitations, chest pain,)  Prescriptions: To be filled by [facility] discharge pharmacy per pt's request; medication list reviewed & sent with pt  Follow Up Appointments: arranged; information given  Belongings: All sent with pt  IV: out  Telemetry: off  Pt exhibits understanding of above discharge instructions; all questions answered.

## 2020-01-08 NOTE — PLAN OF CARE
D: Patient transferred from OSH 1/3 for further management of atrial flutter with RVR. Hx. Afib s/p cardioversion (2016), hemorrhagic storke, HTN, HFrEF w/recovered EF.  I/A: A&Ox4. VSS on RA. A-flutter 70s-110s. Denies pain. Pt reports adequate uop/not measuring. Up ad jacy. Appeared to rest comfortably overnight.   P: Likely discharge home today. Continue to monitor and notify Cards 1 with questions/concerns.

## 2020-01-08 NOTE — PLAN OF CARE
[id number]:     Neuro:  A/O x 4. Call light appropriate.   Cardiac:   A flutter 80-120s on telemetry. BP elevated; team aware. Other VSS.           Respiratory:  O2 saturation > 92% on RA. Denies ARSHAD or SOB.   GI/:  Adequate UO. LBM 1/7.   Diet/appetite:  Fair appetite on regular diet. Encouraged 2g Na diet.   Activity:  Independent  Pain:  Denies pain.   Skin:  WDL; generalized bruising.   LDA's:  L PIV- SL.   Plan:  Continue POC; team aware of elevated BP and HR. One time dose of IV Metoprolol given. Plan for patient to discharge home tomorrow. Will need RAISSA/ablation in 4 weeks.

## 2020-01-09 DIAGNOSIS — I50.23 ACUTE ON CHRONIC SYSTOLIC CONGESTIVE HEART FAILURE (H): Primary | ICD-10-CM

## 2020-01-09 DIAGNOSIS — I48.92 ATRIAL FLUTTER, UNSPECIFIED TYPE (H): Primary | ICD-10-CM

## 2020-01-09 DIAGNOSIS — I51.3 LA THROMBUS: Primary | ICD-10-CM

## 2020-01-09 RX ORDER — SODIUM CHLORIDE 9 MG/ML
INJECTION, SOLUTION INTRAVENOUS CONTINUOUS
Status: CANCELLED | OUTPATIENT
Start: 2020-01-09

## 2020-01-09 RX ORDER — LIDOCAINE 40 MG/G
CREAM TOPICAL
Status: CANCELLED | OUTPATIENT
Start: 2020-01-09

## 2020-01-09 NOTE — PROGRESS NOTES
Patient has clinic visit within 24-48 hours of Discharge so no post DC follow up call is needed    1/10/2020         Pending Admission Date & Time:     PCP: No Ref-Primary, Physician   Admission Date & Time:     Admitting Provider: Jan Romeo MD      Unit: [facility] HEART CATH LAB Room & Bed:       Patient Class: Outpatient Hospital Service: Surgery   Primary Admission Diagnosis: Atrial flutter, unspecified type (H) [I48.92]

## 2020-01-14 DIAGNOSIS — I48.92 ATRIAL FLUTTER, UNSPECIFIED TYPE (H): Primary | ICD-10-CM

## 2020-01-14 RX ORDER — SODIUM CHLORIDE 9 MG/ML
INJECTION, SOLUTION INTRAVENOUS CONTINUOUS
Status: CANCELLED | OUTPATIENT
Start: 2020-01-14

## 2020-01-14 RX ORDER — LIDOCAINE 40 MG/G
CREAM TOPICAL
Status: CANCELLED | OUTPATIENT
Start: 2020-01-14

## 2020-01-22 DIAGNOSIS — I50.23 ACUTE ON CHRONIC SYSTOLIC CONGESTIVE HEART FAILURE (H): ICD-10-CM

## 2020-01-22 LAB
ANION GAP SERPL CALCULATED.3IONS-SCNC: 2 MMOL/L (ref 3–14)
BUN SERPL-MCNC: 19 MG/DL (ref 7–30)
CALCIUM SERPL-MCNC: 9 MG/DL (ref 8.5–10.1)
CHLORIDE SERPL-SCNC: 104 MMOL/L (ref 94–109)
CO2 SERPL-SCNC: 30 MMOL/L (ref 20–32)
CREAT SERPL-MCNC: 1.04 MG/DL (ref 0.66–1.25)
GFR SERPL CREATININE-BSD FRML MDRD: 77 ML/MIN/{1.73_M2}
GLUCOSE SERPL-MCNC: 85 MG/DL (ref 70–99)
NT-PROBNP SERPL-MCNC: 1820 PG/ML (ref 0–125)
POTASSIUM SERPL-SCNC: 5.1 MMOL/L (ref 3.4–5.3)
SODIUM SERPL-SCNC: 136 MMOL/L (ref 133–144)

## 2020-01-22 PROCEDURE — 36415 COLL VENOUS BLD VENIPUNCTURE: CPT | Performed by: NURSE PRACTITIONER

## 2020-01-22 PROCEDURE — 80048 BASIC METABOLIC PNL TOTAL CA: CPT | Performed by: NURSE PRACTITIONER

## 2020-01-22 PROCEDURE — 83880 ASSAY OF NATRIURETIC PEPTIDE: CPT | Performed by: NURSE PRACTITIONER

## 2020-01-23 ENCOUNTER — OFFICE VISIT (OUTPATIENT)
Dept: CARDIOLOGY | Facility: CLINIC | Age: 60
End: 2020-01-23
Payer: MEDICAID

## 2020-01-23 VITALS
HEART RATE: 73 BPM | BODY MASS INDEX: 25.07 KG/M2 | OXYGEN SATURATION: 98 % | DIASTOLIC BLOOD PRESSURE: 101 MMHG | SYSTOLIC BLOOD PRESSURE: 153 MMHG | WEIGHT: 190 LBS

## 2020-01-23 DIAGNOSIS — I50.21 ACUTE SYSTOLIC CONGESTIVE HEART FAILURE (H): ICD-10-CM

## 2020-01-23 DIAGNOSIS — I50.23 ACUTE ON CHRONIC SYSTOLIC CONGESTIVE HEART FAILURE (H): Primary | ICD-10-CM

## 2020-01-23 DIAGNOSIS — I10 ESSENTIAL HYPERTENSION: ICD-10-CM

## 2020-01-23 DIAGNOSIS — I48.91 ATRIAL FIBRILLATION, UNSPECIFIED TYPE (H): ICD-10-CM

## 2020-01-23 DIAGNOSIS — I42.8 NON-ISCHEMIC CARDIOMYOPATHY (H): ICD-10-CM

## 2020-01-23 PROCEDURE — 99214 OFFICE O/P EST MOD 30 MIN: CPT | Performed by: NURSE PRACTITIONER

## 2020-01-23 RX ORDER — FUROSEMIDE 20 MG
20 TABLET ORAL DAILY
Qty: 30 TABLET | Refills: 1 | Status: SHIPPED | OUTPATIENT
Start: 2020-01-23 | End: 2020-02-07

## 2020-01-23 RX ORDER — LISINOPRIL 5 MG/1
5 TABLET ORAL DAILY
Qty: 30 TABLET | Refills: 1 | Status: SHIPPED | OUTPATIENT
Start: 2020-01-23 | End: 2020-01-29

## 2020-01-23 RX ORDER — LISINOPRIL 5 MG/1
5 TABLET ORAL DAILY
Qty: 30 TABLET | Refills: 1 | Status: SHIPPED | OUTPATIENT
Start: 2020-01-23 | End: 2020-01-23

## 2020-01-23 RX ORDER — FUROSEMIDE 20 MG
20 TABLET ORAL DAILY
Qty: 30 TABLET | Refills: 1 | Status: SHIPPED | OUTPATIENT
Start: 2020-01-23 | End: 2020-01-23

## 2020-01-23 RX ORDER — MULTIPLE VITAMINS W/ MINERALS TAB 9MG-400MCG
1 TAB ORAL DAILY
Status: ON HOLD | COMMUNITY
End: 2020-02-25

## 2020-01-23 NOTE — NURSING NOTE
"Diet: Patient instructed regarding a heart healthy diet, including discussion of reduced fat, restricting fluid and sodium intake. Patient demonstrated understanding of this information and agreed to call with further questions or concerns.  Labs: Patient was given results of the laboratory testing obtained today. Potassium level 5.1. Patient was instructed to return for the next laboratory testing in one week. Patient demonstrated understanding of this information and agreed to call with further questions or concerns.   Return Appointment: Patient given instructions regarding scheduling next CORE clinic visit in March. Establish with Dr. Wick on 2/7/20. Patient demonstrated understanding of this information and agreed to call with further questions or concerns.  Medication Change: Patient was educated regarding prescribed medication change, decreasing lisinopril to 5 mg daily including discussion of the indication, administration, side effects, and when to report to MD or RN. Patient demonstrated understanding of this information and agreed to call with further questions or concerns.  Patient stated he understood all health information given and agreed to call with further questions or concerns.    After patients appointment, patients daughter called to inform that the patients other daughter, Vivien, had given him potassium pills for the past 4 days. The daughter stated the total dose was \"200mg\".  I informed Maida Abdi NP of this information. Maida instructed to increase the lisinopril back to 10mg daily and to have labs rechecked on Monday or Tuesday next week. I called patients daughter back and instructed to increase lisinopril back to 10mg daily and rescheduled the lab appointment. The daughter stated she will be checking A[facility] home to make sure there are no potassium pills there. I explained the severity of the issue with the patient being give potassium with out medical advice. She said she would explain that " to Vivien. I will follow labs next week.    Angely Robert RN Care Coordinator  Heart Failure CORE

## 2020-01-23 NOTE — NURSING NOTE
"Chief Complaint   Patient presents with     Heart Failure     New CORE/Hosp F/U 59 y.o. male with HFrEF, EF 15-20% with labs prior. Hx afib, cardioversion 1/10/20. No PCP       Initial There were no vitals taken for this visit. Estimated body mass index is 24.46 kg/m  as calculated from the following:    Height as of 12/31/19: 1.854 m (6' 1\").    Weight as of 1/8/20: 84.1 kg (185 lb 6.4 oz)..  BP completed using cuff size: regular    Ronna Huffman MA  "

## 2020-01-23 NOTE — PROGRESS NOTES
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      Patient doesn't check BP's at home. Patient Weight 185 lbs with discharge , Patient is at 190 lbs now .  He reports he has stopped ETOH.  Patient has been eating more. Patient has been tired . He has no swelling in his legs and abdomen.  SOB none.  Able to lay flat in his bed. He feels he saw some light pink in the urine a couple days . He has had some issues in the past. Patient has been drinking a lot of water. He has been eating a lot sodium in his diet as he says he didn't know about the restriction.     Denies SOB, ARSHAD, PND, orthopnea, edema, chest pain, palpitations, lightheadedness, dizziness, near syncopal/syncopal episodes. Roge has been following salt and fluid restrictions.      PAST MEDICAL HISTORY:  Past Medical History:   Diagnosis Date     Atrial fibrillation (H) 2016    One time episode noted after his finger accident, required cardioversion     Atrial fibrillation with RVR (H) 4/6/2016     Hemorrhagic stroke (H) 12/16/2007    2.3 X 1.5 CM PARENCHYMAL HEMORRHAGE IN THE RIGHT BASAL GANGLIA     HTN (hypertension)      Systolic CHF (H)     EF of 15-30% in 2016       FAMILY HISTORY:  Family History   Problem Relation Age of Onset     Heart Disease Mother      Osteoporosis Mother      LUNG DISEASE Mother      Heart Disease Father      Hypertension Brother        SOCIAL HISTORY:  Social History     Tobacco Use     Smoking status: Never Smoker     Smokeless tobacco: Current User     Types: Chew   Substance Use Topics     Alcohol use: Yes     Drug use: None           CURRENT MEDICATIONS:  Current Outpatient Medications   Medication Sig Dispense Refill     dabigatran ANTICOAGULANT (PRADAXA) 150 MG capsule Take 1 capsule (150 mg) by mouth 2 times daily Store in original  's bottle or blister pack; use within 120 days of opening. 180 capsule 0     digoxin (LANOXIN) 125 MCG tablet Take 1 tablet (125 mcg) by mouth daily 90 tablet 0     folic acid (FOLVITE) 1 MG tablet Take 1 tablet (1 mg) by mouth daily 90 tablet 0     lisinopril (PRINIVIL/ZESTRIL) 10 MG tablet Take 1 tablet (10 mg) by mouth daily 90 tablet 0     metoprolol succinate ER (TOPROL-XL) 100 MG 24 hr tablet Take 1 tablet (100 mg) by mouth daily 90 tablet 0     multivitamin w/minerals (THERA-VIT-M) tablet Take 1 tablet by mouth daily       vitamin B1 (THIAMINE) 100 MG tablet Take 1 tablet (100 mg) by mouth daily 90 tablet 0       ROS:  Review Of Systems  Skin: negative  Eyes: negative  Ears/Nose/Throat: negative  Respiratory: No shortness of breath, dyspnea on exertion, cough, or hemoptysis  Cardiovascular: negative  Gastrointestinal: negative  Genitourinary: negative  Musculoskeletal: negative  Neurologic: negative  Psychiatric: negative  Hematologic/Lymphatic/Immunologic: negative  Endocrine: negative      EXAM:  BP (!) 153/93 (BP Location: Right arm, Patient Position: Chair, Cuff Size: Adult Regular)   Pulse 72   Wt 86.2 kg (190 lb)   SpO2 98%   BMI 25.07 kg/m    Home weight:  General: alert, articulate, and in no acute distress.  HEENT: normocephalic, atraumatic, anicteric sclera, EOMI, mucosa moist, no cyanosis.   Neck: neck supple.  No adenopathy, masses, or carotid bruits.  JVP 10-11 cm at 90 degrees  Heart: regular rhythm, normal S1/S2, no murmur, gallop, rub.  Precordium quiet with normal PMI.     Lungs: clear, no rales, ronchi, or wheezing.  No accessory muscle use, respirations unlabored.   Abdomen: soft, non-tender, bowel sounds present, no hepatomegaly  Extremities: trace edema.   No cyanosis.    Neurological: alert and oriented x 3.  normal speech and affect, no gross motor deficits  Skin:  No ecchymoses, rashes, or clubbing.    Labs:  CBC RESULTS:  Lab Results   Component Value Date    WBC 6.8  01/08/2020    RBC 4.85 01/08/2020    HGB 16.5 01/08/2020    HCT 48.5 01/08/2020     01/08/2020    MCH 34.0 (H) 01/08/2020    MCHC 34.0 01/08/2020    RDW 13.5 01/08/2020     01/08/2020       CMP RESULTS:  Lab Results   Component Value Date     01/22/2020    POTASSIUM 5.1 01/22/2020    CHLORIDE 104 01/22/2020    CO2 30 01/22/2020    ANIONGAP 2 (L) 01/22/2020    GLC 85 01/22/2020    BUN 19 01/22/2020    CR 1.04 01/22/2020    GFRESTIMATED 77 01/22/2020    GFRESTBLACK 90 01/22/2020    BAO 9.0 01/22/2020    BILITOTAL 1.1 12/31/2019    ALBUMIN 3.9 12/31/2019    ALKPHOS 68 12/31/2019    ALT 37 12/31/2019    AST 32 12/31/2019        INR RESULTS:  Lab Results   Component Value Date    INR 1.58 (H) 01/06/2020       No components found for: CK  Lab Results   Component Value Date    MAG 2.0 01/03/2020     Lab Results   Component Value Date    NTBNP 1,820 (H) 01/22/2020     @BRIEFLABR (dig)@    Most recent echocardiogram:  No results found for this or any previous visit (from the past 8760 hour(s)).      Assessment and Plan:    In summary,Roge is 59 year old patient is hypervolemic today .  We discussed the lab results and the patient has some hyperkalemia. The patient states he isn't taking extra potassium supplements to his knowledge. We will start him on low dose Lasix as he was hypervolemic today.  We went over in detail the sodium restrictions as well as fluid restrictions. The patient and his daughter express understanding.  1.  Chronic Systolic heart failure secondary to .  Stage C  NYHA Class III  ACEi/ARB: yes ( Lisinopril 10 mg daily)  BB: yes ( Metoprolol 100 mg daily)  Aldosterone antagonist: contraindicated due to hyperkalemia  SCD prophylaxis: Life-Vest- declined at discharge from hospital  Fluid status: hypervolemic- 20 mg lasix daily  Anticoagulation: Pradaxa  Antiplatelet:  ASA dose   NSAID use:  Contraindicated.  Avoid use.       2.  Follow-up   Appt with Katherin CNP tomorrow   Lisinopril 10  mg daily- continue   Lasix 20 mg daily   Labs in one week   Fluid 2 L  BP checks at home  Weights at home.   Dr. Wick- Consult next month Feb 7th 12:00       Addendum :the daughter who brought the patient to clinic called us back and stated her sister was giving her father potassium at home for the last 4 days.  Angely VAUGHN informed them to take 10 mg Lisinopril with labs on Monday and NO potassium     Maida SAENZ CNP  [facility]

## 2020-01-23 NOTE — LETTER
1/23/2020      RE: Roge Agarwal  [id number] [facility] [id number]       Dear Colleague,    Thank you for the opportunity to participate in the care of your patient, Roge Agarwal, at the [facility] AT [facility]. Please see a copy of my visit note below.      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      Patient doesn't check BP's at home. Patient Weight 185 lbs with discharge , Patient is at 190 lbs now .  He reports he has stopped ETOH.  Patient has been eating more. Patient has been tired . He has no swelling in his legs and abdomen.  SOB none.  Able to lay flat in his bed. He feels he saw some light pink in the urine a couple days . He has had some issues in the past. Patient has been drinking a lot of water. He has been eating a lot sodium in his diet as he says he didn't know about the restriction.     Denies SOB, ARSHAD, PND, orthopnea, edema, chest pain, palpitations, lightheadedness, dizziness, near syncopal/syncopal episodes. Roge has been following salt and fluid restrictions.      PAST MEDICAL HISTORY:  Past Medical History:   Diagnosis Date     Atrial fibrillation (H) 2016    One time episode noted after his finger accident, required cardioversion     Atrial fibrillation with RVR (H) 4/6/2016     Hemorrhagic stroke (H) 12/16/2007    2.3 X 1.5 CM PARENCHYMAL HEMORRHAGE IN THE RIGHT BASAL GANGLIA     HTN (hypertension)      Systolic CHF (H)     EF of 15-30% in 2016       FAMILY HISTORY:  Family History   Problem Relation Age of Onset     Heart Disease Mother      Osteoporosis Mother      LUNG DISEASE Mother      Heart Disease Father      Hypertension Brother        SOCIAL HISTORY:  Social History     Tobacco Use     Smoking status:  Never Smoker     Smokeless tobacco: Current User     Types: Chew   Substance Use Topics     Alcohol use: Yes     Drug use: None           CURRENT MEDICATIONS:  Current Outpatient Medications   Medication Sig Dispense Refill     dabigatran ANTICOAGULANT (PRADAXA) 150 MG capsule Take 1 capsule (150 mg) by mouth 2 times daily Store in original 's bottle or blister pack; use within 120 days of opening. 180 capsule 0     digoxin (LANOXIN) 125 MCG tablet Take 1 tablet (125 mcg) by mouth daily 90 tablet 0     folic acid (FOLVITE) 1 MG tablet Take 1 tablet (1 mg) by mouth daily 90 tablet 0     lisinopril (PRINIVIL/ZESTRIL) 10 MG tablet Take 1 tablet (10 mg) by mouth daily 90 tablet 0     metoprolol succinate ER (TOPROL-XL) 100 MG 24 hr tablet Take 1 tablet (100 mg) by mouth daily 90 tablet 0     multivitamin w/minerals (THERA-VIT-M) tablet Take 1 tablet by mouth daily       vitamin B1 (THIAMINE) 100 MG tablet Take 1 tablet (100 mg) by mouth daily 90 tablet 0       ROS:  Review Of Systems  Skin: negative  Eyes: negative  Ears/Nose/Throat: negative  Respiratory: No shortness of breath, dyspnea on exertion, cough, or hemoptysis  Cardiovascular: negative  Gastrointestinal: negative  Genitourinary: negative  Musculoskeletal: negative  Neurologic: negative  Psychiatric: negative  Hematologic/Lymphatic/Immunologic: negative  Endocrine: negative      EXAM:  BP (!) 153/93 (BP Location: Right arm, Patient Position: Chair, Cuff Size: Adult Regular)   Pulse 72   Wt 86.2 kg (190 lb)   SpO2 98%   BMI 25.07 kg/m     Home weight:  General: alert, articulate, and in no acute distress.  HEENT: normocephalic, atraumatic, anicteric sclera, EOMI, mucosa moist, no cyanosis.   Neck: neck supple.  No adenopathy, masses, or carotid bruits.  JVP 10-11 cm at 90 degrees  Heart: regular rhythm, normal S1/S2, no murmur, gallop, rub.  Precordium quiet with normal PMI.     Lungs: clear, no rales, ronchi, or wheezing.  No accessory muscle  use, respirations unlabored.   Abdomen: soft, non-tender, bowel sounds present, no hepatomegaly  Extremities: trace edema.   No cyanosis.    Neurological: alert and oriented x 3.  normal speech and affect, no gross motor deficits  Skin:  No ecchymoses, rashes, or clubbing.    Labs:  CBC RESULTS:  Lab Results   Component Value Date    WBC 6.8 01/08/2020    RBC 4.85 01/08/2020    HGB 16.5 01/08/2020    HCT 48.5 01/08/2020     01/08/2020    MCH 34.0 (H) 01/08/2020    MCHC 34.0 01/08/2020    RDW 13.5 01/08/2020     01/08/2020       CMP RESULTS:  Lab Results   Component Value Date     01/22/2020    POTASSIUM 5.1 01/22/2020    CHLORIDE 104 01/22/2020    CO2 30 01/22/2020    ANIONGAP 2 (L) 01/22/2020    GLC 85 01/22/2020    BUN 19 01/22/2020    CR 1.04 01/22/2020    GFRESTIMATED 77 01/22/2020    GFRESTBLACK 90 01/22/2020    BAO 9.0 01/22/2020    BILITOTAL 1.1 12/31/2019    ALBUMIN 3.9 12/31/2019    ALKPHOS 68 12/31/2019    ALT 37 12/31/2019    AST 32 12/31/2019        INR RESULTS:  Lab Results   Component Value Date    INR 1.58 (H) 01/06/2020       No components found for: CK  Lab Results   Component Value Date    MAG 2.0 01/03/2020     Lab Results   Component Value Date    NTBNP 1,820 (H) 01/22/2020     @BRIEFLABR (dig)@    Most recent echocardiogram:  No results found for this or any previous visit (from the past 8760 hour(s)).      Assessment and Plan:    In summary,Roge is 59 year old patient is hypervolemic today .  We discussed the lab results and the patient has some hyperkalemia. The patient states he isn't taking extra potassium supplements to his knowledge. We will start him on low dose Lasix as he was hypervolemic today.  We went over in detail the sodium restrictions as well as fluid restrictions. The patient and his daughter express understanding.  1.  Chronic Systolic heart failure secondary to .  Stage C  NYHA Class III  ACEi/ARB: yes ( Lisinopril 10 mg daily)  BB: yes ( Metoprolol 100  mg daily)  Aldosterone antagonist: contraindicated due to hyperkalemia  SCD prophylaxis: Life-Vest- declined at discharge from hospital  Fluid status: hypervolemic- 20 mg lasix daily  Anticoagulation: Pradaxa  Antiplatelet:  ASA dose   NSAID use:  Contraindicated.  Avoid use.       2.  Follow-up   Appt with Katherin BALES tomorrow   Lisinopril 10 mg daily- continue   Lasix 20 mg daily   Labs in one week   Fluid 2 L  BP checks at home  Weights at home.   Dr. Wick- Consult next month Feb 7th 12:00       Addendum :the daughter who brought the patient to clinic called us back and stated her sister was giving her father potassium at home for the last 4 days.  Angely VAUGHN informed them to take 10 mg Lisinopril with labs on Monday and NO potassium     Maida SAENZ CNP  [facility]

## 2020-01-23 NOTE — PATIENT INSTRUCTIONS
Take your medicines every day, as directed    Changes made today:  o Decrease Lisinopril to 5 mg daily  o Start taking Lasix 20 mg daily   Monitor Your Weight and Symptoms    Contact us if you:      Gain 2 pounds in one day or 5 pounds in one week    Feel more short of breath    Notice more leg swelling    Feel lightheadeded   Change your lifestyle    Limit Salt or Sodium:    2000 mg  Limit Fluids:    2000 mL or approximately 64 ounces  Eat a Heart Healthy Diet    Low in saturated fats  Stay Active:    Aim to move at least 150 minutes every  week         To Contact us    During Business Hours:  [phone], option # 1 (University)  Then option # 4 (medical questions)     After hours, weekends or holidays:   [phone], Option #4  Ask to speak to the On-Call Cardiologist. Inform them you are a CORE/heart failure patient at the [facility].     Use [employer] allows you to communicate directly with your heart team through secure messaging.    [employer] can be accessed any time on your phone, computer, or tablet.    If you need assistance, we'd be happy to help!         Keep your Heart Appointments:    Have labs redrawn on 1/30/20 1/24/20- Katherin Barone  2/5/20-Dr. Romeo  2/7/20-Dr. Wick  3/9/20-[facility] with labs on 3/6/20

## 2020-01-24 ENCOUNTER — OFFICE VISIT (OUTPATIENT)
Dept: CARDIOLOGY | Facility: CLINIC | Age: 60
End: 2020-01-24
Attending: NURSE PRACTITIONER
Payer: MEDICAID

## 2020-01-24 VITALS
OXYGEN SATURATION: 98 % | DIASTOLIC BLOOD PRESSURE: 98 MMHG | WEIGHT: 190 LBS | HEART RATE: 70 BPM | BODY MASS INDEX: 25.18 KG/M2 | HEIGHT: 73 IN | SYSTOLIC BLOOD PRESSURE: 155 MMHG

## 2020-01-24 DIAGNOSIS — I48.21 PERMANENT ATRIAL FIBRILLATION (H): Primary | ICD-10-CM

## 2020-01-24 LAB — INTERPRETATION ECG - MUSE: NORMAL

## 2020-01-24 PROCEDURE — G0463 HOSPITAL OUTPT CLINIC VISIT: HCPCS | Mod: 25,ZF

## 2020-01-24 PROCEDURE — 93010 ELECTROCARDIOGRAM REPORT: CPT | Mod: ZP | Performed by: INTERNAL MEDICINE

## 2020-01-24 PROCEDURE — 99204 OFFICE O/P NEW MOD 45 MIN: CPT | Mod: 25 | Performed by: INTERNAL MEDICINE

## 2020-01-24 PROCEDURE — 93005 ELECTROCARDIOGRAM TRACING: CPT | Mod: ZF

## 2020-01-24 ASSESSMENT — PAIN SCALES - GENERAL: PAINLEVEL: NO PAIN (0)

## 2020-01-24 ASSESSMENT — MIFFLIN-ST. JEOR: SCORE: 1725.71

## 2020-01-24 NOTE — LETTER
1/24/2020      RE: Roge Agarwal  [id number] [facility] [id number]       Dear Colleague,    Thank you for the opportunity to participate in the care of your patient, Roge Agarwal, at the [facility] HEART [facility] at [facility]. Please see a copy of my visit note below.    Electrophysiology Clinic Note  HPI:   Mr. 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      In 4/2016 he amputated two of his fingers with his wood saw. In the ER, he was found in a AFL with RVR and with LVEF 15%. He'd not been aware of arrhythmia but did report feeling fatigued. He had RAISSA/CV and started warfarin. His LVEF returned to 55% by several months later. He came off warfarin a month after the DCCV. He then represented to ER with 2 month history of feeing fatigued and increasingly SOB even at rest. He was found to be back in AFL with RVR 140s and echo demonstrated LVEF 30%. He was subsequently admitted. He was started on Pradaxa and amiodarone. Plan was for RAISSA/ablation; however, RAISSA demonstrated ROGELIO thrombus and ablation was cancelled. Amiodarone was stopped. He was instead placed on Metoprolol and digoxin for rate control. Stress CMRI showed NICM with severely reduced biventricular function with a pattern of midmyocardial enhancement in the septal segments. ROGELIO thrombus also demonstrated on CMRI. No ischemia on stress portion. Echo showed LVEF 25-30%, moderately decreased RV function, and mild MR. Etiology considered likely multifactorial given tachyarrhythmia and ETOH abuse. He was counseled to stop drinking. He was started on GDMT for his depressed LVEF. He was discharged to have close follow up with EP and [facility].     He presents today to establish outpatient care. He reports feeling well. He denies any HF symptoms. He  continues to be unaware of his arrhythmia. He reports he has stopped drinking. He has been following with [facility] clinic who have started him on diuretic. He denies any chest pain/pressures, dizziness, lightheadedness, worsening shortness of breath, leg/ankle swelling, PND, orthopnea, palpitations, or syncopal symptoms. Presenting 12 lead ECG shows AFL Vent Rate 72 bpm, QRS 96 ms, QTc 433 ms. Current cardiac medications include: Pradaxa, ASA, Digoxin, Lasix, Metoprolol, and Lisinopril.      PAST MEDICAL HISTORY:  Past Medical History:   Diagnosis Date     Atrial fibrillation (H) 2016    One time episode noted after his finger accident, required cardioversion     Atrial fibrillation with RVR (H) 4/6/2016     Hemorrhagic stroke (H) 12/16/2007    2.3 X 1.5 CM PARENCHYMAL HEMORRHAGE IN THE RIGHT BASAL GANGLIA     HTN (hypertension)      Systolic CHF (H)     EF of 15-30% in 2016       CURRENT MEDICATIONS:  Current Outpatient Medications   Medication Sig Dispense Refill     dabigatran ANTICOAGULANT (PRADAXA) 150 MG capsule Take 1 capsule (150 mg) by mouth 2 times daily Store in original 's bottle or blister pack; use within 120 days of opening. 180 capsule 0     digoxin (LANOXIN) 125 MCG tablet Take 1 tablet (125 mcg) by mouth daily 90 tablet 0     folic acid (FOLVITE) 1 MG tablet Take 1 tablet (1 mg) by mouth daily 90 tablet 0     furosemide (LASIX) 20 MG tablet Take 1 tablet (20 mg) by mouth daily 30 tablet 1     lisinopril (PRINIVIL/ZESTRIL) 5 MG tablet Take 1 tablet (5 mg) by mouth daily 30 tablet 1     metoprolol succinate ER (TOPROL-XL) 100 MG 24 hr tablet Take 1 tablet (100 mg) by mouth daily 90 tablet 0     multivitamin w/minerals (THERA-VIT-M) tablet Take 1 tablet by mouth daily       vitamin B1 (THIAMINE) 100 MG tablet Take 1 tablet (100 mg) by mouth daily 90 tablet 0       PAST SURGICAL HISTORY:  Past Surgical History:   Procedure Laterality Date     AMPUTATE FINGER(S) Right     Right 3rd distal finger  "after work injury       ALLERGIES:   No Known Allergies    FAMILY HISTORY:  Family History   Problem Relation Age of Onset     Heart Disease Mother      Osteoporosis Mother      LUNG DISEASE Mother      Heart Disease Father      Hypertension Brother        SOCIAL HISTORY:  Social History     Tobacco Use     Smoking status: Never Smoker     Smokeless tobacco: Current User     Types: Chew   Substance Use Topics     Alcohol use: Yes     Drug use: Not on file       ROS:   A comprehensive 10 point review of systems negative other than as mentioned in HPI.  Exam:  BP (!) 155/98 (BP Location: Right arm, Patient Position: Chair, Cuff Size: Adult Regular)   Pulse 70   Ht 1.854 m (6' 1\")   Wt 86.2 kg (190 lb)   SpO2 98%   BMI 25.07 kg/m     GENERAL APPEARANCE: alert and no distress  HEENT: no icterus, no xanthelasmas, normal pupil size and reaction, normal palate, mucosa moist, no central cyanosis  NECK: supple, JVP not elevated  RESPIRATORY: lungs clear to auscultation - no rales, rhonchi or wheezes, no use of accessory muscles, no retractions, respirations are unlabored, normal respiratory rate  CARDIOVASCULAR: regularly irregular, soft murmur.  ABDOMEN: soft, non tender, bowel sounds normal, non-distended  EXTREMITIES: peripheral pulses normal, no edema  NEURO: alert and oriented to person/place/time, normal speech, gait and affect  SKIN: no ecchymoses, no rashes  PSYCH: normal affect, cooperative    Labs:  Reviewed.     Testing/Procedures:  1/2/20 ECHOCARDIOGRAM:   Interpretation Summary     The visual ejection fraction is estimated at 25-30%.  Moderately decreased right ventricular systolic function  There is moderate biatrial enlargement.  There is mild (1+) mitral regurgitation.  Probable aflutter with rapid ventricular response.  There is no comparison study available.  1/3/20 NM IRLANDA SCAN:     Only resting perfusion images were taken.     Resting images shows mild intensity basal inferior wall photopenic " defect.     Gated images not performed. LV systolic function cannot be evaluated.     1/6/20 RAISSA:  Interpretation Summary  Left and right atrial appendage thrombi, measuring 1.7 x 1.4 cm and 1.5 x 1.4  cm, respectively.     Mildly dilated left ventricle with normal wall thickness. Severely decreased  LV systolic function with EF 15%-20%. Severe diffuse hypokinesis.  Global right ventricular function is moderately reduced.     Compared to the previous study dated 1/2/2020 (TTE): The left and right atrial  appendages are better-visualized and thrombi are seen in both atrial  Appendages.    1/6/20 CMRI:  1. The LV is mildly enlarged with normal wall thickness. The global systolic function is severely reduced.  The LVEF is 15%. There is severe diffuse hypokinesis.     2. The RV is normal in cavity size. The global systolic function is severely reduced. The RVEF is 20%.      3. The left atrium is severely enlarged.  The right atrium is moderately enlarged.      4. There is mild mitral regurgitation.     5. Late gadolinium enhancement imaging shows midmyocardial enhancement in the basal anteroseptum, basal  inferoseptum, mid anteroseptum, mid inferoseptum, and apical septum.  This is consistent with a  non-ischemic etiology of cardiomyopathy.     6. Regadenoson stress perfusion imaging shows no ischemia.     7. There is no pericardial effusion or thickening.     8. There is a left atrial appendage thrombus.     9. There is a left-sided pleural effusion.     CONCLUSIONS: Non-ischemic cardiomyopathy.  There is severely reduced biventricular function with a pattern  of midmyocardial enhancement in the septal segments. There is also a left atrial appendage thrombus. There  is no ischemia.        Assessment and Plan:   Mr. 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  In 4/2016 he amputated two of his fingers with his wood saw. In the ER, he was found in a AFL with RVR and with LVEF 15%. He'd not been aware of arrhythmia but did report feeling fatigued. He had RAISSA/CV and started warfarin. His LVEF returned to 55% by several months later. He came off warfarin a month after the DCCV. He then represented to ER with 2 month history of feeing fatigued and increasingly SOB even at rest. He was found to be back in AFL with RVR 140s and echo demonstrated LVEF 30%. He was subsequently admitted. He was started on Pradaxa and amiodarone. Plan was for RAISSA/ablation; however, RAISSA demonstrated ROGELIO thrombus and ablation was cancelled. Amiodarone was stopped. He was instead placed on Metoprolol and digoxin for rate control. Stress CMRI showed NICM with severely reduced biventricular function with a pattern of midmyocardial enhancement in the septal segments. ROGELIO thrombus also demonstrated on CMRI. No ischemia on stress portion. Echo showed LVEF 25-30%, moderately decreased RV function, and mild MR. Etiology considered likely multifactorial given tachyarrhythmia and ETOH abuse. He was counseled to stop drinking. He was started on GDMT for his depressed LVEF. He was discharged to have close follow up with EP and [facility] clinic. presents today to establish outpatient care. He reports feeling well. He denies any HF symptoms. He continues to be unaware of his arrhythmia. He reports he has stopped drinking. He has been following with [facility] who have started him on diuretic. Presenting 12 lead ECG shows AFL Vent Rate 72 bpm, QRS 96 ms, QTc 433 ms. Current cardiac medications include: Pradaxa, ASA, Digoxin, Lasix, Metoprolol, and Lisinopril.    NICM Likely Tachy-induced and/or ETOH induced LVEF 25-30%, NYHA II:  1. ACEi/ARB: Continue Lisinopril.  2. BB: Continue Toprol XL   3. Aldosterone antagonist: deferred, can consider addition will leave to [facility].  4. SCD prophylaxis: not currently  indicated as he was just started on GDMT  5. Fluid status: euvolemic on exam    Atrial Flutter:  We discussed in detail with the patient management/treatment options for [place] includin. Stroke Prophylaxis:  CHADSVASC=+HF, +HTN  2, corresponding to a 2.2% annual stroke [facility] rate. indicating need for long term oral anticoagulation.  He is appropriately on Pradaxa. No bleeding issues.   2. Rate Control: Continue Toprol XL and Digoxin.   3. Rhythm Control: He had DCCV in 2016 for AFL. Now with recurrence. Has ROGELIO thrombus so avoiding rhythm control until this clears. Given history of tachy-mediated CM and recurrent AFL, he need tight rhythm control. AAT less efficacious for this and we favor ablation. Will plan to get RAISSA after being on anticoagulation for 4-6 weeks. If clear will proceed with ablation. The procedural risk of EP study and ablation were discussed in detail. Risks discussed include: vascular complications, CVA, AVB, pericardial effusion and tamponade. Even if ablation is successful anticoagulation may be needed lifelong. We also discussed that he has ~40% risk of developing AF in his lifetime and that ablation for AFL does not address this.   4. Risk Factor Management: tight BP control, GO evaluation as indicated, and continued abstinence  from ETOH.      He will continue to follow with [facility] clinic per routine.   Follow up with EP 3 months after ablation with echo prior.   The patient states understanding and is agreeable with plan.   This patient was seen and evaluated with Dr. Romeo. The above note reflects our joint assessment and plan.   DANY Muro CNP  Pager: [phone]    EP STAFF NOTE  I have seen and examined the patient as part of a shared visit with OLAMIDE Muro NP.  I agree with the note above. I reviewed today's vital signs and medications. I have reviewed and discussed with the advanced practice provider their physical examination, assessment, and plan    Briefly, AFL, ROGELIO thrombus, doubt there is right sided thrombus but canot rule out, on AC, stopped ETOH, feeling better  My key history/exam findings are: AFL.   The key management decisions made by me: RAISSA/ablation in a few weeks, advance HF meds.    Jan Romeo MD [facility]  Cardiology - Electrophysiology

## 2020-01-24 NOTE — NURSING NOTE
Chief Complaint   Patient presents with     Follow Up     1 Year Follow-up     Vitals were taken and medications were reconciled. EKG was performed    Ethel VAN  1:46 PM

## 2020-01-24 NOTE — PROGRESS NOTES
Electrophysiology Clinic Note  HPI:   Mr. 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      In 4/2016 he amputated two of his fingers with his wood saw. In the ER, he was found in a AFL with RVR and with LVEF 15%. He'd not been aware of arrhythmia but did report feeling fatigued. He had RAISSA/CV and started warfarin. His LVEF returned to 55% by several months later. He came off warfarin a month after the DCCV. He then represented to ER with 2 month history of feeing fatigued and increasingly SOB even at rest. He was found to be back in AFL with RVR 140s and echo demonstrated LVEF 30%. He was subsequently admitted. He was started on Pradaxa and amiodarone. Plan was for RAISSA/ablation; however, RAISSA demonstrated ROGELIO thrombus and ablation was cancelled. Amiodarone was stopped. He was instead placed on Metoprolol and digoxin for rate control. Stress CMRI showed NICM with severely reduced biventricular function with a pattern of midmyocardial enhancement in the septal segments. ROGELIO thrombus also demonstrated on CMRI. No ischemia on stress portion. Echo showed LVEF 25-30%, moderately decreased RV function, and mild MR. Etiology considered likely multifactorial given tachyarrhythmia and ETOH abuse. He was counseled to stop drinking. He was started on GDMT for his depressed LVEF. He was discharged to have close follow up with EP and [facility].     He presents today to establish outpatient care. He reports feeling well. He denies any HF symptoms. He continues to be unaware of his arrhythmia. He reports he has stopped drinking. He has been following with [facility] who have started him on diuretic. He denies any chest pain/pressures, dizziness, lightheadedness, worsening shortness of breath, leg/ankle swelling, PND, orthopnea, palpitations, or syncopal symptoms.  Presenting 12 lead ECG shows AFL Vent Rate 72 bpm, QRS 96 ms, QTc 433 ms. Current cardiac medications include: Pradaxa, ASA, Digoxin, Lasix, Metoprolol, and Lisinopril.      PAST MEDICAL HISTORY:  Past Medical History:   Diagnosis Date     Atrial fibrillation (H) 2016    One time episode noted after his finger accident, required cardioversion     Atrial fibrillation with RVR (H) 4/6/2016     Hemorrhagic stroke (H) 12/16/2007    2.3 X 1.5 CM PARENCHYMAL HEMORRHAGE IN THE RIGHT BASAL GANGLIA     HTN (hypertension)      Systolic CHF (H)     EF of 15-30% in 2016       CURRENT MEDICATIONS:  Current Outpatient Medications   Medication Sig Dispense Refill     dabigatran ANTICOAGULANT (PRADAXA) 150 MG capsule Take 1 capsule (150 mg) by mouth 2 times daily Store in original 's bottle or blister pack; use within 120 days of opening. 180 capsule 0     digoxin (LANOXIN) 125 MCG tablet Take 1 tablet (125 mcg) by mouth daily 90 tablet 0     folic acid (FOLVITE) 1 MG tablet Take 1 tablet (1 mg) by mouth daily 90 tablet 0     furosemide (LASIX) 20 MG tablet Take 1 tablet (20 mg) by mouth daily 30 tablet 1     lisinopril (PRINIVIL/ZESTRIL) 5 MG tablet Take 1 tablet (5 mg) by mouth daily 30 tablet 1     metoprolol succinate ER (TOPROL-XL) 100 MG 24 hr tablet Take 1 tablet (100 mg) by mouth daily 90 tablet 0     multivitamin w/minerals (THERA-VIT-M) tablet Take 1 tablet by mouth daily       vitamin B1 (THIAMINE) 100 MG tablet Take 1 tablet (100 mg) by mouth daily 90 tablet 0       PAST SURGICAL HISTORY:  Past Surgical History:   Procedure Laterality Date     AMPUTATE FINGER(S) Right     Right 3rd distal finger after work injury       ALLERGIES:   No Known Allergies    FAMILY HISTORY:  Family History   Problem Relation Age of Onset     Heart Disease Mother      Osteoporosis Mother      LUNG DISEASE Mother      Heart Disease Father      Hypertension Brother        SOCIAL HISTORY:  Social History     Tobacco Use     Smoking  "status: Never Smoker     Smokeless tobacco: Current User     Types: Chew   Substance Use Topics     Alcohol use: Yes     Drug use: Not on file       ROS:   A comprehensive 10 point review of systems negative other than as mentioned in HPI.  Exam:  BP (!) 155/98 (BP Location: Right arm, Patient Position: Chair, Cuff Size: Adult Regular)   Pulse 70   Ht 1.854 m (6' 1\")   Wt 86.2 kg (190 lb)   SpO2 98%   BMI 25.07 kg/m    GENERAL APPEARANCE: alert and no distress  HEENT: no icterus, no xanthelasmas, normal pupil size and reaction, normal palate, mucosa moist, no central cyanosis  NECK: supple, JVP not elevated  RESPIRATORY: lungs clear to auscultation - no rales, rhonchi or wheezes, no use of accessory muscles, no retractions, respirations are unlabored, normal respiratory rate  CARDIOVASCULAR: regularly irregular, soft murmur.  ABDOMEN: soft, non tender, bowel sounds normal, non-distended  EXTREMITIES: peripheral pulses normal, no edema  NEURO: alert and oriented to person/place/time, normal speech, gait and affect  SKIN: no ecchymoses, no rashes  PSYCH: normal affect, cooperative    Labs:  Reviewed.     Testing/Procedures:  1/2/20 ECHOCARDIOGRAM:   Interpretation Summary     The visual ejection fraction is estimated at 25-30%.  Moderately decreased right ventricular systolic function  There is moderate biatrial enlargement.  There is mild (1+) mitral regurgitation.  Probable aflutter with rapid ventricular response.  There is no comparison study available.  1/3/20 NM IRLANDA SCAN:     Only resting perfusion images were taken.     Resting images shows mild intensity basal inferior wall photopenic defect.     Gated images not performed. LV systolic function cannot be evaluated.     1/6/20 RAISSA:  Interpretation Summary  Left and right atrial appendage thrombi, measuring 1.7 x 1.4 cm and 1.5 x 1.4  cm, respectively.     Mildly dilated left ventricle with normal wall thickness. Severely decreased  LV systolic function " with EF 15%-20%. Severe diffuse hypokinesis.  Global right ventricular function is moderately reduced.     Compared to the previous study dated 1/2/2020 (TTE): The left and right atrial  appendages are better-visualized and thrombi are seen in both atrial  Appendages.    1/6/20 CMRI:  1. The LV is mildly enlarged with normal wall thickness. The global systolic function is severely reduced.  The LVEF is 15%. There is severe diffuse hypokinesis.     2. The RV is normal in cavity size. The global systolic function is severely reduced. The RVEF is 20%.      3. The left atrium is severely enlarged.  The right atrium is moderately enlarged.      4. There is mild mitral regurgitation.     5. Late gadolinium enhancement imaging shows midmyocardial enhancement in the basal anteroseptum, basal  inferoseptum, mid anteroseptum, mid inferoseptum, and apical septum.  This is consistent with a  non-ischemic etiology of cardiomyopathy.     6. Regadenoson stress perfusion imaging shows no ischemia.     7. There is no pericardial effusion or thickening.     8. There is a left atrial appendage thrombus.     9. There is a left-sided pleural effusion.     CONCLUSIONS: Non-ischemic cardiomyopathy.  There is severely reduced biventricular function with a pattern  of midmyocardial enhancement in the septal segments. There is also a left atrial appendage thrombus. There  is no ischemia.        Assessment and Plan:   Mr. 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  In 4/2016 he amputated two of his fingers with his wood saw. In the ER, he was found in a AFL with RVR and with LVEF 15%. He'd not been aware of arrhythmia but did report feeling fatigued. He had RAISSA/CV and started warfarin. His LVEF returned to 55% by several months later. He came off warfarin a month after the  [facility]. He then represented to ER with 2 month history of feeing fatigued and increasingly SOB even at rest. He was found to be back in AFL with RVR 140s and echo demonstrated LVEF 30%. He was subsequently admitted. He was started on Pradaxa and amiodarone. Plan was for RAISSA/ablation; however, RAISSA demonstrated ROGELIO thrombus and ablation was cancelled. Amiodarone was stopped. He was instead placed on Metoprolol and digoxin for rate control. Stress CMRI showed NICM with severely reduced biventricular function with a pattern of midmyocardial enhancement in the septal segments. ROGELIO thrombus also demonstrated on CMRI. No ischemia on stress portion. Echo showed LVEF 25-30%, moderately decreased RV function, and mild MR. Etiology considered likely multifactorial given tachyarrhythmia and ETOH abuse. He was counseled to stop drinking. He was started on GDMT for his depressed LVEF. He was discharged to have close follow up with EP and [facility] clinic. presents today to establish outpatient care. He reports feeling well. He denies any HF symptoms. He continues to be unaware of his arrhythmia. He reports he has stopped drinking. He has been following with [facility] who have started him on diuretic. Presenting 12 lead ECG shows AFL Vent Rate 72 bpm, QRS 96 ms, QTc 433 ms. Current cardiac medications include: Pradaxa, ASA, Digoxin, Lasix, Metoprolol, and Lisinopril.    NICM Likely Tachy-induced and/or ETOH induced LVEF 25-30%, NYHA II:  1. ACEi/ARB: Continue Lisinopril.  2. BB: Continue Toprol XL   3. Aldosterone antagonist: deferred, can consider addition will leave to [facility].  4. SCD prophylaxis: not currently indicated as he was just started on GDMT  5. Fluid status: euvolemic on exam    Atrial Flutter:  We discussed in detail with the patient management/treatment options for A.fib includin. Stroke Prophylaxis:  CHADSVASC=+HF, +HTN  2, corresponding to a 2.2% annual stroke / systemic emolism event rate. indicating need for long  term oral anticoagulation.  He is appropriately on Pradaxa. No bleeding issues.   2. Rate Control: Continue Toprol XL and Digoxin.   3. Rhythm Control: He had DCCV in 2016 for AFL. Now with recurrence. Has ROGELIO thrombus so avoiding rhythm control until this clears. Given history of tachy-mediated CM and recurrent AFL, he need tight rhythm control. AAT less efficacious for this and we favor ablation. Will plan to get RAISSA after being on anticoagulation for 4-6 weeks. If clear will proceed with ablation. The procedural risk of EP study and ablation were discussed in detail. Risks discussed include: vascular complications, CVA, AVB, pericardial effusion and tamponade. Even if ablation is successful anticoagulation may be needed lifelong. We also discussed that he has ~40% risk of developing AF in his lifetime and that ablation for AFL does not address this.   4. Risk Factor Management: tight BP control, GO evaluation as indicated, and continued abstinence  from ETOH.      He will continue to follow with [facility] clinic per routine.   Follow up with EP 3 months after ablation with echo prior.   The patient states understanding and is agreeable with plan.   This patient was seen and evaluated with Dr. Romeo. The above note reflects our joint assessment and plan.   DANY Muro CNP  Pager: [phone]    EP STAFF NOTE  I have seen and examined the patient as part of a shared visit with OLAMIDE Muro NP.  I agree with the note above. I reviewed today's vital signs and medications. I have reviewed and discussed with the advanced practice provider their physical examination, assessment, and plan   Briefly, AFL, ROGELIO thrombus, doubt there is right sided thrombus but canot rule out, on AC, stopped ETOH, feeling better  My key history/exam findings are: AFL.   The key management decisions made by me: RAISSA/ablation in a few weeks, advance HF meds.    Jan Romeo MD [facility]  Cardiology - Electrophysiology

## 2020-01-24 NOTE — PATIENT INSTRUCTIONS
You are scheduled for an Atrial Flutter Ablation, at [facility], [place], with Dr. Jan Romeo. The hospital is located at [address] [place] [address] on the [place] of the [place].  If you need to cancel this procedure, please call [phone].       Date:___February 27, 2020___  Time: _____8:30am__________To the [facility] Waiting Room at the [facility]  Atrial Flutter Ablation    1. Your history and physical will be completed by our nurse practitioner when you arrive.  2. Please do not eat anything for 8 hours prior to your procedure. You may have sips of water up until 2 hours prior to your arrival.  3. The morning of your procedure, you may take your scheduled medications with a sip of water - continue your blood thinner (Pradaxa).  4. You may discharge the same day. You will need a .        Post-Procedure Instructions  Care of groin site:    Remove the Band-Aid after 24 hours. If there is minor oozing, apply another Band-aid and remove it after 12 hours.     Do NOT take a bath, use a hot tub, pool, or submerse in water for at least 3 days. You may shower.     It is normal to have a small bruise or lump at the site.    Do not scrub the site.    Do not use lotion or powder near the puncture site for 3 days.    If you start bleeding from the site in your groin: Lie down flat and press firmly on the site. Call your physician immediately, or, come to the emergency room.  Call 911 right away if you have bleeding that is heavy or does not stop.    Call your doctor/provider if:     You have a large or growing hard lump around the site.     The site is red, swollen, hot or tender.     Blood or fluid is draining from the site.     You have chills or a fever greater than 101 F (38 C).     Your leg or arm turns bluish, feels numb or cool.     You have hives, a rash or unusual itching.      Activity Restrictions    For the first 2 days: Do not stoop or squat. When you cough, sneeze or move your  bowels, hold your hand over the puncture site and press gently.    Do not lift more than 10 pounds or exertional activity for 10 days.  - No driving for 24 hours after (with or without general anesthesia).         Date: _______ Follow up appointment      Please do not hesitate to utilize [facility] or call us if you have any questions or concerns.    Althea Hahn, RN  Electrophysiology Nurse Coordinator  [phone]    [facility]   [phone]

## 2020-01-27 DIAGNOSIS — I50.23 ACUTE ON CHRONIC SYSTOLIC CONGESTIVE HEART FAILURE (H): ICD-10-CM

## 2020-01-27 LAB
ANION GAP SERPL CALCULATED.3IONS-SCNC: 5 MMOL/L (ref 3–14)
BUN SERPL-MCNC: 23 MG/DL (ref 7–30)
CALCIUM SERPL-MCNC: 9.3 MG/DL (ref 8.5–10.1)
CHLORIDE SERPL-SCNC: 103 MMOL/L (ref 94–109)
CO2 SERPL-SCNC: 26 MMOL/L (ref 20–32)
CREAT SERPL-MCNC: 1.09 MG/DL (ref 0.66–1.25)
GFR SERPL CREATININE-BSD FRML MDRD: 73 ML/MIN/{1.73_M2}
GLUCOSE SERPL-MCNC: 92 MG/DL (ref 70–99)
POTASSIUM SERPL-SCNC: 5.1 MMOL/L (ref 3.4–5.3)
SODIUM SERPL-SCNC: 134 MMOL/L (ref 133–144)

## 2020-01-27 PROCEDURE — 36415 COLL VENOUS BLD VENIPUNCTURE: CPT | Performed by: NURSE PRACTITIONER

## 2020-01-27 PROCEDURE — 80048 BASIC METABOLIC PNL TOTAL CA: CPT | Performed by: NURSE PRACTITIONER

## 2020-01-28 DIAGNOSIS — I50.23 ACUTE ON CHRONIC SYSTOLIC CONGESTIVE HEART FAILURE (H): Primary | ICD-10-CM

## 2020-01-29 DIAGNOSIS — I50.23 ACUTE ON CHRONIC SYSTOLIC CONGESTIVE HEART FAILURE (H): Primary | ICD-10-CM

## 2020-01-29 DIAGNOSIS — I50.21 ACUTE SYSTOLIC CONGESTIVE HEART FAILURE (H): ICD-10-CM

## 2020-01-29 RX ORDER — LISINOPRIL 5 MG/1
10 TABLET ORAL DAILY
Qty: 30 TABLET | Refills: 1 | Status: SHIPPED | OUTPATIENT
Start: 2020-01-29 | End: 2020-01-31

## 2020-01-31 DIAGNOSIS — I50.21 ACUTE SYSTOLIC CONGESTIVE HEART FAILURE (H): ICD-10-CM

## 2020-01-31 RX ORDER — LISINOPRIL 5 MG/1
10 TABLET ORAL DAILY
Qty: 60 TABLET | Refills: 1 | Status: SHIPPED | OUTPATIENT
Start: 2020-01-31 | End: 2020-02-07

## 2020-02-06 DIAGNOSIS — I50.23 ACUTE ON CHRONIC SYSTOLIC CONGESTIVE HEART FAILURE (H): ICD-10-CM

## 2020-02-06 LAB
ALBUMIN SERPL-MCNC: 3.7 G/DL (ref 3.4–5)
ALP SERPL-CCNC: 64 U/L (ref 40–150)
ALT SERPL W P-5'-P-CCNC: 52 U/L (ref 0–70)
ANION GAP SERPL CALCULATED.3IONS-SCNC: 3 MMOL/L (ref 3–14)
AST SERPL W P-5'-P-CCNC: 29 U/L (ref 0–45)
BILIRUB SERPL-MCNC: 0.6 MG/DL (ref 0.2–1.3)
BUN SERPL-MCNC: 18 MG/DL (ref 7–30)
CALCIUM SERPL-MCNC: 8.9 MG/DL (ref 8.5–10.1)
CHLORIDE SERPL-SCNC: 104 MMOL/L (ref 94–109)
CHOLEST SERPL-MCNC: 220 MG/DL
CO2 SERPL-SCNC: 30 MMOL/L (ref 20–32)
CREAT SERPL-MCNC: 0.9 MG/DL (ref 0.66–1.25)
ERYTHROCYTE [DISTWIDTH] IN BLOOD BY AUTOMATED COUNT: 12.3 % (ref 10–15)
GFR SERPL CREATININE-BSD FRML MDRD: >90 ML/MIN/{1.73_M2}
GLUCOSE SERPL-MCNC: 72 MG/DL (ref 70–99)
HCT VFR BLD AUTO: 44.2 % (ref 40–53)
HDLC SERPL-MCNC: 42 MG/DL
HGB BLD-MCNC: 15.3 G/DL (ref 13.3–17.7)
LDLC SERPL CALC-MCNC: 161 MG/DL
MAGNESIUM SERPL-MCNC: 2 MG/DL (ref 1.6–2.3)
MCH RBC QN AUTO: 34.3 PG (ref 26.5–33)
MCHC RBC AUTO-ENTMCNC: 34.6 G/DL (ref 31.5–36.5)
MCV RBC AUTO: 99 FL (ref 78–100)
NONHDLC SERPL-MCNC: 178 MG/DL
NT-PROBNP SERPL-MCNC: 1753 PG/ML (ref 0–125)
PLATELET # BLD AUTO: 149 10E9/L (ref 150–450)
POTASSIUM SERPL-SCNC: 4.7 MMOL/L (ref 3.4–5.3)
PROT SERPL-MCNC: 8.1 G/DL (ref 6.8–8.8)
RBC # BLD AUTO: 4.46 10E12/L (ref 4.4–5.9)
SODIUM SERPL-SCNC: 137 MMOL/L (ref 133–144)
TRIGL SERPL-MCNC: 86 MG/DL
WBC # BLD AUTO: 5.9 10E9/L (ref 4–11)

## 2020-02-06 PROCEDURE — 36415 COLL VENOUS BLD VENIPUNCTURE: CPT | Performed by: INTERNAL MEDICINE

## 2020-02-06 PROCEDURE — 83880 ASSAY OF NATRIURETIC PEPTIDE: CPT | Performed by: INTERNAL MEDICINE

## 2020-02-06 PROCEDURE — 80053 COMPREHEN METABOLIC PANEL: CPT | Performed by: INTERNAL MEDICINE

## 2020-02-06 PROCEDURE — 80061 LIPID PANEL: CPT | Performed by: INTERNAL MEDICINE

## 2020-02-06 PROCEDURE — 83735 ASSAY OF MAGNESIUM: CPT | Performed by: INTERNAL MEDICINE

## 2020-02-06 PROCEDURE — 85027 COMPLETE CBC AUTOMATED: CPT | Performed by: INTERNAL MEDICINE

## 2020-02-07 ENCOUNTER — OFFICE VISIT (OUTPATIENT)
Dept: CARDIOLOGY | Facility: CLINIC | Age: 60
End: 2020-02-07
Payer: MEDICAID

## 2020-02-07 VITALS
SYSTOLIC BLOOD PRESSURE: 155 MMHG | WEIGHT: 194 LBS | HEART RATE: 76 BPM | OXYGEN SATURATION: 99 % | DIASTOLIC BLOOD PRESSURE: 97 MMHG | BODY MASS INDEX: 25.6 KG/M2

## 2020-02-07 DIAGNOSIS — I50.23 ACUTE ON CHRONIC SYSTOLIC CONGESTIVE HEART FAILURE (H): ICD-10-CM

## 2020-02-07 DIAGNOSIS — I48.21 PERMANENT ATRIAL FIBRILLATION (H): ICD-10-CM

## 2020-02-07 DIAGNOSIS — R19.7 DIARRHEA OF PRESUMED INFECTIOUS ORIGIN: Primary | ICD-10-CM

## 2020-02-07 DIAGNOSIS — I42.8 NON-ISCHEMIC CARDIOMYOPATHY (H): ICD-10-CM

## 2020-02-07 DIAGNOSIS — I50.21 ACUTE SYSTOLIC CONGESTIVE HEART FAILURE (H): ICD-10-CM

## 2020-02-07 DIAGNOSIS — I48.4 ATYPICAL ATRIAL FLUTTER (H): ICD-10-CM

## 2020-02-07 PROCEDURE — 99215 OFFICE O/P EST HI 40 MIN: CPT | Performed by: INTERNAL MEDICINE

## 2020-02-07 RX ORDER — DABIGATRAN ETEXILATE 150 MG/1
150 CAPSULE ORAL 2 TIMES DAILY
Qty: 180 CAPSULE | Refills: 3 | Status: ON HOLD | OUTPATIENT
Start: 2020-02-07 | End: 2020-02-25

## 2020-02-07 RX ORDER — FUROSEMIDE 20 MG
20 TABLET ORAL DAILY
Qty: 90 TABLET | Refills: 1 | Status: ON HOLD | OUTPATIENT
Start: 2020-02-07 | End: 2020-02-25

## 2020-02-07 RX ORDER — DIGOXIN 125 MCG
125 TABLET ORAL DAILY
Qty: 90 TABLET | Refills: 0 | Status: ON HOLD | OUTPATIENT
Start: 2020-02-07 | End: 2020-02-25

## 2020-02-07 RX ORDER — METOPROLOL SUCCINATE 100 MG/1
100 TABLET, EXTENDED RELEASE ORAL DAILY
Qty: 90 TABLET | Refills: 1 | Status: ON HOLD | OUTPATIENT
Start: 2020-02-07 | End: 2020-02-25

## 2020-02-07 RX ORDER — LISINOPRIL 20 MG/1
20 TABLET ORAL DAILY
Qty: 90 TABLET | Refills: 1 | Status: ON HOLD | OUTPATIENT
Start: 2020-02-07 | End: 2020-02-25

## 2020-02-07 NOTE — NURSING NOTE
"Chief Complaint   Patient presents with     RECHECK     Dr Wick: new pt. 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        Initial There were no vitals taken for this visit. Estimated body mass index is 25.07 kg/m  as calculated from the following:    Height as of 1/24/20: 1.854 m (6' 1\").    Weight as of 1/24/20: 86.2 kg (190 lb)..  BP completed using cuff size: claude Huffman MA  "

## 2020-02-07 NOTE — LETTER
2/7/2020      RE: Roge Agarwal  [id number] [facility] [id number]       Dear Colleague,    Thank you for the opportunity to participate in the care of your patient, Roge Agarwal, at the [facility] HEART AT [facility] at [facility]. Please see a copy of my visit note below.    February 7, 2020     I had the pleasure of seeing Roge Agarwal  in the [facility] for further evaluation and management of his heart failure.  Mr. 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     In 4/2016 he amputated two of his fingers with his wood saw. In the ER, he was found in a AFL with RVR and with LVEF 15%. He'd not been aware of arrhythmia but did report feeling fatigued. He had RAISSA/CV and started warfarin. His LVEF returned to 55% by several months later. He came off warfarin a month after the DCCV. He then represented to ER with 2 month history of feeing fatigued and increasingly SOB even at rest. He was found to be back in AFL with RVR 140s and echo demonstrated LVEF 30%. He was subsequently admitted. He was started on Pradaxa and amiodarone. Plan was for RAISSA/ablation; however, RAISSA demonstrated ROGELIO thrombus and ablation was cancelled. Amiodarone was stopped. He was instead placed on Metoprolol and digoxin for rate control. Stress CMRI showed NICM with severely reduced biventricular function with a pattern of midmyocardial enhancement in the septal segments. ROGELIO thrombus also demonstrated on CMRI. No ischemia on stress portion. Echo showed LVEF 25-30%, moderately decreased RV function, and mild MR. Etiology considered likely multifactorial given tachyarrhythmia and ETOH abuse. He was counseled to stop drinking. He was started on GDMT for his depressed LVEF. He was discharged  to have close follow up.   Notes that he has been doing relatively well however continues to have episodes of shortness of breath especially with exertion.  He does not feel any palpitations.  It was discussed with him extensively in the past that he should stop alcohol consumption as this can contribute significantly decreased heart failure and I completely agree with this.  In addition tachycardia induced cardiomyopathy is definitely on the differential.  He recovered once and now he is very determined to recover his heart function again.  He did stop drinking 1 February and became more active and exercising.  No other complaints at this time.     PAST MEDICAL HISTORY:  Past Medical History:   Diagnosis Date     Atrial fibrillation (H) 2016    One time episode noted after his finger accident, required cardioversion     Atrial fibrillation with RVR (H) 4/6/2016     Hemorrhagic stroke (H) 12/16/2007    2.3 X 1.5 CM PARENCHYMAL HEMORRHAGE IN THE RIGHT BASAL GANGLIA     HTN (hypertension)      Systolic CHF (H)     EF of 15-30% in 2016     FAMILY HISTORY:  Family History   Problem Relation Age of Onset     Heart Disease Mother      Osteoporosis Mother      LUNG DISEASE Mother      Heart Disease Father      Hypertension Brother       SOCIAL HISTORY:  Social History     Socioeconomic History     Marital status: Single     Spouse name: None     Number of children: None     Years of education: None     Highest education level: None   Occupational History     None   Social Needs     Financial resource strain: None     Food insecurity:     Worry: None     Inability: None     Transportation needs:     Medical: None     Non-medical: None   Tobacco Use     Smoking status: Never Smoker     Smokeless tobacco: Current User     Types: Chew   Substance and Sexual Activity     Alcohol use: Yes     Drug use: None     Sexual activity: None   Lifestyle     Physical activity:     Days per week: None     Minutes per session: None      Stress: None   Relationships     Social connections:     Talks on phone: None     Gets together: None     Attends Yazdanism service: None     Active member of club or organization: None     Attends meetings of clubs or organizations: None     Relationship status: None     Intimate partner violence:     Fear of current or ex partner: None     Emotionally abused: None     Physically abused: None     Forced sexual activity: None   Other Topics Concern     Parent/sibling w/ CABG, MI or angioplasty before 65F 55M? Not Asked   Social History Narrative     None     CURRENT MEDICATIONS:  Current Outpatient Medications   Medication     dabigatran ANTICOAGULANT (PRADAXA) 150 MG capsule     digoxin (LANOXIN) 125 MCG tablet     folic acid (FOLVITE) 1 MG tablet     furosemide (LASIX) 20 MG tablet     lisinopril (PRINIVIL/ZESTRIL) 5 MG tablet     metoprolol succinate ER (TOPROL-XL) 100 MG 24 hr tablet     multivitamin w/minerals (THERA-VIT-M) tablet     vitamin B1 (THIAMINE) 100 MG tablet     No current facility-administered medications for this visit.       ROS:   Constitutional: No fever, chills, or sweats. Weight is 0 lbs 0 oz  ENT: No visual disturbance, ear ache, epistaxis, sore throat.   Allergies/Immunologic: Negative.   Respiratory: No cough, hemoptysis.   Cardiovascular: As per HPI.   GI: No nausea, vomiting, hematemesis, melena, or hematochezia.   : No urinary frequency, dysuria, or hematuria.   Integrument: Negative.   Psychiatric: No evidence of major depression  Neuro: No new neurological complaints at this time. Non focal  Endocrinology: Negative.   Musculoskeletal: As per HPI.      EXAM:  BP (!) 155/97 (BP Location: Right arm, Patient Position: Chair, Cuff Size: Adult Regular)   Pulse 76   Wt 88 kg (194 lb)   SpO2 99%   BMI 25.60 kg/m     General: appears comfortable, alert and oriented  Head: normocephalic, atraumatic  Eyes: anicteric sclera, EOMI , PERRL  Neck: no adenopathy  Orophyarynx: moist mucosa, no  lesions noted  Heart: regular but tachy, S1/S2, no murmurs, rubs or gallop. Estimated JVP at 5 cmH2O  Lungs: CTAB, No wheezing.   Abdomen: soft, non-tender, bowel sounds present, no hepatosplenomegaly  Extremities: No LE edema today  Skin: no open lesions noted  Neuro: grossly non-focal  Psych: no evidence of depression noted     Labs:  Lab Results   Component Value Date    WBC 5.9 02/06/2020    HGB 15.3 02/06/2020    HCT 44.2 02/06/2020     (L) 02/06/2020     02/06/2020    POTASSIUM 4.7 02/06/2020    CHLORIDE 104 02/06/2020    CO2 30 02/06/2020    BUN 18 02/06/2020    CR 0.90 02/06/2020    GLC 72 02/06/2020    DD 0.9 (H) 12/31/2019    NTBNPI 2,401 (H) 12/31/2019    NTBNP 1,753 (H) 02/06/2020    TROPI 0.037 01/01/2020    AST 29 02/06/2020    ALT 52 02/06/2020    ALKPHOS 64 02/06/2020    BILITOTAL 0.6 02/06/2020    INR 1.58 (H) 01/06/2020 1/2/20 ECHOCARDIOGRAM:   The visual ejection fraction is estimated at 25-30%. Moderately decreased right ventricular systolic function There is moderate biatrial enlargement.  There is mild (1+) mitral regurgitation.  Probable aflutter with rapid ventricular response.  There is no comparison study available.    1/3/20 NM IRLANDA SCAN:     Only resting perfusion images were taken.     Resting images shows mild intensity basal inferior wall photopenic defect.     Gated images not performed. LV systolic function cannot be evaluated.     1/6/20 RAISSA:  Left and right atrial appendage thrombi, measuring 1.7 x 1.4 cm and 1.5 x 1.4  cm, respectively.   Mildly dilated left ventricle with normal wall thickness. Severely decreased  LV systolic function with EF 15%-20%. Severe diffuse hypokinesis.  Global right ventricular function is moderately reduced.   Compared to the previous study dated 1/2/2020 (TTE): The left and right atrial  appendages are better-visualized and thrombi are seen in both atrial  Appendages.     1/6/20 CMRI:  1. The LV is mildly enlarged with normal wall  thickness. The global systolic function is severely reduced. The LVEF is 15%. There is severe diffuse hypokinesis.  2. The RV is normal in cavity size. The global systolic function is severely reduced. The RVEF is 20%.   3. The left atrium is severely enlarged.  The right atrium is moderately enlarged.   4. There is mild mitral regurgitation.   5. Late gadolinium enhancement imaging shows midmyocardial enhancement in the basal anteroseptum, basal inferoseptum, mid anteroseptum, mid inferoseptum, and apical septum.  This is consistent with a non-ischemic etiology of cardiomyopathy.   6. Regadenoson stress perfusion imaging shows no ischemia.   7. There is no pericardial effusion or thickening.   8. There is a left atrial appendage thrombus.   9. There is a left-sided pleural effusion.   CONCLUSIONS: Non-ischemic cardiomyopathy.  There is severely reduced biventricular function with a pattern of midmyocardial enhancement in the septal segments. There is also a left atrial appendage thrombus. There is no ischemia.     ASSESSMENT AND PLAN:  In summary, patient is a 59 year old gentleman with above history including recovered ejection fraction in the setting of alcohol use in the past.  He was recently evaluated by EP in core clinic and referred to me for heart failure management in the setting of significantly reduced ejection fraction at about 15% due to possibly combination of tachycardia induced cardiomyopathy and alcohol use.  He quit alcohol in early February and he is determined to recover his ejection fraction.  At this time we will continue the metoprolol XL at the current dose and stay off amiodarone for upcoming ablation.  He remains hypertensive so we will increase lisinopril to 20 mg once a day.  We will defer Spironolactone at this time especially the setting of elevated potassium.  Currently he does not need a defibrillator however if ejection fraction remains low at least 3 months after the ablation and  staying off alcohol then defibrillator should definitely be considered.  He is aware and sounds like he is willing to undergo the defibrillator placement.  We extensively discussed the importance of healthy lifestyle including staying off alcohol as it is toxin for the cardiac cells.  He will follow-up with our EP team for ablation as scheduled.  Continue other medications at this time.     Follow up:   4 months with echocardiogram.    I appreciate the opportunity to participate in the care of Roge Agarwal . Please do not hesitate to contact me with any further questions.    Sincerely,    Amor Wick MD     [facility] Division of Cardiology

## 2020-02-07 NOTE — PATIENT INSTRUCTIONS
Thank you for coming to the [facility] @ [facility]; please note the following instructions:    1. Start lisinopril 20 mg once daily  2. Follow up in 4 months for echocardiogram and Dr Wick- labs 2 days prior          If you have any questions regarding your visit please contact your care team:     Cardiology  Telephone Number   Che BECERRA, RN  Sis FINK, RN   Holli TAYLOR, RMA  Ronna DIEHL, RMA  Faraz HERNADEZ, LPN   [phone] (option 1)   For scheduling appts:     [phone] (select option 1)       For the Device Clinic (Pacemakers and ICD's)  RN's :  Nina Shore   During business hours: [phone]    *After business hours:  [phone] (select option 4)      Normal test result notifications will be released via Heavy or mailed within 7 business days.  All other test results, will be communicated via telephone once reviewed by your cardiologist.    If you need a medication refill please contact your pharmacy.  Please allow 3 business days for your refill to be completed.    As always, thank you for trusting us with your health care needs!

## 2020-02-07 NOTE — PROGRESS NOTES
February 7, 2020     I had the pleasure of seeing Roge Agarwal  in the [facility] for further evaluation and management of his heart failure.  Mr. 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     In 4/2016 he amputated two of his fingers with his wood saw. In the ER, he was found in a AFL with RVR and with LVEF 15%. He'd not been aware of arrhythmia but did report feeling fatigued. He had RAISSA/CV and started warfarin. His LVEF returned to 55% by several months later. He came off warfarin a month after the DCCV. He then represented to ER with 2 month history of feeing fatigued and increasingly SOB even at rest. He was found to be back in AFL with RVR 140s and echo demonstrated LVEF 30%. He was subsequently admitted. He was started on Pradaxa and amiodarone. Plan was for RAISSA/ablation; however, RAISSA demonstrated ROGELIO thrombus and ablation was cancelled. Amiodarone was stopped. He was instead placed on Metoprolol and digoxin for rate control. Stress CMRI showed NICM with severely reduced biventricular function with a pattern of midmyocardial enhancement in the septal segments. ROGELIO thrombus also demonstrated on CMRI. No ischemia on stress portion. Echo showed LVEF 25-30%, moderately decreased RV function, and mild MR. Etiology considered likely multifactorial given tachyarrhythmia and ETOH abuse. He was counseled to stop drinking. He was started on GDMT for his depressed LVEF. He was discharged to have close follow up.   Notes that he has been doing relatively well however continues to have episodes of shortness of breath especially with exertion.  He does not feel any palpitations.  It was discussed with him extensively in the past that he should stop alcohol consumption as this can contribute significantly decreased heart failure and I  completely agree with this.  In addition tachycardia induced cardiomyopathy is definitely on the differential.  He recovered once and now he is very determined to recover his heart function again.  He did stop drinking 1 February and became more active and exercising.  No other complaints at this time.     PAST MEDICAL HISTORY:  Past Medical History:   Diagnosis Date     Atrial fibrillation (H) 2016    One time episode noted after his finger accident, required cardioversion     Atrial fibrillation with RVR (H) 4/6/2016     Hemorrhagic stroke (H) 12/16/2007    2.3 X 1.5 CM PARENCHYMAL HEMORRHAGE IN THE RIGHT BASAL GANGLIA     HTN (hypertension)      Systolic CHF (H)     EF of 15-30% in 2016     FAMILY HISTORY:  Family History   Problem Relation Age of Onset     Heart Disease Mother      Osteoporosis Mother      LUNG DISEASE Mother      Heart Disease Father      Hypertension Brother       SOCIAL HISTORY:  Social History     Socioeconomic History     Marital status: Single     Spouse name: None     Number of children: None     Years of education: None     Highest education level: None   Occupational History     None   Social Needs     Financial resource strain: None     Food insecurity:     Worry: None     Inability: None     Transportation needs:     Medical: None     Non-medical: None   Tobacco Use     Smoking status: Never Smoker     Smokeless tobacco: Current User     Types: Chew   Substance and Sexual Activity     Alcohol use: Yes     Drug use: None     Sexual activity: None   Lifestyle     Physical activity:     Days per week: None     Minutes per session: None     Stress: None   Relationships     Social connections:     Talks on phone: None     Gets together: None     Attends [employer]ian service: None     Active member of club or organization: None     Attends meetings of clubs or organizations: None     Relationship status: None     Intimate partner violence:     Fear of current or ex partner: None      Emotionally abused: None     Physically abused: None     Forced sexual activity: None   Other Topics Concern     Parent/sibling w/ CABG, MI or angioplasty before 65F 55M? Not Asked   Social History Narrative     None     CURRENT MEDICATIONS:  Current Outpatient Medications   Medication     dabigatran ANTICOAGULANT (PRADAXA) 150 MG capsule     digoxin (LANOXIN) 125 MCG tablet     folic acid (FOLVITE) 1 MG tablet     furosemide (LASIX) 20 MG tablet     lisinopril (PRINIVIL/ZESTRIL) 5 MG tablet     metoprolol succinate ER (TOPROL-XL) 100 MG 24 hr tablet     multivitamin w/minerals (THERA-VIT-M) tablet     vitamin B1 (THIAMINE) 100 MG tablet     No current facility-administered medications for this visit.       ROS:   Constitutional: No fever, chills, or sweats. Weight is 0 lbs 0 oz  ENT: No visual disturbance, ear ache, epistaxis, sore throat.   Allergies/Immunologic: Negative.   Respiratory: No cough, hemoptysis.   Cardiovascular: As per HPI.   GI: No nausea, vomiting, hematemesis, melena, or hematochezia.   : No urinary frequency, dysuria, or hematuria.   Integrument: Negative.   Psychiatric: No evidence of major depression  Neuro: No new neurological complaints at this time. Non focal  Endocrinology: Negative.   Musculoskeletal: As per HPI.      EXAM:  BP (!) 155/97 (BP Location: Right arm, Patient Position: Chair, Cuff Size: Adult Regular)   Pulse 76   Wt 88 kg (194 lb)   SpO2 99%   BMI 25.60 kg/m    General: appears comfortable, alert and oriented  Head: normocephalic, atraumatic  Eyes: anicteric sclera, EOMI , PERRL  Neck: no adenopathy  Orophyarynx: moist mucosa, no lesions noted  Heart: regular but tachy, S1/S2, no murmurs, rubs or gallop. Estimated JVP at 5 cmH2O  Lungs: CTAB, No wheezing.   Abdomen: soft, non-tender, bowel sounds present, no hepatosplenomegaly  Extremities: No LE edema today  Skin: no open lesions noted  Neuro: grossly non-focal  Psych: no evidence of depression noted     Labs:  Lab  Results   Component Value Date    WBC 5.9 02/06/2020    HGB 15.3 02/06/2020    HCT 44.2 02/06/2020     (L) 02/06/2020     02/06/2020    POTASSIUM 4.7 02/06/2020    CHLORIDE 104 02/06/2020    CO2 30 02/06/2020    BUN 18 02/06/2020    CR 0.90 02/06/2020    GLC 72 02/06/2020    DD 0.9 (H) 12/31/2019    NTBNPI 2,401 (H) 12/31/2019    NTBNP 1,753 (H) 02/06/2020    TROPI 0.037 01/01/2020    AST 29 02/06/2020    ALT 52 02/06/2020    ALKPHOS 64 02/06/2020    BILITOTAL 0.6 02/06/2020    INR 1.58 (H) 01/06/2020 1/2/20 ECHOCARDIOGRAM:   The visual ejection fraction is estimated at 25-30%. Moderately decreased right ventricular systolic function There is moderate biatrial enlargement.  There is mild (1+) mitral regurgitation.  Probable aflutter with rapid ventricular response.  There is no comparison study available.    1/3/20 NM IRLANDA SCAN:     Only resting perfusion images were taken.     Resting images shows mild intensity basal inferior wall photopenic defect.     Gated images not performed. LV systolic function cannot be evaluated.     1/6/20 RAISSA:  Left and right atrial appendage thrombi, measuring 1.7 x 1.4 cm and 1.5 x 1.4  cm, respectively.   Mildly dilated left ventricle with normal wall thickness. Severely decreased  LV systolic function with EF 15%-20%. Severe diffuse hypokinesis.  Global right ventricular function is moderately reduced.   Compared to the previous study dated 1/2/2020 (TTE): The left and right atrial  appendages are better-visualized and thrombi are seen in both atrial  Appendages.     1/6/20 CMRI:  1. The LV is mildly enlarged with normal wall thickness. The global systolic function is severely reduced. The LVEF is 15%. There is severe diffuse hypokinesis.  2. The RV is normal in cavity size. The global systolic function is severely reduced. The RVEF is 20%.   3. The left atrium is severely enlarged.  The right atrium is moderately enlarged.   4. There is mild mitral  regurgitation.   5. Late gadolinium enhancement imaging shows midmyocardial enhancement in the basal anteroseptum, basal inferoseptum, mid anteroseptum, mid inferoseptum, and apical septum.  This is consistent with a non-ischemic etiology of cardiomyopathy.   6. Regadenoson stress perfusion imaging shows no ischemia.   7. There is no pericardial effusion or thickening.   8. There is a left atrial appendage thrombus.   9. There is a left-sided pleural effusion.   CONCLUSIONS: Non-ischemic cardiomyopathy.  There is severely reduced biventricular function with a pattern of midmyocardial enhancement in the septal segments. There is also a left atrial appendage thrombus. There is no ischemia.     ASSESSMENT AND PLAN:  In summary, patient is a 59 year old gentleman with above history including recovered ejection fraction in the setting of alcohol use in the past.  He was recently evaluated by EP in core clinic and referred to me for heart failure management in the setting of significantly reduced ejection fraction at about 15% due to possibly combination of tachycardia induced cardiomyopathy and alcohol use.  He quit alcohol in early February and he is determined to recover his ejection fraction.  At this time we will continue the metoprolol XL at the current dose and stay off amiodarone for upcoming ablation.  He remains hypertensive so we will increase lisinopril to 20 mg once a day.  We will defer Spironolactone at this time especially the setting of elevated potassium.  Currently he does not need a defibrillator however if ejection fraction remains low at least 3 months after the ablation and staying off alcohol then defibrillator should definitely be considered.  He is aware and sounds like he is willing to undergo the defibrillator placement.  We extensively discussed the importance of healthy lifestyle including staying off alcohol as it is toxin for the cardiac cells.  He will follow-up with our EP team for  ablation as scheduled.  Continue other medications at this time.     Follow up:   4 months with echocardiogram.    I appreciate the opportunity to participate in the care of Roge Agarwal . Please do not hesitate to contact me with any further questions.    Sincerely,    Amor Wick MD     [facility] Division of Cardiology

## 2020-02-21 ENCOUNTER — TELEPHONE (OUTPATIENT)
Dept: CARDIOLOGY | Facility: CLINIC | Age: 60
End: 2020-02-21

## 2020-02-21 NOTE — TELEPHONE ENCOUNTER
Spoke with Geneva,   Stated she was concerned b/p was too high- still running 160/- discussed with dr Wick,   Instructed to increase lisinopril  To 30 mg daily.      Reviewed instructions for ablation from AVS.  Pt did not receive instruction regarding shower, no soap avail    Instructed to shower morning of procedure with antibacterial soap, no deodorant, lotions-   NPO for 8 hours. Am medications okay with SIP of water. Do not stop blood thinner.     Verbalized understanding

## 2020-02-21 NOTE — TELEPHONE ENCOUNTER
[facility] Call Center    Phone Message    May a detailed message be left on voicemail: yes     Reason for Call: Other: Geneva returning call. Please call her back      Action Taken: Message routed to:  [facility]     Travel Screening: Not Applicable

## 2020-02-21 NOTE — TELEPHONE ENCOUNTER
[facility] Call Center    Phone Message    May a detailed message be left on voicemail: yes     Reason for Call: Other: Per call from Geneva is requesting to speak with Janeth re: PT's high blood pressure between 160/108 - 164/108 even thought there was an dosage increase on the lisinopril. Per Geneva also wanted to discuss upcoming procedure.       Action Taken: Other: [place]    Travel Screening: Not Applicable

## 2020-02-24 DIAGNOSIS — I50.23 ACUTE ON CHRONIC SYSTOLIC CONGESTIVE HEART FAILURE (H): Primary | ICD-10-CM

## 2020-02-24 LAB
ANION GAP SERPL CALCULATED.3IONS-SCNC: 2 MMOL/L (ref 3–14)
BUN SERPL-MCNC: 16 MG/DL (ref 7–30)
CALCIUM SERPL-MCNC: 8.8 MG/DL (ref 8.5–10.1)
CHLORIDE SERPL-SCNC: 105 MMOL/L (ref 94–109)
CO2 SERPL-SCNC: 28 MMOL/L (ref 20–32)
CREAT SERPL-MCNC: 0.98 MG/DL (ref 0.66–1.25)
GFR SERPL CREATININE-BSD FRML MDRD: 84 ML/MIN/{1.73_M2}
GLUCOSE SERPL-MCNC: 87 MG/DL (ref 70–99)
POTASSIUM SERPL-SCNC: 5.1 MMOL/L (ref 3.4–5.3)
SODIUM SERPL-SCNC: 135 MMOL/L (ref 133–144)

## 2020-02-24 PROCEDURE — 36415 COLL VENOUS BLD VENIPUNCTURE: CPT | Performed by: INTERNAL MEDICINE

## 2020-02-24 PROCEDURE — 80048 BASIC METABOLIC PNL TOTAL CA: CPT | Performed by: INTERNAL MEDICINE

## 2020-02-25 ENCOUNTER — SURGERY (OUTPATIENT)
Age: 60
End: 2020-02-25
Payer: MEDICAID

## 2020-02-25 ENCOUNTER — HOSPITAL ENCOUNTER (OUTPATIENT)
Dept: CARDIOLOGY | Facility: CLINIC | Age: 60
End: 2020-02-25
Attending: NURSE PRACTITIONER
Payer: MEDICAID

## 2020-02-25 ENCOUNTER — HOSPITAL ENCOUNTER (OUTPATIENT)
Facility: CLINIC | Age: 60
Discharge: HOME | End: 2020-02-25
Attending: INTERNAL MEDICINE | Admitting: INTERNAL MEDICINE
Payer: MEDICAID

## 2020-02-25 ENCOUNTER — ANESTHESIA EVENT (OUTPATIENT)
Dept: SURGERY | Facility: CLINIC | Age: 60
End: 2020-02-25
Payer: MEDICAID

## 2020-02-25 ENCOUNTER — ANESTHESIA (OUTPATIENT)
Dept: SURGERY | Facility: CLINIC | Age: 60
End: 2020-02-25
Payer: MEDICAID

## 2020-02-25 ENCOUNTER — APPOINTMENT (OUTPATIENT)
Dept: MEDSURG UNIT | Facility: CLINIC | Age: 60
End: 2020-02-25
Attending: NURSE PRACTITIONER
Payer: MEDICAID

## 2020-02-25 VITALS
OXYGEN SATURATION: 99 % | RESPIRATION RATE: 16 BRPM | HEART RATE: 87 BPM | TEMPERATURE: 97.9 F | SYSTOLIC BLOOD PRESSURE: 140 MMHG | WEIGHT: 194 LBS | BODY MASS INDEX: 25.71 KG/M2 | DIASTOLIC BLOOD PRESSURE: 84 MMHG | HEIGHT: 73 IN

## 2020-02-25 VITALS
HEART RATE: 73 BPM | DIASTOLIC BLOOD PRESSURE: 97 MMHG | OXYGEN SATURATION: 97 % | SYSTOLIC BLOOD PRESSURE: 160 MMHG | RESPIRATION RATE: 18 BRPM

## 2020-02-25 DIAGNOSIS — I48.92 ATRIAL FLUTTER, UNSPECIFIED TYPE (H): ICD-10-CM

## 2020-02-25 DIAGNOSIS — I42.8 NICM (NONISCHEMIC CARDIOMYOPATHY) (H): Primary | ICD-10-CM

## 2020-02-25 DIAGNOSIS — Z78.9 ALCOHOL USE: ICD-10-CM

## 2020-02-25 DIAGNOSIS — I50.23 ACUTE ON CHRONIC SYSTOLIC CONGESTIVE HEART FAILURE (H): ICD-10-CM

## 2020-02-25 DIAGNOSIS — I48.4 ATYPICAL ATRIAL FLUTTER (H): ICD-10-CM

## 2020-02-25 DIAGNOSIS — I51.3 LA THROMBUS: ICD-10-CM

## 2020-02-25 PROBLEM — Z86.79 S/P ABLATION OF ATRIAL FLUTTER: Status: ACTIVE | Noted: 2020-02-25

## 2020-02-25 PROBLEM — Z98.890 S/P ABLATION OF ATRIAL FLUTTER: Status: ACTIVE | Noted: 2020-02-25

## 2020-02-25 LAB
ERYTHROCYTE [DISTWIDTH] IN BLOOD BY AUTOMATED COUNT: 12.8 % (ref 10–15)
GLUCOSE BLDC GLUCOMTR-MCNC: 52 MG/DL (ref 70–99)
GLUCOSE BLDC GLUCOMTR-MCNC: 55 MG/DL (ref 70–99)
GLUCOSE BLDC GLUCOMTR-MCNC: 60 MG/DL (ref 70–99)
GLUCOSE BLDC GLUCOMTR-MCNC: 96 MG/DL (ref 70–99)
HCT VFR BLD AUTO: 42.7 % (ref 40–53)
HGB BLD-MCNC: 14 G/DL (ref 13.3–17.7)
INR PPP: 1.32 (ref 0.86–1.14)
MCH RBC QN AUTO: 32.9 PG (ref 26.5–33)
MCHC RBC AUTO-ENTMCNC: 32.8 G/DL (ref 31.5–36.5)
MCV RBC AUTO: 100 FL (ref 78–100)
PLATELET # BLD AUTO: 161 10E9/L (ref 150–450)
RBC # BLD AUTO: 4.26 10E12/L (ref 4.4–5.9)
WBC # BLD AUTO: 7.2 10E9/L (ref 4–11)

## 2020-02-25 PROCEDURE — 93005 ELECTROCARDIOGRAM TRACING: CPT

## 2020-02-25 PROCEDURE — 99153 MOD SED SAME PHYS/QHP EA: CPT | Performed by: INTERNAL MEDICINE

## 2020-02-25 PROCEDURE — 25000125 ZZHC RX 250: Performed by: INTERNAL MEDICINE

## 2020-02-25 PROCEDURE — 93320 DOPPLER ECHO COMPLETE: CPT | Mod: 26 | Performed by: INTERNAL MEDICINE

## 2020-02-25 PROCEDURE — 40000065 ZZH STATISTIC EKG NON-CHARGEABLE

## 2020-02-25 PROCEDURE — 93653 COMPRE EP EVAL TX SVT: CPT | Mod: GC | Performed by: INTERNAL MEDICINE

## 2020-02-25 PROCEDURE — C1894 INTRO/SHEATH, NON-LASER: HCPCS | Performed by: INTERNAL MEDICINE

## 2020-02-25 PROCEDURE — 93312 ECHO TRANSESOPHAGEAL: CPT | Mod: 26 | Performed by: INTERNAL MEDICINE

## 2020-02-25 PROCEDURE — 93613 INTRACARDIAC EPHYS 3D MAPG: CPT | Mod: GC | Performed by: INTERNAL MEDICINE

## 2020-02-25 PROCEDURE — 25000125 ZZHC RX 250: Performed by: NURSE ANESTHETIST, CERTIFIED REGISTERED

## 2020-02-25 PROCEDURE — 93613 INTRACARDIAC EPHYS 3D MAPG: CPT | Performed by: INTERNAL MEDICINE

## 2020-02-25 PROCEDURE — 93621 COMP EP EVL L PAC&REC C SINS: CPT | Performed by: INTERNAL MEDICINE

## 2020-02-25 PROCEDURE — 25000128 H RX IP 250 OP 636: Performed by: INTERNAL MEDICINE

## 2020-02-25 PROCEDURE — 99152 MOD SED SAME PHYS/QHP 5/>YRS: CPT | Performed by: INTERNAL MEDICINE

## 2020-02-25 PROCEDURE — 85610 PROTHROMBIN TIME: CPT | Performed by: INTERNAL MEDICINE

## 2020-02-25 PROCEDURE — 40000172 ZZH STATISTIC PROCEDURE PREP ONLY

## 2020-02-25 PROCEDURE — 93325 DOPPLER ECHO COLOR FLOW MAPG: CPT | Mod: 26 | Performed by: INTERNAL MEDICINE

## 2020-02-25 PROCEDURE — 99152 MOD SED SAME PHYS/QHP 5/>YRS: CPT | Mod: GC | Performed by: INTERNAL MEDICINE

## 2020-02-25 PROCEDURE — 93653 COMPRE EP EVAL TX SVT: CPT | Performed by: INTERNAL MEDICINE

## 2020-02-25 PROCEDURE — 93010 ELECTROCARDIOGRAM REPORT: CPT | Mod: 76 | Performed by: INTERNAL MEDICINE

## 2020-02-25 PROCEDURE — 99152 MOD SED SAME PHYS/QHP 5/>YRS: CPT

## 2020-02-25 PROCEDURE — 37000008 ZZH ANESTHESIA TECHNICAL FEE, 1ST 30 MIN

## 2020-02-25 PROCEDURE — 25800025 ZZH RX 258: Performed by: STUDENT IN AN ORGANIZED HEALTH CARE EDUCATION/TRAINING PROGRAM

## 2020-02-25 PROCEDURE — C1733 CATH, EP, OTHR THAN COOL-TIP: HCPCS | Performed by: INTERNAL MEDICINE

## 2020-02-25 PROCEDURE — 85027 COMPLETE CBC AUTOMATED: CPT | Performed by: INTERNAL MEDICINE

## 2020-02-25 PROCEDURE — 27210794 ZZH OR GENERAL SUPPLY STERILE: Performed by: INTERNAL MEDICINE

## 2020-02-25 PROCEDURE — 99153 MOD SED SAME PHYS/QHP EA: CPT

## 2020-02-25 PROCEDURE — 25000132 ZZH RX MED GY IP 250 OP 250 PS 637: Performed by: NURSE PRACTITIONER

## 2020-02-25 PROCEDURE — 25800030 ZZH RX IP 258 OP 636: Performed by: NURSE PRACTITIONER

## 2020-02-25 PROCEDURE — 93312 ECHO TRANSESOPHAGEAL: CPT

## 2020-02-25 PROCEDURE — 82962 GLUCOSE BLOOD TEST: CPT | Mod: 91

## 2020-02-25 PROCEDURE — C1887 CATHETER, GUIDING: HCPCS | Performed by: INTERNAL MEDICINE

## 2020-02-25 RX ORDER — ACYCLOVIR 200 MG/1
9.5 CAPSULE ORAL
Status: DISCONTINUED | OUTPATIENT
Start: 2020-02-25 | End: 2020-02-26 | Stop reason: HOSPADM

## 2020-02-25 RX ORDER — CARVEDILOL 12.5 MG/1
12.5 TABLET ORAL 2 TIMES DAILY WITH MEALS
Status: DISCONTINUED | OUTPATIENT
Start: 2020-02-25 | End: 2020-02-25 | Stop reason: HOSPADM

## 2020-02-25 RX ORDER — FENTANYL CITRATE 50 UG/ML
50 INJECTION, SOLUTION INTRAMUSCULAR; INTRAVENOUS ONCE
Status: COMPLETED | OUTPATIENT
Start: 2020-02-25 | End: 2020-02-25

## 2020-02-25 RX ORDER — NALOXONE HYDROCHLORIDE 0.4 MG/ML
.1-.4 INJECTION, SOLUTION INTRAMUSCULAR; INTRAVENOUS; SUBCUTANEOUS
Status: DISCONTINUED | OUTPATIENT
Start: 2020-02-25 | End: 2020-02-25 | Stop reason: HOSPADM

## 2020-02-25 RX ORDER — FUROSEMIDE 20 MG
20 TABLET ORAL DAILY
Qty: 90 TABLET | Refills: 3 | Status: SHIPPED | OUTPATIENT
Start: 2020-02-25 | End: 2020-04-22

## 2020-02-25 RX ORDER — LIDOCAINE HYDROCHLORIDE 20 MG/ML
15 SOLUTION OROPHARYNGEAL ONCE
Status: COMPLETED | OUTPATIENT
Start: 2020-02-25 | End: 2020-02-25

## 2020-02-25 RX ORDER — NICOTINE POLACRILEX 4 MG
15-30 LOZENGE BUCCAL
Status: DISCONTINUED | OUTPATIENT
Start: 2020-02-25 | End: 2020-02-25 | Stop reason: HOSPADM

## 2020-02-25 RX ORDER — HYDRALAZINE HYDROCHLORIDE 20 MG/ML
INJECTION INTRAMUSCULAR; INTRAVENOUS
Status: DISCONTINUED | OUTPATIENT
Start: 2020-02-25 | End: 2020-02-25 | Stop reason: HOSPADM

## 2020-02-25 RX ORDER — LIDOCAINE 40 MG/G
CREAM TOPICAL
Status: DISCONTINUED | OUTPATIENT
Start: 2020-02-25 | End: 2020-02-25 | Stop reason: HOSPADM

## 2020-02-25 RX ORDER — DIGOXIN 125 MCG
125 TABLET ORAL DAILY
Status: DISCONTINUED | OUTPATIENT
Start: 2020-02-26 | End: 2020-02-25 | Stop reason: HOSPADM

## 2020-02-25 RX ORDER — FENTANYL CITRATE 50 UG/ML
INJECTION, SOLUTION INTRAMUSCULAR; INTRAVENOUS
Status: DISCONTINUED | OUTPATIENT
Start: 2020-02-25 | End: 2020-02-25 | Stop reason: HOSPADM

## 2020-02-25 RX ORDER — LISINOPRIL 20 MG/1
20 TABLET ORAL DAILY
Status: DISCONTINUED | OUTPATIENT
Start: 2020-02-26 | End: 2020-02-25 | Stop reason: HOSPADM

## 2020-02-25 RX ORDER — SODIUM CHLORIDE 9 MG/ML
INJECTION, SOLUTION INTRAVENOUS CONTINUOUS PRN
Status: DISCONTINUED | OUTPATIENT
Start: 2020-02-25 | End: 2020-02-26 | Stop reason: HOSPADM

## 2020-02-25 RX ORDER — FENTANYL CITRATE 50 UG/ML
25 INJECTION, SOLUTION INTRAMUSCULAR; INTRAVENOUS
Status: DISCONTINUED | OUTPATIENT
Start: 2020-02-25 | End: 2020-02-26 | Stop reason: HOSPADM

## 2020-02-25 RX ORDER — LANOLIN ALCOHOL/MO/W.PET/CERES
100 CREAM (GRAM) TOPICAL DAILY
Qty: 90 TABLET | Refills: 0 | Status: SHIPPED | OUTPATIENT
Start: 2020-02-25 | End: 2020-04-30

## 2020-02-25 RX ORDER — NALOXONE HYDROCHLORIDE 0.4 MG/ML
.1-.4 INJECTION, SOLUTION INTRAMUSCULAR; INTRAVENOUS; SUBCUTANEOUS
Status: DISCONTINUED | OUTPATIENT
Start: 2020-02-25 | End: 2020-02-26 | Stop reason: HOSPADM

## 2020-02-25 RX ORDER — FUROSEMIDE 20 MG
20 TABLET ORAL DAILY
Status: DISCONTINUED | OUTPATIENT
Start: 2020-02-26 | End: 2020-02-25 | Stop reason: HOSPADM

## 2020-02-25 RX ORDER — OXYCODONE AND ACETAMINOPHEN 5; 325 MG/1; MG/1
1 TABLET ORAL EVERY 4 HOURS PRN
Status: DISCONTINUED | OUTPATIENT
Start: 2020-02-25 | End: 2020-02-25 | Stop reason: HOSPADM

## 2020-02-25 RX ORDER — LISINOPRIL 20 MG/1
20 TABLET ORAL DAILY
Qty: 90 TABLET | Refills: 3 | Status: SHIPPED | OUTPATIENT
Start: 2020-02-25 | End: 2020-04-22

## 2020-02-25 RX ORDER — CEFAZOLIN SODIUM 1 G/3ML
1 INJECTION, POWDER, FOR SOLUTION INTRAMUSCULAR; INTRAVENOUS
Status: DISCONTINUED | OUTPATIENT
Start: 2020-02-25 | End: 2020-02-25 | Stop reason: HOSPADM

## 2020-02-25 RX ORDER — DOBUTAMINE HYDROCHLORIDE 200 MG/100ML
5-40 INJECTION INTRAVENOUS CONTINUOUS PRN
Status: DISCONTINUED | OUTPATIENT
Start: 2020-02-25 | End: 2020-02-25 | Stop reason: HOSPADM

## 2020-02-25 RX ORDER — CARVEDILOL 12.5 MG/1
12.5 TABLET ORAL 2 TIMES DAILY WITH MEALS
Qty: 60 TABLET | Refills: 11 | Status: SHIPPED | OUTPATIENT
Start: 2020-02-25 | End: 2020-02-25

## 2020-02-25 RX ORDER — SODIUM CHLORIDE 9 MG/ML
INJECTION, SOLUTION INTRAVENOUS CONTINUOUS
Status: DISCONTINUED | OUTPATIENT
Start: 2020-02-25 | End: 2020-02-25 | Stop reason: HOSPADM

## 2020-02-25 RX ORDER — FOLIC ACID 1 MG/1
1 TABLET ORAL DAILY
Qty: 90 TABLET | Refills: 0 | Status: SHIPPED | OUTPATIENT
Start: 2020-02-25 | End: 2020-05-29

## 2020-02-25 RX ORDER — FLUMAZENIL 0.1 MG/ML
0.2 INJECTION, SOLUTION INTRAVENOUS
Status: DISCONTINUED | OUTPATIENT
Start: 2020-02-25 | End: 2020-02-26 | Stop reason: HOSPADM

## 2020-02-25 RX ORDER — LIDOCAINE 40 MG/G
CREAM TOPICAL
Status: DISCONTINUED | OUTPATIENT
Start: 2020-02-25 | End: 2020-02-26 | Stop reason: HOSPADM

## 2020-02-25 RX ORDER — CARVEDILOL 12.5 MG/1
12.5 TABLET ORAL 2 TIMES DAILY WITH MEALS
Qty: 60 TABLET | Refills: 11 | Status: SHIPPED | OUTPATIENT
Start: 2020-02-25 | End: 2020-06-18 | Stop reason: DRUGHIGH

## 2020-02-25 RX ORDER — DEXTROSE MONOHYDRATE 25 G/50ML
25-50 INJECTION, SOLUTION INTRAVENOUS
Status: DISCONTINUED | OUTPATIENT
Start: 2020-02-25 | End: 2020-02-25 | Stop reason: HOSPADM

## 2020-02-25 RX ORDER — DIPHENHYDRAMINE HYDROCHLORIDE 50 MG/ML
INJECTION INTRAMUSCULAR; INTRAVENOUS
Status: DISCONTINUED | OUTPATIENT
Start: 2020-02-25 | End: 2020-02-25 | Stop reason: HOSPADM

## 2020-02-25 RX ORDER — MULTIPLE VITAMINS W/ MINERALS TAB 9MG-400MCG
1 TAB ORAL DAILY
Qty: 30 TABLET | Refills: 11 | Status: SHIPPED | OUTPATIENT
Start: 2020-02-25 | End: 2021-03-23

## 2020-02-25 RX ORDER — DABIGATRAN ETEXILATE 150 MG/1
150 CAPSULE ORAL 2 TIMES DAILY
Qty: 180 CAPSULE | Refills: 3 | Status: SHIPPED | OUTPATIENT
Start: 2020-02-25 | End: 2021-01-12

## 2020-02-25 RX ORDER — DABIGATRAN ETEXILATE 150 MG/1
150 CAPSULE ORAL 2 TIMES DAILY
Status: DISCONTINUED | OUTPATIENT
Start: 2020-02-25 | End: 2020-02-25 | Stop reason: HOSPADM

## 2020-02-25 RX ORDER — DIGOXIN 125 MCG
125 TABLET ORAL DAILY
Qty: 90 TABLET | Refills: 0 | Status: SHIPPED | OUTPATIENT
Start: 2020-02-25 | End: 2020-05-29

## 2020-02-25 RX ADMIN — FENTANYL CITRATE 50 MCG: 50 INJECTION, SOLUTION INTRAMUSCULAR; INTRAVENOUS at 08:54

## 2020-02-25 RX ADMIN — SODIUM CHLORIDE: 9 INJECTION, SOLUTION INTRAVENOUS at 11:49

## 2020-02-25 RX ADMIN — CARVEDILOL 12.5 MG: 12.5 TABLET, FILM COATED ORAL at 18:02

## 2020-02-25 RX ADMIN — MIDAZOLAM 2 MG: 1 INJECTION INTRAMUSCULAR; INTRAVENOUS at 08:56

## 2020-02-25 RX ADMIN — METHOHEXITAL SODIUM 40 MG: 500 INJECTION, POWDER, LYOPHILIZED, FOR SOLUTION INTRAMUSCULAR; INTRAVENOUS; RECTAL at 13:35

## 2020-02-25 RX ADMIN — FENTANYL CITRATE 25 MCG: 50 INJECTION, SOLUTION INTRAMUSCULAR; INTRAVENOUS at 08:59

## 2020-02-25 RX ADMIN — TOPICAL ANESTHETIC 0.5 ML: 200 SPRAY DENTAL; PERIODONTAL at 08:42

## 2020-02-25 RX ADMIN — DEXTROSE 50 % IN WATER (D50W) INTRAVENOUS SYRINGE 25 ML: at 18:15

## 2020-02-25 RX ADMIN — LIDOCAINE HYDROCHLORIDE 15 ML: 20 SOLUTION ORAL; TOPICAL at 08:42

## 2020-02-25 RX ADMIN — MIDAZOLAM 1 MG: 1 INJECTION INTRAMUSCULAR; INTRAVENOUS at 08:54

## 2020-02-25 RX ADMIN — MIDAZOLAM 1 MG: 1 INJECTION INTRAMUSCULAR; INTRAVENOUS at 09:02

## 2020-02-25 RX ADMIN — MIDAZOLAM 0.5 MG: 1 INJECTION INTRAMUSCULAR; INTRAVENOUS at 09:05

## 2020-02-25 ASSESSMENT — MIFFLIN-ST. JEOR: SCORE: 1743.86

## 2020-02-25 NOTE — PROGRESS NOTES
Electrophysiology Brief Progress Note    Patient had RAISSA this morning which showed resolution of ROGELIO thrombus. He was then checked in for his procedure to pre-procedure unit. 12 lead ECG done there showed AF. This was first time AF was noted, as his prior ECGs had shown AFL. We discussed options with him includin. Cardioversion alone  2. Cardioversion for AF and then proceed with ablation for AFL  3. Considering ablation for AF/AFL. However, given this was his first time having AF did not favor this approach.   4. Use of AATs  Given he has history of AFL in 2016 and likely tach-induced CM, we do favor tight control of his rhythm. His AF could be due to degeneration of his AFL.  After a detailed discussion about pros/cosn of each and indications, risks, and benefits of each approach. We elected to pursue do a cardioversion for his AF and continue with planned AFL ablation given recurrences and CM. We discussed that he does have about 40% chance of developing AF spontaneously in the future. If so, at that time we would need to consider AAT vs ablation.   He states understanding and is agreeable with the plan to proceed with DCCV and AFL ablation today.     DANY Muro CNP  Electrophysiology Consult Service  Pager: [phone]

## 2020-02-25 NOTE — PROGRESS NOTES
Admission          2/25/2020  1700  -----------------------------------------------------------  Reason for admission: post abalation  Primary team notified of pt arrival.  Admitted from:  Via: stretcher  Accompanied by: family  Belongings: Placed in closet; valuables sent home with family  Admission Profile: complete  Teaching: orientation to unit and call light- call light within reach, call don't fall, use of console, meal times, when to call for the RN, and enforced importance of safety   Access: PIV  Telemetry:Placed on pt  Ht./Wt.: complete  2 RN Skin Assessment Completed By: Leda CHAPARRO and Holli YARBROUGH  Pt status: pt is alert and oriented x4, anxious, flushed, VSS, call light within reach, family at bedside.    Temp:  [97.9  F (36.6  C)] 97.9  F (36.6  C)  Pulse:  [59-85] 59  Heart Rate:  [88] 88  Resp:  [16-18] 16  BP: (126-187)/() 153/98  SpO2:  [97 %-100 %] 99 %

## 2020-02-25 NOTE — PROGRESS NOTES
Prep and teaching complete for cardiac ablation; Family at bedside, Geneva Vizcarra: phone:  [phone].

## 2020-02-25 NOTE — PROGRESS NOTES
Pt arrived in ECHO department  for scheduled RAISSA.   Procedure explained, questions answered and consent signed (for ablation as well as RAISSA). Discharge instructions discussed with patient.  Pt's throat sprayed at 0845, therefore pt will not be able to eat or drink until 2 hours after at 1045 - although patient having ablation after therefore was instructed not to eat until after that procedure. Informed pt of this info and encouraged to start with warm fluids and soft foods.    Pt tolerated procedure well, and was given a total of 75 mcg IV fentanyl and 4.5 mg IV versed for conscious sedation.  Pt denied throat or chest pain after RAISSA complete.   RAISSA probe 58 used for procedure.  Pt denied throat pain after procedure and was brought to 2A for prep for ablation.     Report given to 2A RN.

## 2020-02-25 NOTE — PRE-PROCEDURE
GENERAL PRE-PROCEDURE:   Procedure:  AFL ablation  Date/Time:  2/25/2020 11:33 AM    Verbal consent obtained?: Yes    Written consent obtained?: Yes    Risks and benefits: Risks, benefits and alternatives were discussed    Consent given by:  Patient  Patient states understanding of procedure being performed: Yes    Patient's understanding of procedure matches consent: Yes    Procedure consent matches procedure scheduled: Yes    Appropriately NPO:  Yes  ASA Class:  Class 2- mild systemic disease, no acute problems, no functional limitations  Mallampati  :  Grade 2- soft palate, base of uvula, tonsillar pillars, and portion of posterior pharyngeal wall visible  Lungs:  Lungs clear with good breath sounds bilaterally  Heart:  A-flutter  History & Physical reviewed:  History and physical reviewed and no updates needed  Statement of review:  I have reviewed the lab findings, diagnostic data, medications, and the plan for sedation    DANY Muro CNP  Electrophysiology Consult Service  Pager: [phone]

## 2020-02-25 NOTE — ANESTHESIA PREPROCEDURE EVALUATION
"Anesthesia Pre-Procedure Evaluation    Patient: Roge Agarwal   MRN:     [id number] Gender:   male   Age:    59 year old :      1960        Preoperative Diagnosis: Atrial fibrillation, unspecified type (H) [I48.91]   Procedure(s):  CARDIOVERSION     LABS:  CBC:   Lab Results   Component Value Date    WBC 7.2 2020    WBC 5.9 2020    HGB 14.0 2020    HGB 15.3 2020    HCT 42.7 2020    HCT 44.2 2020     2020     (L) 2020     BMP:   Lab Results   Component Value Date     2020     2020    POTASSIUM 5.1 2020    POTASSIUM 4.7 2020    CHLORIDE 105 2020    CHLORIDE 104 2020    CO2 28 2020    CO2 30 2020    BUN 16 2020    BUN 18 2020    CR 0.98 2020    CR 0.90 2020    GLC 87 2020    GLC 72 2020     COAGS:   Lab Results   Component Value Date    PTT 26 10/03/2017    INR 1.32 (H) 2020     POC: No results found for: BGM, HCG, HCGS  OTHER:   Lab Results   Component Value Date    LACT 1.0 2020    BAO 8.8 2020    PHOS 3.2 2020    MAG 2.0 2020    ALBUMIN 3.7 2020    PROTTOTAL 8.1 2020    ALT 52 2020    AST 29 2020    ALKPHOS 64 2020    BILITOTAL 0.6 2020    LIPASE 43 (L) 2019    TSH 1.29 2019        Preop Vitals    BP Readings from Last 3 Encounters:   20 137/83   20 (!) 155/97   20 (!) 155/98    Pulse Readings from Last 3 Encounters:   20 73   20 76   20 70      Resp Readings from Last 3 Encounters:   20 18   20 16   20 18    SpO2 Readings from Last 3 Encounters:   20 97%   20 99%   20 98%      Temp Readings from Last 1 Encounters:   20 37  C (98.6  F) (Oral)    Ht Readings from Last 1 Encounters:   20 1.854 m (6' 1\")      Wt Readings from Last 1 Encounters:   20 88 kg (194 lb)    Estimated body mass " "index is 25.6 kg/m  as calculated from the following:    Height as of this encounter: 1.854 m (6' 1\").    Weight as of this encounter: 88 kg (194 lb).     LDA:  Peripheral IV 02/25/20 Left Upper forearm (Active)   Site Assessment WDL 2/25/2020 11:34 AM   Line Status Saline locked 2/25/2020 11:34 AM   Phlebitis Scale 0-->no symptoms 2/25/2020 11:34 AM   Infiltration Scale 0 2/25/2020 11:34 AM   Extravasation? No 2/25/2020 11:34 AM   Number of days: 0        Past Medical History:   Diagnosis Date     Atrial fibrillation (H) 2016    One time episode noted after his finger accident, required cardioversion     Atrial fibrillation with RVR (H) 4/6/2016     Hemorrhagic stroke (H) 12/16/2007    2.3 X 1.5 CM PARENCHYMAL HEMORRHAGE IN THE RIGHT BASAL GANGLIA     HTN (hypertension)      Systolic CHF (H)     EF of 15-30% in 2016      Past Surgical History:   Procedure Laterality Date     AMPUTATE FINGER(S) Right     Right 3rd distal finger after work injury      No Known Allergies          JZ[facility] AN PHYSICAL EXAM    Assessment:   ASA SCORE: 3    H&P: History and physical reviewed and following examination; no interval change.         Plan:   Anes. Type:  MAC   Pre-Medication: None   Induction:  N/a   Airway: Native Airway   Access/Monitoring: PIV   Maintenance: N/a     Postop Plan:   Postop Pain: None  Postop Sedation/Airway: Not planned               Chart reviewed and patient examined. Plan discussed with patient.   MD Javan Burks MD  "

## 2020-02-25 NOTE — DISCHARGE INSTRUCTIONS
Plan:    Stop Metoprolol XL    Start Coreg 12.5 mg twice daily    Continue other home medications without changes    Follow up in 1 month      Care of groin site:         Remove the Band-Aid after 24 hours. If there is minor oozing, apply another Band-aid and remove it after 12 hours.          Do NOT take a bath, use a hot tub, pool, or submerse in water for at least 3 days You may shower.          It is normal to have a small bruise or lump at the site.         Do not scrub the site.         Do not use lotion or powder near the puncture site for 3 days.         For the first 2 days: Do not stoop or squat. When you cough, sneeze or move your bowels, hold your hand over the puncture site and press gently.         Do not lift more than 10 pounds or exertional activity for 10 days.      If you start bleeding from the site in your groin:  Lie down flat and press firmly on the site.  Call your physician immediately, or, come to the emergency room.    Call 911 right away if you have bleeding that is heavy or does not stop.     Call your doctor/provider if:         You have a large or growing hard lump around the site.         The site is red, swollen, hot or tender.         Blood or fluid is draining from the site.         You have chills or a fever greater than 101 F (38 C).         Your leg or arm turns bluish, feels numb or cool.         You have hives, a rash or unusual itching.     Cardiovascular Clinic:   [address] [place] [address]. [place] [address]  Your Care Team:   Cardiology   Telephone Number     Katherin Romeo [phone]   Althea Hahn RN [phone]     For scheduling appts or procedures:    Ansley Elizondo   [phone]     As always, Thank you for trusting us with your health care needs

## 2020-02-25 NOTE — ANESTHESIA POSTPROCEDURE EVALUATION
Anesthesia POST Procedure Evaluation    Patient: Roge Agarwal   MRN:     [id number] Gender:   male   Age:    59 year old :      1960        Preoperative Diagnosis: Atrial fibrillation, unspecified type (H) [I48.91]   Procedure(s):  CARDIOVERSION   Postop Comments: No value filed.     Anesthesia Type: MAC          Postop Pain Control: Uneventful            Sign Out: Well controlled pain   PONV: No   Neuro/Psych: Uneventful            Sign Out: Acceptable/Baseline neuro status   Airway/Respiratory: Uneventful            Sign Out: Acceptable/Baseline resp. status   CV/Hemodynamics: Uneventful            Sign Out: Acceptable CV status   Other NRE: NONE   DID A NON-ROUTINE EVENT OCCUR? No         Last Anesthesia Record Vitals:  CRNA VITALS  2020 1327 - 2020 1357      2020             SpO2:  100 %    Resp Rate (observed):  12    EKG:  Sinus rhythm          Last PACU Vitals:  No vitals data found for the desired time range.        Electronically Signed By: Javan Menard MD, 2020, 1:57 PM

## 2020-02-26 LAB
INTERPRETATION ECG - MUSE: NORMAL
INTERPRETATION ECG - MUSE: NORMAL

## 2020-02-26 NOTE — PROVIDER NOTIFICATION
Notified MD that pt's BS is 55 asked MD if BS protocol can be placed MD will put orders. Per MD discharge the patient after the discharge criteria has met.

## 2020-02-26 NOTE — PROGRESS NOTES
DISCHARGE                         2/25/2020  7:02 PM  ----------------------------------------------------------------------------  Discharged to: Home  Via: private transportation  Accompanied by: Family  Discharge Instructions: regular diet, as activity, medications, follow up appointments, when to call the MD, aftercare instructions.  Prescriptions: To be filled by [facility]; medication list reviewed & sent with pt  Follow Up Appointments: arranged; information given  Belongings: All sent with pt  IV: d/c'd  Telemetry: d/c'd  Pt exhibits understanding of above discharge instructions; all questions answered.    Discharge Paperwork: Signed, copied, and sent home with patient.

## 2020-02-28 ENCOUNTER — PATIENT OUTREACH (OUTPATIENT)
Dept: CARDIOLOGY | Facility: CLINIC | Age: 60
End: 2020-02-28

## 2020-02-28 DIAGNOSIS — Z86.79 S/P ABLATION OF ATRIAL FLUTTER: Primary | ICD-10-CM

## 2020-02-28 DIAGNOSIS — Z98.890 S/P ABLATION OF ATRIAL FLUTTER: Primary | ICD-10-CM

## 2020-02-28 NOTE — LETTER
February 28, 2020      TO: Roge Agarwal  [id number] [facility] [id number]         To whom it may concern,    Roge is under my care in the cardiology clinic. Please see activity restrictions below for Roge starting 2/26/20.     Activity Restrictions    Do not lift more than 10 pounds or exertional activity for 10 days.      Sincerely,        Jan Romeo MD

## 2020-02-28 NOTE — PROGRESS NOTES
Post-Procedure Follow-up Phone Call (Electrophysiology)    Procedure:  Atrial flutter ablation, cardioversion for AF  Date of Call: 2/28/20  Date of Procedure: 2/25/20  Physician: Dr. Romeo    Spoke to patient's daughter, Geneva.  Groin Site: R Femoral Vein       Sore, swollen in his bilateral legs r/t to groin sites. Using tylenol.   Encouraged to use ibuprofen short term if needed for comfort.   - Denies the presence of fluid or blood draining from site.   Symptoms    NA   Education Post discharge instructions reviewed.    New Discharge Medications Plan:    Stop Metoprolol XL    Start Coreg 12.5 mg twice daily    Continue other home medications without changes     Appointments   Follow up in 1 month: Changed to 4/15/20 at 11am with Dr. Romeo     Patient Comments Will send letter to mailing address with activity restrictions per patient request.     Patient verbalized understanding of information in call and will call with any other questions at [phone].

## 2020-03-06 DIAGNOSIS — I50.23 ACUTE ON CHRONIC SYSTOLIC CONGESTIVE HEART FAILURE (H): ICD-10-CM

## 2020-03-06 LAB
ANION GAP SERPL CALCULATED.3IONS-SCNC: 2 MMOL/L (ref 3–14)
BUN SERPL-MCNC: 12 MG/DL (ref 7–30)
CALCIUM SERPL-MCNC: 9.6 MG/DL (ref 8.5–10.1)
CHLORIDE SERPL-SCNC: 101 MMOL/L (ref 94–109)
CO2 SERPL-SCNC: 32 MMOL/L (ref 20–32)
CREAT SERPL-MCNC: 0.81 MG/DL (ref 0.66–1.25)
GFR SERPL CREATININE-BSD FRML MDRD: >90 ML/MIN/{1.73_M2}
GLUCOSE SERPL-MCNC: 90 MG/DL (ref 70–99)
POTASSIUM SERPL-SCNC: 4.7 MMOL/L (ref 3.4–5.3)
SODIUM SERPL-SCNC: 135 MMOL/L (ref 133–144)

## 2020-03-06 PROCEDURE — 36415 COLL VENOUS BLD VENIPUNCTURE: CPT | Performed by: NURSE PRACTITIONER

## 2020-03-06 PROCEDURE — 80048 BASIC METABOLIC PNL TOTAL CA: CPT | Performed by: NURSE PRACTITIONER

## 2020-04-20 ENCOUNTER — PATIENT OUTREACH (OUTPATIENT)
Dept: CARDIOLOGY | Facility: CLINIC | Age: 60
End: 2020-04-20

## 2020-04-20 DIAGNOSIS — I42.8 NON-ISCHEMIC CARDIOMYOPATHY (H): Primary | ICD-10-CM

## 2020-04-20 DIAGNOSIS — I48.91 ATRIAL FIBRILLATION, UNSPECIFIED TYPE (H): ICD-10-CM

## 2020-04-20 DIAGNOSIS — I48.92 ATRIAL FLUTTER, UNSPECIFIED TYPE (H): ICD-10-CM

## 2020-04-20 NOTE — PROGRESS NOTES
Notified by clinic coordinator that daughter concerned with switching patient to telephone/video visit given ongoing symptoms and prefers in-clinic visit.    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 Now s/p CTI ablation 2/25/20 and DCCV for AF same day.      Called and spoke to daughter.     Symptoms (onset 3 wks ago after cutting thumb in work accident):  - Drained, fatigued  - BP 160s/100s (prior to realizing patient was taking metop + coreg, see below)  - Unknown weights (patient does not weigh himself regularly)  - Denies SOB  - Denies dry cough  - Denies fever    She states these symptoms are similar to his presentation when he was admitted 1/3/20-1/8/20 for:  Atrial flutter with RVR  Hx of atrial fibrillation s/p cardioversion (2016)  Acute on Chronic HFrEF (EF 15-20% 1/6/20)  HTN  Alcohol abuse  Anxiety      Of note, daughter recognized today that patient has been taking Toprol XL 100mg daily along with carvedilol 12.5mg BID for the last four days. Toprol had been dc'd at ablation. Today's BP was 130/80 and HRs 50-55. Encouraged to discontinue metoprolol, continue carvedilol. Med rec completed; taking meds as prescribed. Called [facility] and they deactivated Toprol XL on their end.       Reviewed with Katherin Green NP. Agree to in-person visit with HERRER[facility], Dr Hui, at originally scheduled time on 4/22/20. Patient and daughter aware.

## 2020-04-22 ENCOUNTER — CARE COORDINATION (OUTPATIENT)
Dept: CARDIOLOGY | Facility: CLINIC | Age: 60
End: 2020-04-22

## 2020-04-22 ENCOUNTER — OFFICE VISIT (OUTPATIENT)
Dept: CARDIOLOGY | Facility: CLINIC | Age: 60
End: 2020-04-22
Attending: INTERNAL MEDICINE
Payer: COMMERCIAL

## 2020-04-22 VITALS
WEIGHT: 199.2 LBS | DIASTOLIC BLOOD PRESSURE: 92 MMHG | OXYGEN SATURATION: 100 % | SYSTOLIC BLOOD PRESSURE: 188 MMHG | BODY MASS INDEX: 26.4 KG/M2 | HEART RATE: 57 BPM | HEIGHT: 73 IN

## 2020-04-22 DIAGNOSIS — I50.23 ACUTE ON CHRONIC SYSTOLIC CONGESTIVE HEART FAILURE (H): ICD-10-CM

## 2020-04-22 DIAGNOSIS — I50.23 ACUTE ON CHRONIC SYSTOLIC CONGESTIVE HEART FAILURE (H): Primary | ICD-10-CM

## 2020-04-22 DIAGNOSIS — I16.0 HYPERTENSIVE URGENCY: ICD-10-CM

## 2020-04-22 DIAGNOSIS — I48.21 ATRIAL FIBRILLATION, PERMANENT (H): ICD-10-CM

## 2020-04-22 LAB
ANION GAP SERPL CALCULATED.3IONS-SCNC: 2 MMOL/L (ref 3–14)
BUN SERPL-MCNC: 13 MG/DL (ref 7–30)
CALCIUM SERPL-MCNC: 9.2 MG/DL (ref 8.5–10.1)
CHLORIDE SERPL-SCNC: 104 MMOL/L (ref 94–109)
CO2 SERPL-SCNC: 31 MMOL/L (ref 20–32)
CREAT SERPL-MCNC: 0.86 MG/DL (ref 0.66–1.25)
GFR SERPL CREATININE-BSD FRML MDRD: >90 ML/MIN/{1.73_M2}
GLUCOSE SERPL-MCNC: 86 MG/DL (ref 70–99)
NT-PROBNP SERPL-MCNC: 1033 PG/ML (ref 0–125)
POTASSIUM SERPL-SCNC: 4.7 MMOL/L (ref 3.4–5.3)
SODIUM SERPL-SCNC: 137 MMOL/L (ref 133–144)

## 2020-04-22 PROCEDURE — 80048 BASIC METABOLIC PNL TOTAL CA: CPT | Performed by: INTERNAL MEDICINE

## 2020-04-22 PROCEDURE — 93005 ELECTROCARDIOGRAM TRACING: CPT | Mod: ZF

## 2020-04-22 PROCEDURE — 99215 OFFICE O/P EST HI 40 MIN: CPT | Mod: ZP | Performed by: INTERNAL MEDICINE

## 2020-04-22 PROCEDURE — 93010 ELECTROCARDIOGRAM REPORT: CPT | Mod: ZP | Performed by: INTERNAL MEDICINE

## 2020-04-22 PROCEDURE — G0463 HOSPITAL OUTPT CLINIC VISIT: HCPCS | Mod: 25,ZF

## 2020-04-22 PROCEDURE — 83880 ASSAY OF NATRIURETIC PEPTIDE: CPT | Performed by: INTERNAL MEDICINE

## 2020-04-22 PROCEDURE — 36415 COLL VENOUS BLD VENIPUNCTURE: CPT | Performed by: INTERNAL MEDICINE

## 2020-04-22 RX ORDER — FUROSEMIDE 20 MG
40 TABLET ORAL 2 TIMES DAILY
Qty: 90 TABLET | Refills: 3 | COMMUNITY
Start: 2020-04-22 | End: 2020-04-22

## 2020-04-22 RX ORDER — LISINOPRIL 20 MG/1
40 TABLET ORAL DAILY
Qty: 90 TABLET | Refills: 3 | COMMUNITY
Start: 2020-04-22 | End: 2020-04-22

## 2020-04-22 RX ORDER — FUROSEMIDE 20 MG
40 TABLET ORAL 2 TIMES DAILY
Qty: 120 TABLET | Refills: 0 | Status: SHIPPED | OUTPATIENT
Start: 2020-04-22 | End: 2020-04-30

## 2020-04-22 RX ORDER — LISINOPRIL 20 MG/1
40 TABLET ORAL DAILY
Qty: 60 TABLET | Refills: 0 | Status: SHIPPED | OUTPATIENT
Start: 2020-04-22 | End: 2020-05-29 | Stop reason: ALTCHOICE

## 2020-04-22 ASSESSMENT — MIFFLIN-ST. JEOR: SCORE: 1767.45

## 2020-04-22 ASSESSMENT — PAIN SCALES - GENERAL: PAINLEVEL: NO PAIN (0)

## 2020-04-22 NOTE — PATIENT INSTRUCTIONS
You were seen in the cardiology clinic today by: Dr. Hui    Plan:     Medication Changes:     Increase your Lisinopril to 40mg daily (this is double).    Increase lasix: Take 40mg in the AM, and 40mg in PM.    Labs/Tests Needed:    Lab draw today: BMP & BNP.    If your kidney function does not look good, we would like to admit you to the hospital.    Further Instructions:    Letter is provided for work.      Your Care Team:  [facility] Cardiology   Telephone Number     Joan Rayo RN [phone]     For scheduling appts or procedures:    Ansley Elizondo   [phone]   For the Device Clinic (Pacemakers, ICDs, Loop Recorders)    During business hours: [phone]  After business hours:   [phone]- select option 4 and ask for job code [phone].       Cardiovascular Clinic:   [address] [place] [address]. [place] [address]      As always, Thank you for trusting us with your health care needs!

## 2020-04-22 NOTE — PROGRESS NOTES
Electrophysiology Clinic Note  HPI (April 22, 2020):    He was seen in Jan, 2020 by Dr. Romeo who reported:    Mr. 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      In 4/2016 he amputated two of his fingers with his wood saw. In the ER, he was found in a AFL with RVR and with LVEF 15%. He'd not been aware of arrhythmia but did report feeling fatigued. He had RAISSA/CV and started warfarin. His LVEF returned to 55% by several months later. He came off warfarin a month after the DCCV. He then represented to ER with 2 month history of feeing fatigued and increasingly SOB even at rest. He was found to be back in AFL with RVR 140s and echo demonstrated LVEF 30%. He was subsequently admitted. He was started on Pradaxa and amiodarone. Plan was for RAISSA/ablation; however, RAISSA demonstrated ROGELIO thrombus and ablation was cancelled. Amiodarone was stopped. He was instead placed on Metoprolol and digoxin for rate control. Stress CMRI showed NICM with severely reduced biventricular function with a pattern of midmyocardial enhancement in the septal segments. ROGELIO thrombus also demonstrated on CMRI. No ischemia on stress portion. Echo showed LVEF 25-30%, moderately decreased RV function, and mild MR. Etiology considered likely multifactorial given tachyarrhythmia and ETOH abuse. He was counseled to stop drinking. He was started on GDMT for his depressed LVEF. He was discharged to have close follow up with EP and [facility] clinic.     He presents today to establish outpatient care. He reports feeling well. He denies any HF symptoms. He continues to be unaware of his arrhythmia. He reports he has stopped drinking. He has been following with [facility] clinic who have started him on diuretic. He denies any chest pain/pressures, dizziness, lightheadedness, worsening shortness of  "breath, leg/ankle swelling, PND, orthopnea, palpitations, or syncopal symptoms. Presenting 12 lead ECG shows AFL Vent Rate 72 bpm, QRS 96 ms, QTc 433 ms. Current cardiac medications include: Pradaxa, ASA, Digoxin, Lasix, Metoprolol, and Lisinopril.    He underwent atrial flutter ablation on 2/25/2020. He called reporting he was experiencing increase shortness of breath similar to he had prior to ablation and is concerned the arrhythmia has recurred.     He also reports his fingers have been \"blue\" and \"cold\".       PAST MEDICAL HISTORY:  Past Medical History:   Diagnosis Date     Atrial fibrillation (H) 2016    One time episode noted after his finger accident, required cardioversion     Atrial fibrillation with RVR (H) 4/6/2016     Hemorrhagic stroke (H) 12/16/2007    2.3 X 1.5 CM PARENCHYMAL HEMORRHAGE IN THE RIGHT BASAL GANGLIA     HTN (hypertension)      Systolic CHF (H)     EF of 15-30% in 2016       CURRENT MEDICATIONS:  Current Outpatient Medications   Medication Sig Dispense Refill     carvedilol (COREG) 12.5 MG tablet Take 1 tablet (12.5 mg) by mouth 2 times daily (with meals) 60 tablet 11     dabigatran ANTICOAGULANT (PRADAXA) 150 MG capsule Take 1 capsule (150 mg) by mouth 2 times daily Store in original 's bottle or blister pack; use within 120 days of opening. 180 capsule 3     digoxin (LANOXIN) 125 MCG tablet Take 1 tablet (125 mcg) by mouth daily 90 tablet 0     folic acid (FOLVITE) 1 MG tablet Take 1 tablet (1 mg) by mouth daily 90 tablet 0     furosemide (LASIX) 20 MG tablet Take 1 tablet (20 mg) by mouth daily 90 tablet 3     lisinopril (ZESTRIL) 20 MG tablet Take 1 tablet (20 mg) by mouth daily 90 tablet 3     multivitamin w/minerals (THERA-VIT-M) tablet Take 1 tablet by mouth daily 30 tablet 11     vitamin B1 (THIAMINE) 100 MG tablet Take 1 tablet (100 mg) by mouth daily 90 tablet 0       PAST SURGICAL HISTORY:  Past Surgical History:   Procedure Laterality Date     AMPUTATE FINGER(S) " "Right     Right 3rd distal finger after work injury     ANESTHESIA CARDIOVERSION N/A 2/25/2020    Procedure: CARDIOVERSION;  Surgeon: GENERIC ANESTHESIA PROVIDER;  Location: [facility] OR     EP ABLATION ATRIAL FLUTTER N/A 2/25/2020    Procedure: EP ABLATION ATRIAL FLUTTER;  Surgeon: Jan Romeo MD;  Location: [facility]ATH [facility]       ALLERGIES:   No Known Allergies    FAMILY HISTORY:  Family History   Problem Relation Age of Onset     Heart Disease Mother      Osteoporosis Mother      LUNG DISEASE Mother      Heart Disease Father      Hypertension Brother        SOCIAL HISTORY:  Social History     Tobacco Use     Smoking status: Never Smoker     Smokeless tobacco: Current User     Types: Chew   Substance Use Topics     Alcohol use: Not Currently     Comment: 12/30/2019     Drug use: Not Currently       ROS:   A comprehensive 10 point review of systems negative other than as mentioned in HPI.    Exam:  BP (!) 178/90 (BP Location: Left arm, Patient Position: Sitting, Cuff Size: Adult Regular)   Pulse 57   Ht 1.854 m (6' 1\")   Wt 90.4 kg (199 lb 3.2 oz)   SpO2 100%   BMI 26.28 kg/m         GENERAL APPEARANCE: alert and no distress  HEENT: no icterus, no xanthelasmas, normal pupil size and reaction, normal palate, mucosa moist, no central cyanosis  NECK: supple, JVP 8-9 cm with +HJR  RESPIRATORY: lungs clear to auscultation - no rales, rhonchi or wheezes, no use of accessory muscles, no retractions, respirations are unlabored, normal respiratory rate  CARDIOVASCULAR: regularly irregular, soft murmur, +S4/S3  ABDOMEN: soft, non tender, bowel sounds normal, non-distended  EXTREMITIES: mild edema over shins, extremities cool, fingers blue  NEURO: alert and oriented to person/place/time, normal speech, gait and affect  SKIN: no ecchymoses, no rashes  PSYCH: normal affect, cooperative    Lab and diagnostic data reviewed April 22, 2020:          Results for MONTES, BRENNEN (MRN [id number]) as of 4/22/2020 11:47   Ref. " Range 3/6/2020 11:51   Sodium Latest Ref Range: 133 - 144 mmol/L 135   Potassium Latest Ref Range: 3.4 - 5.3 mmol/L 4.7   Chloride Latest Ref Range: 94 - 109 mmol/L 101   Carbon Dioxide Latest Ref Range: 20 - 32 mmol/L 32   Urea Nitrogen Latest Ref Range: 7 - 30 mg/dL 12   Creatinine Latest Ref Range: 0.66 - 1.25 mg/dL 0.81   GFR Estimate Latest Ref Range: >60 mL/min/1.73_m2 >90       EP Procedure Note  Procedures:  1. Radiofrequency ablation of the cavotricuspid isthmus for CTI-dependent atrial flutter.  2. Direct current cardioversion of atrial fibrillation into sinus rhythm.  3. Fluoroscopy.  4. 3D electroanatomic mapping.     Attending: Dr. Jan Romeo  EP Fellow: Dr. Gabriel Conley     Pre-operative Diagnosis: Typical atrial flutter  Post-operative diagnosis: RF ablation of cavotricuspid Isthmus dependant typical AFL  Complications: None  Fluoroscopy time/dose: see procedure log     Clinical Profile: 60 yo male with typical atrial flutter and non-ischemic cardiomyopathy suspected to be tachycardia-induced, thereby referred for catheter ablation of his atrial flutter.  He had no prior history of atrial fibrillation but his EKG on presentation today showed AF.  We discussed a trial of amiodarone potentially followed by catheter ablation of both if suppressing both led to improvement in LV systolic function vs proceeding with AFL ablation today followed by a trial of amiodarone for the atrial fibrillation alone.  After considering the advantage and risk of both options he was strongly in favor of at least ablating the atrial flutter today and then making a decision about the atrial fibrillation afterward pending the response to antiarrhythmic therapy.     PROCEDURE  After we discussed the risks and benefits of, and alternatives to the procedure the patient provided informed consent.  The risks discussed include, but are not limited to: pain, bleeding, blood transfusion reactions, arrhythmia, dissection of  vessels, cardiac perforation, pericardial effusion, AV block with need of a pacemaker, stroke, and death.  The patient was verified to have been fasting and confirmed to be in a hemodynamically stable state.  The patient was brought to the EP lab and then prepped and draped in a sterile fashion.     Sedation: This procedure was performed under moderate sedation under the supervision of the the Staff Physician. The patient received 4 mg Versed and 200 mcg Fentanyl for a total procedural sedation time of 128 minutes. Heart rate, blood pressure, oxygen saturation, and patient responses were monitored throughout the procedure with the assistance of the RN.     Sheaths and Catheters:  After local anesthesia with 2% lidocaine, vascular access was obtained using the modified Seldinger technique for the following access points:     Right Femoral Vein:  - 7Fr Locking Sheath: decapolar Catheter in Coronary Sinus.  - 8Fr sheath later exchanged for an 8Fr RAMP sheath for increased catheter stability, through which an 8Fr [employer] Holden DF curve SmartTouch ablation catheter was positioned into the RA and RV.     EP study results (in msec):  Pre-ablation (post-cardioversion): In sinus rhythm, AA=VV= 815, AK= 262, QRS= 93, QT= 373, HV 59.  Post-ablation: AA=VV= 1237, AK= 226, QRS= 101, QT= 361.     Arrhythmias Observed or Induced:  A. Atrial fibrillation, cardioverted to sinus rhythm with a synchronized 200J shock after our anesthesiology colleagues placed the patient under deep sedation.     Mapping and Ablation:  After placing the sheaths and catheters noted above he was cardioverted from AF into sinus rhythm as mentioned above.  An anatomic shell of the RA and TVA was created with the ablation catheter, using Carto.  Then, to enhance catheter stability, the 8Fr sheath was exchanged over a guidewire for an 8Fr RAMP sheath under fluoroscopy. The ablation catheter was advanced into the cavotriscupid isthmus to a position where  there was a large ventricular signal, and a small atrial signal. The position of the catheter was checked in the both the MCCARTHY and [place] projections.  Prior to ablating, the catheter was maneuvered to map out the HIS location. Then, we repositioned the catheter in the 6 O'clock to 6:30 position in the [place] projection. A series of coalescing ablation lesions were applied that connected the tricuspid annulus to the IVC. Twice during the procedure he moved because of pain during ablation, necessitating partial re-mapping before proceeding with ablation.  Ultimately, coaching him to take steady breaths and remain stationary during each ablation lesion led to completion of the CTI line.  The post-ablation TICT was 165 ms and differential pacing confirmed bidirectional block.  After 10 min of observation the TICT remained unchanged.  The procedure was terminated.     Dr. Romeo was present throughout the entire procedure.     ASSESSMENT:  1  Successful RF ablation of the CTI for typical atrial flutter and tachycardia mediated cardiomyopathy.  2  Direct current cardioversion of AF.     PLAN:    1. Bed rest for 4 hours prior to ambulating   2. Continue therapeutic anticoagulation with dabigatran   3. Switch metoprolol to carvedilol 12.5 mg BID and continue digoxin   4. Defer antiarrhythmic therapy for now due to baseline AV conduction disease (prolonged NH).  If LV function does not improve and AF recurs we can reconsider AAT and/or ablation.     Gabriel Conley MD  Cardiac electrophysiology fellow     EP STAFF NOTE  I was present throughout the procedure. I agree with the note above by the EP fellow.  Jan Romeo MD [facility]  Cardiology - Electrophysiology       1/2/20 ECHOCARDIOGRAM:   Interpretation Summary     The visual ejection fraction is estimated at 25-30%.  Moderately decreased right ventricular systolic function  There is moderate biatrial enlargement.  There is mild (1+) mitral regurgitation.  Probable aflutter  with rapid ventricular response.  There is no comparison study available.  1/3/20 NM IRLANDA SCAN:     Only resting perfusion images were taken.     Resting images shows mild intensity basal inferior wall photopenic defect.     Gated images not performed. LV systolic function cannot be evaluated.     1/6/20 RAISSA:  Interpretation Summary  Left and right atrial appendage thrombi, measuring 1.7 x 1.4 cm and 1.5 x 1.4  cm, respectively.     Mildly dilated left ventricle with normal wall thickness. Severely decreased  LV systolic function with EF 15%-20%. Severe diffuse hypokinesis.  Global right ventricular function is moderately reduced.     Compared to the previous study dated 1/2/2020 (TTE): The left and right atrial  appendages are better-visualized and thrombi are seen in both atrial  Appendages.    1/6/20 CMRI:  1. The LV is mildly enlarged with normal wall thickness. The global systolic function is severely reduced.  The LVEF is 15%. There is severe diffuse hypokinesis.     2. The RV is normal in cavity size. The global systolic function is severely reduced. The RVEF is 20%.      3. The left atrium is severely enlarged.  The right atrium is moderately enlarged.      4. There is mild mitral regurgitation.     5. Late gadolinium enhancement imaging shows midmyocardial enhancement in the basal anteroseptum, basal  inferoseptum, mid anteroseptum, mid inferoseptum, and apical septum.  This is consistent with a  non-ischemic etiology of cardiomyopathy.     6. Regadenoson stress perfusion imaging shows no ischemia.     7. There is no pericardial effusion or thickening.     8. There is a left atrial appendage thrombus.     9. There is a left-sided pleural effusion.     CONCLUSIONS: Non-ischemic cardiomyopathy.  There is severely reduced biventricular function with a pattern  of midmyocardial enhancement in the septal segments. There is also a left atrial appendage thrombus. There  is no ischemia.        Assessment and Plan:      #NICM Likely Tachy-induced and/or ETOH induced LVEF 25-30%, NYHA II:  1. ACEi/ARB: Increase Lisinopril.  2. BB: Continue Toprol XL   3. Aldosterone antagonist: deferred, can consider addition will leave to [facility].  4. SCD prophylaxis: not currently indicated as he was just started on GDMT  5. Fluid status: hypervolemic      He is not back in atrial flutter but he appears to have acute decompensation. He feels like he became much worse when he went back to work full time. I discussed admission with him (and got a curbside from Dr. Joe). He does not wish to be admitted.    - increase furosemide, if no response or worsening admit for IV diuresis  - will need follow-up next week, if no improvement will need to be admitted for IV diuresis      #Atrial Flutter:  We discussed in detail with the patient management/treatment options for A.fib includin. Stroke Prophylaxis:  CHADSVASC=+HF, +HTN  2, corresponding to a 2.2% annual stroke / systemic emolism event rate. indicating need for long term oral anticoagulation.  He is appropriately on Pradaxa. No bleeding issues.   2. Rate Control: Continue Toprol XL and Digoxin.   3. Rhythm Control: He had DCCV in 2016 for AFL. Now with recurrence. Has ROGELIO thrombus so avoiding rhythm control until this clears. Given history of tachy-mediated CM and recurrent AFL, he need tight rhythm control. AAT less efficacious for this and we favor ablation. Will plan to get RAISSA after being on anticoagulation for 4-6 weeks. If clear will proceed with ablation. The procedural risk of EP study and ablation were discussed in detail. Risks discussed include: vascular complications, CVA, AVB, pericardial effusion and tamponade. Even if ablation is successful anticoagulation may be needed lifelong. We also discussed that he has ~40% risk of developing AF in his lifetime and that ablation for AFL does not address this.   4. Risk Factor Management: tight BP control, GO evaluation as indicated, and  continued abstinence  from ETOH.      Acute decompensation cannot be attributed to recurrence of his flutter. I cannot exclude paroxysms of atrial fibrillation or flutter but he is in sinus at this time.     - continue oral anticoagulation.    #Hypertension urgency - increase lisinopril to 20 mg BID    I appreciate the chance to help with Mr. Agarwal's care. Please let me know if you have any questions or concerns.

## 2020-04-22 NOTE — PROGRESS NOTES
Reviewed today's labs bonita/ Ez:        Date: 4/22/2020    Time of Call: 2:18 PM     Diagnosis:  HF Exacerbation      [ VORB ] Ordering provider: Jorge Hui MD  Order:     -can stay home  -Continue medication changes made today (Increase Lisinopril from 20mg daily to 40mg daily, Increase lasix from 20mg daily to 40mg BID)   -Follow up with [facility] provider late next week for f/up.     Order received by: Joan Rayo     Follow-up/additional notes:     Called daughter Mari and explained results & need for [facility] f/up. She verbalized understanding. Message sent to [facility] team for f/up.    Joan Rayo, KHANHN, RN, PHN  Electrophysiology Nurse Coordinator

## 2020-04-22 NOTE — LETTER
4/22/2020      RE: Roge Agarwal  [address] [facility] [address]       Dear Colleague,    Thank you for the opportunity to participate in the care of your patient, Roge Agarwal, at the [facility] HEART [facility]. Please see a copy of my visit note below.    Electrophysiology Clinic Note  HPI (April 22, 2020):    He was seen in Jan, 2020 by Dr. Romeo who reported:    Mr. 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      In 4/2016 he amputated two of his fingers with his wood saw. In the ER, he was found in a AFL with RVR and with LVEF 15%. He'd not been aware of arrhythmia but did report feeling fatigued. He had RAISSA/CV and started warfarin. His LVEF returned to 55% by several months later. He came off warfarin a month after the DCCV. He then represented to ER with 2 month history of feeing fatigued and increasingly SOB even at rest. He was found to be back in AFL with RVR 140s and echo demonstrated LVEF 30%. He was subsequently admitted. He was started on Pradaxa and amiodarone. Plan was for RAISSA/ablation; however, RAISSA demonstrated ROGELIO thrombus and ablation was cancelled. Amiodarone was stopped. He was instead placed on Metoprolol and digoxin for rate control. Stress CMRI showed NICM with severely reduced biventricular function with a pattern of midmyocardial enhancement in the septal segments. ROGELIO thrombus also demonstrated on CMRI. No ischemia on stress portion. Echo showed LVEF 25-30%, moderately decreased RV function, and mild MR. Etiology considered likely multifactorial given tachyarrhythmia and ETOH abuse. He was counseled to stop drinking. He was started on GDMT for his depressed LVEF. He was discharged to have close follow up with EP and CORE clinic.     He presents today to establish  "outpatient care. He reports feeling well. He denies any HF symptoms. He continues to be unaware of his arrhythmia. He reports he has stopped drinking. He has been following with CORE clinic who have started him on diuretic. He denies any chest pain/pressures, dizziness, lightheadedness, worsening shortness of breath, leg/ankle swelling, PND, orthopnea, palpitations, or syncopal symptoms. Presenting 12 lead ECG shows AFL Vent Rate 72 bpm, QRS 96 ms, QTc 433 ms. Current cardiac medications include: Pradaxa, ASA, Digoxin, Lasix, Metoprolol, and Lisinopril.    He underwent atrial flutter ablation on 2/25/2020. He called reporting he was experiencing increase shortness of breath similar to he had prior to ablation and is concerned the arrhythmia has recurred.     He also reports his fingers have been \"blue\" and \"cold\".       PAST MEDICAL HISTORY:  Past Medical History:   Diagnosis Date     Atrial fibrillation (H) 2016    One time episode noted after his finger accident, required cardioversion     Atrial fibrillation with RVR (H) 4/6/2016     Hemorrhagic stroke (H) 12/16/2007    2.3 X 1.5 CM PARENCHYMAL HEMORRHAGE IN THE RIGHT BASAL GANGLIA     HTN (hypertension)      Systolic CHF (H)     EF of 15-30% in 2016       CURRENT MEDICATIONS:  Current Outpatient Medications   Medication Sig Dispense Refill     carvedilol (COREG) 12.5 MG tablet Take 1 tablet (12.5 mg) by mouth 2 times daily (with meals) 60 tablet 11     dabigatran ANTICOAGULANT (PRADAXA) 150 MG capsule Take 1 capsule (150 mg) by mouth 2 times daily Store in original 's bottle or blister pack; use within 120 days of opening. 180 capsule 3     digoxin (LANOXIN) 125 MCG tablet Take 1 tablet (125 mcg) by mouth daily 90 tablet 0     folic acid (FOLVITE) 1 MG tablet Take 1 tablet (1 mg) by mouth daily 90 tablet 0     furosemide (LASIX) 20 MG tablet Take 1 tablet (20 mg) by mouth daily 90 tablet 3     lisinopril (ZESTRIL) 20 MG tablet Take 1 tablet (20 mg) " "by mouth daily 90 tablet 3     multivitamin w/minerals (THERA-VIT-M) tablet Take 1 tablet by mouth daily 30 tablet 11     vitamin B1 (THIAMINE) 100 MG tablet Take 1 tablet (100 mg) by mouth daily 90 tablet 0       PAST SURGICAL HISTORY:  Past Surgical History:   Procedure Laterality Date     AMPUTATE FINGER(S) Right     Right 3rd distal finger after work injury     ANESTHESIA CARDIOVERSION N/A 2/25/2020    Procedure: CARDIOVERSION;  Surgeon: GENERIC ANESTHESIA PROVIDER;  Location: [facility] OR     EP ABLATION ATRIAL FLUTTER N/A 2/25/2020    Procedure: EP ABLATION ATRIAL FLUTTER;  Surgeon: Jan Romeo MD;  Location:  HEART CARDIAC CATH LAB       ALLERGIES:   No Known Allergies    FAMILY HISTORY:  Family History   Problem Relation Age of Onset     Heart Disease Mother      Osteoporosis Mother      LUNG DISEASE Mother      Heart Disease Father      Hypertension Brother        SOCIAL HISTORY:  Social History     Tobacco Use     Smoking status: Never Smoker     Smokeless tobacco: Current User     Types: Chew   Substance Use Topics     Alcohol use: Not Currently     Comment: 12/30/2019     Drug use: Not Currently       ROS:   A comprehensive 10 point review of systems negative other than as mentioned in HPI.    Exam:  BP (!) 178/90 (BP Location: Left arm, Patient Position: Sitting, Cuff Size: Adult Regular)   Pulse 57   Ht 1.854 m (6' 1\")   Wt 90.4 kg (199 lb 3.2 oz)   SpO2 100%   BMI 26.28 kg/m         GENERAL APPEARANCE: alert and no distress  HEENT: no icterus, no xanthelasmas, normal pupil size and reaction, normal palate, mucosa moist, no central cyanosis  NECK: supple, JVP 8-9 cm with +HJR  RESPIRATORY: lungs clear to auscultation - no rales, rhonchi or wheezes, no use of accessory muscles, no retractions, respirations are unlabored, normal respiratory rate  CARDIOVASCULAR: regularly irregular, soft murmur, +S4/S3  ABDOMEN: soft, non tender, bowel sounds normal, non-distended  EXTREMITIES: mild edema over shins, " extremities cool, fingers blue  NEURO: alert and oriented to person/place/time, normal speech, gait and affect  SKIN: no ecchymoses, no rashes  PSYCH: normal affect, cooperative    Lab and diagnostic data reviewed April 22, 2020:          Results for BRENNEN MONTES (MRN [id number]) as of 4/22/2020 11:47   Ref. Range 3/6/2020 11:51   Sodium Latest Ref Range: 133 - 144 mmol/L 135   Potassium Latest Ref Range: 3.4 - 5.3 mmol/L 4.7   Chloride Latest Ref Range: 94 - 109 mmol/L 101   Carbon Dioxide Latest Ref Range: 20 - 32 mmol/L 32   Urea Nitrogen Latest Ref Range: 7 - 30 mg/dL 12   Creatinine Latest Ref Range: 0.66 - 1.25 mg/dL 0.81   GFR Estimate Latest Ref Range: >60 mL/min/1.73_m2 >90       EP Procedure Note  Procedures:  1. Radiofrequency ablation of the cavotricuspid isthmus for CTI-dependent atrial flutter.  2. Direct current cardioversion of atrial fibrillation into sinus rhythm.  3. Fluoroscopy.  4. 3D electroanatomic mapping.     Attending: Dr. Jan Romeo  EP Fellow: Dr. Gabriel Conley     Pre-operative Diagnosis: Typical atrial flutter  Post-operative diagnosis: RF ablation of cavotricuspid Isthmus dependant typical AFL  Complications: None  Fluoroscopy time/dose: see procedure log     Clinical Profile: 60 yo male with typical atrial flutter and non-ischemic cardiomyopathy suspected to be tachycardia-induced, thereby referred for catheter ablation of his atrial flutter.  He had no prior history of atrial fibrillation but his EKG on presentation today showed AF.  We discussed a trial of amiodarone potentially followed by catheter ablation of both if suppressing both led to improvement in LV systolic function vs proceeding with AFL ablation today followed by a trial of amiodarone for the atrial fibrillation alone.  After considering the advantage and risk of both options he was strongly in favor of at least ablating the atrial flutter today and then making a decision about the atrial fibrillation afterward  pending the response to antiarrhythmic therapy.     PROCEDURE  After we discussed the risks and benefits of, and alternatives to the procedure the patient provided informed consent.  The risks discussed include, but are not limited to: pain, bleeding, blood transfusion reactions, arrhythmia, dissection of vessels, cardiac perforation, pericardial effusion, AV block with need of a pacemaker, stroke, and death.  The patient was verified to have been fasting and confirmed to be in a hemodynamically stable state.  The patient was brought to the EP lab and then prepped and draped in a sterile fashion.     Sedation: This procedure was performed under moderate sedation under the supervision of the the Staff Physician. The patient received 4 mg Versed and 200 mcg Fentanyl for a total procedural sedation time of 128 minutes. Heart rate, blood pressure, oxygen saturation, and patient responses were monitored throughout the procedure with the assistance of the RN.     Sheaths and Catheters:  After local anesthesia with 2% lidocaine, vascular access was obtained using the modified Seldinger technique for the following access points:     Right Femoral Vein:  - 7Fr Locking Sheath: decapolar Catheter in Coronary Sinus.  - 8Fr sheath later exchanged for an 8Fr RAMP sheath for increased catheter stability, through which an 8Fr Biosense Holden DF curve SmartTouch ablation catheter was positioned into the RA and RV.     EP study results (in msec):  Pre-ablation (post-cardioversion): In sinus rhythm, AA=VV= 815, ND= 262, QRS= 93, QT= 373, HV 59.  Post-ablation: AA=VV= 1237, ND= 226, QRS= 101, QT= 361.     Arrhythmias Observed or Induced:  A. Atrial fibrillation, cardioverted to sinus rhythm with a synchronized 200J shock after our anesthesiology colleagues placed the patient under deep sedation.     Mapping and Ablation:  After placing the sheaths and catheters noted above he was cardioverted from AF into sinus rhythm as mentioned  above.  An anatomic shell of the RA and TVA was created with the ablation catheter, using Carto.  Then, to enhance catheter stability, the 8Fr sheath was exchanged over a guidewire for an 8Fr RAMP sheath under fluoroscopy. The ablation catheter was advanced into the cavotriscupid isthmus to a position where there was a large ventricular signal, and a small atrial signal. The position of the catheter was checked in the both the MCCARTHY and Swedish projections.  Prior to ablating, the catheter was maneuvered to map out the HIS location. Then, we repositioned the catheter in the 6 O'clock to 6:30 position in the Swedish projection. A series of coalescing ablation lesions were applied that connected the tricuspid annulus to the IVC. Twice during the procedure he moved because of pain during ablation, necessitating partial re-mapping before proceeding with ablation.  Ultimately, coaching him to take steady breaths and remain stationary during each ablation lesion led to completion of the CTI line.  The post-ablation TICT was 165 ms and differential pacing confirmed bidirectional block.  After 10 min of observation the TICT remained unchanged.  The procedure was terminated.     Dr. Romeo was present throughout the entire procedure.     ASSESSMENT:  1  Successful RF ablation of the CTI for typical atrial flutter and tachycardia mediated cardiomyopathy.  2  Direct current cardioversion of AF.     PLAN:    1. Bed rest for 4 hours prior to ambulating   2. Continue therapeutic anticoagulation with dabigatran   3. Switch metoprolol to carvedilol 12.5 mg BID and continue digoxin   4. Defer antiarrhythmic therapy for now due to baseline AV conduction disease (prolonged OR).  If LV function does not improve and AF recurs we can reconsider AAT and/or ablation.     Gabriel Conley MD  Cardiac electrophysiology fellow     EP STAFF NOTE  I was present throughout the procedure. I agree with the note above by the EP fellow.  Jan Romeo MD [facility]  Cardiology - Electrophysiology       1/2/20 ECHOCARDIOGRAM:   Interpretation Summary     The visual ejection fraction is estimated at 25-30%.  Moderately decreased right ventricular systolic function  There is moderate biatrial enlargement.  There is mild (1+) mitral regurgitation.  Probable aflutter with rapid ventricular response.  There is no comparison study available.  1/3/20 NM IRLANDA SCAN:     Only resting perfusion images were taken.     Resting images shows mild intensity basal inferior wall photopenic defect.     Gated images not performed. LV systolic function cannot be evaluated.     1/6/20 RAISSA:  Interpretation Summary  Left and right atrial appendage thrombi, measuring 1.7 x 1.4 cm and 1.5 x 1.4  cm, respectively.     Mildly dilated left ventricle with normal wall thickness. Severely decreased  LV systolic function with EF 15%-20%. Severe diffuse hypokinesis.  Global right ventricular function is moderately reduced.     Compared to the previous study dated 1/2/2020 (TTE): The left and right atrial  appendages are better-visualized and thrombi are seen in both atrial  Appendages.    1/6/20 CMRI:  1. The LV is mildly enlarged with normal wall thickness. The global systolic function is severely reduced.  The LVEF is 15%. There is severe diffuse hypokinesis.     2. The RV is normal in cavity size. The global systolic function is severely reduced. The RVEF is 20%.      3. The left atrium is severely enlarged.  The right atrium is moderately enlarged.      4. There is mild mitral regurgitation.     5. Late gadolinium enhancement imaging shows midmyocardial enhancement in the basal anteroseptum, basal  inferoseptum, mid anteroseptum, mid inferoseptum, and apical septum.  This is consistent with a  non-ischemic etiology of cardiomyopathy.     6. Regadenoson stress perfusion imaging shows no ischemia.     7. There is no pericardial effusion or thickening.     8. There is a left atrial appendage  thrombus.     9. There is a left-sided pleural effusion.     CONCLUSIONS: Non-ischemic cardiomyopathy.  There is severely reduced biventricular function with a pattern  of midmyocardial enhancement in the septal segments. There is also a left atrial appendage thrombus. There  is no ischemia.        Assessment and Plan:     #NICM Likely Tachy-induced and/or ETOH induced LVEF 25-30%, NYHA II:  1. ACEi/ARB: Increase Lisinopril.  2. BB: Continue Toprol XL   3. Aldosterone antagonist: deferred, can consider addition will leave to C[facility].  4. SCD prophylaxis: not currently indicated as he was just started on GDMT  5. Fluid status: hypervolemic      He is not back in atrial flutter but he appears to have acute decompensation. He feels like he became much worse when he went back to work full time. I discussed admission with him (and got a curbside from Dr. Joe). He does not wish to be admitted.    - increase furosemide, if no response or worsening admit for IV diuresis  - will need follow-up next week, if no improvement will need to be admitted for IV diuresis      #Atrial Flutter:  We discussed in detail with the patient management/treatment options for AKartikfib includin. Stroke Prophylaxis:  CHADSVASC=+HF, +HTN  2, corresponding to a 2.2% annual stroke / systemic emolism event rate. indicating need for long term oral anticoagulation.  He is appropriately on Pradaxa. No bleeding issues.   2. Rate Control: Continue Toprol XL and Digoxin.   3. Rhythm Control: He had DCCV in 2016 for AFL. Now with recurrence. Has ROGELIO thrombus so avoiding rhythm control until this clears. Given history of tachy-mediated CM and recurrent AFL, he need tight rhythm control. AAT less efficacious for this and we favor ablation. Will plan to get RAISSA after being on anticoagulation for 4-6 weeks. If clear will proceed with ablation. The procedural risk of EP study and ablation were discussed in detail. Risks discussed include: vascular  complications, CVA, AVB, pericardial effusion and tamponade. Even if ablation is successful anticoagulation may be needed lifelong. We also discussed that he has ~40% risk of developing AF in his lifetime and that ablation for AFL does not address this.   4. Risk Factor Management: tight BP control, GO evaluation as indicated, and continued abstinence  from ETOH.      Acute decompensation cannot be attributed to recurrence of his flutter. I cannot exclude paroxysms of atrial fibrillation or flutter but he is in sinus at this time.     - continue oral anticoagulation.    #Hypertension urgency - increase lisinopril to 20 mg BID    I appreciate the chance to help with Mr. Agarwal's care. Please let me know if you have any questions or concerns.        Please do not hesitate to contact me if you have any questions/concerns.     Sincerely,      Katia General DOD

## 2020-04-22 NOTE — LETTER
2020      TO: Roge Agarwal  [address] [facility] [address]             To Whom it May Concern:    Roge Agarwal is currently under my care (: 1960). He is not allowed to work more than 20 hours a week for 3 months. We will re-assess at that time.      Sincerely,        Jorge Hui MD

## 2020-04-24 LAB — INTERPRETATION ECG - MUSE: NORMAL

## 2020-04-30 ENCOUNTER — VIRTUAL VISIT (OUTPATIENT)
Dept: CARDIOLOGY | Facility: CLINIC | Age: 60
End: 2020-04-30
Payer: COMMERCIAL

## 2020-04-30 VITALS
WEIGHT: 193 LBS | DIASTOLIC BLOOD PRESSURE: 112 MMHG | HEART RATE: 74 BPM | SYSTOLIC BLOOD PRESSURE: 174 MMHG | BODY MASS INDEX: 25.46 KG/M2

## 2020-04-30 DIAGNOSIS — I50.22 CHRONIC SYSTOLIC HEART FAILURE (H): Primary | ICD-10-CM

## 2020-04-30 DIAGNOSIS — I42.8 NON-ISCHEMIC CARDIOMYOPATHY (H): ICD-10-CM

## 2020-04-30 DIAGNOSIS — I10 ESSENTIAL HYPERTENSION: ICD-10-CM

## 2020-04-30 DIAGNOSIS — I48.20 CHRONIC ATRIAL FIBRILLATION (H): ICD-10-CM

## 2020-04-30 DIAGNOSIS — Z78.9 ALCOHOL USE: ICD-10-CM

## 2020-04-30 DIAGNOSIS — I50.23 ACUTE ON CHRONIC SYSTOLIC CONGESTIVE HEART FAILURE (H): ICD-10-CM

## 2020-04-30 PROCEDURE — 99215 OFFICE O/P EST HI 40 MIN: CPT | Mod: 95 | Performed by: NURSE PRACTITIONER

## 2020-04-30 RX ORDER — FUROSEMIDE 20 MG
60 TABLET ORAL 2 TIMES DAILY
Qty: 180 TABLET | Refills: 1 | Status: SHIPPED | OUTPATIENT
Start: 2020-04-30 | End: 2020-05-04

## 2020-04-30 RX ORDER — LANOLIN ALCOHOL/MO/W.PET/CERES
100 CREAM (GRAM) TOPICAL DAILY
Qty: 90 TABLET | Refills: 0 | Status: SHIPPED | OUTPATIENT
Start: 2020-04-30 | End: 2020-08-04

## 2020-04-30 RX ORDER — AMLODIPINE BESYLATE 10 MG/1
10 TABLET ORAL DAILY
Qty: 30 TABLET | Refills: 1 | Status: SHIPPED | OUTPATIENT
Start: 2020-04-30 | End: 2020-04-30 | Stop reason: ALTCHOICE

## 2020-04-30 NOTE — PROGRESS NOTES
"Roge Agarwal is a 59 year old male who is being evaluated via a billable telephone visit.      The patient has been notified of following:     \"This telephone visit will be conducted via a call between you and your physician/provider. We have found that certain health care needs can be provided without the need for a physical exam.  This service lets us provide the care you need with a short phone conversation.  If a prescription is necessary we can send it directly to your pharmacy.  If lab work is needed we can place an order for that and you can then stop by our lab to have the test done at a later time.    Telephone visits are billed at different rates depending on your insurance coverage. During this emergency period, for some insurers they may be billed the same as an in-person visit.  Please reach out to your insurance provider with any questions.    If during the course of the call the physician/provider feels a telephone visit is not appropriate, you will not be charged for this service.\"    Patient has given verbal consent for Telephone visit?  Yes    How would you like to obtain your AVS? 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       Patients BPs are now checked by his daughter and she has been seeing 170-180's systolic.  Patient Weight 192-193 lbs. Patient has been tired . He has no swelling in his legs and abdomen. Dr. Hui had increased his Lasix to 40 mg BID last week. SOB none. Able to lay flat in his bed.  Patient has been drinking a lot of water. He has been watching his diet closely.      Denies SOB, ARSHAD, PND, orthopnea, edema, chest pain, palpitations, lightheadedness, dizziness, near syncopal/syncopal episodes. Roge has been following salt and fluid restrictions.      Chronic Systolic heart " failure secondary to .  Stage C  NYHA Class III  ACEi/ARB: yes ( Lisinopril 40 mg daily)  BB: yes ( Metoprolol 100 mg daily)  Aldosterone antagonist: contraindicated due to hyperkalemia in the past  SCD prophylaxis: Life-Vest- declined at discharge from hospital  Fluid status: hypervolemic- 60 mg lasix BID  Anticoagulation: Pradaxa  Antiplatelet:  ASA dose   NSAID use:  Contraindicated.  Avoid use.      Plan:  -Consult with Dr. Wick regarding possible Entresto start. Daughter and patient aware we will reach out to them once decision made.  -Increase Lasix 60 mg BID  -BMP Monday  - needs better BP control as well.  Possibly Norvasc needed as well - will discuss with Dr. Wick.     Phone call duration: 40 minutes    Maida NARAYANAN

## 2020-04-30 NOTE — LETTER
May 1, 2020      TO: Roge Agarwal  [address] [facility] [address]         Dear Roge,    Preventive Care:    Colorectal Cancer Screening: During our visit today, we discussed that it is recommended you receive colorectal cancer screening. Please call or make an appointment with your primary care provider to discuss this. You may also call the [facility] scheduling line ([phone]) to set up a colonoscopy appointment.          Take your medicines every day, as directed    Changes made today:    Increase Lasix to 60 mg twice a day       Monitor Your Weight and Symptoms    Contact us if you:      Gain 2 pounds in one day or 5 pounds in one week    Feel more short of breath    Notice more leg swelling    Feel lightheadeded   Change your lifestyle    Limit Salt or Sodium:    2000 mg  Limit Fluids:    2000 mL or approximately 64 ounces  Eat a Heart Healthy Diet    Low in saturated fats  Stay Active:    Aim to move at least 150 minutes every  week         To Contact us    During Business Hours:  [phone], option # 1 (University)  Then option # 4 (medical questions)     After hours, weekends or holidays:   [phone], Option #4  Ask to speak to the On-Call Cardiologist. Inform them you are a CORE/heart failure patient at the [facility].     Use [employer] allows you to communicate directly with your heart team through secure messaging.    [employer] can be accessed any time on your phone, computer, or tablet.    If you need assistance, we'd be happy to help!         Keep your Heart Appointments:      Have BMP drawn on Monday 5/4/20 in [place] @ 12:25 pm

## 2020-04-30 NOTE — NURSING NOTE
"Chief Complaint   Patient presents with     Heart Failure     Per patient no heart symptom concerns except bp.       Initial BP (!) 174/112   Pulse 74   Wt 87.5 kg (193 lb)   BMI 25.46 kg/m   Estimated body mass index is 25.46 kg/m  as calculated from the following:    Height as of 4/22/20: 1.854 m (6' 1\").    Weight as of this encounter: 87.5 kg (193 lb)..  BP completed using cuff size: lorraine Lazaro L.P.N.      "

## 2020-04-30 NOTE — PATIENT INSTRUCTIONS
Preventive Care:    Colorectal Cancer Screening: During our visit today, we discussed that it is recommended you receive colorectal cancer screening. Please call or make an appointment with your primary care provider to discuss this. You may also call the [facility] scheduling line ([phone]) to set up a colonoscopy appointment.          Take your medicines every day, as directed    Changes made today:    Increase Lasix to 60 mg twice a day       Monitor Your Weight and Symptoms    Contact us if you:      Gain 2 pounds in one day or 5 pounds in one week    Feel more short of breath    Notice more leg swelling    Feel lightheadeded   Change your lifestyle    Limit Salt or Sodium:    2000 mg  Limit Fluids:    2000 mL or approximately 64 ounces  Eat a Heart Healthy Diet    Low in saturated fats  Stay Active:    Aim to move at least 150 minutes every  week         To Contact us    During Business Hours:  [phone], option # 1 (University)  Then option # 4 (medical questions)     After hours, weekends or holidays:   [phone], Option #4  Ask to speak to the On-Call Cardiologist. Inform them you are a CORE/heart failure patient at the [facility].     Use [employer] allows you to communicate directly with your heart team through secure messaging.    [employer] can be accessed any time on your phone, computer, or tablet.    If you need assistance, we'd be happy to help!         Keep your Heart Appointments:      Have BMP drawn on Monday 5/4/20

## 2020-05-01 DIAGNOSIS — I10 ESSENTIAL HYPERTENSION: Primary | ICD-10-CM

## 2020-05-01 RX ORDER — AMLODIPINE BESYLATE 5 MG/1
5 TABLET ORAL DAILY
Qty: 30 TABLET | Refills: 3 | Status: SHIPPED | OUTPATIENT
Start: 2020-05-01 | End: 2020-05-22

## 2020-05-04 ENCOUNTER — PATIENT OUTREACH (OUTPATIENT)
Dept: CARDIOLOGY | Facility: CLINIC | Age: 60
End: 2020-05-04

## 2020-05-04 DIAGNOSIS — I50.23 ACUTE ON CHRONIC SYSTOLIC CONGESTIVE HEART FAILURE (H): Primary | ICD-10-CM

## 2020-05-04 DIAGNOSIS — I50.22 CHRONIC SYSTOLIC HEART FAILURE (H): ICD-10-CM

## 2020-05-04 DIAGNOSIS — I42.8 NON-ISCHEMIC CARDIOMYOPATHY (H): ICD-10-CM

## 2020-05-04 DIAGNOSIS — I50.23 ACUTE ON CHRONIC SYSTOLIC CONGESTIVE HEART FAILURE (H): ICD-10-CM

## 2020-05-04 LAB
ANION GAP SERPL CALCULATED.3IONS-SCNC: 5 MMOL/L (ref 3–14)
BUN SERPL-MCNC: 26 MG/DL (ref 7–30)
CALCIUM SERPL-MCNC: 9.4 MG/DL (ref 8.5–10.1)
CHLORIDE SERPL-SCNC: 98 MMOL/L (ref 94–109)
CO2 SERPL-SCNC: 28 MMOL/L (ref 20–32)
CREAT SERPL-MCNC: 1.15 MG/DL (ref 0.66–1.25)
GFR SERPL CREATININE-BSD FRML MDRD: 68 ML/MIN/{1.73_M2}
GLUCOSE SERPL-MCNC: 95 MG/DL (ref 70–99)
POTASSIUM SERPL-SCNC: 4.9 MMOL/L (ref 3.4–5.3)
SODIUM SERPL-SCNC: 131 MMOL/L (ref 133–144)

## 2020-05-04 PROCEDURE — 36415 COLL VENOUS BLD VENIPUNCTURE: CPT | Performed by: NURSE PRACTITIONER

## 2020-05-04 PROCEDURE — 80048 BASIC METABOLIC PNL TOTAL CA: CPT | Performed by: NURSE PRACTITIONER

## 2020-05-04 RX ORDER — FUROSEMIDE 20 MG
40 TABLET ORAL 2 TIMES DAILY
Qty: 180 TABLET | Refills: 1 | Status: SHIPPED | OUTPATIENT
Start: 2020-05-04 | End: 2020-09-01

## 2020-05-04 NOTE — TELEPHONE ENCOUNTER
Date: 5/4/2020    Time of Call: 3:08 PM     Diagnosis:  Heart failure     [ TORB ] Ordering provider: Maida Abdi NP    Order: decrease lasix to 40 mg BID      Order received by: Angely Robert RN     Follow-up/additional notes: BMP in 1 week      Called [place] to review lab results and medication changes. She verbalized understanding of changes and follow up.  [place] mentioned that Aamir started amlodipine Friday. Started feeling dizzy and sluggish, almost a feeling like he was sick.  He stopped taking it Sunday because he felt so bad.  They are asking if there is anything else they can take. I recommended taking it at night before bed. He feels bad all daylong that he feels that will not help.    Angely Robert RN

## 2020-05-04 NOTE — TELEPHONE ENCOUNTER
Maida originally ordered  Amlodipine 10mg daily with the pharmacy then changed it right way to 5 mg daily. Called patients pharmacy to confirm what medication the picked up from the pharmacy.  They have on record that he picked up Amlodipine 10mg on Thursday. Called keke Bean explaining that Aamir should have 10 mg pills, and that could explain why he as been feeling lousy. Asked her to confirm the dose on the bottle and that he can take 1/2 tab. I will follow up to see how he is feeling with this change.   Angely Robert RN

## 2020-05-04 NOTE — TELEPHONE ENCOUNTER
Date: 5/4/2020    Time of Call: 11:49 AM     Diagnosis:  Heart Failure     [ VORB ] Ordering provider: Maida Abdi NP  Order: start Amlodipine 5 mg daily and Entresto 49/51mg (1 tab) twice daily; follow up in 2 weeks after starting.     Order received by: Angely Robert RN     Follow-up/additional notes: Left message for Roge Bean's Daughter, letting her know that prescriptions were sent to the pharmacy for the medications above.  The Entresto requires pre-auth, and informed her of that and will contact her once auth is obtained so they can  medication. Reviewed the fact that Lisinopril will need to be held for 36 hrs prior to starting Entresto, and revisit those instructions once they get the medication.   Angely Robert RN

## 2020-05-05 ENCOUNTER — CARE COORDINATION (OUTPATIENT)
Dept: CARDIOLOGY | Facility: CLINIC | Age: 60
End: 2020-05-05

## 2020-05-05 NOTE — PROGRESS NOTES
Prior Authorization Specialty Medication Request    Medication/Dose: entresto pre-auth  ICD code (if different than what is on RX):  I50.23  Previously Tried and Failed:  Lisinopril 20 mg daily    Important Lab Values: Potassium 4.7 and Creatinine 0.86- stable  Rationale: Chronic Systolic Heart Failure EF 10-20%    Insurance Name: [employer]   Insurance ID: [id number]    Insurance Phone Number:     Pharmacy Information (if different than what is on RX)  Name:  [facility]  Phone:  [phone]

## 2020-05-05 NOTE — TELEPHONE ENCOUNTER
PA Initiation    Medication: entresto pre-auth -   Insurance Company: [email or web address] - Phone [phone] Fax [phone]  Pharmacy Filling the Rx: [id number] - [place] - [address]  Filling Pharmacy Phone: [phone]  Filling Pharmacy Fax: [phone]  Start Date: 5/5/2020

## 2020-05-06 NOTE — TELEPHONE ENCOUNTER
Geneva called stating Aamir has tried the 5 mg dose of Amlodipine and still is lethargic and has a hard time getting out of bed. She is asking if something else can be prescribed.  Angely Robert RN

## 2020-05-07 ENCOUNTER — DOCUMENTATION ONLY (OUTPATIENT)
Dept: CARDIOLOGY | Facility: CLINIC | Age: 60
End: 2020-05-07

## 2020-05-07 NOTE — PROGRESS NOTES
Patient has lab only appointment scheduled for 5/11/20.   Due to current coronavirus pandemic, please consider whether these lab tests can be deferred.     Please open ORDER REVIEW, review orders, and then confirm or defer orders ASAP.  Enter <dot>keep or <dot>defer smart phrase into NOTES, complete documentation, and route encounter.  [facility]: /team  [facility]: individual provider pool  [facility] primary care:   [facility] specialty: scheduling pool

## 2020-05-08 NOTE — PROGRESS NOTES
[employer] phone [phone] and fax [phone] or call [phone] for Prior Auth. Faxed form received , and previous note from [facility] meds re. Entresto prior Auth. Needed

## 2020-05-11 DIAGNOSIS — I50.22 CHRONIC SYSTOLIC HEART FAILURE (H): ICD-10-CM

## 2020-05-11 LAB
ANION GAP SERPL CALCULATED.3IONS-SCNC: 5 MMOL/L (ref 3–14)
BUN SERPL-MCNC: 30 MG/DL (ref 7–30)
CALCIUM SERPL-MCNC: 9 MG/DL (ref 8.5–10.1)
CHLORIDE SERPL-SCNC: 99 MMOL/L (ref 94–109)
CO2 SERPL-SCNC: 27 MMOL/L (ref 20–32)
CREAT SERPL-MCNC: 1.17 MG/DL (ref 0.66–1.25)
GFR SERPL CREATININE-BSD FRML MDRD: 67 ML/MIN/{1.73_M2}
GLUCOSE SERPL-MCNC: 90 MG/DL (ref 70–99)
POTASSIUM SERPL-SCNC: 4.6 MMOL/L (ref 3.4–5.3)
SODIUM SERPL-SCNC: 131 MMOL/L (ref 133–144)

## 2020-05-11 PROCEDURE — 36415 COLL VENOUS BLD VENIPUNCTURE: CPT | Performed by: NURSE PRACTITIONER

## 2020-05-11 PROCEDURE — 80048 BASIC METABOLIC PNL TOTAL CA: CPT | Performed by: NURSE PRACTITIONER

## 2020-05-11 NOTE — TELEPHONE ENCOUNTER
Prior Authorization Approval    Medication: entresto pre-auth - APPROVED was approved on 5/8/2020  Effective: 3/1/2020 to 5/8/2021  Reference #:    Approved Dose/Quantity:   Insurance Company: [employer] - Phone [phone] Fax [phone]  Expected CoPay:    Pharmacy Filling the Rx: [id number] - [place], [place] - [address]  Pharmacy Notified: Yes  Patient Notified: Comment:  **Instructed pharmacy to notify patient when script is ready to /ship.**

## 2020-05-13 ENCOUNTER — PATIENT OUTREACH (OUTPATIENT)
Dept: CARDIOLOGY | Facility: CLINIC | Age: 60
End: 2020-05-13

## 2020-05-13 NOTE — TELEPHONE ENCOUNTER
Talked with Geneva to provide lab results. We discussed his fluid and sodium intake and he seems to be following heart failure guidelines. His weight on Monday was 192 lbs. She was uncertain if he is still having diarrhea, but will ask him and get back to me.  I confirmed he is taking amlodipine 5 mg at night and the symptoms of lethargy have improved. He will still be a little dizzy when he bends down to tie his shoe and stand back up. Discussed he will need to take his time when bending over or changing positions to prevent the dizziness. His blood pressures have been in 150/90 range and heart rates have been in 60-70's.   Angely Robert RN

## 2020-05-15 NOTE — TELEPHONE ENCOUNTER
Called Geneva. Aamir does not have diarrhea. Geneva had him start the norvasc 5 mg at night on 5/7/20, so it has only been a week since he has been on it. He feels better on the 5 mg, less dizzy. Blood pressure still high. She gave me numbers but not dates to coincide. The blood pressures she gave were  145/95  143/103  172/79  137/100  Heart rates 70-90's    She asked if her an have an in person appt with you or [place]. She is nervous that he isn't being seen by a provider.  Dr. Wick is DOD at [facility] on 5/21 with openings or the week of  June 12-18.    Would you be able to call her and talk with her about why we are changing his norvasc and diuretics. She seems very flustered.    Angely Robert RN

## 2020-05-21 ENCOUNTER — TELEPHONE (OUTPATIENT)
Dept: CARDIOLOGY | Facility: CLINIC | Age: 60
End: 2020-05-21

## 2020-05-21 NOTE — TELEPHONE ENCOUNTER
Called Aamir Bean's daughter . We talked for a while she is very nervous about her father as he doesn't show clear symptoms of heart failure when he has been bad. She would like the next appt with Dr. Wick to be in person.      Norvasc 10 mg daily   Dr. Wick the week of June 12th - in person visit  [facility] and Echo under Dr. Wick for that day  RN call in one week to see how he is feeling .        Maida

## 2020-05-22 DIAGNOSIS — I10 ESSENTIAL HYPERTENSION: ICD-10-CM

## 2020-05-22 RX ORDER — AMLODIPINE BESYLATE 5 MG/1
10 TABLET ORAL DAILY
Qty: 60 TABLET | Refills: 3 | Status: SHIPPED | OUTPATIENT
Start: 2020-05-22 | End: 2020-08-04

## 2020-05-22 NOTE — TELEPHONE ENCOUNTER
Called lucinda Bean to coordinate appts, labs and echo for her father. Left message with details for planning. Will contact again to schedule.  Angely Robert RN

## 2020-05-28 ENCOUNTER — TELEPHONE (OUTPATIENT)
Dept: CARDIOLOGY | Facility: CLINIC | Age: 60
End: 2020-05-28

## 2020-05-28 DIAGNOSIS — I50.23 ACUTE ON CHRONIC SYSTOLIC CONGESTIVE HEART FAILURE (H): ICD-10-CM

## 2020-05-28 DIAGNOSIS — I48.4 ATYPICAL ATRIAL FLUTTER (H): ICD-10-CM

## 2020-05-28 DIAGNOSIS — Z78.9 ALCOHOL USE: ICD-10-CM

## 2020-05-28 NOTE — TELEPHONE ENCOUNTER
Health Call Center    Phone Message    May a detailed message be left on voicemail: yes     Reason for Call: Medication Refill Request    Has the patient contacted the pharmacy for the refill? Yes   Name of medication being requested: digoxin (LANOXIN) 125 MCG tablet  Provider who prescribed the medication: Katherin Sunshine APRN [facility]   Pharm[facility]: [id number] [place] [place] [address]   Date medication is needed: ASAP        Name of medication being requested: folic acid (FOLVITE) 1 MG tablet   Provider who prescribed the medication: Katherin Sunshine APRN [facility]   Pharmacy[id number] [place] [address]   Date medication is needed: ASAP       Name of medication being requested:  lisinopril (ZESTRIL) 20 MG tablet   Provider who prescribed the medication: Jorge Hui MD   Pharmacy: [id number] - [place], [place] - [address]   Date medication is needed: ASAP       Action Taken: Message routed to:  [facility] Cardiology    Travel Screening: Not Applicable

## 2020-05-29 ENCOUNTER — PATIENT OUTREACH (OUTPATIENT)
Dept: CARDIOLOGY | Facility: CLINIC | Age: 60
End: 2020-05-29

## 2020-05-29 DIAGNOSIS — I50.22 CHRONIC SYSTOLIC HEART FAILURE (H): Primary | ICD-10-CM

## 2020-05-29 RX ORDER — FOLIC ACID 1 MG/1
1 TABLET ORAL DAILY
Qty: 90 TABLET | Refills: 3 | Status: SHIPPED | OUTPATIENT
Start: 2020-05-29 | End: 2021-01-12

## 2020-05-29 RX ORDER — DIGOXIN 125 MCG
125 TABLET ORAL DAILY
Qty: 90 TABLET | Refills: 3 | Status: SHIPPED | OUTPATIENT
Start: 2020-05-29 | End: 2021-01-12

## 2020-05-29 NOTE — TELEPHONE ENCOUNTER
Called and talked with Geneva, pt's daughter,To set up in person appt with Dr. Wick with labs on 6/18/20 and echo 6/1/20 before the appt.  Confirmed patient picked up his Entresto yesterday. He took his last Lisinopril dose today 5/29/20. He will start Entresto Sunday 5/31/20. Lab work is scheduled for 5/8/20.  Confirmed patient started his 10 mg dose of Norvasc on 5/22/20. Daughter denies he is having any lethargy, diarrhea, or SOB.  Blood pressures have been 140-150/'s. HR 70-80's. Wt about 192 lbs    Angely Robert RN

## 2020-06-01 RX ORDER — LISINOPRIL 20 MG/1
TABLET ORAL
Qty: 60 TABLET | Refills: 0 | OUTPATIENT
Start: 2020-06-01

## 2020-06-08 DIAGNOSIS — I50.22 CHRONIC SYSTOLIC HEART FAILURE (H): ICD-10-CM

## 2020-06-08 LAB
ANION GAP SERPL CALCULATED.3IONS-SCNC: 5 MMOL/L (ref 3–14)
BUN SERPL-MCNC: 16 MG/DL (ref 7–30)
CALCIUM SERPL-MCNC: 9.1 MG/DL (ref 8.5–10.1)
CHLORIDE SERPL-SCNC: 102 MMOL/L (ref 94–109)
CO2 SERPL-SCNC: 28 MMOL/L (ref 20–32)
CREAT SERPL-MCNC: 0.98 MG/DL (ref 0.66–1.25)
GFR SERPL CREATININE-BSD FRML MDRD: 82 ML/MIN/{1.73_M2}
GLUCOSE SERPL-MCNC: 94 MG/DL (ref 70–99)
POTASSIUM SERPL-SCNC: 4.3 MMOL/L (ref 3.4–5.3)
SODIUM SERPL-SCNC: 135 MMOL/L (ref 133–144)

## 2020-06-08 PROCEDURE — 36415 COLL VENOUS BLD VENIPUNCTURE: CPT | Performed by: NURSE PRACTITIONER

## 2020-06-08 PROCEDURE — 80048 BASIC METABOLIC PNL TOTAL CA: CPT | Performed by: NURSE PRACTITIONER

## 2020-06-10 ENCOUNTER — PATIENT OUTREACH (OUTPATIENT)
Dept: CARDIOLOGY | Facility: CLINIC | Age: 60
End: 2020-06-10

## 2020-06-10 NOTE — TELEPHONE ENCOUNTER
Left message with daughter Geneva to confirm doses of cardiac medications. She had called me previously to reschedule the echo appt  Aamir had on 6/1/20 because they missed it. Provided Geneva the phone number at [facility] to reschedule. Geneva returned call to confirm medication doses   Lasix 40 mg BID  entresto 49/51mg at night  norvasc 10 mg at night  Coreg 12.5 mg BID     She stated pt is tolerating all the medications well.     Angely Robert RN

## 2020-06-15 ENCOUNTER — PATIENT OUTREACH (OUTPATIENT)
Dept: CARDIOLOGY | Facility: CLINIC | Age: 60
End: 2020-06-15

## 2020-06-15 NOTE — TELEPHONE ENCOUNTER
Geneva left message on my voicemail stating she wouldn't be able to get Aamir to his echo appt today because she has to go to court. Called her back, confirmed with her that the echo is scheduled for tomorrow. Aamir did not want to go to [facility] twice and it is hard for her to get 2 days off to take him to appts. Asked to have echo rescheduled again to same day as in person appt with Dr. Wick. Echo and labs rescheduled to 6/18 at 11:30am and 11:00am respectively. Called Geneva, told her the times and dates of tests.   She also confirmed Aamir's blood pressures lately have been 130-140/90's and that his weight is up 3lbs.    Angely Robert RN

## 2020-06-18 ENCOUNTER — ANCILLARY PROCEDURE (OUTPATIENT)
Dept: CARDIOLOGY | Facility: CLINIC | Age: 60
End: 2020-06-18
Attending: INTERNAL MEDICINE
Payer: COMMERCIAL

## 2020-06-18 VITALS
OXYGEN SATURATION: 98 % | WEIGHT: 197.1 LBS | BODY MASS INDEX: 26.12 KG/M2 | HEART RATE: 98 BPM | DIASTOLIC BLOOD PRESSURE: 79 MMHG | HEIGHT: 73 IN | SYSTOLIC BLOOD PRESSURE: 130 MMHG

## 2020-06-18 DIAGNOSIS — I50.22 CHRONIC SYSTOLIC HEART FAILURE (H): Primary | ICD-10-CM

## 2020-06-18 DIAGNOSIS — I50.22 CHRONIC SYSTOLIC HEART FAILURE (H): ICD-10-CM

## 2020-06-18 DIAGNOSIS — I48.0 PAROXYSMAL ATRIAL FIBRILLATION (H): ICD-10-CM

## 2020-06-18 DIAGNOSIS — I42.8 NONISCHEMIC CARDIOMYOPATHY (H): ICD-10-CM

## 2020-06-18 DIAGNOSIS — I50.20 HEART FAILURE WITH REDUCED EJECTION FRACTION, NYHA CLASS III (H): ICD-10-CM

## 2020-06-18 DIAGNOSIS — I10 BENIGN ESSENTIAL HYPERTENSION: ICD-10-CM

## 2020-06-18 LAB
ANION GAP SERPL CALCULATED.3IONS-SCNC: 4 MMOL/L (ref 3–14)
BUN SERPL-MCNC: 13 MG/DL (ref 7–30)
CALCIUM SERPL-MCNC: 9 MG/DL (ref 8.5–10.1)
CHLORIDE SERPL-SCNC: 104 MMOL/L (ref 94–109)
CO2 SERPL-SCNC: 29 MMOL/L (ref 20–32)
CREAT SERPL-MCNC: 0.81 MG/DL (ref 0.66–1.25)
GFR SERPL CREATININE-BSD FRML MDRD: >90 ML/MIN/{1.73_M2}
GLUCOSE SERPL-MCNC: 99 MG/DL (ref 70–99)
NT-PROBNP SERPL-MCNC: 283 PG/ML (ref 0–125)
POTASSIUM SERPL-SCNC: 3.9 MMOL/L (ref 3.4–5.3)
SODIUM SERPL-SCNC: 136 MMOL/L (ref 133–144)

## 2020-06-18 PROCEDURE — 80048 BASIC METABOLIC PNL TOTAL CA: CPT | Performed by: INTERNAL MEDICINE

## 2020-06-18 PROCEDURE — 99215 OFFICE O/P EST HI 40 MIN: CPT | Mod: 25 | Performed by: INTERNAL MEDICINE

## 2020-06-18 PROCEDURE — 36415 COLL VENOUS BLD VENIPUNCTURE: CPT | Performed by: INTERNAL MEDICINE

## 2020-06-18 PROCEDURE — G0463 HOSPITAL OUTPT CLINIC VISIT: HCPCS | Mod: ZF

## 2020-06-18 PROCEDURE — 83880 ASSAY OF NATRIURETIC PEPTIDE: CPT | Performed by: INTERNAL MEDICINE

## 2020-06-18 RX ORDER — CARVEDILOL 25 MG/1
25 TABLET ORAL 2 TIMES DAILY WITH MEALS
Qty: 180 TABLET | Refills: 3 | Status: SHIPPED | OUTPATIENT
Start: 2020-06-18 | End: 2020-07-08 | Stop reason: DRUGHIGH

## 2020-06-18 ASSESSMENT — MIFFLIN-ST. JEOR: SCORE: 1757.92

## 2020-06-18 ASSESSMENT — PAIN SCALES - GENERAL: PAINLEVEL: NO PAIN (0)

## 2020-06-18 NOTE — LETTER
6/18/2020      RE: Roge Agarwal  [address] [facility] [address]       Dear Colleague,    Thank you for the opportunity to participate in the care of your patient, Roge Agarwal, at the [facility]. Please see a copy of my visit note below.    February 7, 2020     I had the pleasure of seeing Roge Agarwal  in the [facility] for further evaluation and management of his heart failure.  Mr. 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     In 4/2016 he amputated two of his fingers with his wood saw. In the ER, he was found in a AFL with RVR and with LVEF 15%. He'd not been aware of arrhythmia but did report feeling fatigued. He had RAISSA/CV and started warfarin. His LVEF returned to 55% by several months later. He came off warfarin a month after the DCCV. He then represented to ER with 2 month history of feeing fatigued and increasingly SOB even at rest. He was found to be back in AFL with RVR 140s and echo demonstrated LVEF 30%. He was subsequently admitted. He was started on Pradaxa and amiodarone. Plan was for RAISSA/ablation; however, RAISSA demonstrated ROGELIO thrombus and ablation was cancelled. Amiodarone was stopped. He was instead placed on Metoprolol and digoxin for rate control. Stress CMRI showed NICM with severely reduced biventricular function with a pattern of midmyocardial enhancement in the septal segments. ROGELIO thrombus also demonstrated on CMRI. No ischemia on stress portion. Echo showed LVEF 25-30%, moderately decreased RV function, and mild MR. Etiology considered likely multifactorial given tachyarrhythmia and ETOH abuse. He was counseled to stop drinking. He was started on GDMT for his depressed LVEF. He was discharged to have close follow up.   Today he  presents for follow-up.  Note that he has been doing relatively well with no new complaints.  He has been able to do a little bit more than before however he remains with class III symptoms at this time.  He remains off of alcohol.  He denies any lower extremity edema however notes a bit of abdominal swelling.  They have noticed that his heart rate is irregular however rate controlled now up into the 90s which is higher than it was before down to the 60s to 70s.  Other than that denies any issues.     PAST MEDICAL HISTORY:  Past Medical History:   Diagnosis Date     Atrial fibrillation (H) 2016    One time episode noted after his finger accident, required cardioversion     Atrial fibrillation with RVR (H) 4/6/2016     Hemorrhagic stroke (H) 12/16/2007    2.3 X 1.5 CM PARENCHYMAL HEMORRHAGE IN THE RIGHT BASAL GANGLIA     HTN (hypertension)      Systolic CHF (H)     EF of 15-30% in 2016     FAMILY HISTORY:  Family History   Problem Relation Age of Onset     Heart Disease Mother      Osteoporosis Mother      LUNG DISEASE Mother      Heart Disease Father      Hypertension Brother       SOCIAL HISTORY:  Social History     Socioeconomic History     Marital status: Single     Spouse name: None     Number of children: None     Years of education: None     Highest education level: None   Occupational History     None   Social Needs     Financial resource strain: None     Food insecurity:     Worry: None     Inability: None     Transportation needs:     Medical: None     Non-medical: None   Tobacco Use     Smoking status: Never Smoker     Smokeless tobacco: Current User     Types: Chew   Substance and Sexual Activity     Alcohol use: Yes     Drug use: None     Sexual activity: None   Lifestyle     Physical activity:     Days per week: None     Minutes per session: None     Stress: None   Relationships     Social connections:     Talks on phone: None     Gets together: None     Attends Worship service: None     Active member  "of club or organization: None     Attends meetings of clubs or organizations: None     Relationship status: None     Intimate partner violence:     Fear of current or ex partner: None     Emotionally abused: None     Physically abused: None     Forced sexual activity: None   Other Topics Concern     Parent/sibling w/ CABG, MI or angioplasty before 65F 55M? Not Asked   Social History Narrative     None     CURRENT MEDICATIONS:  Current Outpatient Medications   Medication     amLODIPine (NORVASC) 5 MG tablet     carvedilol (COREG) 12.5 MG tablet     dabigatran ANTICOAGULANT (PRADAXA) 150 MG capsule     digoxin (LANOXIN) 125 MCG tablet     folic acid (FOLVITE) 1 MG tablet     furosemide (LASIX) 20 MG tablet     multivitamin w/minerals (THERA-VIT-M) tablet     sacubitril-valsartan (ENTRESTO) 49-51 MG per tablet     vitamin B1 (THIAMINE) 100 MG tablet     No current facility-administered medications for this visit.       ROS:   Constitutional: No fever, chills, or sweats. Weight is 0 lbs 0 oz  ENT: No visual disturbance, ear ache, epistaxis, sore throat.   Allergies/Immunologic: Negative.   Respiratory: No cough, hemoptysis.   Cardiovascular: As per HPI.   GI: No nausea, vomiting, hematemesis, melena, or hematochezia.   : No urinary frequency, dysuria, or hematuria.   Integrument: Negative.   Psychiatric: No evidence of major depression  Neuro: No new neurological complaints at this time. Non focal  Endocrinology: Negative.   Musculoskeletal: As per HPI.      EXAM:  /79 (BP Location: Left arm, Patient Position: Sitting, Cuff Size: Adult Regular)   Pulse 98   Ht 1.854 m (6' 1\")   Wt 89.4 kg (197 lb 1.6 oz)   SpO2 98%   BMI 26.00 kg/m    General: appears comfortable, alert and oriented  Head: normocephalic, atraumatic  Eyes: anicteric sclera, EOMI , PERRL  Neck: no adenopathy  Orophyarynx: moist mucosa, no lesions noted  Heart: irregular, borderline tachy, no murmurs, rubs. Estimated JVP at 6 cmH2O  Lungs: " CTAB, No wheezing.   Abdomen: soft, non-tender, bowel sounds present, no hepatosplenomegaly  Extremities: No LE edema today  Skin: no open lesions noted  Neuro: grossly non-focal  Psych: no evidence of depression noted     Labs:  Lab Results   Component Value Date    WBC 7.2 02/25/2020    HGB 14.0 02/25/2020    HCT 42.7 02/25/2020     02/25/2020     06/08/2020    POTASSIUM 4.3 06/08/2020    CHLORIDE 102 06/08/2020    CO2 28 06/08/2020    BUN 16 06/08/2020    CR 0.98 06/08/2020    GLC 94 06/08/2020    DD 0.9 (H) 12/31/2019    NTBNPI 2,401 (H) 12/31/2019    NTBNP 1,033 (H) 04/22/2020    TROPI 0.037 01/01/2020    AST 29 02/06/2020    ALT 52 02/06/2020    ALKPHOS 64 02/06/2020    BILITOTAL 0.6 02/06/2020    INR 1.32 (H) 02/25/2020 1/2/20 ECHOCARDIOGRAM:   The visual ejection fraction is estimated at 25-30%. Moderately decreased right ventricular systolic function There is moderate biatrial enlargement.  There is mild (1+) mitral regurgitation.  Probable aflutter with rapid ventricular response.  There is no comparison study available.    1/3/20 NM IRLANDA SCAN:     Only resting perfusion images were taken.     Resting images shows mild intensity basal inferior wall photopenic defect.     Gated images not performed. LV systolic function cannot be evaluated.     1/6/20 RAISSA:  Left and right atrial appendage thrombi, measuring 1.7 x 1.4 cm and 1.5 x 1.4  cm, respectively.   Mildly dilated left ventricle with normal wall thickness. Severely decreased  LV systolic function with EF 15%-20%. Severe diffuse hypokinesis.  Global right ventricular function is moderately reduced.   Compared to the previous study dated 1/2/2020 (TTE): The left and right atrial  appendages are better-visualized and thrombi are seen in both atrial  Appendages.     1/6/20 CMRI:  1. The LV is mildly enlarged with normal wall thickness. The global systolic function is severely reduced. The LVEF is 15%. There is severe diffuse hypokinesis.  2.  The RV is normal in cavity size. The global systolic function is severely reduced. The RVEF is 20%.   3. The left atrium is severely enlarged.  The right atrium is moderately enlarged.   4. There is mild mitral regurgitation.   5. Late gadolinium enhancement imaging shows midmyocardial enhancement in the basal anteroseptum, basal inferoseptum, mid anteroseptum, mid inferoseptum, and apical septum.  This is consistent with a non-ischemic etiology of cardiomyopathy.   6. Regadenoson stress perfusion imaging shows no ischemia.   7. There is no pericardial effusion or thickening.   8. There is a left atrial appendage thrombus.   9. There is a left-sided pleural effusion.   CONCLUSIONS: Non-ischemic cardiomyopathy.  There is severely reduced biventricular function with a pattern of midmyocardial enhancement in the septal segments. There is also a left atrial appendage thrombus. There is no ischemia.     ASSESSMENT AND PLAN:  In summary, patient is a 59 year old gentleman with above history including recovered ejection fraction in the setting of alcohol use in the past.  He was recently evaluated by EP in core clinic and referred to me for heart failure management in the setting of significantly reduced ejection fraction at about 15% due to possibly combination of tachycardia induced cardiomyopathy and alcohol use.  He quit alcohol in early February and he is determined to recover his ejection fraction.  This time we will try to increase his Coreg to 25 mg twice daily dosing and see how good rate control it provides.  If this is appropriate then we will consider restarting amiodarone or alternatively we can ask our EP colleagues to reevaluate him and in addition to the a flutter ablation we could consider A. fib ablation.  He remains on dabigatran and discussed that he should stay on this for now.  We will continue his current dose of 40 mg Lasix twice a day.  He is also continuing his Entresto 49 mg twice daily dosing if  the blood pressure remains elevated on the next visit then we will consider increasing it to 97 mg twice daily dosing.  Also continue the digoxin.  He takes no medications and remains of alcohol which we discussed very extensively that this is his best benefit.  He notes that he does have some dietary nonadherence at times.  With all the above and multiple admissions for heart failure I do believe he would benefit from CardioMEMS monitoring and we are going to start the process for this.  I did personally review his echocardiogram from today which showed improved ejection fraction to about 40 to 45%.  This was discussed with patient in detail.  Given the above he does not need ICD at this point however will need to monitor him closely.  No other medication changes at this time however again might need to increase his Entresto on the next visit.  I will see him back in 2 months or sooner if needed.     I appreciate the opportunity to participate in the care of Roge Agarwal . Please do not hesitate to contact me with any further questions.    Sincerely,    Amor Wick MD     [facility] Division of Cardiology        Please do not hesitate to contact me if you have any questions/concerns.     Sincerely,      Cardiology Advanced DOD

## 2020-06-18 NOTE — PATIENT INSTRUCTIONS
Please increase Coreg to 25 MG twice daily.    Thank you for your visit today.  Please call me with any questions or concerns.   Kb Holguin RN  Cardiology Care Coordinator  [phone], press option 1 then option 4

## 2020-06-18 NOTE — PROGRESS NOTES
February 7, 2020     I had the pleasure of seeing Roge Agarwal  in the [facility] for further evaluation and management of his heart failure.  Mr. 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     In 4/2016 he amputated two of his fingers with his wood saw. In the ER, he was found in a AFL with RVR and with LVEF 15%. He'd not been aware of arrhythmia but did report feeling fatigued. He had RAISSA/CV and started warfarin. His LVEF returned to 55% by several months later. He came off warfarin a month after the DCCV. He then represented to ER with 2 month history of feeing fatigued and increasingly SOB even at rest. He was found to be back in AFL with RVR 140s and echo demonstrated LVEF 30%. He was subsequently admitted. He was started on Pradaxa and amiodarone. Plan was for RAISSA/ablation; however, RAISSA demonstrated ROGELIO thrombus and ablation was cancelled. Amiodarone was stopped. He was instead placed on Metoprolol and digoxin for rate control. Stress CMRI showed NICM with severely reduced biventricular function with a pattern of midmyocardial enhancement in the septal segments. ROGELIO thrombus also demonstrated on CMRI. No ischemia on stress portion. Echo showed LVEF 25-30%, moderately decreased RV function, and mild MR. Etiology considered likely multifactorial given tachyarrhythmia and ETOH abuse. He was counseled to stop drinking. He was started on GDMT for his depressed LVEF. He was discharged to have close follow up.   Today he presents for follow-up.  Note that he has been doing relatively well with no new complaints.  He has been able to do a little bit more than before however he remains with class III symptoms at this time.  He remains off of alcohol.  He denies any lower extremity edema however notes a bit of abdominal swelling.   They have noticed that his heart rate is irregular however rate controlled now up into the 90s which is higher than it was before down to the 60s to 70s.  Other than that denies any issues.     PAST MEDICAL HISTORY:  Past Medical History:   Diagnosis Date     Atrial fibrillation (H) 2016    One time episode noted after his finger accident, required cardioversion     Atrial fibrillation with RVR (H) 4/6/2016     Hemorrhagic stroke (H) 12/16/2007    2.3 X 1.5 CM PARENCHYMAL HEMORRHAGE IN THE RIGHT BASAL GANGLIA     HTN (hypertension)      Systolic CHF (H)     EF of 15-30% in 2016     FAMILY HISTORY:  Family History   Problem Relation Age of Onset     Heart Disease Mother      Osteoporosis Mother      LUNG DISEASE Mother      Heart Disease Father      Hypertension Brother       SOCIAL HISTORY:  Social History     Socioeconomic History     Marital status: Single     Spouse name: None     Number of children: None     Years of education: None     Highest education level: None   Occupational History     None   Social Needs     Financial resource strain: None     Food insecurity:     Worry: None     Inability: None     Transportation needs:     Medical: None     Non-medical: None   Tobacco Use     Smoking status: Never Smoker     Smokeless tobacco: Current User     Types: Chew   Substance and Sexual Activity     Alcohol use: Yes     Drug use: None     Sexual activity: None   Lifestyle     Physical activity:     Days per week: None     Minutes per session: None     Stress: None   Relationships     Social connections:     Talks on phone: None     Gets together: None     Attends [employer] service: None     Active member of club or organization: None     Attends meetings of clubs or organizations: None     Relationship status: None     Intimate partner violence:     Fear of current or ex partner: None     Emotionally abused: None     Physically abused: None     Forced sexual activity: None   Other Topics Concern      "Parent/sibling w/ CABG, MI or angioplasty before 65F 55M? Not Asked   Social History Narrative     None     CURRENT MEDICATIONS:  Current Outpatient Medications   Medication     amLODIPine (NORVASC) 5 MG tablet     carvedilol (COREG) 12.5 MG tablet     dabigatran ANTICOAGULANT (PRADAXA) 150 MG capsule     digoxin (LANOXIN) 125 MCG tablet     folic acid (FOLVITE) 1 MG tablet     furosemide (LASIX) 20 MG tablet     multivitamin w/minerals (THERA-VIT-M) tablet     sacubitril-valsartan (ENTRESTO) 49-51 MG per tablet     vitamin B1 (THIAMINE) 100 MG tablet     No current facility-administered medications for this visit.       ROS:   Constitutional: No fever, chills, or sweats. Weight is 0 lbs 0 oz  ENT: No visual disturbance, ear ache, epistaxis, sore throat.   Allergies/Immunologic: Negative.   Respiratory: No cough, hemoptysis.   Cardiovascular: As per HPI.   GI: No nausea, vomiting, hematemesis, melena, or hematochezia.   : No urinary frequency, dysuria, or hematuria.   Integrument: Negative.   Psychiatric: No evidence of major depression  Neuro: No new neurological complaints at this time. Non focal  Endocrinology: Negative.   Musculoskeletal: As per HPI.      EXAM:  /79 (BP Location: Left arm, Patient Position: Sitting, Cuff Size: Adult Regular)   Pulse 98   Ht 1.854 m (6' 1\")   Wt 89.4 kg (197 lb 1.6 oz)   SpO2 98%   BMI 26.00 kg/m    General: appears comfortable, alert and oriented  Head: normocephalic, atraumatic  Eyes: anicteric sclera, EOMI , PERRL  Neck: no adenopathy  Orophyarynx: moist mucosa, no lesions noted  Heart: irregular, borderline tachy, no murmurs, rubs. Estimated JVP at 6 cmH2O  Lungs: CTAB, No wheezing.   Abdomen: soft, non-tender, bowel sounds present, no hepatosplenomegaly  Extremities: No LE edema today  Skin: no open lesions noted  Neuro: grossly non-focal  Psych: no evidence of depression noted     Labs:  Lab Results   Component Value Date    WBC 7.2 02/25/2020    HGB 14.0 " 02/25/2020    HCT 42.7 02/25/2020     02/25/2020     06/08/2020    POTASSIUM 4.3 06/08/2020    CHLORIDE 102 06/08/2020    CO2 28 06/08/2020    BUN 16 06/08/2020    CR 0.98 06/08/2020    GLC 94 06/08/2020    DD 0.9 (H) 12/31/2019    NTBNPI 2,401 (H) 12/31/2019    NTBNP 1,033 (H) 04/22/2020    TROPI 0.037 01/01/2020    AST 29 02/06/2020    ALT 52 02/06/2020    ALKPHOS 64 02/06/2020    BILITOTAL 0.6 02/06/2020    INR 1.32 (H) 02/25/2020 1/2/20 ECHOCARDIOGRAM:   The visual ejection fraction is estimated at 25-30%. Moderately decreased right ventricular systolic function There is moderate biatrial enlargement.  There is mild (1+) mitral regurgitation.  Probable aflutter with rapid ventricular response.  There is no comparison study available.    1/3/20 NM IRLANDA SCAN:     Only resting perfusion images were taken.     Resting images shows mild intensity basal inferior wall photopenic defect.     Gated images not performed. LV systolic function cannot be evaluated.     1/6/20 RAISSA:  Left and right atrial appendage thrombi, measuring 1.7 x 1.4 cm and 1.5 x 1.4  cm, respectively.   Mildly dilated left ventricle with normal wall thickness. Severely decreased  LV systolic function with EF 15%-20%. Severe diffuse hypokinesis.  Global right ventricular function is moderately reduced.   Compared to the previous study dated 1/2/2020 (TTE): The left and right atrial  appendages are better-visualized and thrombi are seen in both atrial  Appendages.     1/6/20 CMRI:  1. The LV is mildly enlarged with normal wall thickness. The global systolic function is severely reduced. The LVEF is 15%. There is severe diffuse hypokinesis.  2. The RV is normal in cavity size. The global systolic function is severely reduced. The RVEF is 20%.   3. The left atrium is severely enlarged.  The right atrium is moderately enlarged.   4. There is mild mitral regurgitation.   5. Late gadolinium enhancement imaging shows midmyocardial enhancement  in the basal anteroseptum, basal inferoseptum, mid anteroseptum, mid inferoseptum, and apical septum.  This is consistent with a non-ischemic etiology of cardiomyopathy.   6. Regadenoson stress perfusion imaging shows no ischemia.   7. There is no pericardial effusion or thickening.   8. There is a left atrial appendage thrombus.   9. There is a left-sided pleural effusion.   CONCLUSIONS: Non-ischemic cardiomyopathy.  There is severely reduced biventricular function with a pattern of midmyocardial enhancement in the septal segments. There is also a left atrial appendage thrombus. There is no ischemia.     ASSESSMENT AND PLAN:  In summary, patient is a 59 year old gentleman with above history including recovered ejection fraction in the setting of alcohol use in the past.  He was recently evaluated by EP in core clinic and referred to me for heart failure management in the setting of significantly reduced ejection fraction at about 15% due to possibly combination of tachycardia induced cardiomyopathy and alcohol use.  He quit alcohol in early February and he is determined to recover his ejection fraction.  This time we will try to increase his Coreg to 25 mg twice daily dosing and see how good rate control it provides.  If this is appropriate then we will consider restarting amiodarone or alternatively we can ask our EP colleagues to reevaluate him and in addition to the a flutter ablation we could consider A. fib ablation.  He remains on dabigatran and discussed that he should stay on this for now.  We will continue his current dose of 40 mg Lasix twice a day.  He is also continuing his Entresto 49 mg twice daily dosing if the blood pressure remains elevated on the next visit then we will consider increasing it to 97 mg twice daily dosing.  Also continue the digoxin.  He takes no medications and remains of alcohol which we discussed very extensively that this is his best benefit.  He notes that he does have some  dietary nonadherence at times.  With all the above and multiple admissions for heart failure I do believe he would benefit from CardioMEMS monitoring and we are going to start the process for this.  I did personally review his echocardiogram from today which showed improved ejection fraction to about 40 to 45%.  This was discussed with patient in detail.  Given the above he does not need ICD at this point however will need to monitor him closely.  No other medication changes at this time however again might need to increase his Entresto on the next visit.  I will see him back in 2 months or sooner if needed.     I appreciate the opportunity to participate in the care of Roge Agarwal . Please do not hesitate to contact me with any further questions.    Sincerely,    Amor Wick MD     [facility] Division of Cardiology

## 2020-06-23 ENCOUNTER — PATIENT OUTREACH (OUTPATIENT)
Dept: CARDIOLOGY | Facility: CLINIC | Age: 60
End: 2020-06-23

## 2020-06-23 NOTE — TELEPHONE ENCOUNTER
Pt's daugther, Geneva, left message on my voicemail 6/22/20 stating that at Aamir's appt with Dr. Wick on 6/18 his Carvedilol was increased to 25 mg BID. Pt felt fine Friday and Saturday, but on Sunday started to feel light headed. He took his BP and HR and it was 168/88 and 92 respectively. Pt decided to decrease his dose back to 12.5 mg BID. She did not state how he has felt since decreasing dose.  Pt was also needing a letter stating that he is to continue to only work 20 hrs week for the next 3 months. Geneva states Dr. Hui wrote one for him for the last 3 months and now need a new letter for work.     Please advise.    Angely Robert RN

## 2020-06-26 ENCOUNTER — TELEPHONE (OUTPATIENT)
Dept: CARDIOLOGY | Facility: CLINIC | Age: 60
End: 2020-06-26

## 2020-06-26 NOTE — TELEPHONE ENCOUNTER
Date: 6/26/2020    Time of Call: 9:18 AM     Diagnosis:  Heart Failure     [ TORB ] Ordering provider: Dr. Wick  Order: Okay to decrease Coreg dose back to 12.5 mg BID d/t feeling dizzy with increased dose     Order received by: Angely Robert RN     Follow-up/additional notes: Left VM with Geneva, pts daughter, letting her know Dr. Wick is okay with decreasing dose back to 12.5 mg BID. Also let her know that Dr. Romeo office will be mailing a letter to Roge regarding continuing to reduce hours at work.

## 2020-06-26 NOTE — TELEPHONE ENCOUNTER
Per Delano Chinchilla MD.,Cardiology  Echo.due Faraz Lazaro L.P.N.    Left message to return clinic call to .  Faraz Lazaro L.P.N.

## 2020-07-08 DIAGNOSIS — I50.22 CHRONIC SYSTOLIC HEART FAILURE (H): ICD-10-CM

## 2020-07-08 RX ORDER — CARVEDILOL 12.5 MG/1
12.5 TABLET ORAL 2 TIMES DAILY WITH MEALS
Qty: 180 TABLET | Refills: 3 | Status: SHIPPED | OUTPATIENT
Start: 2020-07-08 | End: 2021-01-12

## 2020-07-16 DIAGNOSIS — I50.22 CHRONIC SYSTOLIC HEART FAILURE (H): Primary | ICD-10-CM

## 2020-07-31 DIAGNOSIS — Z78.9 ALCOHOL USE: ICD-10-CM

## 2020-08-04 DIAGNOSIS — I10 ESSENTIAL HYPERTENSION: ICD-10-CM

## 2020-08-04 RX ORDER — AMLODIPINE BESYLATE 5 MG/1
10 TABLET ORAL DAILY
Qty: 60 TABLET | Refills: 3 | Status: SHIPPED | OUTPATIENT
Start: 2020-08-04 | End: 2021-01-07

## 2020-08-04 RX ORDER — LANOLIN ALCOHOL/MO/W.PET/CERES
100 CREAM (GRAM) TOPICAL DAILY
Qty: 30 TABLET | Refills: 0 | Status: SHIPPED | OUTPATIENT
Start: 2020-08-04 | End: 2020-10-23

## 2020-08-04 NOTE — TELEPHONE ENCOUNTER
Received fax on 08/04/20 from [facility] requesting refill(s) for the following medication(s):    1.      Drug:  Amlodipine 10 MG              Patient's Last Cardiology Appointment:  06/18/20   Patient's Next Cardiology Appointment:  08/26/20       Refill encounter routed to Rekha ivey.  Faraz Lazaro L.P.N.

## 2020-08-31 DIAGNOSIS — I50.23 ACUTE ON CHRONIC SYSTOLIC CONGESTIVE HEART FAILURE (H): ICD-10-CM

## 2020-08-31 NOTE — TELEPHONE ENCOUNTER
Pending Prescriptions:                       Disp   Refills    furosemide (LASIX) 20 MG tablet           180 ta*1            Sig: Take 2 tablets (40 mg) by mouth 2 times daily    Last Visit 6/18/2020  Last Filled 7/30/2020  Quantity 180  Req. Received 8/31/2020  REHAN Maldonado

## 2020-09-01 RX ORDER — FUROSEMIDE 20 MG
40 TABLET ORAL 2 TIMES DAILY
Qty: 180 TABLET | Refills: 1 | Status: SHIPPED | OUTPATIENT
Start: 2020-09-01 | End: 2020-12-01

## 2020-09-09 ENCOUNTER — HOSPITAL ENCOUNTER (EMERGENCY)
Facility: CLINIC | Age: 60
Discharge: HOME | End: 2020-09-09
Attending: PHYSICIAN ASSISTANT | Admitting: PHYSICIAN ASSISTANT
Payer: COMMERCIAL

## 2020-09-09 ENCOUNTER — APPOINTMENT (OUTPATIENT)
Dept: CT IMAGING | Facility: CLINIC | Age: 60
End: 2020-09-09
Attending: PHYSICIAN ASSISTANT
Payer: COMMERCIAL

## 2020-09-09 VITALS
OXYGEN SATURATION: 97 % | RESPIRATION RATE: 16 BRPM | WEIGHT: 200 LBS | BODY MASS INDEX: 26.51 KG/M2 | DIASTOLIC BLOOD PRESSURE: 77 MMHG | HEART RATE: 78 BPM | HEIGHT: 73 IN | TEMPERATURE: 97.6 F | SYSTOLIC BLOOD PRESSURE: 146 MMHG

## 2020-09-09 DIAGNOSIS — M54.9 BACK PAIN: ICD-10-CM

## 2020-09-09 DIAGNOSIS — N32.89 BLADDER WALL THICKENING: ICD-10-CM

## 2020-09-09 DIAGNOSIS — R31.9 HEMATURIA: ICD-10-CM

## 2020-09-09 LAB
ALBUMIN SERPL-MCNC: 3.6 G/DL (ref 3.4–5)
ALBUMIN UR-MCNC: 30 MG/DL
ALP SERPL-CCNC: 82 U/L (ref 40–150)
ALT SERPL W P-5'-P-CCNC: 23 U/L (ref 0–70)
ANION GAP SERPL CALCULATED.3IONS-SCNC: 6 MMOL/L (ref 3–14)
APPEARANCE UR: CLEAR
AST SERPL W P-5'-P-CCNC: 20 U/L (ref 0–45)
BASOPHILS # BLD AUTO: 0.1 10E9/L (ref 0–0.2)
BASOPHILS NFR BLD AUTO: 0.6 %
BILIRUB SERPL-MCNC: 0.8 MG/DL (ref 0.2–1.3)
BILIRUB UR QL STRIP: NEGATIVE
BUN SERPL-MCNC: 13 MG/DL (ref 7–30)
CALCIUM SERPL-MCNC: 9.1 MG/DL (ref 8.5–10.1)
CHLORIDE SERPL-SCNC: 104 MMOL/L (ref 94–109)
CO2 SERPL-SCNC: 28 MMOL/L (ref 20–32)
COLOR UR AUTO: YELLOW
CREAT SERPL-MCNC: 0.81 MG/DL (ref 0.66–1.25)
DIFFERENTIAL METHOD BLD: NORMAL
EOSINOPHIL # BLD AUTO: 0.2 10E9/L (ref 0–0.7)
EOSINOPHIL NFR BLD AUTO: 1.9 %
ERYTHROCYTE [DISTWIDTH] IN BLOOD BY AUTOMATED COUNT: 12.2 % (ref 10–15)
GFR SERPL CREATININE-BSD FRML MDRD: >90 ML/MIN/{1.73_M2}
GLUCOSE SERPL-MCNC: 87 MG/DL (ref 70–99)
GLUCOSE UR STRIP-MCNC: NEGATIVE MG/DL
HCT VFR BLD AUTO: 43.4 % (ref 40–53)
HGB BLD-MCNC: 14.6 G/DL (ref 13.3–17.7)
HGB UR QL STRIP: ABNORMAL
IMM GRANULOCYTES # BLD: 0 10E9/L (ref 0–0.4)
IMM GRANULOCYTES NFR BLD: 0.4 %
KETONES UR STRIP-MCNC: NEGATIVE MG/DL
LEUKOCYTE ESTERASE UR QL STRIP: NEGATIVE
LIPASE SERPL-CCNC: 35 U/L (ref 73–393)
LYMPHOCYTES # BLD AUTO: 1.2 10E9/L (ref 0.8–5.3)
LYMPHOCYTES NFR BLD AUTO: 14.7 %
MCH RBC QN AUTO: 32.7 PG (ref 26.5–33)
MCHC RBC AUTO-ENTMCNC: 33.6 G/DL (ref 31.5–36.5)
MCV RBC AUTO: 97 FL (ref 78–100)
MONOCYTES # BLD AUTO: 0.6 10E9/L (ref 0–1.3)
MONOCYTES NFR BLD AUTO: 8 %
MUCOUS THREADS #/AREA URNS LPF: PRESENT /LPF
NEUTROPHILS # BLD AUTO: 5.9 10E9/L (ref 1.6–8.3)
NEUTROPHILS NFR BLD AUTO: 74.4 %
NITRATE UR QL: NEGATIVE
NRBC # BLD AUTO: 0 10*3/UL
NRBC BLD AUTO-RTO: 0 /100
PH UR STRIP: 7 PH (ref 5–7)
PLATELET # BLD AUTO: 222 10E9/L (ref 150–450)
POTASSIUM SERPL-SCNC: 3.9 MMOL/L (ref 3.4–5.3)
PROT SERPL-MCNC: 8 G/DL (ref 6.8–8.8)
RBC # BLD AUTO: 4.47 10E12/L (ref 4.4–5.9)
RBC #/AREA URNS AUTO: 16 /HPF (ref 0–2)
SODIUM SERPL-SCNC: 138 MMOL/L (ref 133–144)
SOURCE: ABNORMAL
SP GR UR STRIP: 1.01 (ref 1–1.03)
UROBILINOGEN UR STRIP-MCNC: 0 MG/DL (ref 0–2)
WBC # BLD AUTO: 7.9 10E9/L (ref 4–11)
WBC #/AREA URNS AUTO: 7 /HPF (ref 0–5)

## 2020-09-09 PROCEDURE — 96376 TX/PRO/DX INJ SAME DRUG ADON: CPT | Performed by: PHYSICIAN ASSISTANT

## 2020-09-09 PROCEDURE — 99285 EMERGENCY DEPT VISIT HI MDM: CPT | Mod: 25 | Performed by: PHYSICIAN ASSISTANT

## 2020-09-09 PROCEDURE — 25000128 H RX IP 250 OP 636: Performed by: PHYSICIAN ASSISTANT

## 2020-09-09 PROCEDURE — 25800030 ZZH RX IP 258 OP 636: Performed by: PHYSICIAN ASSISTANT

## 2020-09-09 PROCEDURE — 80053 COMPREHEN METABOLIC PANEL: CPT | Performed by: PHYSICIAN ASSISTANT

## 2020-09-09 PROCEDURE — 74176 CT ABD & PELVIS W/O CONTRAST: CPT

## 2020-09-09 PROCEDURE — 96361 HYDRATE IV INFUSION ADD-ON: CPT | Performed by: PHYSICIAN ASSISTANT

## 2020-09-09 PROCEDURE — 96374 THER/PROPH/DIAG INJ IV PUSH: CPT | Performed by: PHYSICIAN ASSISTANT

## 2020-09-09 PROCEDURE — 99285 EMERGENCY DEPT VISIT HI MDM: CPT | Mod: Z6 | Performed by: PHYSICIAN ASSISTANT

## 2020-09-09 PROCEDURE — 96375 TX/PRO/DX INJ NEW DRUG ADDON: CPT | Performed by: PHYSICIAN ASSISTANT

## 2020-09-09 PROCEDURE — 83690 ASSAY OF LIPASE: CPT | Performed by: PHYSICIAN ASSISTANT

## 2020-09-09 PROCEDURE — 81001 URINALYSIS AUTO W/SCOPE: CPT | Performed by: PHYSICIAN ASSISTANT

## 2020-09-09 PROCEDURE — 25000132 ZZH RX MED GY IP 250 OP 250 PS 637: Performed by: PHYSICIAN ASSISTANT

## 2020-09-09 PROCEDURE — 85025 COMPLETE CBC W/AUTO DIFF WBC: CPT | Performed by: PHYSICIAN ASSISTANT

## 2020-09-09 RX ORDER — OXYCODONE HYDROCHLORIDE 5 MG/1
5 TABLET ORAL EVERY 6 HOURS PRN
Qty: 6 TABLET | Refills: 0 | Status: SHIPPED | OUTPATIENT
Start: 2020-09-09 | End: 2021-03-01

## 2020-09-09 RX ORDER — ACETAMINOPHEN 500 MG
1000 TABLET ORAL ONCE
Status: COMPLETED | OUTPATIENT
Start: 2020-09-09 | End: 2020-09-09

## 2020-09-09 RX ORDER — ONDANSETRON 2 MG/ML
4 INJECTION INTRAMUSCULAR; INTRAVENOUS ONCE
Status: COMPLETED | OUTPATIENT
Start: 2020-09-09 | End: 2020-09-09

## 2020-09-09 RX ORDER — SODIUM CHLORIDE 9 MG/ML
INJECTION, SOLUTION INTRAVENOUS CONTINUOUS
Status: DISCONTINUED | OUTPATIENT
Start: 2020-09-09 | End: 2020-09-09 | Stop reason: HOSPADM

## 2020-09-09 RX ORDER — HYDROMORPHONE HYDROCHLORIDE 1 MG/ML
0.5 INJECTION, SOLUTION INTRAMUSCULAR; INTRAVENOUS; SUBCUTANEOUS
Status: COMPLETED | OUTPATIENT
Start: 2020-09-09 | End: 2020-09-09

## 2020-09-09 RX ADMIN — ONDANSETRON 4 MG: 2 INJECTION INTRAMUSCULAR; INTRAVENOUS at 16:39

## 2020-09-09 RX ADMIN — HYDROMORPHONE HYDROCHLORIDE 0.5 MG: 1 INJECTION, SOLUTION INTRAMUSCULAR; INTRAVENOUS; SUBCUTANEOUS at 15:50

## 2020-09-09 RX ADMIN — ACETAMINOPHEN 1000 MG: 500 TABLET, FILM COATED ORAL at 18:16

## 2020-09-09 RX ADMIN — SODIUM CHLORIDE 1000 ML: 9 INJECTION, SOLUTION INTRAVENOUS at 15:50

## 2020-09-09 RX ADMIN — HYDROMORPHONE HYDROCHLORIDE 0.5 MG: 1 INJECTION, SOLUTION INTRAMUSCULAR; INTRAVENOUS; SUBCUTANEOUS at 16:40

## 2020-09-09 ASSESSMENT — ENCOUNTER SYMPTOMS
VOMITING: 0
DYSURIA: 0
DIFFICULTY URINATING: 0
HEMATURIA: 1
NAUSEA: 0
ACTIVITY CHANGE: 0
APPETITE CHANGE: 0
ABDOMINAL PAIN: 1
RESPIRATORY NEGATIVE: 1
FEVER: 0
FREQUENCY: 0
CARDIOVASCULAR NEGATIVE: 1
ABDOMINAL DISTENTION: 0
ANAL BLEEDING: 0
BLOOD IN STOOL: 0
CONSTIPATION: 0
FLANK PAIN: 1
NEUROLOGICAL NEGATIVE: 1
BACK PAIN: 1
EYES NEGATIVE: 1
DIARRHEA: 0
CONSTITUTIONAL NEGATIVE: 1
FATIGUE: 0
PSYCHIATRIC NEGATIVE: 1

## 2020-09-09 ASSESSMENT — MIFFLIN-ST. JEOR: SCORE: 1766.07

## 2020-09-09 NOTE — LETTER
September 9, 2020      To Whom It May Concern:      Roge Agarwal was seen in our Emergency Department today, 09/09/20.  Please excuse patient from work tomorrow.  Patient can return to work on 9/11/20 as long as the symptoms improve.    Sincerely,        Faina Parra PA-C

## 2020-09-09 NOTE — ED PROVIDER NOTES
"  History     Chief Complaint   Patient presents with     Flank Pain     low left back pain; worsening since Sat     HPI  Roge Agarwal is a 60 year old male with history of hematuria, atrial flutter with ablation, atrial fibrillation, systolic congestive heart failure, traumatic amputation finger of right hand, multiple-type hyperlipidemia, alcohol use, essential hypertension, cerebral hemorrhage, and tobacco abuse who presents to the Emergency Room today with daughter for right-sided flank pain, back pain, abdominal \"irritation,\" and hematuria that started about 5 days ago. Patient states that he does not have history of kidney stones, but has seen a urologist for hematuria in 2017 where he had a cystoscopy and was supposed to have a CT scan of the kidneys for ruling out more issues. Patient notes that the pain in the back has been persistent and worsening. Patient denies fevers, rash, testicular pain/swelling, penile pain/swelling, nausea/vomiting/diarrhea, bloody or black tarry stools, loss of bowel or bladder function, saddle anesthesia, numbness/tingling in the lower extremities or radiation of pain down the legs, chest pain, shortness of breath, headache, dizziness, fevers, URI symptoms, cough, or exposure to covid 19.     Allergies:  No Known Allergies    Problem List:    Patient Active Problem List    Diagnosis Date Noted     S/P ablation of atrial flutter 02/25/2020     Priority: Medium     Atrial flutter, unspecified type (H) 01/03/2020     Priority: Medium     Added automatically from request for surgery [id number]       Atrial flutter (H) 01/02/2020     Priority: Medium     Atrial flutter with rapid ventricular response (H) 12/31/2019     Priority: Medium     Atrial fibrillation with RVR (H) 12/31/2019     Priority: Medium     Elevated troponin 12/31/2019     Priority: Medium     Elevated d-dimer 12/31/2019     Priority: Medium     Atypical chest pain 12/31/2019     Priority: Medium     Elevated lactic " acid level 12/31/2019     Priority: Medium     Epistaxis 06/02/2017     Priority: Medium     Systolic CHF (H)      Priority: Medium     EF of 15-30% 4/2016, up to 50% by 6/1/2017.       Non-ischemic cardiomyopathy (H) 04/06/2016     Priority: Medium     Traumatic amputation of finger of right hand 04/06/2016     Priority: Medium     Atrial fibrillation (H) 01/01/2016     Priority: Medium     One time episode noted after his finger accident, required cardioversion       Multiple-type hyperlipidemia 06/21/2011     Priority: Medium     PVCs (premature ventricular contractions) 11/13/2008     Priority: Medium     Overview:   Frequent.  Seen on EKG 2007       Alcohol use 12/16/2007     Priority: Medium     Essential hypertension 12/16/2007     Priority: Medium     Cerebral hemorrhage (H) 12/16/2007     Priority: Medium     Overview:   CT HEAD W/O CONTRAST, 12/14/07    IMPRESSION:  1.  THERE IS A 2.3 X 1.5 CM PARENCHYMAL HEMORRHAGE IN THE RIGHT BASAL  GANGLIA WITH SURROUNDING EDEMA.       Tobacco abuse 12/16/2007     Priority: Medium        Past Medical History:    Past Medical History:   Diagnosis Date     Atrial fibrillation (H) 2016     Atrial fibrillation with RVR (H) 4/6/2016     Hemorrhagic stroke (H) 12/16/2007     HTN (hypertension)      Systolic CHF (H)        Past Surgical History:    Past Surgical History:   Procedure Laterality Date     AMPUTATE FINGER(S) Right     Right 3rd distal finger after work injury     ANESTHESIA CARDIOVERSION N/A 2/25/2020    Procedure: CARDIOVERSION;  Surgeon: GENERIC ANESTHESIA PROVIDER;  Location:  OR     EP ABLATION ATRIAL FLUTTER N/A 2/25/2020    Procedure: EP ABLATION ATRIAL FLUTTER;  Surgeon: Jan Romeo MD;  Location:  [facility]       Family History:    Family History   Problem Relation Age of Onset     Heart Disease Mother      Osteoporosis Mother      LUNG DISEASE Mother      Heart Disease Father      Hypertension Brother        Social History:  Marital  "Status:  Single [1]  Social History     Tobacco Use     Smoking status: Never Smoker     Smokeless tobacco: Current User     Types: Chew   Substance Use Topics     Alcohol use: Not Currently     Comment: 12/30/2019     Drug use: Not Currently        Medications:    amLODIPine (NORVASC) 5 MG tablet  carvedilol (COREG) 12.5 MG tablet  dabigatran ANTICOAGULANT (PRADAXA) 150 MG capsule  digoxin (LANOXIN) 125 MCG tablet  folic acid (FOLVITE) 1 MG tablet  furosemide (LASIX) 20 MG tablet  multivitamin w/minerals (THERA-VIT-M) tablet  oxyCODONE (ROXICODONE) 5 MG tablet  sacubitril-valsartan (ENTRESTO) 49-51 MG per tablet  thiamine (B-1) 100 MG tablet          Review of Systems   Constitutional: Negative.  Negative for activity change, appetite change, fatigue and fever.   HENT: Negative.    Eyes: Negative.    Respiratory: Negative.    Cardiovascular: Negative.    Gastrointestinal: Positive for abdominal pain (slight). Negative for abdominal distention, anal bleeding, blood in stool, constipation, diarrhea, nausea and vomiting.   Genitourinary: Positive for flank pain and hematuria. Negative for decreased urine volume, difficulty urinating, discharge, dysuria, frequency, genital sores, penile pain, penile swelling, scrotal swelling, testicular pain and urgency.   Musculoskeletal: Positive for back pain.   Skin: Negative.  Negative for rash.   Neurological: Negative.    Psychiatric/Behavioral: Negative.    All other systems reviewed and are negative.      Physical Exam   BP: (!) 146/77  Pulse: 78  Temp: 97.6  F (36.4  C)  Resp: 16  Height: 185.4 cm (6' 1\")  Weight: 90.7 kg (200 lb)  SpO2: 97 %      Physical Exam  Vitals signs and nursing note reviewed.   Constitutional:       General: He is not in acute distress.     Appearance: Normal appearance. He is normal weight. He is not ill-appearing or toxic-appearing.      Comments: Patient appears uncomfortable in exam room, but not in acute distress    HENT:      Head: " Normocephalic.   Cardiovascular:      Rate and Rhythm: Normal rate and regular rhythm.      Pulses: Normal pulses.      Heart sounds: Normal heart sounds.   Pulmonary:      Effort: Pulmonary effort is normal.      Breath sounds: Normal breath sounds.   Abdominal:      General: Abdomen is flat. Bowel sounds are normal. There is no distension.      Palpations: Abdomen is soft. There is no mass.      Tenderness: There is no abdominal tenderness. There is no right CVA tenderness, left CVA tenderness, guarding or rebound.   Musculoskeletal: Normal range of motion.         General: No tenderness.      Lumbar back: He exhibits pain. He exhibits normal range of motion, no tenderness, no bony tenderness, no swelling, no deformity, no laceration and no spasm.        Back:       Right lower leg: No edema.      Left lower leg: No edema.      Comments: Positive pain with flexion and extension of the back, but no pain with twisting. Patient has full range of motion in back in all directions. No pain with straight leg raise or with flexion of the great toes bilaterally. Muscle strength 5/5 to bilateral lower extremities.    Skin:     General: Skin is warm.      Capillary Refill: Capillary refill takes less than 2 seconds.      Findings: No rash.   Neurological:      General: No focal deficit present.      Mental Status: He is alert and oriented to person, place, and time.      GCS: GCS eye subscore is 4. GCS verbal subscore is 5. GCS motor subscore is 6.      Sensory: Sensation is intact.      Motor: Motor function is intact. No weakness.      Gait: Gait is intact.      Deep Tendon Reflexes:      Reflex Scores:       Patellar reflexes are 2+ on the right side and 2+ on the left side.  Psychiatric:         Mood and Affect: Mood normal.         Behavior: Behavior normal.         Thought Content: Thought content normal.         Judgment: Judgment normal.         ED Course        Procedures              Critical Care time:  none                Results for orders placed or performed during the hospital encounter of 09/09/20 (from the past 24 hour(s))   CBC with platelets differential   Result Value Ref Range    WBC 7.9 4.0 - 11.0 10e9/L    RBC Count 4.47 4.4 - 5.9 10e12/L    Hemoglobin 14.6 13.3 - 17.7 g/dL    Hematocrit 43.4 40.0 - 53.0 %    MCV 97 78 - 100 fl    MCH 32.7 26.5 - 33.0 pg    MCHC 33.6 31.5 - 36.5 g/dL    RDW 12.2 10.0 - 15.0 %    Platelet Count 222 150 - 450 10e9/L    Diff Method Automated Method     % Neutrophils 74.4 %    % Lymphocytes 14.7 %    % Monocytes 8.0 %    % Eosinophils 1.9 %    % Basophils 0.6 %    % Immature Granulocytes 0.4 %    Nucleated RBCs 0 0 /100    Absolute Neutrophil 5.9 1.6 - 8.3 10e9/L    Absolute Lymphocytes 1.2 0.8 - 5.3 10e9/L    Absolute Monocytes 0.6 0.0 - 1.3 10e9/L    Absolute Eosinophils 0.2 0.0 - 0.7 10e9/L    Absolute Basophils 0.1 0.0 - 0.2 10e9/L    Abs Immature Granulocytes 0.0 0 - 0.4 10e9/L    Absolute Nucleated RBC 0.0    Comprehensive metabolic panel   Result Value Ref Range    Sodium 138 133 - 144 mmol/L    Potassium 3.9 3.4 - 5.3 mmol/L    Chloride 104 94 - 109 mmol/L    Carbon Dioxide 28 20 - 32 mmol/L    Anion Gap 6 3 - 14 mmol/L    Glucose 87 70 - 99 mg/dL    Urea Nitrogen 13 7 - 30 mg/dL    Creatinine 0.81 0.66 - 1.25 mg/dL    GFR Estimate >90 >60 mL/min/[1.73_m2]    GFR Estimate If Black >90 >60 mL/min/[1.73_m2]    Calcium 9.1 8.5 - 10.1 mg/dL    Bilirubin Total 0.8 0.2 - 1.3 mg/dL    Albumin 3.6 3.4 - 5.0 g/dL    Protein Total 8.0 6.8 - 8.8 g/dL    Alkaline Phosphatase 82 40 - 150 U/L    ALT 23 0 - 70 U/L    AST 20 0 - 45 U/L   Lipase   Result Value Ref Range    Lipase 35 (L) 73 - 393 U/L   UA with Microscopic   Result Value Ref Range    Color Urine Yellow     Appearance Urine Clear     Glucose Urine Negative NEG^Negative mg/dL    Bilirubin Urine Negative NEG^Negative    Ketones Urine Negative NEG^Negative mg/dL    Specific Gravity Urine 1.006 1.003 - 1.035    Blood Urine Large (A)  NEG^Negative    pH Urine 7.0 5.0 - 7.0 pH    Protein Albumin Urine 30 (A) NEG^Negative mg/dL    Urobilinogen mg/dL 0.0 0.0 - 2.0 mg/dL    Nitrite Urine Negative NEG^Negative    Leukocyte Esterase Urine Negative NEG^Negative    Source Midstream Urine     WBC Urine 7 (H) 0 - 5 /HPF    RBC Urine 16 (H) 0 - 2 /HPF    Mucous Urine Present (A) NEG^Negative /LPF   CT Abdomen Pelvis w/o Contrast    Narrative    EXAM: CT ABDOMEN PELVIS W/O CONTRAST  LOCATION: [facility]  DATE/TIME: 9/9/2020 4:57 PM    INDICATION: Hematuria with back and flank pain.  COMPARISON: 10/05/2017  TECHNIQUE: CT scan of the abdomen and pelvis was performed without IV contrast. Multiplanar reformats were obtained. Dose reduction techniques were used.  CONTRAST: None.    FINDINGS:   LOWER CHEST: Normal.    HEPATOBILIARY: Normal.  PANCREAS: Normal.  SPLEEN: Normal.  ADRENAL GLANDS: Normal.    KIDNEYS/BLADDER: No change in the 4 x 3 cm cyst posterior lip of mid kidney. A tiny, thin rim of posterior wall calcification or milk of calcium also unchanged. No stones or hydronephrosis.    Left kidney remains normal.    Right posterior bladder diverticulum again identified. There is mild diffuse bladder wall thickening including slight slight thickening of the diverticular wall which is minimally changed.    BOWEL: No obstruction or inflammatory change.  LYMPH NODES: Normal.  VASCULATURE: Unremarkable.  PELVIC ORGANS: Normal.    MUSCULOSKELETAL: Fat-containing inguinal hernias bilaterally unchanged.  Bony structures unremarkable.      Impression    IMPRESSION:   1.  No urinary tract stones or hydronephrosis. Stable cyst right kidney.  2.  Right-sided bladder diverticulum with mild bladder wall thickening.         Medications   0.9% sodium chloride BOLUS (0 mLs Intravenous Stopped 9/9/20 3517)   HYDROmorphone (PF) (DILAUDID) injection 0.5 mg (0.5 mg Intravenous Given 9/9/20 1550)   HYDROmorphone (PF) (DILAUDID) injection 0.5 mg (0.5 mg Intravenous  "Given 9/9/20 1640)   ondansetron (ZOFRAN) injection 4 mg (4 mg Intravenous Given 9/9/20 1639)   acetaminophen (TYLENOL) tablet 1,000 mg (1,000 mg Oral Given 9/9/20 1816)       Assessments & Plan (with Medical Decision Making)     I have reviewed the nursing notes.    I have reviewed the findings, diagnosis, plan and need for follow up with the patient.    Roge Agarwal is a 60 year old male with history of hematuria, atrial flutter with ablation, atrial fibrillation, systolic congestive heart failure, traumatic amputation finger of right hand, multiple-type hyperlipidemia, alcohol use, essential hypertension, cerebral hemorrhage, and tobacco abuse who presents to the Emergency Room today with daughter for right-sided flank pain, back pain, abdominal \"irritation,\" and hematuria that started about 5 days ago. Patient states that he does not have history of kidney stones, but has seen a urologist for hematuria in 2017 where he had a cystoscopy and was supposed to have a CT scan of the kidneys for ruling out more issues. Patient notes that the pain in the back has been persistent and worsening. Patient denies fevers, rash, testicular pain/swelling, penile pain/swelling, nausea/vomiting/diarrhea, bloody or black tarry stools, loss of bowel or bladder function, saddle anesthesia, numbness/tingling in the lower extremities or radiation of pain down the legs, chest pain, shortness of breath, headache, dizziness, fevers, URI symptoms, cough, or exposure to covid 19.     See exam findings above.  UA shows hematuria without infection.  CBC within normal limits, comprehensive metabolic panel within normal limits. Lipase slightly low.  CT scan of abdomen pelvis without contrast shows no urinary tract stones or hydronephrosis.  Stable cyst right kidney.  Right-sided bladder diverticulum with mild bladder wall thickening.  Patient was given Tylenol and Dilaudid here in the emergency department for pain control with some relief.  " Patient sent home with referral to urology, informed increase fluids, rest return to the emergency department symptoms worsen or change in prescription for oxycodone to take if pain worsens or changes.  Patient discharged in stable condition and informed to follow-up with urology regarding persistent hematuria and now back and flank pain with abnormal finding on CT scan. No indication for antibiotics at this time. Patient afebrile in no acute distress and non toxic in appearance with normal labs.     Discharge Medication List as of 9/9/2020  6:58 PM      START taking these medications    Details   oxyCODONE (ROXICODONE) 5 MG tablet Take 1 tablet (5 mg) by mouth every 6 hours as needed for breakthrough pain or pain, Disp-6 tablet,R-0, E-Prescribe             Final diagnoses:   Hematuria   Back pain   Bladder wall thickening - seen on CT scan       9/9/2020   [facility] EMERGENCY DEPARTMENT     Faina Parra PA-C  09/09/20 [id number]

## 2020-09-09 NOTE — ED AVS SNAPSHOT
[facility] Emergency Department  [address] [address]  Phone:  [phone]  Fax:  [phone]                                    Roge Agarwal   MRN: [id number]    Department:  [facility] Emergency Department   Date of Visit:  9/9/2020           After Visit Summary Signature Page    I have received my discharge instructions, and my questions have been answered. I have discussed any challenges I see with this plan with the nurse or doctor.    ..........................................................................................................................................  Patient/Patient Representative Signature      ..........................................................................................................................................  Patient Representative Print Name and Relationship to Patient    ..................................................               ................................................  Date                                   Time    ..........................................................................................................................................  Reviewed by Signature/Title    ...................................................              ..............................................  Date                                               Time          22EPIC Rev 08/18

## 2020-09-09 NOTE — DISCHARGE INSTRUCTIONS
Follow-up with urology.    Use medication as directed.  Caution with medication can cause sedation.  Do not drive within 8 hours of taking this medication.    Follow-up with your primary care doctor for recheck within the next 5 to 7 days.    Return to the emergency department if worsening back pain, fevers, chills, abdominal pain, nausea or vomiting, painful urination, testicular pain or swelling, or penile pain or swelling occur.

## 2020-09-14 ENCOUNTER — TELEPHONE (OUTPATIENT)
Dept: UROLOGY | Facility: CLINIC | Age: 60
End: 2020-09-14

## 2020-09-14 NOTE — TELEPHONE ENCOUNTER
[facility] Call Center    Phone Message    May a detailed message be left on voicemail: yes     Reason for Call: Other: Geneva calling to schedule an appointment for her father Roge. Geneva says that Roge went to the ED on 9/9/20, and he was told to follow up within the week. Geneva says that Roge still has blood in his urine, and he is having really bad lower back pain as well. Geneva said that the CT Roge had on the 9/9/20 was the first time they were made aware of a cyst on his kidney. Per COVID-19 protocols, it is recommended an encounter be sent over when a concern is blood in the urine. Roge has seen Dr. Courtney previously at the [facility], but not ayone else. Please give Geneva a call back at your earliest convenience to discuss.     Action Taken: Message routed to:  [facility] ([facility] EMANUEL Uro    Travel Screening: Not Applicable

## 2020-09-14 NOTE — TELEPHONE ENCOUNTER
Patient's daughter called and wants patient seen soon  I put him on for pariser next Wednesday.  Justina Seth LPN Staff Nurse

## 2020-09-14 NOTE — TELEPHONE ENCOUNTER
Patient is on blood thinners and has blood on and off since December  Saw naya in [place]. But wants someone soon Justina Seth LPN Staff Nurse

## 2020-09-15 ENCOUNTER — PRE VISIT (OUTPATIENT)
Dept: UROLOGY | Facility: CLINIC | Age: 60
End: 2020-09-15

## 2020-09-15 NOTE — TELEPHONE ENCOUNTER
Visit Type : Virtual-Return    Hx/Sx: Hematuria    Records/Orders: Yes    Pt Contacted: n/a    At [phone]   No e-mail address on record

## 2020-09-23 ENCOUNTER — VIRTUAL VISIT (OUTPATIENT)
Dept: UROLOGY | Facility: CLINIC | Age: 60
End: 2020-09-23
Payer: COMMERCIAL

## 2020-09-23 DIAGNOSIS — N32.89 BLADDER WALL THICKENING: ICD-10-CM

## 2020-09-23 DIAGNOSIS — R31.9 HEMATURIA, UNSPECIFIED TYPE: ICD-10-CM

## 2020-09-23 DIAGNOSIS — M54.9 BACK PAIN, UNSPECIFIED BACK LOCATION, UNSPECIFIED BACK PAIN LATERALITY, UNSPECIFIED CHRONICITY: ICD-10-CM

## 2020-09-23 NOTE — NURSING NOTE
Called 09/23/20 at 12:55 PM and went to voicemail x2.  Unable to leave message.    Clayton Vanessa, EMT

## 2020-09-23 NOTE — LETTER
9/23/2020       RE: Roge Agarwal  [id number] [address]     Dear Colleague,    Thank you for referring your patient, Roge Agarwal, to the [facility] UROLOGY AND INST FOR PROSTATE AND UROLOGIC CANCERS at [facility]. Please see a copy of my visit note below.    Couldn't reach this patient today    Again, thank you for allowing me to participate in the care of your patient.      Sincerely,    Aaron Cabrera MD

## 2020-09-23 NOTE — LETTER
Date:September 26, 2020      Provider requested that no letter be sent. Do not send.       [facility] Health Information

## 2020-09-28 ENCOUNTER — PRE VISIT (OUTPATIENT)
Dept: UROLOGY | Facility: CLINIC | Age: 60
End: 2020-09-28

## 2020-09-28 ENCOUNTER — PATIENT OUTREACH (OUTPATIENT)
Dept: CARDIOLOGY | Facility: CLINIC | Age: 60
End: 2020-09-28

## 2020-09-28 NOTE — TELEPHONE ENCOUNTER
Daughter Geneva left  stating patient needed to reschedule his 8/26/20 appt with Dr. Wick that was cancelled. Called Geneva back and left  and provided her the scheduling phone number to call to set up an appt either in [place] or at the .   Angely Robert RN

## 2020-09-28 NOTE — TELEPHONE ENCOUNTER
Visit Type : New-Clinic    Hx/Sx: Kidney cyst/ hematuria     Records/Orders: Yes    Pt Contacted: n/a    At Rooming: Normal

## 2020-10-02 ENCOUNTER — OFFICE VISIT (OUTPATIENT)
Dept: UROLOGY | Facility: CLINIC | Age: 60
End: 2020-10-02
Payer: COMMERCIAL

## 2020-10-02 VITALS
HEIGHT: 73 IN | HEART RATE: 75 BPM | SYSTOLIC BLOOD PRESSURE: 165 MMHG | WEIGHT: 195 LBS | BODY MASS INDEX: 25.84 KG/M2 | DIASTOLIC BLOOD PRESSURE: 95 MMHG

## 2020-10-02 DIAGNOSIS — R10.9 FLANK PAIN: ICD-10-CM

## 2020-10-02 DIAGNOSIS — M54.9 BACK PAIN, UNSPECIFIED BACK LOCATION, UNSPECIFIED BACK PAIN LATERALITY, UNSPECIFIED CHRONICITY: ICD-10-CM

## 2020-10-02 DIAGNOSIS — R31.9 HEMATURIA, UNSPECIFIED TYPE: Primary | ICD-10-CM

## 2020-10-02 PROCEDURE — 99213 OFFICE O/P EST LOW 20 MIN: CPT | Mod: 25 | Performed by: STUDENT IN AN ORGANIZED HEALTH CARE EDUCATION/TRAINING PROGRAM

## 2020-10-02 PROCEDURE — 52204 CYSTOSCOPY W/BIOPSY(S): CPT | Performed by: STUDENT IN AN ORGANIZED HEALTH CARE EDUCATION/TRAINING PROGRAM

## 2020-10-02 PROCEDURE — 88305 TISSUE EXAM BY PATHOLOGIST: CPT | Performed by: PATHOLOGY

## 2020-10-02 PROCEDURE — 88112 CYTOPATH CELL ENHANCE TECH: CPT | Performed by: PATHOLOGY

## 2020-10-02 RX ORDER — LIDOCAINE HYDROCHLORIDE 20 MG/ML
10 JELLY TOPICAL ONCE
Status: COMPLETED | OUTPATIENT
Start: 2020-10-02 | End: 2020-10-02

## 2020-10-02 RX ORDER — OXYCODONE HYDROCHLORIDE 5 MG/1
5 TABLET ORAL EVERY 6 HOURS PRN
Qty: 6 TABLET | Refills: 0 | Status: SHIPPED | OUTPATIENT
Start: 2020-10-02 | End: 2021-03-01

## 2020-10-02 RX ADMIN — LIDOCAINE HYDROCHLORIDE 10 ML: 20 JELLY TOPICAL at 17:26

## 2020-10-02 ASSESSMENT — MIFFLIN-ST. JEOR: SCORE: 1743.39

## 2020-10-02 ASSESSMENT — PAIN SCALES - GENERAL: PAINLEVEL: MODERATE PAIN (5)

## 2020-10-02 NOTE — PROGRESS NOTES
Chief Complaint   Patient presents with     RECHECK     Kidney cyst/ hematuria       Loyda Lares MA

## 2020-10-02 NOTE — NURSING NOTE
"Chief Complaint   Patient presents with     RECHECK     Kidney cyst/ hematuria       Blood pressure (!) 165/95, pulse 75, height 1.854 m (6' 1\"), weight 88.5 kg (195 lb). Body mass index is 25.73 kg/m .    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     Atrial flutter with rapid ventricular response (H)     Atrial fibrillation with RVR (H)     Elevated troponin     Elevated d-dimer     Atypical chest pain     Elevated lactic acid level     Atrial flutter (H)     Atrial flutter, unspecified type (H)     S/P ablation of atrial flutter       No Known Allergies    Current Outpatient Medications   Medication Sig Dispense Refill     amLODIPine (NORVASC) 5 MG tablet Take 2 tablets (10 mg) by mouth daily 60 tablet 3     carvedilol (COREG) 12.5 MG tablet Take 1 tablet (12.5 mg) by mouth 2 times daily (with meals) 180 tablet 3     dabigatran ANTICOAGULANT (PRADAXA) 150 MG capsule Take 1 capsule (150 mg) by mouth 2 times daily Store in original 's bottle or blister pack; use within 120 days of opening. 180 capsule 3     digoxin (LANOXIN) 125 MCG tablet Take 1 tablet (125 mcg) by mouth daily 90 tablet 3     folic acid (FOLVITE) 1 MG tablet Take 1 tablet (1 mg) by mouth daily 90 tablet 3     furosemide (LASIX) 20 MG tablet Take 2 tablets (40 mg) by mouth 2 times daily 180 tablet 1     multivitamin w/minerals (THERA-VIT-M) tablet Take 1 tablet by mouth daily 30 tablet 11     oxyCODONE (ROXICODONE) 5 MG tablet Take 1 tablet (5 mg) by mouth every 6 hours as needed for breakthrough pain or pain 6 tablet 0     sacubitril-valsartan (ENTRESTO) 49-51 MG per tablet Take 1 tablet by mouth 2 times daily 60 tablet 11     " thiamine (B-1) 100 MG tablet Take 1 tablet (100 mg) by mouth daily 30 tablet 0       Social History     Tobacco Use     Smoking status: Never Smoker     Smokeless tobacco: Current User     Types: Chew   Substance Use Topics     Alcohol use: Not Currently     Comment: 2019     Drug use: Not Currently       Invasive Procedure Safety Checklist:    Procedure: Cystoscopy    Action: Complete sections and checkboxes as appropriate.    Pre-procedure:  1. Patient ID Verified with 2 identifiers (Helga and  or MRN) : YES    2. Procedure and site verified with patient/designee (when able) : YES    3. Accurate consent documentation in medical record : YES    4. H&P (or appropriate assessment) documented in medical record : N/A  H&P must be up to 30 days prior to procedure an updated within 24 hours of                 Procedure as applicable.     5. Relevant diagnostic and radiology test results appropriately labeled and displayed as applicable : YES    6. Blood products, implants, devices, and/or special equipment available for the procedure as applicable : YES    7. Procedure site(s) marked with provider initials [Exclusions: none] : NO    8. Marking not required. Reason : Yes  Procedure does not require site marking    Time Out:     Time-Out performed immediately prior to starting procedure, including verbal and active participation of all team members addressing: YES    1. Correct patient identity.  2. Confirmed that the correct side and site are marked.  3. An accurate procedure to be done.  4. Agreement on the procedure to be done.  5. Correct patient position.  6. Relevant images and results are properly labeled and appropriately displayed.  7. The need to administer antibiotics or fluids for irrigation purposes during the procedure as applicable.  8. Safety precautions based on patient history or medication use.    During Procedure: Verification of correct person, site, and procedure occurs any time the  responsibility for care of the patient is transferred to another member of the care team.    The following medication was given:     MEDICATION: Lidocaine Uro-Jet 2% 200mg (20mg/mL)  ROUTE: Urethral   SITE: Urethra   DOSE: 10mL  LOT #: [id number]  : [employer]   EXPIRATION DATE: 6/22  NDC#: [address]-39   Was there drug waste? No    Prior to injection, verified patient identity using patient's name and date of birth.  Due to injection administration, patient instructed to remain in clinic for 15 minutes  afterwards, and to report any adverse reaction to me immediately.    Drug Amount Wasted:  None.  Vial/Syringe: Single dose vial      Loyda Lares CMA  10/2/2020  4:56 PM

## 2020-10-02 NOTE — LETTER
10/2/2020       RE: Roge [place]  [address] [facility] [place] [address]     Dear Colleague,    Thank you for referring your patient, Roge Agarwal, to the [facility] [place] at [facility]. Please see a copy of my visit note below.    Chief Complaint   Patient presents with     RECHECK     Kidney cyst/ hematuria       Loyda Lares MA     Pre-procedure diagnosis: ? Bladder cancer/hematuria  Post procedure diagnosis: Rt POsterior wall Diverticullum with areas of papillary change  Procedure performed: Diagnostic Cystourethroscopy and Bladder Biopsy  Surgeon: Epi Aparicio MD  Anesthesia: local    Indications for procedure: Patient is a 60 year old year old male with a history of bladder cancer/hematuria.  Here today for cysto  Description of procedure: After fully informed voluntary consent was obtained patient was brought into the procedure room, identified and placed in a supine position on the cysto table.  The groin/scrotum were prepped and draped in a sterile fashion with betadine.  A 15F flexible cystoscope was inserted into the urethra and the bladder and urethra examined in a systematic manner.  There was a prominenet diverticulum in the posterior wall on the right side . Inside the diverticulum we could find areas of papillary changes on the most dependent part of the diverticulum.We took biopsies from this area and sent it for pathology.  Ureteric orifices were normal in position and number and effluxing clear urine.  The prostate was 4cm long and showed bilobar hypertrophy.  There was no median lobe.  Distal urethra was normal.  The patient tolerated the procedure well and there were no complications.      Assessment/Plan: Patient with a history of hematuria with a suspicious cystoscopy.   We will obtain a urine cytology today and order a CT Urogram to assess his flank pain in relation to hematuria.          Chief Complaint:   Recurrent  Gross Hematuria           Consult or Referral:     Mr. Roge Agarwal is a 60 year old male seen at the request of Dr. Mary.         History of Present Illness:    Roge Agarwal is a very pleasant 60 year old male who presents with a history of recurrent gross hematuria which he has been having on/off since 2017.He had been briefly evaluated in 2017 with cystoscopy and bladder biopsy by Dr Courtney which was negative. The cystoscopy did show some abnormal mucosa at the diverticulum.  Since then he did not follow up for regular evaluation in view of Insurance issues and then has been having hematuria on and off. The most significant episode  was a few months ago when he came in for an ER visit due to significant back pain associated with the hematuria and  a NCCT did show some bladder thickening.  He continues to have that intermittent pain along with hematuria and when asked points to lower right back.   He chews tobacco but is not a smoker.   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  Has been on PRADAXA for anticoagulation since December 2019 in view of his cardiac history and procedure.         Past Medical History:     Past Medical History:   Diagnosis Date     Atrial fibrillation (H) 2016    One time episode noted after his finger accident, required cardioversion     Atrial fibrillation with RVR (H) 4/6/2016     Hemorrhagic stroke (H) 12/16/2007    2.3 X 1.5 CM PARENCHYMAL HEMORRHAGE IN THE RIGHT BASAL GANGLIA     HTN (hypertension)      Systolic CHF (H)     EF of 15-30% in 2016            Past Surgical History:     Past Surgical History:   Procedure Laterality Date     AMPUTATE FINGER(S) Right     Right 3rd distal finger after work injury     ANESTHESIA CARDIOVERSION N/A 2/25/2020    Procedure: CARDIOVERSION;  Surgeon: GENERIC ANESTHESIA PROVIDER;  Location: [facility] OR     EP ABLATION ATRIAL FLUTTER N/A 2/25/2020    Procedure: EP  ABLATION ATRIAL FLUTTER;  Surgeon: Jan Romeo MD;  Location:  HEART CARDIAC CATH LAB            Medications     Current Outpatient Medications   Medication     amLODIPine (NORVASC) 5 MG tablet     carvedilol (COREG) 12.5 MG tablet     dabigatran ANTICOAGULANT (PRADAXA) 150 MG capsule     digoxin (LANOXIN) 125 MCG tablet     folic acid (FOLVITE) 1 MG tablet     furosemide (LASIX) 20 MG tablet     multivitamin w/minerals (THERA-VIT-M) tablet     oxyCODONE (ROXICODONE) 5 MG tablet     sacubitril-valsartan (ENTRESTO) 49-51 MG per tablet     thiamine (B-1) 100 MG tablet     No current facility-administered medications for this visit.             Family History:     Family History   Problem Relation Age of Onset     Heart Disease Mother      Osteoporosis Mother      LUNG DISEASE Mother      Heart Disease Father      Hypertension Brother             Social History:     Social History     Socioeconomic History     Marital status: Single     Spouse name: Not on file     Number of children: Not on file     Years of education: Not on file     Highest education level: Not on file   Occupational History     Not on file   Social Needs     Financial resource strain: Not on file     Food insecurity     Worry: Not on file     Inability: Not on file     Transportation needs     Medical: Not on file     Non-medical: Not on file   Tobacco Use     Smoking status: Never Smoker     Smokeless tobacco: Current User     Types: Chew   Substance and Sexual Activity     Alcohol use: Not Currently     Comment: 12/30/2019     Drug use: Not Currently     Sexual activity: Not on file   Lifestyle     Physical activity     Days per week: Not on file     Minutes per session: Not on file     Stress: Not on file   Relationships     Social connections     Talks on phone: Not on file     Gets together: Not on file     Attends Latter day service: Not on file     Active member of club or organization: Not on file     Attends meetings of clubs or  "organizations: Not on file     Relationship status: Not on file     Intimate partner violence     Fear of current or ex partner: Not on file     Emotionally abused: Not on file     Physically abused: Not on file     Forced sexual activity: Not on file   Other Topics Concern     Parent/sibling w/ CABG, MI or angioplasty before 65F 55M? Not Asked   Social History Narrative     Not on file            Allergies:   Patient has no known allergies.         Review of Systems:  From intake questionnaire     Skin: negative  Eyes: negative  Ears/Nose/Throat: negative  Respiratory: No shortness of breath, dyspnea on exertion, cough, or hemoptysis  Cardiovascular: No chest pain or palpitations  Gastrointestinal: negative; no nausea/vomiting, constipation or diarrhea  Genitourinary: as per HPI  Musculoskeletal: negative  Neurologic: negative  Psychiatric: negative  Hematologic/Lymphatic/Immunologic: negative  Endocrine: negative         Physical Exam:     Patient is a 60 year old  male   Vitals: Blood pressure (!) 165/95, pulse 75, height 1.854 m (6' 1\"), weight 88.5 kg (195 lb).  Constitutional: Body mass index is 25.73 kg/m .  Alert, no acute distress, oriented, conversant  Eyes: no scleral icterus; extraocular muscles intact, moist conjunctivae  Neck: trachea midline, no thyromegaly  Ears/nose/mouth: throat/mouth:normal, good dentition  Respiratory: no respiratory distress, or pursed lip breathing  Cardiovascular: pulses strong and intact; no obvious jugular venous distension present  Gastrointestinal: soft, nontender, no organomegaly or masses,   Lymphatics: No inguinal adenopathy  Musculoskeletal: extremities normal, no peripheral edema  Skin: no suspicious lesions or rashes  Neuro: Alert, oriented, speech and mentation normal  Psych: affect and mood normal, alert and oriented to person, place and time  Gait: Normal  : deferred to cystoscopy. Normal prostate on IGNACIO      Labs and Pathology:    I reviewed all applicable " laboratory and pathology data and went over findings with patient  Significant for   Lab Results   Component Value Date    CR 0.81 09/09/2020    CR 0.81 06/18/2020    CR 0.98 06/08/2020    CR 1.17 05/11/2020    CR 1.15 05/04/2020    CR 0.86 04/22/2020    CR 0.81 03/06/2020    CR 0.98 02/24/2020    CR 0.90 02/06/2020    CR 1.09 01/27/2020     No results found for: PSA          Imaging:    I directly visualized and reviewed all applicable imaging a with patient.  NCCT from 9/9/2020: Significant for thickened posterior bladder wall and bladder diverticulum.      Outside and Past Medical records:    I spent 10 minutes reviewing outside and past medical records.               Assessment and Plan:     Assessment:   Bladder diverticulum with suspicious looking diverticular mucosa which was biopsied.    Plan:  Cystoscopy and Bladder biopsy done today  CT Urogram   Urine Cytology   We will discuss the results on a subsequent video visit.        Epi Aparicio MD  Urology  [facility]

## 2020-10-02 NOTE — PROGRESS NOTES
Chief Complaint:   Recurrent Gross Hematuria           Consult or Referral:     Mr. Roge Agarwal is a 60 year old male seen at the request of Dr. Mary.         History of Present Illness:    Roge Agarwal is a very pleasant 60 year old male who presents with a history of recurrent gross hematuria which he has been having on/off since 2017.He had been briefly evaluated in 2017 with cystoscopy and bladder biopsy by Dr Courtney which was negative. The cystoscopy did show some abnormal mucosa at the diverticulum.  Since then he did not follow up for regular evaluation in view of Insurance issues and then has been having hematuria on and off. The most significant episode  was a few months ago when he came in for an ER visit due to significant back pain associated with the hematuria and  a NCCT did show some bladder thickening.  He continues to have that intermittent pain along with hematuria and when asked points to lower right back.   He chews tobacco but is not a smoker.   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  Has been on PRADAXA for anticoagulation since December 2019 in view of his cardiac history and procedure.         Past Medical History:     Past Medical History:   Diagnosis Date     Atrial fibrillation (H) 2016    One time episode noted after his finger accident, required cardioversion     Atrial fibrillation with RVR (H) 4/6/2016     Hemorrhagic stroke (H) 12/16/2007    2.3 X 1.5 CM PARENCHYMAL HEMORRHAGE IN THE RIGHT BASAL GANGLIA     HTN (hypertension)      Systolic CHF (H)     EF of 15-30% in 2016            Past Surgical History:     Past Surgical History:   Procedure Laterality Date     AMPUTATE FINGER(S) Right     Right 3rd distal finger after work injury     ANESTHESIA CARDIOVERSION N/A 2/25/2020    Procedure: CARDIOVERSION;  Surgeon: GENERIC ANESTHESIA PROVIDER;  Location: [facility] OR     EP ABLATION ATRIAL FLUTTER  N/A 2/25/2020    Procedure: EP ABLATION ATRIAL FLUTTER;  Surgeon: Jan Romeo MD;  Location:  HEART CARDIAC CATH LAB            Medications     Current Outpatient Medications   Medication     amLODIPine (NORVASC) 5 MG tablet     carvedilol (COREG) 12.5 MG tablet     dabigatran ANTICOAGULANT (PRADAXA) 150 MG capsule     digoxin (LANOXIN) 125 MCG tablet     folic acid (FOLVITE) 1 MG tablet     furosemide (LASIX) 20 MG tablet     multivitamin w/minerals (THERA-VIT-M) tablet     oxyCODONE (ROXICODONE) 5 MG tablet     sacubitril-valsartan (ENTRESTO) 49-51 MG per tablet     thiamine (B-1) 100 MG tablet     No current facility-administered medications for this visit.             Family History:     Family History   Problem Relation Age of Onset     Heart Disease Mother      Osteoporosis Mother      LUNG DISEASE Mother      Heart Disease Father      Hypertension Brother             Social History:     Social History     Socioeconomic History     Marital status: Single     Spouse name: Not on file     Number of children: Not on file     Years of education: Not on file     Highest education level: Not on file   Occupational History     Not on file   Social Needs     Financial resource strain: Not on file     Food insecurity     Worry: Not on file     Inability: Not on file     Transportation needs     Medical: Not on file     Non-medical: Not on file   Tobacco Use     Smoking status: Never Smoker     Smokeless tobacco: Current User     Types: Chew   Substance and Sexual Activity     Alcohol use: Not Currently     Comment: 12/30/2019     Drug use: Not Currently     Sexual activity: Not on file   Lifestyle     Physical activity     Days per week: Not on file     Minutes per session: Not on file     Stress: Not on file   Relationships     Social connections     Talks on phone: Not on file     Gets together: Not on file     Attends [employer] service: Not on file     Active member of club or organization: Not on file      "Attends meetings of clubs or organizations: Not on file     Relationship status: Not on file     Intimate partner violence     Fear of current or ex partner: Not on file     Emotionally abused: Not on file     Physically abused: Not on file     Forced sexual activity: Not on file   Other Topics Concern     Parent/sibling w/ CABG, MI or angioplasty before 65F 55M? Not Asked   Social History Narrative     Not on file            Allergies:   Patient has no known allergies.         Review of Systems:  From intake questionnaire     Skin: negative  Eyes: negative  Ears/Nose/Throat: negative  Respiratory: No shortness of breath, dyspnea on exertion, cough, or hemoptysis  Cardiovascular: No chest pain or palpitations  Gastrointestinal: negative; no nausea/vomiting, constipation or diarrhea  Genitourinary: as per HPI  Musculoskeletal: negative  Neurologic: negative  Psychiatric: negative  Hematologic/Lymphatic/Immunologic: negative  Endocrine: negative         Physical Exam:     Patient is a 60 year old  male   Vitals: Blood pressure (!) 165/95, pulse 75, height 1.854 m (6' 1\"), weight 88.5 kg (195 lb).  Constitutional: Body mass index is 25.73 kg/m .  Alert, no acute distress, oriented, conversant  Eyes: no scleral icterus; extraocular muscles intact, moist conjunctivae  Neck: trachea midline, no thyromegaly  Ears/nose/mouth: throat/mouth:normal, good dentition  Respiratory: no respiratory distress, or pursed lip breathing  Cardiovascular: pulses strong and intact; no obvious jugular venous distension present  Gastrointestinal: soft, nontender, no organomegaly or masses,   Lymphatics: No inguinal adenopathy  Musculoskeletal: extremities normal, no peripheral edema  Skin: no suspicious lesions or rashes  Neuro: Alert, oriented, speech and mentation normal  Psych: affect and mood normal, alert and oriented to person, place and time  Gait: Normal  : deferred to cystoscopy. Normal prostate on IGNACIO      Labs and Pathology:    I " reviewed all applicable laboratory and pathology data and went over findings with patient  Significant for   Lab Results   Component Value Date    CR 0.81 09/09/2020    CR 0.81 06/18/2020    CR 0.98 06/08/2020    CR 1.17 05/11/2020    CR 1.15 05/04/2020    CR 0.86 04/22/2020    CR 0.81 03/06/2020    CR 0.98 02/24/2020    CR 0.90 02/06/2020    CR 1.09 01/27/2020     No results found for: PSA          Imaging:    I directly visualized and reviewed all applicable imaging a with patient.  NCCT from 9/9/2020: Significant for thickened posterior bladder wall and bladder diverticulum.      Outside and Past Medical records:    I spent 10 minutes reviewing outside and past medical records.               Assessment and Plan:     Assessment:   Bladder diverticulum with suspicious looking diverticular mucosa which was biopsied.    Plan:  Cystoscopy and Bladder biopsy done today  CT Urogram   Urine Cytology   We will discuss the results on a subsequent video visit.        Epi Aparicio MD  Urology  [facility]

## 2020-10-02 NOTE — PROGRESS NOTES
Pre-procedure diagnosis: ? Bladder cancer/hematuria  Post procedure diagnosis: Rt POsterior wall Diverticullum with areas of papillary change  Procedure performed: Diagnostic Cystourethroscopy and Bladder Biopsy  Surgeon: Epi Aparicio MD  Anesthesia: local    Indications for procedure: Patient is a 60 year old year old male with a history of bladder cancer/hematuria.  Here today for cysto  Description of procedure: After fully informed voluntary consent was obtained patient was brought into the procedure room, identified and placed in a supine position on the cysto table.  The groin/scrotum were prepped and draped in a sterile fashion with betadine.  A 15F flexible cystoscope was inserted into the urethra and the bladder and urethra examined in a systematic manner.  There was a prominenet diverticulum in the posterior wall on the right side . Inside the diverticulum we could find areas of papillary changes on the most dependent part of the diverticulum.We took biopsies from this area and sent it for pathology.  Ureteric orifices were normal in position and number and effluxing clear urine.  The prostate was 4cm long and showed bilobar hypertrophy.  There was no median lobe.  Distal urethra was normal.  The patient tolerated the procedure well and there were no complications.      Assessment/Plan: Patient with a history of hematuria with a suspicious cystoscopy.   We will obtain a urine cytology today and order a CT Urogram to assess his flank pain in relation to hematuria.

## 2020-10-02 NOTE — PATIENT INSTRUCTIONS
Please make a appointment for a CT in [place]   Please follow up with  after CT is done this appointment can be phone or video     It was a pleasure meeting with you today.  Thank you for allowing me and my team the privilege of caring for you today.  YOU are the reason we are here, and I truly hope we provided you with the excellent service you deserve.  Please let us know if there is anything else we can do for you so that we can be sure you are leaving completely satisfied with your care experience.

## 2020-10-05 DIAGNOSIS — Z78.9 ALCOHOL USE: ICD-10-CM

## 2020-10-05 LAB — COPATH REPORT: NORMAL

## 2020-10-06 LAB — COPATH REPORT: NORMAL

## 2020-10-06 NOTE — TELEPHONE ENCOUNTER
thiamine (B-1) 100 MG tablet      Last Written Prescription Date:  8/4/20  Last Fill Quantity: 30,   # refills: 0  Last Office Visit : Amor Wick MD  6/18/2020  [facility] Office visit:  10/9/20    Routing refill request to provider for review/approval because:  Drug not on cardiology refill protocol

## 2020-10-07 ENCOUNTER — PRE VISIT (OUTPATIENT)
Dept: UROLOGY | Facility: CLINIC | Age: 60
End: 2020-10-07

## 2020-10-07 ENCOUNTER — HOSPITAL ENCOUNTER (OUTPATIENT)
Dept: CT IMAGING | Facility: CLINIC | Age: 60
Discharge: HOME | End: 2020-10-07
Admitting: STUDENT IN AN ORGANIZED HEALTH CARE EDUCATION/TRAINING PROGRAM
Payer: COMMERCIAL

## 2020-10-07 DIAGNOSIS — R31.9 HEMATURIA, UNSPECIFIED TYPE: ICD-10-CM

## 2020-10-07 PROCEDURE — 74178 CT ABD&PLV WO CNTR FLWD CNTR: CPT

## 2020-10-07 PROCEDURE — 250N000011 HC RX IP 250 OP 636: Performed by: RADIOLOGY

## 2020-10-07 PROCEDURE — 250N000009 HC RX 250: Performed by: RADIOLOGY

## 2020-10-07 RX ORDER — IOPAMIDOL 755 MG/ML
95 INJECTION, SOLUTION INTRAVASCULAR ONCE
Status: COMPLETED | OUTPATIENT
Start: 2020-10-07 | End: 2020-10-07

## 2020-10-07 RX ORDER — LANOLIN ALCOHOL/MO/W.PET/CERES
100 CREAM (GRAM) TOPICAL DAILY
Qty: 30 TABLET | Refills: 0 | OUTPATIENT
Start: 2020-10-07

## 2020-10-07 RX ADMIN — IOPAMIDOL 95 ML: 755 INJECTION, SOLUTION INTRAVENOUS at 13:51

## 2020-10-07 RX ADMIN — SODIUM CHLORIDE 64 ML: 9 INJECTION, SOLUTION INTRAVENOUS at 13:51

## 2020-10-07 NOTE — TELEPHONE ENCOUNTER
Visit Type : Clinic-Return    Hx/Sx: CT Review    Records/Orders: Yes    Pt Contacted: n/a    At Rooming: Normal

## 2020-10-09 ENCOUNTER — OFFICE VISIT (OUTPATIENT)
Dept: CARDIOLOGY | Facility: CLINIC | Age: 60
End: 2020-10-09
Attending: INTERNAL MEDICINE
Payer: COMMERCIAL

## 2020-10-09 ENCOUNTER — OFFICE VISIT (OUTPATIENT)
Dept: UROLOGY | Facility: CLINIC | Age: 60
End: 2020-10-09
Payer: COMMERCIAL

## 2020-10-09 VITALS
OXYGEN SATURATION: 94 % | DIASTOLIC BLOOD PRESSURE: 91 MMHG | HEIGHT: 73 IN | HEART RATE: 97 BPM | WEIGHT: 207 LBS | SYSTOLIC BLOOD PRESSURE: 152 MMHG | BODY MASS INDEX: 27.43 KG/M2

## 2020-10-09 DIAGNOSIS — I50.22 CHRONIC SYSTOLIC HEART FAILURE (H): ICD-10-CM

## 2020-10-09 DIAGNOSIS — E78.2 MIXED HYPERLIPIDEMIA: ICD-10-CM

## 2020-10-09 DIAGNOSIS — I48.0 PAROXYSMAL ATRIAL FIBRILLATION (H): ICD-10-CM

## 2020-10-09 DIAGNOSIS — R31.9 HEMATURIA, UNSPECIFIED TYPE: Primary | ICD-10-CM

## 2020-10-09 DIAGNOSIS — R31.9 HEMATURIA, UNSPECIFIED TYPE: ICD-10-CM

## 2020-10-09 DIAGNOSIS — I42.8 NONISCHEMIC CARDIOMYOPATHY (H): ICD-10-CM

## 2020-10-09 DIAGNOSIS — I50.20 HEART FAILURE WITH REDUCED EJECTION FRACTION, NYHA CLASS III (H): ICD-10-CM

## 2020-10-09 DIAGNOSIS — I10 BENIGN ESSENTIAL HYPERTENSION: Primary | ICD-10-CM

## 2020-10-09 LAB
ALBUMIN SERPL-MCNC: 3.7 G/DL (ref 3.4–5)
ALP SERPL-CCNC: 72 U/L (ref 40–150)
ALT SERPL W P-5'-P-CCNC: 21 U/L (ref 0–70)
ANION GAP SERPL CALCULATED.3IONS-SCNC: 5 MMOL/L (ref 3–14)
AST SERPL W P-5'-P-CCNC: 17 U/L (ref 0–45)
BILIRUB SERPL-MCNC: 0.5 MG/DL (ref 0.2–1.3)
BUN SERPL-MCNC: 13 MG/DL (ref 7–30)
CALCIUM SERPL-MCNC: 8.9 MG/DL (ref 8.5–10.1)
CHLORIDE SERPL-SCNC: 102 MMOL/L (ref 94–109)
CO2 SERPL-SCNC: 27 MMOL/L (ref 20–32)
CREAT SERPL-MCNC: 0.81 MG/DL (ref 0.66–1.25)
ERYTHROCYTE [DISTWIDTH] IN BLOOD BY AUTOMATED COUNT: 12.6 % (ref 10–15)
GFR SERPL CREATININE-BSD FRML MDRD: >90 ML/MIN/{1.73_M2}
GLUCOSE SERPL-MCNC: 86 MG/DL (ref 70–99)
HCT VFR BLD AUTO: 41.3 % (ref 40–53)
HGB BLD-MCNC: 13.9 G/DL (ref 13.3–17.7)
MCH RBC QN AUTO: 32.7 PG (ref 26.5–33)
MCHC RBC AUTO-ENTMCNC: 33.7 G/DL (ref 31.5–36.5)
MCV RBC AUTO: 97 FL (ref 78–100)
NT-PROBNP SERPL-MCNC: 302 PG/ML (ref 0–125)
PLATELET # BLD AUTO: 201 10E9/L (ref 150–450)
POTASSIUM SERPL-SCNC: 3.7 MMOL/L (ref 3.4–5.3)
PROT SERPL-MCNC: 7.9 G/DL (ref 6.8–8.8)
PSA SERPL-MCNC: 0.49 UG/L (ref 0–4)
RBC # BLD AUTO: 4.25 10E12/L (ref 4.4–5.9)
SODIUM SERPL-SCNC: 134 MMOL/L (ref 133–144)
WBC # BLD AUTO: 8 10E9/L (ref 4–11)

## 2020-10-09 PROCEDURE — G0463 HOSPITAL OUTPT CLINIC VISIT: HCPCS

## 2020-10-09 PROCEDURE — 36415 COLL VENOUS BLD VENIPUNCTURE: CPT | Performed by: PATHOLOGY

## 2020-10-09 PROCEDURE — 84153 ASSAY OF PSA TOTAL: CPT | Mod: 90 | Performed by: PATHOLOGY

## 2020-10-09 PROCEDURE — 99213 OFFICE O/P EST LOW 20 MIN: CPT | Performed by: STUDENT IN AN ORGANIZED HEALTH CARE EDUCATION/TRAINING PROGRAM

## 2020-10-09 PROCEDURE — 99214 OFFICE O/P EST MOD 30 MIN: CPT | Performed by: INTERNAL MEDICINE

## 2020-10-09 RX ORDER — SACUBITRIL AND VALSARTAN 97; 103 MG/1; MG/1
1 TABLET, FILM COATED ORAL 2 TIMES DAILY
Qty: 180 TABLET | Refills: 3 | Status: SHIPPED | OUTPATIENT
Start: 2020-10-09 | End: 2021-01-12

## 2020-10-09 ASSESSMENT — PAIN SCALES - GENERAL: PAINLEVEL: NO PAIN (0)

## 2020-10-09 ASSESSMENT — MIFFLIN-ST. JEOR: SCORE: 1797.83

## 2020-10-09 NOTE — PROGRESS NOTES
October 9, 2020    I had the pleasure of seeing Roge Agarwal  in the [facility] for further evaluation and management of his heart failure.  Mr. 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     In 4/2016 he amputated two of his fingers with his wood saw. In the ER, he was found in a AFL with RVR and with LVEF 15%. He'd not been aware of arrhythmia but did report feeling fatigued. He had RAISSA/CV and started warfarin. His LVEF returned to 55% by several months later. He came off warfarin a month after the DCCV. He then represented to ER with 2 month history of feeing fatigued and increasingly SOB even at rest. He was found to be back in AFL with RVR 140s and echo demonstrated LVEF 30%. He was subsequently admitted. He was started on Pradaxa and amiodarone. Plan was for RAISSA/ablation; however, RAISSA demonstrated ROGELIO thrombus and ablation was cancelled. Amiodarone was stopped. He was instead placed on Metoprolol and digoxin for rate control. Stress CMRI showed NICM with severely reduced biventricular function with a pattern of midmyocardial enhancement in the septal segments. ROGELIO thrombus also demonstrated on CMRI. No ischemia on stress portion. Echo showed LVEF 25-30%, moderately decreased RV function, and mild MR. Etiology considered likely multifactorial given tachyarrhythmia and ETOH abuse. He was counseled to stop drinking. He was started on GDMT for his depressed LVEF. He was discharged to have close follow up.   Today presents for follow-up.  Note that his been doing relatively well he does have some fatigue still ongoing however better than it was before.  He stays away from alcohol has not drink anything.  His daughter is helping him set up the medications and he takes her medications as prescribed.  Daughter knows the  medications very well.  I would say currently he has class III symptoms.  Mild lower extremity edema.  He is hypertensive today granted he is nervous for upcoming urology appointment but he was evaluated for possible urothelial cancer.  Note that this was negative.  He is also borderline tachycardia with a heart rate around 95 bpm it appears very regular.     PAST MEDICAL HISTORY:  Past Medical History:   Diagnosis Date     Atrial fibrillation (H) 2016    One time episode noted after his finger accident, required cardioversion     Atrial fibrillation with RVR (H) 4/6/2016     Hemorrhagic stroke (H) 12/16/2007    2.3 X 1.5 CM PARENCHYMAL HEMORRHAGE IN THE RIGHT BASAL GANGLIA     HTN (hypertension)      Systolic CHF (H)     EF of 15-30% in 2016     FAMILY HISTORY:  Family History   Problem Relation Age of Onset     Heart Disease Mother      Osteoporosis Mother      LUNG DISEASE Mother      Heart Disease Father      Hypertension Brother      SOCIAL HISTORY:  Social History     Socioeconomic History     Marital status: Single     Spouse name: Not on file     Number of children: Not on file     Years of education: Not on file     Highest education level: Not on file   Occupational History     Not on file   Social Needs     Financial resource strain: Not on file     Food insecurity     Worry: Not on file     Inability: Not on file     Transportation needs     Medical: Not on file     Non-medical: Not on file   Tobacco Use     Smoking status: Never Smoker     Smokeless tobacco: Current User     Types: Chew   Substance and Sexual Activity     Alcohol use: Not Currently     Comment: 12/30/2019     Drug use: Not Currently     Sexual activity: Not on file   Lifestyle     Physical activity     Days per week: Not on file     Minutes per session: Not on file     Stress: Not on file   Relationships     Social connections     Talks on phone: Not on file     Gets together: Not on file     Attends [employer] service: Not on file      "Active member of club or organization: Not on file     Attends meetings of clubs or organizations: Not on file     Relationship status: Not on file     Intimate partner violence     Fear of current or ex partner: Not on file     Emotionally abused: Not on file     Physically abused: Not on file     Forced sexual activity: Not on file   Other Topics Concern     Parent/sibling w/ CABG, MI or angioplasty before 65F 55M? Not Asked   Social History Narrative     Not on file     CURRENT MEDICATIONS:  Current Outpatient Medications   Medication     amLODIPine (NORVASC) 5 MG tablet     carvedilol (COREG) 12.5 MG tablet     dabigatran ANTICOAGULANT (PRADAXA) 150 MG capsule     digoxin (LANOXIN) 125 MCG tablet     folic acid (FOLVITE) 1 MG tablet     furosemide (LASIX) 20 MG tablet     multivitamin w/minerals (THERA-VIT-M) tablet     oxyCODONE (ROXICODONE) 5 MG tablet     oxyCODONE (ROXICODONE) 5 MG tablet     sacubitril-valsartan (ENTRESTO) 49-51 MG per tablet     thiamine (B-1) 100 MG tablet     No current facility-administered medications for this visit.      ROS:   Constitutional: No fever, chills, or sweats. Weight is 0 lbs 0 oz  ENT: No visual disturbance, ear ache, epistaxis, sore throat.   Allergies/Immunologic: Negative.   Respiratory: No cough, hemoptysis.   Cardiovascular: As per HPI.   GI: No nausea, vomiting, hematemesis, melena, or hematochezia.   : No urinary frequency, dysuria, or hematuria.   Integument: Negative.   Psychiatric: Pleasant, no major depression noted  Neuro: No focal neurological deficits noted  Endocrinology: Negative.   Musculoskeletal: As per HPI.      EXAM:  BP (!) 152/91 (BP Location: Left arm, Patient Position: Sitting, Cuff Size: Adult Regular)   Pulse 97   Ht 1.854 m (6' 1\")   Wt 93.9 kg (207 lb)   SpO2 94%   BMI 27.31 kg/m    General: appears comfortable, alert and oriented  Head: normocephalic, atraumatic  Eyes: anicteric sclera, EOMI , PERRL  Neck: no adenopathy  Orophyarynx: " moist mucosa, no lesions noted  Heart: irregular, borderline tachy, no murmurs, rubs. Estimated JVP at 6 cmH2O  Lungs: CTAB, No wheezing.   Abdomen: soft, non-tender, bowel sounds present, no hepatosplenomegaly  Extremities: No LE edema today  Skin: no open lesions noted  Neuro: grossly non-focal  Psych: no evidence of depression noted     Labs:  Lab Results   Component Value Date    WBC 7.9 09/09/2020    HGB 14.6 09/09/2020    HCT 43.4 09/09/2020     09/09/2020     09/09/2020    POTASSIUM 3.9 09/09/2020    CHLORIDE 104 09/09/2020    CO2 28 09/09/2020    BUN 13 09/09/2020    CR 0.81 09/09/2020    GLC 87 09/09/2020    DD 0.9 (H) 12/31/2019    NTBNPI 2,401 (H) 12/31/2019    NTBNP 283 (H) 06/18/2020    TROPI 0.037 01/01/2020    AST 20 09/09/2020    ALT 23 09/09/2020    ALKPHOS 82 09/09/2020    BILITOTAL 0.8 09/09/2020    INR 1.32 (H) 02/25/2020 1/2/20 ECHOCARDIOGRAM:   The visual ejection fraction is estimated at 25-30%. Moderately decreased right ventricular systolic function There is moderate biatrial enlargement.  There is mild (1+) mitral regurgitation.  Probable aflutter with rapid ventricular response.  There is no comparison study available.     1/3/20 NM IRLANDA SCAN:     Only resting perfusion images were taken.     Resting images shows mild intensity basal inferior wall photopenic defect.     Gated images not performed. LV systolic function cannot be evaluated.     1/6/20 RAISSA:  Left and right atrial appendage thrombi, measuring 1.7 x 1.4 cm and 1.5 x 1.4  cm, respectively.   Mildly dilated left ventricle with normal wall thickness. Severely decreased LV systolic function with EF 15%-20%. Severe diffuse hypokinesis.  Global right ventricular function is moderately reduced.   Compared to the previous study dated 1/2/2020 (TTE): The left and right atrial appendages are better-visualized and thrombi are seen in both atrial Appendages.     1/6/20 CMRI:  1. The LV is mildly enlarged with normal wall  thickness. The global systolic function is severely reduced. The LVEF is 15%. There is severe diffuse hypokinesis.  2. The RV is normal in cavity size. The global systolic function is severely reduced. The RVEF is 20%.   3. The left atrium is severely enlarged.  The right atrium is moderately enlarged.   4. There is mild mitral regurgitation.   5. Late gadolinium enhancement imaging shows midmyocardial enhancement in the basal anteroseptum, basal inferoseptum, mid anteroseptum, mid inferoseptum, and apical septum.  This is consistent with a non-ischemic etiology of cardiomyopathy.   6. Regadenoson stress perfusion imaging shows no ischemia.   7. There is no pericardial effusion or thickening.   8. There is a left atrial appendage thrombus.   9. There is a left-sided pleural effusion.   CONCLUSIONS: Non-ischemic cardiomyopathy.  There is severely reduced biventricular function with a pattern of midmyocardial enhancement in the septal segments. There is also a left atrial appendage thrombus. There is no ischemia.     TTE 6/2020:  Patient in tachyarrhythmia during study. Mildly (EF 40-45%) reduced left ventricular function is present.  Global right ventricular function is mildly reduced.  No significant valvular dysfunction present.  The inferior vena cava was normal in size with preserved respiratory variability.  No pericardial effusion is present.    EP ablation 2/2020:  1. Radiofrequency ablation of the cavotricuspid isthmus for CTI-dependent atrial flutter.  2. Direct current cardioversion of atrial fibrillation into sinus rhythm.  3. Fluoroscopy.  4. 3D electroanatomic mapping     ASSESSMENT AND PLAN:  In summary, patient is a 59 year old gentleman with above history including recovered ejection fraction in the setting of alcohol use in the past.  He was evaluated by EP and core clinic and referred to me for heart failure management in the setting of significantly reduced ejection fraction at about 15% due to  possibly combination of tachycardia induced cardiomyopathy and alcohol use.  He quit alcohol in early February and he is determined to recover his ejection fraction.  Today did change we will make is to increase his Entresto to 97 mg twice daily dosing.  He will double up on to the new prescription comes in and discussed that he should only take 1 pill at the time when he drives.  Next visit we will plan to increase his carvedilol to 25 mg twice daily dosing if his heart rate is not better controlled.  He remains on dabigatran and no significant issues with that.  Also continue the digoxin and we will not change this.  He stayed off alcohol very well as we discussed and knows all his medications especially his daughter who is setting helping set up the medications for him.  He did have multiple heart failure hospitalizations has a heart failure with reduced ejection fraction and class III symptoms so I would believe he would benefit from CardioMEMS monitoring.  He is agreeable to participate in the guide trial.  We have given him some information today and will contact him next week to help set up for his participation.  Given above changes he does not need an ICD at this time.  We will repeat his echocardiogram in about 2 months I will see him back in about 2 months after the echo is completed.     I appreciate the opportunity to participate in the care of Roge Agarwal . Please do not hesitate to contact me with any further questions.     Sincerely,    Amor Wick MD     [facility] Division of Cardiology

## 2020-10-09 NOTE — PATIENT INSTRUCTIONS
Please increase Entresto to  MG twice daily.    Thank you for your visit today.  Please call me with any questions or concerns.   Kb Holguin RN  Cardiology Care Coordinator  [phone], press option 1 then option 4

## 2020-10-09 NOTE — NURSING NOTE
Chief Complaint   Patient presents with     Follow Up     Heart Failure        Vitals were taken    Malinda Loyd  3:41 PM

## 2020-10-09 NOTE — PROGRESS NOTES
CHIEF COMPLAINT   It was my pleasure to see Roge Agarwal who is a 60 year old male who has come with his daughter to discus the findings of his tests.    HPI   Roge Agarwal is a very pleasant 60 year old male who presents with a history of Gross Hematuria .    He tells me that the bleeding is much less frequent now and he currently has no complaints.    PHYSICAL EXAM  Patient is a 60 year old  male   Vitals: There were no vitals taken for this visit.  General Appearance Adult: There is no height or weight on file to calculate BMI.  Alert, no acute distress, oriented  Results discussed    LAB INVESTIGATIONS  PSA    Ref Range & Units 3d ago    PSA 0 - 4 ug/L 0.49      Cytology non gyn  CYTOLOGIC INTERPRETATION:     Urine, voided:   - Negative for High-Grade Urothelial Carcinoma   - Specimen Adequacy: Satisfactory for evaluation.    Surgical pathology exam  Collected: 10/2/2020   Received: 10/5/2020   Reported: 10/6/2020 17:50   Ordering Phy(s): GILBERTO KUHN      SPECIMEN(S):   Bladder biopsy     FINAL DIAGNOSIS:   Bladder biopsy:   - Benign urothelium with nephrogenic metaplasia   - Negative for malignancy     IMAGING    CT UROGRAM WO & W CONTRAST 10/7/2020 2:13 PM  IMPRESSION:   1.  Similar size of previously seen urinary bladder diverticulum. No  urinary bladder masses.  2.  Mild persistent wall thickening of the diverticulum and interval  decrease in wall thickening of the remainder of the urinary bladder.  3.  No nephrolithiasis or hydronephrosis.  4.  Normal appearance of the central renal collecting system and  opacified portions of the ureters on delayed imaging.  5.  Minimally complex right renal cyst (Bosniak 2). No follow-up is  necessary.  6.  Fat-containing noninflamed bilateral inguinal hernias.  7.  Colonic diverticulosis without signs of diverticulitis.          ASSESSMENT and PLAN  We discussed in detail the findings of the detailed evaluation that Mr Agarwal underwent this past week.   We  discussed that we know the most likely etiology for the bleeding is the benign lesion in the bladder diverticulum and this might have been aggravated by the blood thinner Eliquis that he has been recently started on.  The plan moving ahead will be conservative and only in the event of repeated bouts of bleeding or Hgb drop, which are both unlikely, we are would be taking him up for cystoscopic fulguration of the diverticular  Lesion.  Mr Agarwal and his daughter were in agreement of this plan and would follow up with us as needed.          I spent over 20 minutes with the patient.  Over half this time was spent on counseling regarding the result findings and the treatment plan.    Epi Aparicio MD  Urology  [facility]

## 2020-10-09 NOTE — LETTER
10/9/2020       RE: Roge Agarwal  [id number] [place] [address] [id number]     Dear Colleague,    Thank you for referring your patient, Roge Agarwal, to the [facility] [place] at [facility]. Please see a copy of my visit note below.    CHIEF COMPLAINT   It was my pleasure to see Roge Agarwal who is a 60 year old male who has come with his daughter to discus the findings of his tests.    HPI   Roge Agarwal is a very pleasant 60 year old male who presents with a history of Gross Hematuria .    He tells me that the bleeding is much less frequent now and he currently has no complaints.    PHYSICAL EXAM  Patient is a 60 year old  male   Vitals: There were no vitals taken for this visit.  General Appearance Adult: There is no height or weight on file to calculate BMI.  Alert, no acute distress, oriented  Results discussed    LAB INVESTIGATIONS  PSA    Ref Range & Units 3d ago    PSA 0 - 4 ug/L 0.49      Cytology non gyn  CYTOLOGIC INTERPRETATION:     Urine, voided:   - Negative for High-Grade Urothelial Carcinoma   - Specimen Adequacy: Satisfactory for evaluation.    Surgical pathology exam  Collected: 10/2/2020   Received: 10/5/2020   Reported: 10/6/2020 17:50   Ordering Phy(s): GILBERTO KUHN      SPECIMEN(S):   Bladder biopsy     FINAL DIAGNOSIS:   Bladder biopsy:   - Benign urothelium with nephrogenic metaplasia   - Negative for malignancy     IMAGING    CT UROGRAM WO & W CONTRAST 10/7/2020 2:13 PM  IMPRESSION:   1.  Similar size of previously seen urinary bladder diverticulum. No  urinary bladder masses.  2.  Mild persistent wall thickening of the diverticulum and interval  decrease in wall thickening of the remainder of the urinary bladder.  3.  No nephrolithiasis or hydronephrosis.  4.  Normal appearance of the central renal collecting system and  opacified portions of the ureters on delayed imaging.  5.  Minimally complex right renal cyst  (Bosniak 2). No follow-up is  necessary.  6.  Fat-containing noninflamed bilateral inguinal hernias.  7.  Colonic diverticulosis without signs of diverticulitis.          ASSESSMENT and PLAN  We discussed in detail the findings of the detailed evaluation that Mr Agarwal underwent this past week.   We discussed that we know the most likely etiology for the bleeding is the benign lesion in the bladder diverticulum and this might have been aggravated by the blood thinner Eliquis that he has been recently started on.  The plan moving ahead will be conservative and only in the event of repeated bouts of bleeding or Hgb drop, which are both unlikely, we are would be taking him up for cystoscopic fulguration of the diverticular  Lesion.  Mr Agarwal and his daughter were in agreement of this plan and would follow up with us as needed.    I spent over 20 minutes with the patient.  Over half this time was spent on counseling regarding the result findings and the treatment plan.    Epi Aparicio MD  Urology  [facility]

## 2020-10-09 NOTE — LETTER
10/9/2020      RE: Roge Agarwal  [address] [facility] [address]       Dear Colleague,    Thank you for the opportunity to participate in the care of your patient, Roge Agarwal, at the [facility] at [facility]. Please see a copy of my visit note below.    October 9, 2020    I had the pleasure of seeing Roge Agarwal  in the [facility] for further evaluation and management of his heart failure.  Mr. 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     In 4/2016 he amputated two of his fingers with his wood saw. In the ER, he was found in a AFL with RVR and with LVEF 15%. He'd not been aware of arrhythmia but did report feeling fatigued. He had RAISSA/CV and started warfarin. His LVEF returned to 55% by several months later. He came off warfarin a month after the DCCV. He then represented to ER with 2 month history of feeing fatigued and increasingly SOB even at rest. He was found to be back in AFL with RVR 140s and echo demonstrated LVEF 30%. He was subsequently admitted. He was started on Pradaxa and amiodarone. Plan was for RAISSA/ablation; however, RAISSA demonstrated ROGELIO thrombus and ablation was cancelled. Amiodarone was stopped. He was instead placed on Metoprolol and digoxin for rate control. Stress CMRI showed NICM with severely reduced biventricular function with a pattern of midmyocardial enhancement in the septal segments. ROGELIO thrombus also demonstrated on CMRI. No ischemia on stress portion. Echo showed LVEF 25-30%, moderately decreased RV function, and mild MR. Etiology considered likely multifactorial given tachyarrhythmia and ETOH abuse. He was counseled to stop drinking. He was started on GDMT for his depressed LVEF. He was discharged to have close follow  up.   Today presents for follow-up.  Note that his been doing relatively well he does have some fatigue still ongoing however better than it was before.  He stays away from alcohol has not drink anything.  His daughter is helping him set up the medications and he takes her medications as prescribed.  Daughter knows the medications very well.  I would say currently he has class III symptoms.  Mild lower extremity edema.  He is hypertensive today granted he is nervous for upcoming urology appointment but he was evaluated for possible urothelial cancer.  Note that this was negative.  He is also borderline tachycardia with a heart rate around 95 bpm it appears very regular.     PAST MEDICAL HISTORY:  Past Medical History:   Diagnosis Date     Atrial fibrillation (H) 2016    One time episode noted after his finger accident, required cardioversion     Atrial fibrillation with RVR (H) 4/6/2016     Hemorrhagic stroke (H) 12/16/2007    2.3 X 1.5 CM PARENCHYMAL HEMORRHAGE IN THE RIGHT BASAL GANGLIA     HTN (hypertension)      Systolic CHF (H)     EF of 15-30% in 2016     FAMILY HISTORY:  Family History   Problem Relation Age of Onset     Heart Disease Mother      Osteoporosis Mother      LUNG DISEASE Mother      Heart Disease Father      Hypertension Brother      SOCIAL HISTORY:  Social History     Socioeconomic History     Marital status: Single     Spouse name: Not on file     Number of children: Not on file     Years of education: Not on file     Highest education level: Not on file   Occupational History     Not on file   Social Needs     Financial resource strain: Not on file     Food insecurity     Worry: Not on file     Inability: Not on file     Transportation needs     Medical: Not on file     Non-medical: Not on file   Tobacco Use     Smoking status: Never Smoker     Smokeless tobacco: Current User     Types: Chew   Substance and Sexual Activity     Alcohol use: Not Currently     Comment: 12/30/2019     Drug use:  Not Currently     Sexual activity: Not on file   Lifestyle     Physical activity     Days per week: Not on file     Minutes per session: Not on file     Stress: Not on file   Relationships     Social connections     Talks on phone: Not on file     Gets together: Not on file     Attends Yazidism service: Not on file     Active member of club or organization: Not on file     Attends meetings of clubs or organizations: Not on file     Relationship status: Not on file     Intimate partner violence     Fear of current or ex partner: Not on file     Emotionally abused: Not on file     Physically abused: Not on file     Forced sexual activity: Not on file   Other Topics Concern     Parent/sibling w/ CABG, MI or angioplasty before 65F 55M? Not Asked   Social History Narrative     Not on file     CURRENT MEDICATIONS:  Current Outpatient Medications   Medication     amLODIPine (NORVASC) 5 MG tablet     carvedilol (COREG) 12.5 MG tablet     dabigatran ANTICOAGULANT (PRADAXA) 150 MG capsule     digoxin (LANOXIN) 125 MCG tablet     folic acid (FOLVITE) 1 MG tablet     furosemide (LASIX) 20 MG tablet     multivitamin w/minerals (THERA-VIT-M) tablet     oxyCODONE (ROXICODONE) 5 MG tablet     oxyCODONE (ROXICODONE) 5 MG tablet     sacubitril-valsartan (ENTRESTO) 49-51 MG per tablet     thiamine (B-1) 100 MG tablet     No current facility-administered medications for this visit.      ROS:   Constitutional: No fever, chills, or sweats. Weight is 0 lbs 0 oz  ENT: No visual disturbance, ear ache, epistaxis, sore throat.   Allergies/Immunologic: Negative.   Respiratory: No cough, hemoptysis.   Cardiovascular: As per HPI.   GI: No nausea, vomiting, hematemesis, melena, or hematochezia.   : No urinary frequency, dysuria, or hematuria.   Integument: Negative.   Psychiatric: Pleasant, no major depression noted  Neuro: No focal neurological deficits noted  Endocrinology: Negative.   Musculoskeletal: As per HPI.      EXAM:  BP (!) 152/91  "(BP Location: Left arm, Patient Position: Sitting, Cuff Size: Adult Regular)   Pulse 97   Ht 1.854 m (6' 1\")   Wt 93.9 kg (207 lb)   SpO2 94%   BMI 27.31 kg/m    General: appears comfortable, alert and oriented  Head: normocephalic, atraumatic  Eyes: anicteric sclera, EOMI , PERRL  Neck: no adenopathy  Orophyarynx: moist mucosa, no lesions noted  Heart: irregular, borderline tachy, no murmurs, rubs. Estimated JVP at 6 cmH2O  Lungs: CTAB, No wheezing.   Abdomen: soft, non-tender, bowel sounds present, no hepatosplenomegaly  Extremities: No LE edema today  Skin: no open lesions noted  Neuro: grossly non-focal  Psych: no evidence of depression noted     Labs:  Lab Results   Component Value Date    WBC 7.9 09/09/2020    HGB 14.6 09/09/2020    HCT 43.4 09/09/2020     09/09/2020     09/09/2020    POTASSIUM 3.9 09/09/2020    CHLORIDE 104 09/09/2020    CO2 28 09/09/2020    BUN 13 09/09/2020    CR 0.81 09/09/2020    GLC 87 09/09/2020    DD 0.9 (H) 12/31/2019    NTBNPI 2,401 (H) 12/31/2019    NTBNP 283 (H) 06/18/2020    TROPI 0.037 01/01/2020    AST 20 09/09/2020    ALT 23 09/09/2020    ALKPHOS 82 09/09/2020    BILITOTAL 0.8 09/09/2020    INR 1.32 (H) 02/25/2020 1/2/20 ECHOCARDIOGRAM:   The visual ejection fraction is estimated at 25-30%. Moderately decreased right ventricular systolic function There is moderate biatrial enlargement.  There is mild (1+) mitral regurgitation.  Probable aflutter with rapid ventricular response.  There is no comparison study available.     1/3/20 NM IRLANDA SCAN:     Only resting perfusion images were taken.     Resting images shows mild intensity basal inferior wall photopenic defect.     Gated images not performed. LV systolic function cannot be evaluated.     1/6/20 RAISSA:  Left and right atrial appendage thrombi, measuring 1.7 x 1.4 cm and 1.5 x 1.4  cm, respectively.   Mildly dilated left ventricle with normal wall thickness. Severely decreased LV systolic function with EF " 15%-20%. Severe diffuse hypokinesis.  Global right ventricular function is moderately reduced.   Compared to the previous study dated 1/2/2020 (TTE): The left and right atrial appendages are better-visualized and thrombi are seen in both atrial Appendages.     1/6/20 CMRI:  1. The LV is mildly enlarged with normal wall thickness. The global systolic function is severely reduced. The LVEF is 15%. There is severe diffuse hypokinesis.  2. The RV is normal in cavity size. The global systolic function is severely reduced. The RVEF is 20%.   3. The left atrium is severely enlarged.  The right atrium is moderately enlarged.   4. There is mild mitral regurgitation.   5. Late gadolinium enhancement imaging shows midmyocardial enhancement in the basal anteroseptum, basal inferoseptum, mid anteroseptum, mid inferoseptum, and apical septum.  This is consistent with a non-ischemic etiology of cardiomyopathy.   6. Regadenoson stress perfusion imaging shows no ischemia.   7. There is no pericardial effusion or thickening.   8. There is a left atrial appendage thrombus.   9. There is a left-sided pleural effusion.   CONCLUSIONS: Non-ischemic cardiomyopathy.  There is severely reduced biventricular function with a pattern of midmyocardial enhancement in the septal segments. There is also a left atrial appendage thrombus. There is no ischemia.     TTE 6/2020:  Patient in tachyarrhythmia during study. Mildly (EF 40-45%) reduced left ventricular function is present.  Global right ventricular function is mildly reduced.  No significant valvular dysfunction present.  The inferior vena cava was normal in size with preserved respiratory variability.  No pericardial effusion is present.    EP ablation 2/2020:  1. Radiofrequency ablation of the cavotricuspid isthmus for CTI-dependent atrial flutter.  2. Direct current cardioversion of atrial fibrillation into sinus rhythm.  3. Fluoroscopy.  4. 3D electroanatomic mapping     ASSESSMENT AND  PLAN:  In summary, patient is a 59 year old gentleman with above history including recovered ejection fraction in the setting of alcohol use in the past.  He was evaluated by EP and core clinic and referred to me for heart failure management in the setting of significantly reduced ejection fraction at about 15% due to possibly combination of tachycardia induced cardiomyopathy and alcohol use.  He quit alcohol in early February and he is determined to recover his ejection fraction.  Today did change we will make is to increase his Entresto to 97 mg twice daily dosing.  He will double up on to the new prescription comes in and discussed that he should only take 1 pill at the time when he drives.  Next visit we will plan to increase his carvedilol to 25 mg twice daily dosing if his heart rate is not better controlled.  He remains on dabigatran and no significant issues with that.  Also continue the digoxin and we will not change this.  He stayed off alcohol very well as we discussed and knows all his medications especially his daughter who is setting helping set up the medications for him.  He did have multiple heart failure hospitalizations has a heart failure with reduced ejection fraction and class III symptoms so I would believe he would benefit from CardioMEMS monitoring.  He is agreeable to participate in the guide trial.  We have given him some information today and will contact him next week to help set up for his participation.  Given above changes he does not need an ICD at this time.  We will repeat his echocardiogram in about 2 months I will see him back in about 2 months after the echo is completed.     I appreciate the opportunity to participate in the care of Roge Agarwal . Please do not hesitate to contact me with any further questions.     Sincerely,    Amor Wick MD     [facility] Division of Cardiology       Please do not hesitate to contact me if you have any  questions/concerns.     Sincerely,     Amor Wick MD

## 2020-10-09 NOTE — NURSING NOTE
Chief Complaint   Patient presents with     RECHECK     CT Review       There were no vitals taken for this visit. There is no height or weight on file to calculate BMI.    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     Atrial flutter with rapid ventricular response (H)     Atrial fibrillation with RVR (H)     Elevated troponin     Elevated d-dimer     Atypical chest pain     Elevated lactic acid level     Atrial flutter (H)     Atrial flutter, unspecified type (H)     S/P ablation of atrial flutter       No Known Allergies    Current Outpatient Medications   Medication Sig Dispense Refill     amLODIPine (NORVASC) 5 MG tablet Take 2 tablets (10 mg) by mouth daily 60 tablet 3     carvedilol (COREG) 12.5 MG tablet Take 1 tablet (12.5 mg) by mouth 2 times daily (with meals) 180 tablet 3     dabigatran ANTICOAGULANT (PRADAXA) 150 MG capsule Take 1 capsule (150 mg) by mouth 2 times daily Store in original 's bottle or blister pack; use within 120 days of opening. 180 capsule 3     digoxin (LANOXIN) 125 MCG tablet Take 1 tablet (125 mcg) by mouth daily 90 tablet 3     folic acid (FOLVITE) 1 MG tablet Take 1 tablet (1 mg) by mouth daily 90 tablet 3     furosemide (LASIX) 20 MG tablet Take 2 tablets (40 mg) by mouth 2 times daily 180 tablet 1     multivitamin w/minerals (THERA-VIT-M) tablet Take 1 tablet by mouth daily 30 tablet 11     oxyCODONE (ROXICODONE) 5 MG tablet Take 1 tablet (5 mg) by mouth every 6 hours as needed for pain 6 tablet 0     oxyCODONE (ROXICODONE) 5 MG tablet Take 1 tablet (5 mg) by mouth every 6 hours as needed for breakthrough pain or pain 6 tablet 0     sacubitril-valsartan  (ENTRESTO)  MG per tablet Take 1 tablet by mouth 2 times daily 180 tablet 3     thiamine (B-1) 100 MG tablet Take 1 tablet (100 mg) by mouth daily 30 tablet 0       Social History     Tobacco Use     Smoking status: Never Smoker     Smokeless tobacco: Current User     Types: Chew   Substance Use Topics     Alcohol use: Not Currently     Comment: 12/30/2019     Drug use: Not Currently       Clayton Vanessa EMT, EMT  10/9/2020  4:26 PM

## 2020-10-20 ENCOUNTER — TELEPHONE (OUTPATIENT)
Dept: CARDIOLOGY | Facility: CLINIC | Age: 60
End: 2020-10-20

## 2020-10-20 DIAGNOSIS — Z78.9 ALCOHOL USE: ICD-10-CM

## 2020-10-20 NOTE — TELEPHONE ENCOUNTER
Received fax on 10/20/20 from [facility] in [place] requesting refill(s) for the following medication(s):    1.   Rx Number:  [id number]   Drug:  Vit b-1 100 MG            Patient's Last Cardiology Appointment:  10/09/20     Patient's Next Cardiology Appointment: 2 month echo. And Delano Chinchilla MD.,Cardiology visit due  Left message to return clinic call to .  Faraz Lazaro L.P.N.                 Refill encounter routed to Rekha ivey.  Faraz Lazaro L.P.N.

## 2020-10-21 NOTE — TELEPHONE ENCOUNTER
Per Maida, Vit B1 refill request needs to be addressed with primary care.  According to Epic patient does not currently have a primary care provider.  Attempted to contact patient to discuss, no answer, message left for patient to call back.    Pt is also due for an echo and visit with Dr. Wick.      Che Camara RN

## 2020-10-23 RX ORDER — LANOLIN ALCOHOL/MO/W.PET/CERES
100 CREAM (GRAM) TOPICAL DAILY
Qty: 90 TABLET | Refills: 3 | Status: SHIPPED | OUTPATIENT
Start: 2020-10-23 | End: 2021-12-16

## 2020-11-20 ENCOUNTER — TELEPHONE (OUTPATIENT)
Dept: CARDIOLOGY | Facility: CLINIC | Age: 60
End: 2020-11-20

## 2020-11-30 ENCOUNTER — DOCUMENTATION ONLY (OUTPATIENT)
Dept: CARDIOLOGY | Facility: CLINIC | Age: 60
End: 2020-11-30

## 2020-11-30 NOTE — PROGRESS NOTES
Prior Authorization Request For CardioMEMS Has Been Submitted       Type: Commercial    Date: November 30, 2020    Insurance: [employer] [place]    Status   o Pending  o 12/14 email update from [employer] - still pending.   o 1/12/21 received email [facility] Medicaid - no pre-service is allowed for the cardioMEMS procedure. Not able to obtain any review with this plan.     Requested By: Dr. Wick      Notes: Patient was first evaluated for GUIDE. He did not qualify so we are trying commercial. received email [facility] Medicaid - no pre-service is allowed for the cardioMEMS procedure. Not able to obtain any review with this plan.         Richardson Tavares CMA  Heart Failure, Advanced Heart Failure & CORE  Referral Specialist &     [facility]  Cardiology  Office: [phone]  [phone]

## 2020-12-01 ENCOUNTER — TELEPHONE (OUTPATIENT)
Dept: CARDIOLOGY | Facility: CLINIC | Age: 60
End: 2020-12-01

## 2020-12-01 DIAGNOSIS — I50.23 ACUTE ON CHRONIC SYSTOLIC CONGESTIVE HEART FAILURE (H): ICD-10-CM

## 2020-12-01 RX ORDER — FUROSEMIDE 20 MG
40 TABLET ORAL 2 TIMES DAILY
Qty: 360 TABLET | Refills: 0 | Status: SHIPPED | OUTPATIENT
Start: 2020-12-01 | End: 2021-01-12

## 2020-12-01 NOTE — TELEPHONE ENCOUNTER
Signed Prescriptions:                        Disp   Refills    furosemide (LASIX) 20 MG tablet            360 ta*0        Sig: Take 2 tablets (40 mg) by mouth 2 times daily  Authorizing Provider: FERMIN HUITRON  Ordering User: CHE CAMARA    Rx filled per refill protocol.    Che Camara RN    Last Comprehensive Metabolic Panel:  Sodium   Date Value Ref Range Status   10/09/2020 134 133 - 144 mmol/L Final     Potassium   Date Value Ref Range Status   10/09/2020 3.7 3.4 - 5.3 mmol/L Final     Chloride   Date Value Ref Range Status   10/09/2020 102 94 - 109 mmol/L Final     Carbon Dioxide   Date Value Ref Range Status   10/09/2020 27 20 - 32 mmol/L Final     Anion Gap   Date Value Ref Range Status   10/09/2020 5 3 - 14 mmol/L Final     Glucose   Date Value Ref Range Status   10/09/2020 86 70 - 99 mg/dL Final     Urea Nitrogen   Date Value Ref Range Status   10/09/2020 13 7 - 30 mg/dL Final     Creatinine   Date Value Ref Range Status   10/09/2020 0.81 0.66 - 1.25 mg/dL Final     GFR Estimate   Date Value Ref Range Status   10/09/2020 >90 >60 mL/min/[1.73_m2] Final     Comment:     Non  GFR Calc  Starting 12/18/2018, serum creatinine based estimated GFR (eGFR) will be   calculated using the Chronic Kidney Disease Epidemiology Collaboration   (CKD-EPI) equation.       Calcium   Date Value Ref Range Status   10/09/2020 8.9 8.5 - 10.1 mg/dL Final

## 2020-12-01 NOTE — TELEPHONE ENCOUNTER
Received fax on 12/01/20  requesting refill(s) for the following medication(s):    1.   Rx Number:  [id number]   Drug:  Furosemide            Patient's Last Cardiology Appointment:  10/20   Patient's Next Cardiology Appointment:  Due for ECho. And visit . Left message to return clinic call to .  Faraz Lazaro L.P.N.            Refill encounter routed to JOHANA Burgos.  Faraz Lazaro L.P.N.

## 2020-12-02 NOTE — TELEPHONE ENCOUNTER
Scheduled  Dec. Echo, and first visit March with Delano Chinchilla MD.,Cardiology   .Faraz Lazaro L.P.N.,[facility] Dept.

## 2020-12-03 ENCOUNTER — ANCILLARY PROCEDURE (OUTPATIENT)
Dept: CARDIOLOGY | Facility: CLINIC | Age: 60
End: 2020-12-03
Attending: INTERNAL MEDICINE
Payer: COMMERCIAL

## 2020-12-03 DIAGNOSIS — I50.20 HEART FAILURE WITH REDUCED EJECTION FRACTION, NYHA CLASS III (H): ICD-10-CM

## 2020-12-03 DIAGNOSIS — I42.8 NONISCHEMIC CARDIOMYOPATHY (H): ICD-10-CM

## 2020-12-03 PROCEDURE — 93306 TTE W/DOPPLER COMPLETE: CPT | Performed by: INTERNAL MEDICINE

## 2020-12-22 ENCOUNTER — TELEPHONE (OUTPATIENT)
Dept: CARDIOLOGY | Facility: CLINIC | Age: 60
End: 2020-12-22

## 2020-12-22 DIAGNOSIS — I42.8 NONISCHEMIC CARDIOMYOPATHY (H): ICD-10-CM

## 2020-12-22 DIAGNOSIS — I50.23 ACUTE ON CHRONIC SYSTOLIC CONGESTIVE HEART FAILURE (H): Primary | ICD-10-CM

## 2020-12-22 NOTE — TELEPHONE ENCOUNTER
Noted pt was due for a follow up appt with Dr Wick in December. It was rescheduled to March.     Pt has been seen at both [facility] and [facility] location.     Scheduled pt for a follow up at [facility] + labs on 1/12/21  - letter sent. BUT PT/DAUGHTER NEED TO BE NOTIFIED, CONFIRM.    Follow up at [place] (from January appt) scheduled on 3/26- 1:30 pm appt. Pt will need to have a lab scheduled prior to march appt.

## 2021-01-06 ENCOUNTER — TELEPHONE (OUTPATIENT)
Dept: UROLOGY | Facility: CLINIC | Age: 61
End: 2021-01-06

## 2021-01-06 DIAGNOSIS — R31.9 URINARY TRACT INFECTION WITH HEMATURIA, SITE UNSPECIFIED: ICD-10-CM

## 2021-01-06 DIAGNOSIS — N39.0 URINARY TRACT INFECTION WITH HEMATURIA, SITE UNSPECIFIED: ICD-10-CM

## 2021-01-06 DIAGNOSIS — N50.819 PAIN IN TESTICLE, UNSPECIFIED LATERALITY: Primary | ICD-10-CM

## 2021-01-06 DIAGNOSIS — R10.2 PELVIC PAIN IN MALE: ICD-10-CM

## 2021-01-06 DIAGNOSIS — R10.84 ABDOMINAL PAIN, GENERALIZED: ICD-10-CM

## 2021-01-06 NOTE — TELEPHONE ENCOUNTER
[facility] Call Center    Phone Message    May a detailed message be left on voicemail: yes     Reason for Call: Appointment Intake    Referring Provider Name: Dr. Aparicio  Diagnosis and/or Symptoms: Blood in Urine/Hematuria/ Pt has been seen for this by Dr. Aparicio before.    Daughter Geneva called in and stated patient's symptoms are getting worse and is in a lot of pain. The pain is now in his testicles and penis. Please contact Geneva to schedule appt for her father at [phone]. Thank you.    Action Taken: Message routed to:  [facility] Urology    Travel Screening: Not Applicable

## 2021-01-07 DIAGNOSIS — I10 ESSENTIAL HYPERTENSION: ICD-10-CM

## 2021-01-07 RX ORDER — AMLODIPINE BESYLATE 5 MG/1
10 TABLET ORAL DAILY
Qty: 60 TABLET | Refills: 3 | Status: SHIPPED | OUTPATIENT
Start: 2021-01-07 | End: 2021-01-12

## 2021-01-07 NOTE — TELEPHONE ENCOUNTER
Hi Dr. Aparicio,      UA/UC? Any Imaging for the testicular and penile pain? Please advise.      Thanks, Macy Johnson MA

## 2021-01-07 NOTE — TELEPHONE ENCOUNTER
Geneva Painter Dr.-patient's daughter states that the patient has blood in his urine, testicular, groin and low abdominal pain. She states that the patient has a lab appointment with his cardiologist on 1/12/2021 and the patient can leave a urine specimen on the 12 th. Please advise me if the patient needs a scrotal ultrasound as well as additional imaging for the low abdominal pain.    Thanks, Macy Johnson MA

## 2021-01-07 NOTE — TELEPHONE ENCOUNTER
Pending Prescriptions:                       Disp   Refills    amLODIPine (NORVASC) 5 MG tablet          60 tab*3            Sig: Take 2 tablets (10 mg) by mouth daily      Last Visit 10/9/2020 bonita/Delano  Last Filled 12/11/2020  Quantity 30  Req. Received 1/7/2021    REHAN Maldonado

## 2021-01-07 NOTE — TELEPHONE ENCOUNTER
Scrotal and lower abdominal/pelvic ultrasound should be fine. If his hematuria is worse or he is unable to pass urine then he should seek help at his local ER.   Epi

## 2021-01-07 NOTE — TELEPHONE ENCOUNTER
Geneva-patient's daughter was notified that Dr. Aparicio would like for the patient to get a scrotal and lower abdominal/pelvic ultrasound done as well as UA/UC.     Geneva was notified that if his hematuria is worse or he is unable to pass urine then he should seek help at his local ER. Geneva agreed with the plan.      Macy Johnson MA

## 2021-01-07 NOTE — TELEPHONE ENCOUNTER
We can get a UA /UC  and possibly a Scrotal Ultra Sound.   Is he having hematuria again?   Castillo he need something for the pain?   Thanks   Epi

## 2021-01-08 NOTE — TELEPHONE ENCOUNTER
Left a detailed VM that scan and lab was set up on Tues and next Friday will be a phone call with results

## 2021-01-11 NOTE — PROGRESS NOTES
January 11, 2021  I had the pleasure of seeing Roge Agarwal  in the [facility] for further evaluation and management of his heart failure.  Mr. 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  In 4/2016 he amputated two of his fingers with his wood saw. In the ER, he was found in a AFL with RVR and with LVEF 15%. He'd not been aware of arrhythmia but did report feeling fatigued. He had RAISSA/DCCV and started warfarin. His LVEF returned to 55% by several months later. He came off warfarin a month after the DCCV. He then represented to ER with 2 month history of feeing fatigued and increasingly SOB even at rest. He was found to be back in AFL with RVR 140s and echo demonstrated LVEF 30%. He was subsequently admitted. He was started on Pradaxa and amiodarone. Plan was for RAISSA/ablation; however, RAISSA demonstrated ROGELIO thrombus and ablation was cancelled. Amiodarone was stopped. He was instead placed on Metoprolol and digoxin for rate control. Stress CMRI showed NICM with severely reduced biventricular function with a pattern of midmyocardial enhancement in the septal segments. ROGELIO thrombus also demonstrated on CMRI. No ischemia on stress portion. Echo showed LVEF 25-30%, moderately decreased RV function, and mild MR. Etiology considered likely multifactorial given tachyarrhythmia and ETOH abuse. He was counseled to stop drinking. He was started on GDMT for his depressed LVEF. He was discharged to have close follow up.   Today presents for follow-up.    Was at his relatively well with no new complaints.  He remains off of alcohol since December 2019 and takes all his medications as prescribed.  The daughter is here with him today and she knows of the medications and takes good care of him.  He still has reduced exercise capacity, I would say  he has class III symptoms at this time.  He denies any dizziness lightheadedness palpitations.  He feels that he is in A. fib however this does not cause significant problems for him.  He does have some ongoing bleeding from the bladder and he is seeing urology for this.  No other complaints.    PAST MEDICAL HISTORY:  Past Medical History:   Diagnosis Date     Atrial fibrillation (H) 2016    One time episode noted after his finger accident, required cardioversion     Atrial fibrillation with RVR (H) 4/6/2016     Hemorrhagic stroke (H) 12/16/2007    2.3 X 1.5 CM PARENCHYMAL HEMORRHAGE IN THE RIGHT BASAL GANGLIA     HTN (hypertension)      Systolic CHF (H)     EF of 15-30% in 2016     FAMILY HISTORY:  Family History   Problem Relation Age of Onset     Heart Disease Mother      Osteoporosis Mother      LUNG DISEASE Mother      Heart Disease Father      Hypertension Brother      SOCIAL HISTORY:  Social History     Socioeconomic History     Marital status: Single     Spouse name: Not on file     Number of children: Not on file     Years of education: Not on file     Highest education level: Not on file   Occupational History     Not on file   Social Needs     Financial resource strain: Not on file     Food insecurity     Worry: Not on file     Inability: Not on file     Transportation needs     Medical: Not on file     Non-medical: Not on file   Tobacco Use     Smoking status: Never Smoker     Smokeless tobacco: Current User     Types: Chew   Substance and Sexual Activity     Alcohol use: Not Currently     Comment: 12/30/2019     Drug use: Not Currently     Sexual activity: Not on file   Lifestyle     Physical activity     Days per week: Not on file     Minutes per session: Not on file     Stress: Not on file   Relationships     Social connections     Talks on phone: Not on file     Gets together: Not on file     Attends Restorationism service: Not on file     Active member of club or organization: Not on file     Attends  "meetings of clubs or organizations: Not on file     Relationship status: Not on file     Intimate partner violence     Fear of current or ex partner: Not on file     Emotionally abused: Not on file     Physically abused: Not on file     Forced sexual activity: Not on file   Other Topics Concern     Parent/sibling w/ CABG, MI or angioplasty before 65F 55M? Not Asked   Social History Narrative     Not on file     CURRENT MEDICATIONS:  Current Outpatient Medications   Medication     amLODIPine (NORVASC) 5 MG tablet     carvedilol (COREG) 12.5 MG tablet     dabigatran ANTICOAGULANT (PRADAXA) 150 MG capsule     digoxin (LANOXIN) 125 MCG tablet     folic acid (FOLVITE) 1 MG tablet     furosemide (LASIX) 20 MG tablet     multivitamin w/minerals (THERA-VIT-M) tablet     oxyCODONE (ROXICODONE) 5 MG tablet     oxyCODONE (ROXICODONE) 5 MG tablet     sacubitril-valsartan (ENTRESTO)  MG per tablet     thiamine (B-1) 100 MG tablet     No current facility-administered medications for this visit.      ROS:   Constitutional: No fever, chills, or sweats.   ENT: No visual disturbance, ear ache, epistaxis, sore throat.   Allergies/Immunologic: Negative.   Respiratory: No cough, hemoptysis.   Cardiovascular: As per HPI.   GI: No nausea, vomiting, hematemesis, melena, or hematochezia.   : No urinary frequency, dysuria, or hematuria.   Integument: Negative.   Psychiatric: Pleasant, no major depression noted  Neuro: No focal neurological deficits noted  Endocrinology: Negative.   Musculoskeletal: As per HPI.      EXAM:  BP (!) 142/85 (BP Location: Right arm, Patient Position: Chair, Cuff Size: Adult Regular)   Pulse 67   Ht 1.854 m (6' 1\")   Wt 95.3 kg (210 lb)   SpO2 99%   BMI 27.71 kg/m    General: appears comfortable, alert and oriented  Head: normocephalic, atraumatic  Eyes: anicteric sclera, EOMI , PERRL  Neck: no adenopathy  Orophyarynx: moist mucosa, no lesions noted  Heart: irregular, borderline tachy, no murmurs, " rubs. Estimated JVP at 6 cmH2O  Lungs: CTAB, No wheezing.   Abdomen: soft, non-tender, bowel sounds present, no hepatosplenomegaly  Extremities: No LE edema today  Skin: no open lesions noted  Neuro: grossly non-focal  Psych: no evidence of depression noted     Labs:  Lab Results   Component Value Date    WBC 8.0 10/09/2020    HGB 13.9 10/09/2020    HCT 41.3 10/09/2020     10/09/2020     10/09/2020    POTASSIUM 3.7 10/09/2020    CHLORIDE 102 10/09/2020    CO2 27 10/09/2020    BUN 13 10/09/2020    CR 0.81 10/09/2020    GLC 86 10/09/2020    DD 0.9 (H) 12/31/2019    NTBNPI 2,401 (H) 12/31/2019    NTBNP 302 (H) 10/09/2020    TROPI 0.037 01/01/2020    AST 17 10/09/2020    ALT 21 10/09/2020    ALKPHOS 72 10/09/2020    BILITOTAL 0.5 10/09/2020    INR 1.32 (H) 02/25/2020 1/2/20 ECHOCARDIOGRAM:   The visual ejection fraction is estimated at 25-30%. Moderately decreased right ventricular systolic function There is moderate biatrial enlargement.  There is mild (1+) mitral regurgitation.  Probable aflutter with rapid ventricular response.  There is no comparison study available.     1/3/20 NM IRLANDA SCAN:     Only resting perfusion images were taken.     Resting images shows mild intensity basal inferior wall photopenic defect.     Gated images not performed. LV systolic function cannot be evaluated.     1/6/20 RAISSA:  Left and right atrial appendage thrombi, measuring 1.7 x 1.4 cm and 1.5 x 1.4  cm, respectively.   Mildly dilated left ventricle with normal wall thickness. Severely decreased LV systolic function with EF 15%-20%. Severe diffuse hypokinesis.  Global right ventricular function is moderately reduced.   Compared to the previous study dated 1/2/2020 (TTE): The left and right atrial appendages are better-visualized and thrombi are seen in both atrial Appendages.     1/6/20 CMRI:  1. The LV is mildly enlarged with normal wall thickness. The global systolic function is severely reduced. The LVEF is 15%.  There is severe diffuse hypokinesis.  2. The RV is normal in cavity size. The global systolic function is severely reduced. The RVEF is 20%.   3. The left atrium is severely enlarged.  The right atrium is moderately enlarged.   4. There is mild mitral regurgitation.   5. Late gadolinium enhancement imaging shows midmyocardial enhancement in the basal anteroseptum, basal inferoseptum, mid anteroseptum, mid inferoseptum, and apical septum.  This is consistent with a non-ischemic etiology of cardiomyopathy.   6. Regadenoson stress perfusion imaging shows no ischemia.   7. There is no pericardial effusion or thickening.   8. There is a left atrial appendage thrombus.   9. There is a left-sided pleural effusion.   CONCLUSIONS: Non-ischemic cardiomyopathy.  There is severely reduced biventricular function with a pattern of midmyocardial enhancement in the septal segments. There is also a left atrial appendage thrombus. There is no ischemia.      TTE 6/2020:  Patient in tachyarrhythmia during study. Mildly (EF 40-45%) reduced left ventricular function is present.  Global right ventricular function is mildly reduced.  No significant valvular dysfunction present.  The inferior vena cava was normal in size with preserved respiratory variability.  No pericardial effusion is present.     EP ablation 2/2020:  1. Radiofrequency ablation of the cavotricuspid isthmus for CTI-dependent atrial flutter.  2. Direct current cardioversion of atrial fibrillation into sinus rhythm.  3. Fluoroscopy.  4. 3D electroanatomic mapping    TTE 12/2020:  The patient's rhythm is atrial fibrillation. Borderline (EF 50-55%) reduced left ventricular function is present.  Global right ventricular function is normal.  No pericardial effusion is present. Mild biatrial enlargement is present.  IVC diameter <2.1 cm collapsing >50% with sniff suggests a normal RA pressure of 3 mmHg.  Compared to prior, LV fxn is improved.     ASSESSMENT AND PLAN:  In  summary, patient is a 59 year old gentleman with above history including recovered ejection fraction in the setting of alcohol use in the past.  He was evaluated by EP and core clinic and referred to me for heart failure management in the setting of significantly reduced ejection fraction at about 15% due to possibly combination of tachycardia induced cardiomyopathy and alcohol use.  He quit alcohol in December 2019 and he is determined to recover his ejection fraction.  It is possible intervention stopping alcohol and taking medications at this well his atrial fibrillation control his ejection fraction has again improved to 5055%.  He did have the atrial flutter ablation which was successful however he remains in A. fib again.  He is on dabigatran with contributes to some bleeding from the bladder.  He is following up with urology for this.  I will touch base with our EP colleagues whether repeat atrial fibrillation ablation may be considered for him however we need to discuss its benefits and risk for him.  He is on max dose of Entresto in addition to maximum dose of carvedilol and 10 mg of amlodipine.  His blood pressure is a little bit elevated today at some previous visit however he is in pain.  We discussed the tolerance starting medication we will readdress this on the next visit once his pain is hopefully better controlled.  In addition we will renew his medications today.  He does not need ICD implantation at this time.  CardioMEMS monitoring still might be something to consider however with the bleeding I would like to figure that out first and if the bleeding has stopped then we will consider CardioMEMS.     I appreciate the opportunity to participate in the care of Roge Agarwal . Please do not hesitate to contact me with any further questions.     Sincerely,    Amor Wick MD     [facility] Division of Cardiology

## 2021-01-12 ENCOUNTER — ANCILLARY PROCEDURE (OUTPATIENT)
Dept: ULTRASOUND IMAGING | Facility: CLINIC | Age: 61
End: 2021-01-12
Attending: STUDENT IN AN ORGANIZED HEALTH CARE EDUCATION/TRAINING PROGRAM
Payer: COMMERCIAL

## 2021-01-12 ENCOUNTER — OFFICE VISIT (OUTPATIENT)
Dept: CARDIOLOGY | Facility: CLINIC | Age: 61
End: 2021-01-12
Payer: COMMERCIAL

## 2021-01-12 VITALS
HEART RATE: 67 BPM | BODY MASS INDEX: 27.83 KG/M2 | DIASTOLIC BLOOD PRESSURE: 85 MMHG | WEIGHT: 210 LBS | OXYGEN SATURATION: 99 % | SYSTOLIC BLOOD PRESSURE: 142 MMHG | HEIGHT: 73 IN

## 2021-01-12 DIAGNOSIS — I48.4 ATYPICAL ATRIAL FLUTTER (H): ICD-10-CM

## 2021-01-12 DIAGNOSIS — Z78.9 ALCOHOL USE: ICD-10-CM

## 2021-01-12 DIAGNOSIS — R31.9 URINARY TRACT INFECTION WITH HEMATURIA, SITE UNSPECIFIED: ICD-10-CM

## 2021-01-12 DIAGNOSIS — N39.0 URINARY TRACT INFECTION WITH HEMATURIA, SITE UNSPECIFIED: ICD-10-CM

## 2021-01-12 DIAGNOSIS — I10 ESSENTIAL HYPERTENSION: ICD-10-CM

## 2021-01-12 DIAGNOSIS — I42.8 NONISCHEMIC CARDIOMYOPATHY (H): ICD-10-CM

## 2021-01-12 DIAGNOSIS — R10.84 ABDOMINAL PAIN, GENERALIZED: ICD-10-CM

## 2021-01-12 DIAGNOSIS — E78.2 MIXED HYPERLIPIDEMIA: ICD-10-CM

## 2021-01-12 DIAGNOSIS — R10.2 PELVIC PAIN IN MALE: ICD-10-CM

## 2021-01-12 DIAGNOSIS — I50.20 HEART FAILURE WITH REDUCED EJECTION FRACTION, NYHA CLASS III (H): ICD-10-CM

## 2021-01-12 DIAGNOSIS — I50.23 ACUTE ON CHRONIC SYSTOLIC CONGESTIVE HEART FAILURE (H): ICD-10-CM

## 2021-01-12 DIAGNOSIS — N50.819 PAIN IN TESTICLE, UNSPECIFIED LATERALITY: ICD-10-CM

## 2021-01-12 DIAGNOSIS — I10 BENIGN ESSENTIAL HYPERTENSION: Primary | ICD-10-CM

## 2021-01-12 DIAGNOSIS — I48.0 PAROXYSMAL ATRIAL FIBRILLATION (H): ICD-10-CM

## 2021-01-12 DIAGNOSIS — I50.22 CHRONIC SYSTOLIC HEART FAILURE (H): ICD-10-CM

## 2021-01-12 LAB
ALBUMIN SERPL-MCNC: 3.9 G/DL (ref 3.4–5)
ALBUMIN UR-MCNC: NEGATIVE MG/DL
ALP SERPL-CCNC: 91 U/L (ref 40–150)
ALT SERPL W P-5'-P-CCNC: 23 U/L (ref 0–70)
ANION GAP SERPL CALCULATED.3IONS-SCNC: 4 MMOL/L (ref 3–14)
APPEARANCE UR: CLEAR
AST SERPL W P-5'-P-CCNC: 16 U/L (ref 0–45)
BILIRUB SERPL-MCNC: 0.5 MG/DL (ref 0.2–1.3)
BILIRUB UR QL STRIP: NEGATIVE
BUN SERPL-MCNC: 13 MG/DL (ref 7–30)
CALCIUM SERPL-MCNC: 9 MG/DL (ref 8.5–10.1)
CAOX CRY #/AREA URNS HPF: ABNORMAL /HPF
CHLORIDE SERPL-SCNC: 103 MMOL/L (ref 94–109)
CO2 SERPL-SCNC: 30 MMOL/L (ref 20–32)
COLOR UR AUTO: YELLOW
CREAT SERPL-MCNC: 0.84 MG/DL (ref 0.66–1.25)
ERYTHROCYTE [DISTWIDTH] IN BLOOD BY AUTOMATED COUNT: 12.8 % (ref 10–15)
GFR SERPL CREATININE-BSD FRML MDRD: >90 ML/MIN/{1.73_M2}
GLUCOSE SERPL-MCNC: 98 MG/DL (ref 70–99)
GLUCOSE UR STRIP-MCNC: NEGATIVE MG/DL
HCT VFR BLD AUTO: 45.5 % (ref 40–53)
HGB BLD-MCNC: 14.9 G/DL (ref 13.3–17.7)
HGB UR QL STRIP: ABNORMAL
HYALINE CASTS #/AREA URNS LPF: 2 /LPF (ref 0–2)
KETONES UR STRIP-MCNC: NEGATIVE MG/DL
LEUKOCYTE ESTERASE UR QL STRIP: NEGATIVE
MCH RBC QN AUTO: 32.3 PG (ref 26.5–33)
MCHC RBC AUTO-ENTMCNC: 32.7 G/DL (ref 31.5–36.5)
MCV RBC AUTO: 99 FL (ref 78–100)
NITRATE UR QL: NEGATIVE
NT-PROBNP SERPL-MCNC: 176 PG/ML (ref 0–125)
PH UR STRIP: 6 PH (ref 5–7)
PLATELET # BLD AUTO: 202 10E9/L (ref 150–450)
POTASSIUM SERPL-SCNC: 3.9 MMOL/L (ref 3.4–5.3)
PROT SERPL-MCNC: 8.2 G/DL (ref 6.8–8.8)
RBC # BLD AUTO: 4.62 10E12/L (ref 4.4–5.9)
RBC #/AREA URNS AUTO: >182 /HPF (ref 0–2)
SODIUM SERPL-SCNC: 136 MMOL/L (ref 133–144)
SOURCE: ABNORMAL
SP GR UR STRIP: 1.01 (ref 1–1.03)
UROBILINOGEN UR STRIP-MCNC: 0 MG/DL (ref 0–2)
WBC # BLD AUTO: 6.1 10E9/L (ref 4–11)
WBC #/AREA URNS AUTO: 10 /HPF (ref 0–5)

## 2021-01-12 PROCEDURE — 81001 URINALYSIS AUTO W/SCOPE: CPT | Performed by: PATHOLOGY

## 2021-01-12 PROCEDURE — 36415 COLL VENOUS BLD VENIPUNCTURE: CPT | Performed by: PATHOLOGY

## 2021-01-12 PROCEDURE — 85027 COMPLETE CBC AUTOMATED: CPT | Performed by: PATHOLOGY

## 2021-01-12 PROCEDURE — 80053 COMPREHEN METABOLIC PANEL: CPT | Performed by: PATHOLOGY

## 2021-01-12 PROCEDURE — 87086 URINE CULTURE/COLONY COUNT: CPT | Performed by: PATHOLOGY

## 2021-01-12 PROCEDURE — 76870 US EXAM SCROTUM: CPT | Mod: GC | Performed by: RADIOLOGY

## 2021-01-12 PROCEDURE — 93976 VASCULAR STUDY: CPT | Mod: GC | Performed by: RADIOLOGY

## 2021-01-12 PROCEDURE — 99214 OFFICE O/P EST MOD 30 MIN: CPT | Performed by: INTERNAL MEDICINE

## 2021-01-12 PROCEDURE — 83880 ASSAY OF NATRIURETIC PEPTIDE: CPT | Performed by: PATHOLOGY

## 2021-01-12 PROCEDURE — G0463 HOSPITAL OUTPT CLINIC VISIT: HCPCS | Mod: 25

## 2021-01-12 RX ORDER — DABIGATRAN ETEXILATE 150 MG/1
150 CAPSULE ORAL 2 TIMES DAILY
Qty: 180 CAPSULE | Refills: 3 | Status: SHIPPED | OUTPATIENT
Start: 2021-01-12 | End: 2022-03-17

## 2021-01-12 RX ORDER — FUROSEMIDE 20 MG
40 TABLET ORAL 2 TIMES DAILY
Qty: 360 TABLET | Refills: 3 | Status: SHIPPED | OUTPATIENT
Start: 2021-01-12 | End: 2022-03-17

## 2021-01-12 RX ORDER — DIGOXIN 125 MCG
125 TABLET ORAL DAILY
Qty: 90 TABLET | Refills: 3 | Status: SHIPPED | OUTPATIENT
Start: 2021-01-12 | End: 2022-03-17

## 2021-01-12 RX ORDER — FOLIC ACID 1 MG/1
1 TABLET ORAL DAILY
Qty: 90 TABLET | Refills: 3 | Status: SHIPPED | OUTPATIENT
Start: 2021-01-12 | End: 2022-03-17

## 2021-01-12 RX ORDER — SACUBITRIL AND VALSARTAN 97; 103 MG/1; MG/1
1 TABLET, FILM COATED ORAL 2 TIMES DAILY
Qty: 180 TABLET | Refills: 3 | Status: SHIPPED | OUTPATIENT
Start: 2021-01-12 | End: 2021-07-21

## 2021-01-12 RX ORDER — CARVEDILOL 12.5 MG/1
12.5 TABLET ORAL 2 TIMES DAILY WITH MEALS
Qty: 180 TABLET | Refills: 3 | Status: SHIPPED | OUTPATIENT
Start: 2021-01-12 | End: 2022-02-02

## 2021-01-12 RX ORDER — AMLODIPINE BESYLATE 5 MG/1
10 TABLET ORAL DAILY
Qty: 180 TABLET | Refills: 3 | Status: SHIPPED | OUTPATIENT
Start: 2021-01-12 | End: 2022-02-18

## 2021-01-12 ASSESSMENT — MIFFLIN-ST. JEOR: SCORE: 1811.43

## 2021-01-12 ASSESSMENT — PAIN SCALES - GENERAL: PAINLEVEL: NO PAIN (0)

## 2021-01-12 NOTE — PATIENT INSTRUCTIONS
Thank you for your visit today.  Please call me with any questions or concerns.   Kb Holguin RN  Cardiology Care Coordinator  [phone], press option 1 then option 4

## 2021-01-12 NOTE — NURSING NOTE
Chief Complaint   Patient presents with     Follow Up      Heart Failure      Vitals were taken and medications were reconciled.     Ethel Coronado RMA  10:15 AM

## 2021-01-13 ENCOUNTER — PRE VISIT (OUTPATIENT)
Dept: UROLOGY | Facility: CLINIC | Age: 61
End: 2021-01-13

## 2021-01-13 LAB
BACTERIA SPEC CULT: NO GROWTH
Lab: NORMAL
SPECIMEN SOURCE: NORMAL

## 2021-01-13 NOTE — TELEPHONE ENCOUNTER
Visit Type : Return - lab results    Hx/Sx: Gross hematuria    Records/Orders: Available    Pt Contacted: n/a    At Rooming: Telephone

## 2021-01-14 ENCOUNTER — TELEPHONE (OUTPATIENT)
Dept: CARDIOLOGY | Facility: CLINIC | Age: 61
End: 2021-01-14

## 2021-01-14 NOTE — TELEPHONE ENCOUNTER
Called and LVM. Per Kb MARINELLI ( jessica nurse) said that this patient ( if he wants ) can cancel the kelsea appointment that is scheduled in march. Delano would like to see him back in July with labs prior . Patient can go to :      [facility] with labs 1-2 days prior ( locally [place]?)    [facility]  Labs ( same day)   [place]     Link if follow up order is in , thanks

## 2021-01-15 ENCOUNTER — TELEPHONE (OUTPATIENT)
Dept: UROLOGY | Facility: CLINIC | Age: 61
End: 2021-01-15

## 2021-01-15 ENCOUNTER — VIRTUAL VISIT (OUTPATIENT)
Dept: UROLOGY | Facility: CLINIC | Age: 61
End: 2021-01-15
Payer: COMMERCIAL

## 2021-01-15 DIAGNOSIS — K40.90 RIGHT INGUINAL HERNIA: Primary | ICD-10-CM

## 2021-01-15 PROCEDURE — 99207 PR NO BILLABLE SERVICE THIS VISIT: CPT | Mod: TEL | Performed by: STUDENT IN AN ORGANIZED HEALTH CARE EDUCATION/TRAINING PROGRAM

## 2021-01-15 RX ORDER — OXYCODONE HYDROCHLORIDE 5 MG/1
5 TABLET ORAL PRN
Qty: 6 TABLET | Refills: 0 | Status: SHIPPED | OUTPATIENT
Start: 2021-01-15 | End: 2021-03-01

## 2021-01-15 NOTE — PATIENT INSTRUCTIONS
Right Inguinal Hernia, needs to be seen by a general surgeon and possibly be operated.  Referral order placed for Surgery at [facility].

## 2021-01-15 NOTE — TELEPHONE ENCOUNTER
Health Call Center    Phone Message    May a detailed message be left on voicemail: no     Reason for Call: Medication Question or concern regarding medication   Prescription Clarification  Name of Medication: oxyCODONE (ROXICODONE) 5 MG tablet [[id number]]  Prescribing Provider: Dr. Aparicio   Pharmacy: [id number]   What on the order needs clarification? Donald calling from [facility] for clarification on directions for oxyCODONE (ROXICODONE) 5 MG tablet [[id number]]. She is needing more specific directions or a max Pt can take per day. Please chyna Donald back to discuss          Action Taken: Message routed to:  [facility] ([facility] Urology    Travel Screening: Not Applicable

## 2021-01-15 NOTE — PROGRESS NOTES
"Roge is a 60 year old who is being evaluated via a billable telephone visit.      What phone number would you like to be contacted at? [phone]  How would you like to obtain your AVS? Mail a copy  Assessment & Plan     Right inguinal hernia  Recurrent Hematuria  - GENERAL SURG ADULT REFERRAL; Future  - oxyCODONE (ROXICODONE) 5 MG tablet; Take 1 tablet (5 mg) by mouth as needed for pain    Review of external notes as documented above   Review of the result(s) of each unique test - Ultrasound Scrotum  Independent interpretation of a test performed by another physician/other qualified health care professional (not separately reported) - US testicular and Scrotum        20 minutes spent on the date of the encounter doing chart review and patient visit                BMI:   Estimated body mass index is 27.71 kg/m  as calculated from the following:    Height as of 1/12/21: 1.854 m (6' 1\").    Weight as of 1/12/21: 95.3 kg (210 lb).         CONSULTATION/REFERRAL to General Surgery for hernia surgery    Return if symptoms worsen or fail to improve.    Epi Aparicio MD  [facility] [place]      Phone call duration: 15 minutes        Chief Complaint:    Gross Hematuria and Right Inguinal region pain         Consult or Referral:     Mr. Roge Agarwal is a 60 year old male seen at the request of Dr. Cabrera.         History of Present Illness:    Roge Agarwal is a very pleasant 60 year old male who presents with a history of Gross Hematuria and Right inguinal region pain. He was not available for the visit today and the phone call was attended by his daughter who apparently takes care of his medical needs. According to Geneva, Mr Cutler's daughter, he is at work and could not make it for the call.   She did inform me that Aamir was in a lot of pain intermittently and the pain has been persisting.  There has been no increase in the hematuria and no associated history of redness, tenderness and altered " bowel habits associated with the pain. He frequently needs medications for the pain.               Past Medical History:     Past Medical History:   Diagnosis Date     Atrial fibrillation (H) 2016    One time episode noted after his finger accident, required cardioversion     Atrial fibrillation with RVR (H) 4/6/2016     Hemorrhagic stroke (H) 12/16/2007    2.3 X 1.5 CM PARENCHYMAL HEMORRHAGE IN THE RIGHT BASAL GANGLIA     HTN (hypertension)      Systolic CHF (H)     EF of 15-30% in 2016            Past Surgical History:     Past Surgical History:   Procedure Laterality Date     AMPUTATE FINGER(S) Right     Right 3rd distal finger after work injury     ANESTHESIA CARDIOVERSION N/A 2/25/2020    Procedure: CARDIOVERSION;  Surgeon: GENERIC ANESTHESIA PROVIDER;  Location: [facility] OR     EP ABLATION ATRIAL FLUTTER N/A 2/25/2020    Procedure: EP ABLATION ATRIAL FLUTTER;  Surgeon: Jan Romeo MD;  Location:  [facility]            Medications     Current Outpatient Medications   Medication     oxyCODONE (ROXICODONE) 5 MG tablet     amLODIPine (NORVASC) 5 MG tablet     carvedilol (COREG) 12.5 MG tablet     dabigatran ANTICOAGULANT (PRADAXA) 150 MG capsule     digoxin (LANOXIN) 125 MCG tablet     folic acid (FOLVITE) 1 MG tablet     furosemide (LASIX) 20 MG tablet     multivitamin w/minerals (THERA-VIT-M) tablet     oxyCODONE (ROXICODONE) 5 MG tablet     oxyCODONE (ROXICODONE) 5 MG tablet     sacubitril-valsartan (ENTRESTO)  MG per tablet     thiamine (B-1) 100 MG tablet     No current facility-administered medications for this visit.             Family History:     Family History   Problem Relation Age of Onset     Heart Disease Mother      Osteoporosis Mother      LUNG DISEASE Mother      Heart Disease Father      Hypertension Brother             Social History:     Social History     Socioeconomic History     Marital status: Single     Spouse name: Not on file     Number of children: Not on file     Years  of education: Not on file     Highest education level: Not on file   Occupational History     Not on file   Social Needs     Financial resource strain: Not on file     Food insecurity     Worry: Not on file     Inability: Not on file     Transportation needs     Medical: Not on file     Non-medical: Not on file   Tobacco Use     Smoking status: Never Smoker     Smokeless tobacco: Current User     Types: Chew   Substance and Sexual Activity     Alcohol use: Not Currently     Comment: 12/30/2019     Drug use: Not Currently     Sexual activity: Not on file   Lifestyle     Physical activity     Days per week: Not on file     Minutes per session: Not on file     Stress: Not on file   Relationships     Social connections     Talks on phone: Not on file     Gets together: Not on file     Attends Buddhist service: Not on file     Active member of club or organization: Not on file     Attends meetings of clubs or organizations: Not on file     Relationship status: Not on file     Intimate partner violence     Fear of current or ex partner: Not on file     Emotionally abused: Not on file     Physically abused: Not on file     Forced sexual activity: Not on file   Other Topics Concern     Parent/sibling w/ CABG, MI or angioplasty before 65F 55M? Not Asked   Social History Narrative     Not on file            Allergies:   Patient has no known allergies.         Review of Systems:  From intake questionnaire     Skin: negative  Eyes: negative  Ears/Nose/Throat: negative  Respiratory: No shortness of breath, dyspnea on exertion, cough, or hemoptysis  Cardiovascular: No chest pain or palpitations  Gastrointestinal: negative; no nausea/vomiting, constipation or diarrhea  Genitourinary: as per HPI  Musculoskeletal: negative  Neurologic: negative  Psychiatric: negative  Hematologic/Lymphatic/Immunologic: negative  Endocrine: negative         Physical Exam:   This is a virtual phone visit and was attended by his daughter,Geneva, filling  in for Mr Agarwal        Labs and Pathology:    I reviewed all applicable laboratory and pathology data and went over findings with patient  Significant for   Lab Results   Component Value Date    CR 0.84 01/12/2021    CR 0.81 10/09/2020    CR 0.81 09/09/2020    CR 0.81 06/18/2020    CR 0.98 06/08/2020    CR 1.17 05/11/2020    CR 1.15 05/04/2020    CR 0.86 04/22/2020    CR 0.81 03/06/2020    CR 0.98 02/24/2020     PSA   Date Value Ref Range Status   10/09/2020 0.49 0 - 4 ug/L Final     Comment:     Assay Method:  Chemiluminescence using Siemens Vista analyzer      Ref Range & Units 2wk ago     Color Urine  Yellow     Appearance Urine  Clear     Glucose Urine NEG^Negative mg/dL Negative     Bilirubin Urine NEG^Negative Negative     Ketones Urine NEG^Negative mg/dL Negative     Specific Gravity Urine 1.003 - 1.035 1.008     Blood Urine NEG^Negative LargeAbnormal      pH Urine 5.0 - 7.0 pH 6.0     Protein Albumin Urine NEG^Negative mg/dL Negative     Urobilinogen mg/dL 0.0 - 2.0 mg/dL 0.0     Nitrite Urine NEG^Negative Negative     Leukocyte Esterase Urine NEG^Negative Negative     Source  Midstream Urine     WBC Urine 0 - 5 /HPF 10High      RBC Urine 0 - 2 /HPF >182High      Hyaline Casts 0 - 2 /LPF 2     Calcium Oxalate NEG^Negative /HPF FewAbnormal       Description Midstream Urine    Special Requests Specimen received in preservative    Culture Micro No growth              Imaging:    I directly visualized and reviewed all applicable imaging a with patient.  Significant for     PROCEDURE COMMENTS: Ultrasound of the scrotum was performed with color  and spectral Doppler.     FINDINGS:  Right testis: 4.3 x 3.1 x 2.4 cm  Left testis: 4.6 x 3.6 x 2.1 cm     The right testicle is normal in size, shape, and echotexture, and is  located within the scrotum. The epididymis is normal. There is no  hydrocele, varicocele, or abnormal mass. There are two prominent  lobules of fat within the right groin, which bulge further into  the  inguinal canal with Valsalva maneuver. There is normal testicular  blood flow as documented by both color Doppler evaluation and spectral  Doppler waveforms.  There is no evidence of testicular torsion.     The left testicle is normal in size, shape, and echotexture, and is  located within the scrotum. The epididymis is normal. 4 mm cystic  lesion in the head of the left epididymis. There is no hydrocele or  abnormal mass. There is a left-sided varicocele, which does not  significantly changed in size with Valsalva maneuver. There is normal  testicular blood flow as documented by both color Doppler evaluation  and spectral Doppler waveforms.  There is no evidence of testicular  torsion.     Small diverticulum arising off the posterior right aspect of the  urinary bladder wall, as seen on comparison CT from 10/7/2020.                                                                      IMPRESSION:  1. No abnormal testicular mass or evidence of testicular torsion.  2. Small left-sided varicocele which does not change significantly in  size with Valsalva maneuver.  3. Prominent fat-containing right inguinal hernia, which is more  pronounced with Valsalva maneuver.  4. Small urinary bladder diverticula.           Assessment and Plan:     Assessment:   Gross Hematuria currently stable and related to anticoagulation: Prior evaluation was negative for significant lesions  New onset Right inguinal Hernia without obstruction or strangulation  I discussed about the warning signs for the hernia with Geneva and the need to seek emergent medical care if that happens. I will refer Aamir for general surgery    Plan:  Continue the pain management regimen. Oxycodone prescribed for breakthrough pain with the advice to seek emergent medical care in case any of the warning signs appear.  Referral placed to general surgery      Orders  Orders Placed This Encounter   Procedures     GENERAL SURG ADULT REFERRAL       Epi Aparicio,  MD  Urology  [facility]

## 2021-01-15 NOTE — LETTER
"1/15/2021       RE: Roge Agarwal  [address] [facility] [id number]     Dear Colleague,    Thank you for referring your patient, Roge Agarwal, to the [facility] [place] at [facility]. Please see a copy of my visit note below.    Roge is a 60 year old who is being evaluated via a billable telephone visit.      What phone number would you like to be contacted at? [phone]  How would you like to obtain your AVS? Mail a copy  Assessment & Plan     Right inguinal hernia  Recurrent Hematuria  - GENERAL SURG ADULT REFERRAL; Future  - oxyCODONE (ROXICODONE) 5 MG tablet; Take 1 tablet (5 mg) by mouth as needed for pain    Review of external notes as documented above   Review of the result(s) of each unique test - Ultrasound Scrotum  Independent interpretation of a test performed by another physician/other qualified health care professional (not separately reported) - US testicular and Scrotum        20 minutes spent on the date of the encounter doing chart review and patient visit                BMI:   Estimated body mass index is 27.71 kg/m  as calculated from the following:    Height as of 1/12/21: 1.854 m (6' 1\").    Weight as of 1/12/21: 95.3 kg (210 lb).         CONSULTATION/REFERRAL to General Surgery for hernia surgery    Return if symptoms worsen or fail to improve.    Epi Aparicio MD  [facility] [place]      Phone call duration: 15 minutes        Chief Complaint:    Gross Hematuria and Right Inguinal region pain         Consult or Referral:     Mr. Roge Agarwal is a 60 year old male seen at the request of Dr. Cabrera.         History of Present Illness:    Roge Agarwal is a very pleasant 60 year old male who presents with a history of Gross Hematuria and Right inguinal region pain. He was not available for the visit today and the phone call was attended by his daughter who apparently takes care of his medical " needs. According to Geneva, Mr Cutler's daughter, he is at work and could not make it for the call.   She did inform me that Aamir was in a lot of pain intermittently and the pain has been persisting.  There has been no increase in the hematuria and no associated history of redness, tenderness and altered bowel habits associated with the pain. He frequently needs medications for the pain.               Past Medical History:     Past Medical History:   Diagnosis Date     Atrial fibrillation (H) 2016    One time episode noted after his finger accident, required cardioversion     Atrial fibrillation with RVR (H) 4/6/2016     Hemorrhagic stroke (H) 12/16/2007    2.3 X 1.5 CM PARENCHYMAL HEMORRHAGE IN THE RIGHT BASAL GANGLIA     HTN (hypertension)      Systolic CHF (H)     EF of 15-30% in 2016            Past Surgical History:     Past Surgical History:   Procedure Laterality Date     AMPUTATE FINGER(S) Right     Right 3rd distal finger after work injury     ANESTHESIA CARDIOVERSION N/A 2/25/2020    Procedure: CARDIOVERSION;  Surgeon: GENERIC ANESTHESIA PROVIDER;  Location:  OR     EP ABLATION ATRIAL FLUTTER N/A 2/25/2020    Procedure: EP ABLATION ATRIAL FLUTTER;  Surgeon: Jan Romeo MD;  Location:  HEART CARDIAC CATH LAB            Medications     Current Outpatient Medications   Medication     oxyCODONE (ROXICODONE) 5 MG tablet     amLODIPine (NORVASC) 5 MG tablet     carvedilol (COREG) 12.5 MG tablet     dabigatran ANTICOAGULANT (PRADAXA) 150 MG capsule     digoxin (LANOXIN) 125 MCG tablet     folic acid (FOLVITE) 1 MG tablet     furosemide (LASIX) 20 MG tablet     multivitamin w/minerals (THERA-VIT-M) tablet     oxyCODONE (ROXICODONE) 5 MG tablet     oxyCODONE (ROXICODONE) 5 MG tablet     sacubitril-valsartan (ENTRESTO)  MG per tablet     thiamine (B-1) 100 MG tablet     No current facility-administered medications for this visit.             Family History:     Family History   Problem Relation Age of Onset      Heart Disease Mother      Osteoporosis Mother      LUNG DISEASE Mother      Heart Disease Father      Hypertension Brother             Social History:     Social History     Socioeconomic History     Marital status: Single     Spouse name: Not on file     Number of children: Not on file     Years of education: Not on file     Highest education level: Not on file   Occupational History     Not on file   Social Needs     Financial resource strain: Not on file     Food insecurity     Worry: Not on file     Inability: Not on file     Transportation needs     Medical: Not on file     Non-medical: Not on file   Tobacco Use     Smoking status: Never Smoker     Smokeless tobacco: Current User     Types: Chew   Substance and Sexual Activity     Alcohol use: Not Currently     Comment: 12/30/2019     Drug use: Not Currently     Sexual activity: Not on file   Lifestyle     Physical activity     Days per week: Not on file     Minutes per session: Not on file     Stress: Not on file   Relationships     Social connections     Talks on phone: Not on file     Gets together: Not on file     Attends Judaism service: Not on file     Active member of club or organization: Not on file     Attends meetings of clubs or organizations: Not on file     Relationship status: Not on file     Intimate partner violence     Fear of current or ex partner: Not on file     Emotionally abused: Not on file     Physically abused: Not on file     Forced sexual activity: Not on file   Other Topics Concern     Parent/sibling w/ CABG, MI or angioplasty before 65F 55M? Not Asked   Social History Narrative     Not on file            Allergies:   Patient has no known allergies.         Review of Systems:  From intake questionnaire     Skin: negative  Eyes: negative  Ears/Nose/Throat: negative  Respiratory: No shortness of breath, dyspnea on exertion, cough, or hemoptysis  Cardiovascular: No chest pain or palpitations  Gastrointestinal: negative; no  nausea/vomiting, constipation or diarrhea  Genitourinary: as per HPI  Musculoskeletal: negative  Neurologic: negative  Psychiatric: negative  Hematologic/Lymphatic/Immunologic: negative  Endocrine: negative         Physical Exam:   This is a virtual phone visit and was attended by his daughter,Geneva, filling in for Mr Agarwal        Labs and Pathology:    I reviewed all applicable laboratory and pathology data and went over findings with patient  Significant for   Lab Results   Component Value Date    CR 0.84 01/12/2021    CR 0.81 10/09/2020    CR 0.81 09/09/2020    CR 0.81 06/18/2020    CR 0.98 06/08/2020    CR 1.17 05/11/2020    CR 1.15 05/04/2020    CR 0.86 04/22/2020    CR 0.81 03/06/2020    CR 0.98 02/24/2020     PSA   Date Value Ref Range Status   10/09/2020 0.49 0 - 4 ug/L Final     Comment:     Assay Method:  Chemiluminescence using Siemens Vista analyzer      Ref Range & Units 2wk ago     Color Urine  Yellow     Appearance Urine  Clear     Glucose Urine NEG^Negative mg/dL Negative     Bilirubin Urine NEG^Negative Negative     Ketones Urine NEG^Negative mg/dL Negative     Specific Gravity Urine 1.003 - 1.035 1.008     Blood Urine NEG^Negative LargeAbnormal      pH Urine 5.0 - 7.0 pH 6.0     Protein Albumin Urine NEG^Negative mg/dL Negative     Urobilinogen mg/dL 0.0 - 2.0 mg/dL 0.0     Nitrite Urine NEG^Negative Negative     Leukocyte Esterase Urine NEG^Negative Negative     Source  Midstream Urine     WBC Urine 0 - 5 /HPF 10High      RBC Urine 0 - 2 /HPF >182High      Hyaline Casts 0 - 2 /LPF 2     Calcium Oxalate NEG^Negative /HPF FewAbnormal       Description Midstream Urine    Special Requests Specimen received in preservative    Culture Micro No growth              Imaging:    I directly visualized and reviewed all applicable imaging a with patient.  Significant for     PROCEDURE COMMENTS: Ultrasound of the scrotum was performed with color  and spectral Doppler.     FINDINGS:  Right testis: 4.3 x 3.1 x 2.4  cm  Left testis: 4.6 x 3.6 x 2.1 cm     The right testicle is normal in size, shape, and echotexture, and is  located within the scrotum. The epididymis is normal. There is no  hydrocele, varicocele, or abnormal mass. There are two prominent  lobules of fat within the right groin, which bulge further into the  inguinal canal with Valsalva maneuver. There is normal testicular  blood flow as documented by both color Doppler evaluation and spectral  Doppler waveforms.  There is no evidence of testicular torsion.     The left testicle is normal in size, shape, and echotexture, and is  located within the scrotum. The epididymis is normal. 4 mm cystic  lesion in the head of the left epididymis. There is no hydrocele or  abnormal mass. There is a left-sided varicocele, which does not  significantly changed in size with Valsalva maneuver. There is normal  testicular blood flow as documented by both color Doppler evaluation  and spectral Doppler waveforms.  There is no evidence of testicular  torsion.     Small diverticulum arising off the posterior right aspect of the  urinary bladder wall, as seen on comparison CT from 10/7/2020.                                                                      IMPRESSION:  1. No abnormal testicular mass or evidence of testicular torsion.  2. Small left-sided varicocele which does not change significantly in  size with Valsalva maneuver.  3. Prominent fat-containing right inguinal hernia, which is more  pronounced with Valsalva maneuver.  4. Small urinary bladder diverticula.           Assessment and Plan:     Assessment:   Gross Hematuria currently stable and related to anticoagulation: Prior evaluation was negative for significant lesions  New onset Right inguinal Hernia without obstruction or strangulation  I discussed about the warning signs for the hernia with Geneva and the need to seek emergent medical care if that happens. I will refer Aamir for general surgery    Plan:  Continue  the pain management regimen. Oxycodone prescribed for breakthrough pain with the advice to seek emergent medical care in case any of the warning signs appear.  Referral placed to general surgery      Orders  Orders Placed This Encounter   Procedures     GENERAL SURG ADULT REFERRAL       Epi Aparicio MD  Urology  [facility]

## 2021-01-15 NOTE — TELEPHONE ENCOUNTER
Called patient regarding how often patient told to take them only when he needs them for severe pain Justina Seth, BILLN Staff Nurse

## 2021-01-26 DIAGNOSIS — Z53.9 ERRONEOUS ENCOUNTER--DISREGARD: ICD-10-CM

## 2021-01-26 DIAGNOSIS — K40.90 RIGHT INGUINAL HERNIA: Primary | ICD-10-CM

## 2021-02-01 ENCOUNTER — HOSPITAL ENCOUNTER (OUTPATIENT)
Facility: CLINIC | Age: 61
End: 2021-02-01
Attending: SURGERY | Admitting: SURGERY
Payer: COMMERCIAL

## 2021-02-01 ENCOUNTER — TELEPHONE (OUTPATIENT)
Dept: CARDIOLOGY | Facility: CLINIC | Age: 61
End: 2021-02-01

## 2021-02-01 ENCOUNTER — OFFICE VISIT (OUTPATIENT)
Dept: SURGERY | Facility: CLINIC | Age: 61
End: 2021-02-01
Attending: STUDENT IN AN ORGANIZED HEALTH CARE EDUCATION/TRAINING PROGRAM
Payer: COMMERCIAL

## 2021-02-01 VITALS
BODY MASS INDEX: 27.83 KG/M2 | HEART RATE: 74 BPM | WEIGHT: 210 LBS | DIASTOLIC BLOOD PRESSURE: 86 MMHG | HEIGHT: 73 IN | SYSTOLIC BLOOD PRESSURE: 146 MMHG | TEMPERATURE: 98.3 F

## 2021-02-01 DIAGNOSIS — K40.20 NON-RECURRENT BILATERAL INGUINAL HERNIA WITHOUT OBSTRUCTION OR GANGRENE: ICD-10-CM

## 2021-02-01 DIAGNOSIS — K40.20 NON-RECURRENT BILATERAL INGUINAL HERNIA WITHOUT OBSTRUCTION OR GANGRENE: Primary | ICD-10-CM

## 2021-02-01 DIAGNOSIS — K40.90 RIGHT INGUINAL HERNIA: ICD-10-CM

## 2021-02-01 PROCEDURE — 99204 OFFICE O/P NEW MOD 45 MIN: CPT | Performed by: SURGERY

## 2021-02-01 RX ORDER — OXYCODONE HYDROCHLORIDE 5 MG/1
5 TABLET ORAL EVERY 6 HOURS PRN
Qty: 10 TABLET | Refills: 0 | Status: SHIPPED | OUTPATIENT
Start: 2021-02-01 | End: 2021-02-04

## 2021-02-01 ASSESSMENT — MIFFLIN-ST. JEOR: SCORE: 1811.43

## 2021-02-01 NOTE — NURSING NOTE
"Initial BP (!) 146/86 (BP Location: Right arm, Patient Position: Sitting, Cuff Size: Adult Regular)   Pulse 74   Temp 98.3  F (36.8  C) (Tympanic)   Ht 1.854 m (6' 1\")   Wt 95.3 kg (210 lb)   BMI 27.71 kg/m   Estimated body mass index is 27.71 kg/m  as calculated from the following:    Height as of this encounter: 1.854 m (6' 1\").    Weight as of this encounter: 95.3 kg (210 lb). .    Chaya Vazquez CMA    "

## 2021-02-01 NOTE — PROGRESS NOTES
PCP:  No Ref-Primary, Physician    Chief complaint: Bilateral inguinal hernias    History of Present Illness: Roge is a 60-year-old man who presents with his daughter for evaluation of potential hernia repair.  He has history of the hernias is somewhat vague, but he has been having problems with intermittent pain in the right groin for some time.  He was conversing recently with a urologist at the [facility] due to a continuing problem with hematuria.  This provider recommended that he get an urgent evaluation because of concerns for incarceration.    The patient has intermittent pain in the right groin which will actually cause him to bend over in order to obtain some relief.  He will then get some relief when he lies on his back.  No bowel or bladder obstructive symptoms.    A CT scan dated September 9 showed the hernias bilaterally which contain only fat.  A subsequent testicular ultrasound was negative for any blood flow issues.    A review of his imaging reveals a CT scan in October 2017 which clearly showed the hernias, and they do not appear significantly different than his recent study.    About 2 years ago he was admitted to the hospital with acute heart failure and was found to be in atrial flutter.  He eventually had an ablation procedure and now is in atrial fibrillation.  His latest echocardiogram shows an ejection fraction of 51%.    He is on Pradaxa because of his heart failure.  He has not been off of this since he was converted from atrial flutter.    He has a history of a stroke about 13 years ago.    He has episodes of gross hematuria which have been ongoing for several years.  The evaluation was started 4 years ago, but not completed.  His cardiac issues intervened.  He has just begun to have this reevaluated when he had the conversation with the [place] urologist.  He has a known bladder diverticulum which was seen on a cystoscopy 3-1/2 years ago.    Histories:  Past Medical  History:   Diagnosis Date     Atrial fibrillation (H) 2016    One time episode noted after his finger accident, required cardioversion     Atrial fibrillation with RVR (H) 4/6/2016     Hemorrhagic stroke (H) 12/16/2007    2.3 X 1.5 CM PARENCHYMAL HEMORRHAGE IN THE RIGHT BASAL GANGLIA     HTN (hypertension)      Systolic CHF (H)     EF of 15-30% in 2016       Past Surgical History:   Procedure Laterality Date     AMPUTATE FINGER(S) Right     Right 3rd distal finger after work injury     ANESTHESIA CARDIOVERSION N/A 2/25/2020    Procedure: CARDIOVERSION;  Surgeon: GENERIC ANESTHESIA PROVIDER;  Location: [facility] OR     EP ABLATION ATRIAL FLUTTER N/A 2/25/2020    Procedure: EP ABLATION ATRIAL FLUTTER;  Surgeon: Jan Romeo MD;  Location:  HEART CARDIAC CATH LAB       Family History   Problem Relation Age of Onset     Heart Disease Mother      Osteoporosis Mother      LUNG DISEASE Mother      Heart Disease Father      Hypertension Brother        Social History     Tobacco Use     Smoking status: Never Smoker     Smokeless tobacco: Current User     Types: Chew   Substance Use Topics     Alcohol use: Not Currently     Comment: 12/30/2019       Current Outpatient Medications   Medication Sig Dispense Refill     amLODIPine (NORVASC) 5 MG tablet Take 2 tablets (10 mg) by mouth daily 180 tablet 3     carvedilol (COREG) 12.5 MG tablet Take 1 tablet (12.5 mg) by mouth 2 times daily (with meals) 180 tablet 3     dabigatran ANTICOAGULANT (PRADAXA) 150 MG capsule Take 1 capsule (150 mg) by mouth 2 times daily Store in original 's bottle or blister pack; use within 120 days of opening. 180 capsule 3     digoxin (LANOXIN) 125 MCG tablet Take 1 tablet (125 mcg) by mouth daily 90 tablet 3     folic acid (FOLVITE) 1 MG tablet Take 1 tablet (1 mg) by mouth daily 90 tablet 3     furosemide (LASIX) 20 MG tablet Take 2 tablets (40 mg) by mouth 2 times daily 360 tablet 3     multivitamin w/minerals (THERA-VIT-M) tablet Take 1  tablet by mouth daily 30 tablet 11     oxyCODONE (ROXICODONE) 5 MG tablet Take 1 tablet (5 mg) by mouth every 6 hours as needed for pain 10 tablet 0     oxyCODONE (ROXICODONE) 5 MG tablet Take 1 tablet (5 mg) by mouth as needed for pain 6 tablet 0     sacubitril-valsartan (ENTRESTO)  MG per tablet Take 1 tablet by mouth 2 times daily 180 tablet 3     thiamine (B-1) 100 MG tablet Take 1 tablet (100 mg) by mouth daily 90 tablet 3     oxyCODONE (ROXICODONE) 5 MG tablet Take 1 tablet (5 mg) by mouth every 6 hours as needed for pain (Patient not taking: Reported on 1/12/2021) 6 tablet 0     oxyCODONE (ROXICODONE) 5 MG tablet Take 1 tablet (5 mg) by mouth every 6 hours as needed for breakthrough pain or pain (Patient not taking: Reported on 1/12/2021) 6 tablet 0       No Known Allergies    Images:  Recent Results (from the past 744 hour(s))   US Testicular & Scrotum w Doppler Ltd    [facility]    US TESTICULAR AND SCROTUM WITH DOPPLER LIMITED  1/12/2021 12:01 PM      CLINICAL HISTORY: Pain in testicle, unspecified laterality; Abdominal  pain, generalized; Pelvic pain in male    COMPARISON: CT abdomen and pelvis on 10/7/2020        PROCEDURE COMMENTS: Ultrasound of the scrotum was performed with color  and spectral Doppler.    FINDINGS:  Right testis: 4.3 x 3.1 x 2.4 cm  Left testis: 4.6 x 3.6 x 2.1 cm    The right testicle is normal in size, shape, and echotexture, and is  located within the scrotum. The epididymis is normal. There is no  hydrocele, varicocele, or abnormal mass. There are two prominent  lobules of fat within the right groin, which bulge further into the  inguinal canal with Valsalva maneuver. There is normal testicular  blood flow as documented by both color Doppler evaluation and spectral  Doppler waveforms.  There is no evidence of testicular torsion.    The left testicle is normal in size, shape, and echotexture, and is  located within the scrotum. The epididymis is normal. 4 mm cystic  lesion in  "the head of the left epididymis. There is no hydrocele or  abnormal mass. There is a left-sided varicocele, which does not  significantly changed in size with Valsalva maneuver. There is normal  testicular blood flow as documented by both color Doppler evaluation  and spectral Doppler waveforms.  There is no evidence of testicular  torsion.    Small diverticulum arising off the posterior right aspect of the  urinary bladder wall, as seen on comparison CT from 10/7/2020.      Impression    IMPRESSION:  1. No abnormal testicular mass or evidence of testicular torsion.  2. Small left-sided varicocele which does not change significantly in  size with Valsalva maneuver.  3. Prominent fat-containing right inguinal hernia, which is more  pronounced with Valsalva maneuver.  4. Small urinary bladder diverticula.    I have personally reviewed the examination and initial interpretation  and I agree with the findings.    ELOY POE MD       Labs:  No results found for any visits on 02/01/21.    ROS:  Constitutional - Denies fevers, weight loss, malaise, lethargy  Neuro - Denies tremors or seizures  Pulmon - Denies SOB, dyspnea, hemoptysis  CV - Denies CP, SOB  GI - Denies hematemesis, BRBPR, melena, chronic diarrhea or epigastric pain   -see above  Hematology - Denies blood clotting disorders, chronic anemias  Dermatology - No melanomas or skin cancers  Rheumatology - No h/o RA    BP (!) 146/86 (BP Location: Right arm, Patient Position: Sitting, Cuff Size: Adult Regular)   Pulse 74   Temp 98.3  F (36.8  C) (Tympanic)   Ht 1.854 m (6' 1\")   Wt 95.3 kg (210 lb)   BMI 27.71 kg/m      Exam:  General - Alert and Oriented X4, NAD, well nourished  HEENT - Normocephalic, atraumatic  Neck - supple  Lungs -respirations unlabored, chest wall excursion normal  CV -pulse irregular  Abdomen - Soft, non-tender  Groins -bilateral inguinal hernias which are easily reducible, right greater than left, scrotal anatomy normal  Neuro " -intact  Extremities - No cyanosis, clubbing or edema.      Assessment and Plan: Patient presents with symptomatic large inguinal hernias.  The right seems to bother him more.  Other than his cardiac history and current use of blood thinning agents, he would be in a very good candidate for surgery.  I spent about 45 minutes with him and his daughter discussing the anatomy, surgical treatment, risks, benefits, and alternatives of inguinal hernia repair.    They would both like to proceed.  The issues that we need to resolve are how long to keep him off of his Pradaxa, whether he will need bridging or not, obtaining a preop physical as he has no primary care provider, and Covid testing.  We will work on all these issues and get him scheduled for surgery.  They both understand that bruising and swelling is highly likely because of the size of the hernias and the fact that he is on a blood thinning agent.    The patient's daughter will contact the cardiologist who has been taking care of him in order to answer the questions regarding the Pradaxa.  We will also arrange for him to be seen by Dr. Jeong here in the urology clinic at some point.  This does not have to happen before surgery.    Orders were placed, surgery is scheduled.        Demetrio Foss MD FACS

## 2021-02-01 NOTE — PATIENT INSTRUCTIONS
Per physician instructions      If you have questions or concerns on any instructions given to you by your provider today or if you need to schedule an appointment, you can reach us at [phone].

## 2021-02-01 NOTE — TELEPHONE ENCOUNTER
Health Call Center    Phone Message    May a detailed message be left on voicemail: yes     Reason for Call: Other: Geneva calling on behalf of Roge Guan met with Dr. Foss at [facility] to discuss hernia repair. He is on the schedule for surgery 2/17 and is wondering if he needs to hold Pradaxa or any other medications prior to this surgery. Please give Geneva a call to discuss     Action Taken: Message routed to:  [facility] Card[facility] - Needs to go to [place]    Travel Screening: Not Applicable

## 2021-02-01 NOTE — LETTER
2/1/2021         RE: Roge Agarwal  [address] [facility] [address]        Dear Colleague,    Thank you for referring your patient, Roge Agarwal, to the [facility]. Please see a copy of my visit note below.    PCP:  No Ref-Primary, Physician    Chief complaint: Bilateral inguinal hernias    History of Present Illness: Roge is a 60-year-old man who presents with his daughter for evaluation of potential hernia repair.  He has history of the hernias is somewhat vague, but he has been having problems with intermittent pain in the right groin for some time.  He was conversing recently with a urologist at the [facility] due to a continuing problem with hematuria.  This provider recommended that he get an urgent evaluation because of concerns for incarceration.    The patient has intermittent pain in the right groin which will actually cause him to bend over in order to obtain some relief.  He will then get some relief when he lies on his back.  No bowel or bladder obstructive symptoms.    A CT scan dated September 9 showed the hernias bilaterally which contain only fat.  A subsequent testicular ultrasound was negative for any blood flow issues.    A review of his imaging reveals a CT scan in October 2017 which clearly showed the hernias, and they do not appear significantly different than his recent study.    About 2 years ago he was admitted to the hospital with acute heart failure and was found to be in atrial flutter.  He eventually had an ablation procedure and now is in atrial fibrillation.  His latest echocardiogram shows an ejection fraction of 51%.    He is on Pradaxa because of his heart failure.  He has not been off of this since he was converted from atrial flutter.    He has a history of a stroke about 13 years ago.    He has episodes of gross hematuria which have been ongoing for several years.  The evaluation was started 4 years ago, but not completed.  His  cardiac issues intervened.  He has just begun to have this reevaluated when he had the conversation with the [place] urologist.  He has a known bladder diverticulum which was seen on a cystoscopy 3-1/2 years ago.    Histories:  Past Medical History:   Diagnosis Date     Atrial fibrillation (H) 2016    One time episode noted after his finger accident, required cardioversion     Atrial fibrillation with RVR (H) 4/6/2016     Hemorrhagic stroke (H) 12/16/2007    2.3 X 1.5 CM PARENCHYMAL HEMORRHAGE IN THE RIGHT BASAL GANGLIA     HTN (hypertension)      Systolic CHF (H)     EF of 15-30% in 2016       Past Surgical History:   Procedure Laterality Date     AMPUTATE FINGER(S) Right     Right 3rd distal finger after work injury     ANESTHESIA CARDIOVERSION N/A 2/25/2020    Procedure: CARDIOVERSION;  Surgeon: GENERIC ANESTHESIA PROVIDER;  Location: [facility] OR     EP ABLATION ATRIAL FLUTTER N/A 2/25/2020    Procedure: EP ABLATION ATRIAL FLUTTER;  Surgeon: Jan Romeo MD;  Location:  [facility]ARDI[facility] CATH LAB       Family History   Problem Relation Age of Onset     Heart Disease Mother      Osteoporosis Mother      LUNG DISEASE Mother      Heart Disease Father      Hypertension Brother        Social History     Tobacco Use     Smoking status: Never Smoker     Smokeless tobacco: Current User     Types: Chew   Substance Use Topics     Alcohol use: Not Currently     Comment: 12/30/2019       Current Outpatient Medications   Medication Sig Dispense Refill     amLODIPine (NORVASC) 5 MG tablet Take 2 tablets (10 mg) by mouth daily 180 tablet 3     carvedilol (COREG) 12.5 MG tablet Take 1 tablet (12.5 mg) by mouth 2 times daily (with meals) 180 tablet 3     dabigatran ANTICOAGULANT (PRADAXA) 150 MG capsule Take 1 capsule (150 mg) by mouth 2 times daily Store in original 's bottle or blister pack; use within 120 days of opening. 180 capsule 3     digoxin (LANOXIN) 125 MCG tablet Take 1 tablet (125 mcg) by mouth daily 90  tablet 3     folic acid (FOLVITE) 1 MG tablet Take 1 tablet (1 mg) by mouth daily 90 tablet 3     furosemide (LASIX) 20 MG tablet Take 2 tablets (40 mg) by mouth 2 times daily 360 tablet 3     multivitamin w/minerals (THERA-VIT-M) tablet Take 1 tablet by mouth daily 30 tablet 11     oxyCODONE (ROXICODONE) 5 MG tablet Take 1 tablet (5 mg) by mouth every 6 hours as needed for pain 10 tablet 0     oxyCODONE (ROXICODONE) 5 MG tablet Take 1 tablet (5 mg) by mouth as needed for pain 6 tablet 0     sacubitril-valsartan (ENTRESTO)  MG per tablet Take 1 tablet by mouth 2 times daily 180 tablet 3     thiamine (B-1) 100 MG tablet Take 1 tablet (100 mg) by mouth daily 90 tablet 3     oxyCODONE (ROXICODONE) 5 MG tablet Take 1 tablet (5 mg) by mouth every 6 hours as needed for pain (Patient not taking: Reported on 1/12/2021) 6 tablet 0     oxyCODONE (ROXICODONE) 5 MG tablet Take 1 tablet (5 mg) by mouth every 6 hours as needed for breakthrough pain or pain (Patient not taking: Reported on 1/12/2021) 6 tablet 0       No Known Allergies    Images:  Recent Results (from the past 744 hour(s))   US Testicular & Scrotum w Doppler Ltd    Narrative    US TESTICULAR AND SCROTUM WITH DOPPLER LIMITED  1/12/2021 12:01 PM      CLINICAL HISTORY: Pain in testicle, unspecified laterality; Abdominal  pain, generalized; Pelvic pain in male    COMPARISON: CT abdomen and pelvis on 10/7/2020        PROCEDURE COMMENTS: Ultrasound of the scrotum was performed with color  and spectral Doppler.    FINDINGS:  Right testis: 4.3 x 3.1 x 2.4 cm  Left testis: 4.6 x 3.6 x 2.1 cm    The right testicle is normal in size, shape, and echotexture, and is  located within the scrotum. The epididymis is normal. There is no  hydrocele, varicocele, or abnormal mass. There are two prominent  lobules of fat within the right groin, which bulge further into the  inguinal canal with Valsalva maneuver. There is normal testicular  blood flow as documented by both color  "Doppler evaluation and spectral  Doppler waveforms.  There is no evidence of testicular torsion.    The left testicle is normal in size, shape, and echotexture, and is  located within the scrotum. The epididymis is normal. 4 mm cystic  lesion in the head of the left epididymis. There is no hydrocele or  abnormal mass. There is a left-sided varicocele, which does not  significantly changed in size with Valsalva maneuver. There is normal  testicular blood flow as documented by both color Doppler evaluation  and spectral Doppler waveforms.  There is no evidence of testicular  torsion.    Small diverticulum arising off the posterior right aspect of the  urinary bladder wall, as seen on comparison CT from 10/7/2020.      Impression    IMPRESSION:  1. No abnormal testicular mass or evidence of testicular torsion.  2. Small left-sided varicocele which does not change significantly in  size with Valsalva maneuver.  3. Prominent fat-containing right inguinal hernia, which is more  pronounced with Valsalva maneuver.  4. Small urinary bladder diverticula.    I have personally reviewed the examination and initial interpretation  and I agree with the findings.    ELOY POE MD       Labs:  No results found for any visits on 02/01/21.    ROS:  Constitutional - Denies fevers, weight loss, malaise, lethargy  Neuro - Denies tremors or seizures  Pulmon - Denies SOB, dyspnea, hemoptysis  CV - Denies CP, SOB  GI - Denies hematemesis, BRBPR, melena, chronic diarrhea or epigastric pain   -see above  Hematology - Denies blood clotting disorders, chronic anemias  Dermatology - No melanomas or skin cancers  Rheumatology - No h/o RA    BP (!) 146/86 (BP Location: Right arm, Patient Position: Sitting, Cuff Size: Adult Regular)   Pulse 74   Temp 98.3  F (36.8  C) (Tympanic)   Ht 1.854 m (6' 1\")   Wt 95.3 kg (210 lb)   BMI 27.71 kg/m      Exam:  General - Alert and Oriented X4, NAD, well nourished  HEENT - Normocephalic, " atraumatic  Neck - supple  Lungs -respirations unlabored, chest wall excursion normal  CV -pulse irregular  Abdomen - Soft, non-tender  Groins -bilateral inguinal hernias which are easily reducible, right greater than left, scrotal anatomy normal  Neuro -intact  Extremities - No cyanosis, clubbing or edema.      Assessment and Plan: Patient presents with symptomatic large inguinal hernias.  The right seems to bother him more.  Other than his cardiac history and current use of blood thinning agents, he would be in a very good candidate for surgery.  I spent about 45 minutes with him and his daughter discussing the anatomy, surgical treatment, risks, benefits, and alternatives of inguinal hernia repair.    They would both like to proceed.  The issues that we need to resolve are how long to keep him off of his Pradaxa, whether he will need bridging or not, obtaining a preop physical as he has no primary care provider, and Covid testing.  We will work on all these issues and get him scheduled for surgery.  They both understand that bruising and swelling is highly likely because of the size of the hernias and the fact that he is on a blood thinning agent.    The patient's daughter will contact the cardiologist who has been taking care of him in order to answer the questions regarding the Pradaxa.  We will also arrange for him to be seen by Dr. Jeong here in the urology clinic at some point.  This does not have to happen before surgery.    Orders were placed, surgery is scheduled.        Demetrio Foss MD FACS              Again, thank you for allowing me to participate in the care of your patient.        Sincerely,        Demetrio Foss MD

## 2021-02-02 DIAGNOSIS — Z11.59 ENCOUNTER FOR SCREENING FOR OTHER VIRAL DISEASES: Primary | ICD-10-CM

## 2021-02-02 NOTE — TELEPHONE ENCOUNTER
Dr. Wick notified and states that patient should hold Pradaxa for 3 days prior to surgery. Patient is also to hold Entresto the morning of surgery. [place] called and notified and will let the surgeon and patient know.

## 2021-02-10 ENCOUNTER — OFFICE VISIT (OUTPATIENT)
Dept: FAMILY MEDICINE | Facility: CLINIC | Age: 61
End: 2021-02-10
Payer: COMMERCIAL

## 2021-02-10 VITALS
WEIGHT: 199.2 LBS | OXYGEN SATURATION: 99 % | DIASTOLIC BLOOD PRESSURE: 84 MMHG | SYSTOLIC BLOOD PRESSURE: 138 MMHG | RESPIRATION RATE: 16 BRPM | BODY MASS INDEX: 26.4 KG/M2 | HEART RATE: 82 BPM | TEMPERATURE: 97.9 F | HEIGHT: 73 IN

## 2021-02-10 DIAGNOSIS — K40.20 BILATERAL INGUINAL HERNIA WITHOUT OBSTRUCTION OR GANGRENE, RECURRENCE NOT SPECIFIED: ICD-10-CM

## 2021-02-10 DIAGNOSIS — Z12.11 SPECIAL SCREENING FOR MALIGNANT NEOPLASMS, COLON: ICD-10-CM

## 2021-02-10 DIAGNOSIS — I48.11 LONGSTANDING PERSISTENT ATRIAL FIBRILLATION (H): ICD-10-CM

## 2021-02-10 DIAGNOSIS — Z01.818 PREOP GENERAL PHYSICAL EXAM: Primary | ICD-10-CM

## 2021-02-10 LAB
ANION GAP SERPL CALCULATED.3IONS-SCNC: 3 MMOL/L (ref 3–14)
BUN SERPL-MCNC: 13 MG/DL (ref 7–30)
CALCIUM SERPL-MCNC: 8.9 MG/DL (ref 8.5–10.1)
CHLORIDE SERPL-SCNC: 105 MMOL/L (ref 94–109)
CO2 SERPL-SCNC: 30 MMOL/L (ref 20–32)
CREAT SERPL-MCNC: 0.76 MG/DL (ref 0.66–1.25)
GFR SERPL CREATININE-BSD FRML MDRD: >90 ML/MIN/{1.73_M2}
GLUCOSE SERPL-MCNC: 79 MG/DL (ref 70–99)
POTASSIUM SERPL-SCNC: 3.8 MMOL/L (ref 3.4–5.3)
SODIUM SERPL-SCNC: 138 MMOL/L (ref 133–144)

## 2021-02-10 PROCEDURE — 80048 BASIC METABOLIC PNL TOTAL CA: CPT | Performed by: NURSE PRACTITIONER

## 2021-02-10 PROCEDURE — 99214 OFFICE O/P EST MOD 30 MIN: CPT | Performed by: NURSE PRACTITIONER

## 2021-02-10 PROCEDURE — 36415 COLL VENOUS BLD VENIPUNCTURE: CPT | Performed by: NURSE PRACTITIONER

## 2021-02-10 PROCEDURE — 93000 ELECTROCARDIOGRAM COMPLETE: CPT | Performed by: NURSE PRACTITIONER

## 2021-02-10 ASSESSMENT — MIFFLIN-ST. JEOR: SCORE: 1762.45

## 2021-02-10 NOTE — PROGRESS NOTES
[facility]  [address] [address] [facility] [address] [facility] [address]  Phone: [phone]  Primary Provider: No Ref-Primary, Physician        PREOPERATIVE EVALUATION:  Today's date: 2/10/2021    Roge Agarwal is a 60 year old male who presents for a preoperative evaluation.    Surgical Information:  Surgery/Procedure: HERNIORRHAPHY, INGUINAL, OPEN  Surgery Location: [place]   Surgeon: DR Foss   Surgery Date: 2/17   Time of Surgery: 9:20 AM   Where patient plans to recover: At home with family  Fax number for surgical facility: Note does not need to be faxed, will be available electronically in Epic.    Type of Anesthesia Anticipated: Monitor anesthesia care     Assessment & Plan     The proposed surgical procedure is considered INTERMEDIATE risk.    Preop general physical exam    - Basic metabolic panel  (Ca, Cl, CO2, Creat, Gluc, K, Na, BUN)  - EKG 12-lead complete w/read - Clinics    Bilateral inguinal hernia without obstruction or gangrene, recurrence not specified      Special screening for malignant neoplasms, colon    - Fecal colorectal cancer screen (FIT); Future    Longstanding persistent atrial fibrillation (H)  -EKG showed rate controlled afib, patient asymptomatic   - Basic metabolic panel  (Ca, Cl, CO2, Creat, Gluc, K, Na, BUN)  - EKG 12-lead complete w/read - Clinics      Medication Instructions:   - dabigatran (Pradaxa): Bleeding risk is moderate or high for this procedure AND CrCl (>=) 50 mL/min. HOLD 2-3 days before surgery.    - ACE/ARB: May be continued on the day of surgery.    - Diuretics: HOLD on the day of surgery.    RECOMMENDATION:  APPROVAL GIVEN to proceed with proposed procedure, without further diagnostic evaluation.    Review of the result(s) of each unique test - EKG, heart ECHO     Subjective     HPI related to upcoming procedure: bilateral inguinal hernia     Preop Questions 2/10/2021   1. Have you ever had a heart attack or stroke? YES , stroke, years ago    2. Have you  ever had surgery on your heart or blood vessels, such as a stent placement, a coronary artery bypass, or surgery on an artery in your head, neck, heart, or legs? No, but had heart ablation for afib    3. Do you have chest pain with activity? No   4. Do you have a history of  heart failure? YES , recent ECHO was normal, BNP was normal    5. Do you currently have a cold, bronchitis or symptoms of other infection? No   6. Do you have a cough, shortness of breath, or wheezing? No   7. Do you or anyone in your family have previous history of blood clots? No   8. Do you or does anyone in your family have a serious bleeding problem such as prolonged bleeding following surgeries or cuts? No   9. Have you ever had problems with anemia or been told to take iron pills? No   10. Have you had any abnormal blood loss such as black, tarry or bloody stools? No   11. Have you ever had a blood transfusion? No   12. Are you willing to have a blood transfusion if it is medically needed before, during, or after your surgery? Yes   13. Have you or any of your relatives ever had problems with anesthesia? No   14. Do you have sleep apnea, excessive snoring or daytime drowsiness? No   15. Do you have any artifical heart valves or other implanted medical devices like a pacemaker, defibrillator, or continuous glucose monitor? No   16. Do you have artificial joints? No   17. Are you allergic to latex? No       Preoperative Review of :   reviewed - controlled substances reflected in medication list.      Status of Chronic Conditions:  A-FIB - Patient has a longstanding history of chronic A-fib currently on rate control. Current treatment regimen includes Dabigitran for stroke prevention and denies significant symptoms of lightheadedness, palpitations or dyspnea.     CHF - Patient has a longstanding history of moderate-severe CHF. Exacerbating conditions include hypertension. Currently the patient's condition is stable, asymptomatic.  Current treatment regimen includes Angiotensin 2 receptor blocker, beta blocker, digoxin and diuretic. The patient denies chest pain, edema, orthopnea, SOB or recent weight gain. Last Echocardiogram 12/03/2020, EKG 02/10/21  Left Ventricle  Left ventricular wall thickness is normal. Left ventricular size is normal.  LVEF 51% based on biplane 2D tracing. Diastolic function not assessed due to  atrial fibrillation. Borderline (EF 50-55%) reduced left ventricular function  is present. No regional wall motion abnormalities are seen.     Right Ventricle  Global right ventricular function is normal. Mild right ventricular dilation  is present.     Atria  Mild biatrial enlargement is present.     Mitral Valve  Mild mitral insufficiency is present.        Aortic Valve  Aortic valve is normal in structure and function.     Tricuspid Valve  Trace tricuspid insufficiency is present. The right ventricular systolic  pressure is approximated at 26.0 mmHg plus the right atrial pressure.     Pulmonic Valve  Trace to mild pulmonic insufficiency is present.     Vessels  The thoracic aorta is normal. IVC diameter <2.1 cm collapsing >50% with sniff  suggests a normal RA pressure of 3 mmHg.     Pericardium  No pericardial effusion is present.    HYPERTENSION - Patient has longstanding history of HTN , currently denies any symptoms referable to elevated blood pressure. Specifically denies chest pain, palpitations, dyspnea, orthopnea, PND or peripheral edema. Blood pressure readings have been in normal range. Current medication regimen is as listed below. Patient denies any side effects of medication.       Review of Systems  CONSTITUTIONAL: NEGATIVE for fever, chills, change in weight  INTEGUMENTARY/SKIN: NEGATIVE for worrisome rashes, moles or lesions  EYES: NEGATIVE for vision changes or irritation  ENT/MOUTH: NEGATIVE for ear, mouth and throat problems  RESP: NEGATIVE for significant cough or SOB  BREAST: NEGATIVE for masses,  tenderness or discharge  CV: NEGATIVE for chest pain, palpitations or peripheral edema  GI: NEGATIVE for nausea, abdominal pain, heartburn, or change in bowel habits  : NEGATIVE for frequency, dysuria, or hematuria  MUSCULOSKELETAL: NEGATIVE for significant arthralgias or myalgia  NEURO: NEGATIVE for weakness, dizziness or paresthesias  ENDOCRINE: NEGATIVE for temperature intolerance, skin/hair changes  HEME: NEGATIVE for bleeding problems  PSYCHIATRIC: NEGATIVE for changes in mood or affect    Patient Active Problem List    Diagnosis Date Noted     Non-recurrent bilateral inguinal hernia without obstruction or gangrene 02/01/2021     Priority: Medium     Added automatically from request for surgery [id number]       S/P ablation of atrial flutter 02/25/2020     Priority: Medium     Atrial flutter, unspecified type (H) 01/03/2020     Priority: Medium     Added automatically from request for surgery [id number]       Atrial flutter (H) 01/02/2020     Priority: Medium     Atrial flutter with rapid ventricular response (H) 12/31/2019     Priority: Medium     Atrial fibrillation with RVR (H) 12/31/2019     Priority: Medium     Elevated troponin 12/31/2019     Priority: Medium     Elevated d-dimer 12/31/2019     Priority: Medium     Atypical chest pain 12/31/2019     Priority: Medium     Elevated lactic acid level 12/31/2019     Priority: Medium     Epistaxis 06/02/2017     Priority: Medium     Systolic CHF (H)      Priority: Medium     EF of 15-30% 4/2016, up to 50% by 6/1/2017.       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  Seen on EKG 2007       Alcohol use 12/16/2007     Priority: Medium     Essential hypertension 12/16/2007     Priority: Medium     Cerebral hemorrhage (H) 12/16/2007     Priority: Medium     Overview:   CT HEAD W/O CONTRAST, 12/14/07    IMPRESSION:  1.  THERE IS A 2.3 X 1.5 CM PARENCHYMAL HEMORRHAGE IN THE RIGHT BASAL  GANGLIA WITH SURROUNDING EDEMA.       Tobacco abuse 12/16/2007     Priority: Medium      Past Medical History:   Diagnosis Date     Atrial fibrillation (H) 2016    One time episode noted after his finger accident, required cardioversion     Atrial fibrillation with RVR (H) 4/6/2016     Hemorrhagic stroke (H) 12/16/2007    2.3 X 1.5 CM PARENCHYMAL HEMORRHAGE IN THE RIGHT BASAL GANGLIA     HTN (hypertension)      Systolic CHF (H)     EF of 15-30% in 2016     Past Surgical History:   Procedure Laterality Date     AMPUTATE FINGER(S) Right     Right 3rd distal finger after work injury     ANESTHESIA CARDIOVERSION N/A 2/25/2020    Procedure: CARDIOVERSION;  Surgeon: GENERIC ANESTHESIA PROVIDER;  Location:  OR     EP ABLATION ATRIAL FLUTTER N/A 2/25/2020    Procedure: EP ABLATION ATRIAL FLUTTER;  Surgeon: Jan Romeo MD;  Location:  HE[facility] C[facility] LAB     Current Outpatient Medications   Medication Sig Dispense Refill     amLODIPine (NORVASC) 5 MG tablet Take 2 tablets (10 mg) by mouth daily 180 tablet 3     carvedilol (COREG) 12.5 MG tablet Take 1 tablet (12.5 mg) by mouth 2 times daily (with meals) 180 tablet 3     dabigatran ANTICOAGULANT (PRADAXA) 150 MG capsule Take 1 capsule (150 mg) by mouth 2 times daily Store in original 's bottle or blister pack; use within 120 days of opening. 180 capsule 3     digoxin (LANOXIN) 125 MCG tablet Take 1 tablet (125 mcg) by mouth daily 90 tablet 3     folic acid (FOLVITE) 1 MG tablet Take 1 tablet (1 mg) by mouth daily 90 tablet 3     furosemide (LASIX) 20 MG tablet Take 2 tablets (40 mg) by mouth 2 times daily 360 tablet 3      "multivitamin w/minerals (THERA-VIT-M) tablet Take 1 tablet by mouth daily 30 tablet 11     sacubitril-valsartan (ENTRESTO)  MG per tablet Take 1 tablet by mouth 2 times daily 180 tablet 3     thiamine (B-1) 100 MG tablet Take 1 tablet (100 mg) by mouth daily 90 tablet 3     oxyCODONE (ROXICODONE) 5 MG tablet Take 1 tablet (5 mg) by mouth every 6 hours as needed for pain 10 tablet 0     oxyCODONE (ROXICODONE) 5 MG tablet Take 1 tablet (5 mg) by mouth as needed for pain (Patient not taking: Reported on 2/10/2021) 6 tablet 0     oxyCODONE (ROXICODONE) 5 MG tablet Take 1 tablet (5 mg) by mouth every 6 hours as needed for pain (Patient not taking: Reported on 2/10/2021) 6 tablet 0     oxyCODONE (ROXICODONE) 5 MG tablet Take 1 tablet (5 mg) by mouth every 6 hours as needed for breakthrough pain or pain (Patient not taking: Reported on 2/10/2021) 6 tablet 0       No Known Allergies     Social History     Tobacco Use     Smoking status: Never Smoker     Smokeless tobacco: Current User     Types: Chew   Substance Use Topics     Alcohol use: Not Currently     Comment: 12/30/2019       History   Drug Use Unknown         Objective     /84 (BP Location: Right arm, Patient Position: Sitting, Cuff Size: Adult Large)   Pulse 82   Temp 97.9  F (36.6  C) (Tympanic)   Resp 16   Ht 1.854 m (6' 1\")   Wt 90.4 kg (199 lb 3.2 oz)   SpO2 99%   BMI 26.28 kg/m      Physical Exam    GENERAL APPEARANCE: healthy, alert and no distress     EYES: EOMI,  PERRL     HENT: ear canals and TM's normal and nose and mouth without ulcers or lesions     NECK: no adenopathy, no asymmetry, masses, or scars and thyroid normal to palpation     RESP: lungs clear to auscultation - no rales, rhonchi or wheezes     CV: no murmur, click or rub, peripheral pulses strong and irregularly irregular rhythm, no peripheral edema     MS: extremities normal- no gross deformities noted, no evidence of inflammation in joints, FROM in all extremities.     " SKIN: no suspicious lesions or rashes     NEURO: Normal strength and tone, sensory exam grossly normal, mentation intact and speech normal     PSYCH: mentation appears normal. and affect normal/bright     LYMPHATICS: No cervical adenopathy    Recent Labs   Lab Test 01/12/21  0957 10/09/20  1516 02/25/20  1115 02/25/20  1115 01/06/20  0458 01/06/20  0458   HGB 14.9 13.9   < > 14.0   < > 14.6    201   < > 161   < > 157   INR  --   --   --  1.32*  --  1.58*    134   < >  --    < > 137   POTASSIUM 3.9 3.7   < >  --    < > 4.0   CR 0.84 0.81   < >  --    < > 1.04    < > = values in this interval not displayed.        Diagnostics:  Recent Results (from the past 24 hour(s))   Basic metabolic panel  (Ca, Cl, CO2, Creat, Gluc, K, Na, BUN)    Collection Time: 02/10/21  1:31 PM   Result Value Ref Range    Sodium 138 133 - 144 mmol/L    Potassium 3.8 3.4 - 5.3 mmol/L    Chloride 105 94 - 109 mmol/L    Carbon Dioxide 30 20 - 32 mmol/L    Anion Gap 3 3 - 14 mmol/L    Glucose 79 70 - 99 mg/dL    Urea Nitrogen 13 7 - 30 mg/dL    Creatinine 0.76 0.66 - 1.25 mg/dL    GFR Estimate >90 >60 mL/min/[1.73_m2]    GFR Estimate If Black >90 >60 mL/min/[1.73_m2]    Calcium 8.9 8.5 - 10.1 mg/dL      EKG: atrial fibrillation, rate 79, no LVH by voltage criteria, unchanged from previous tracings    Revised Cardiac Risk Index (RCRI):  The patient has the following serious cardiovascular risks for perioperative complications:   - No serious cardiac risks = 0 points     RCRI Interpretation: 0 points: Class I (very low risk - 0.4% complication rate)             Signed Electronically by: DANY Yanez CNP  Copy of this evaluation report is provided to requesting physician.    [facility] to [facility] Guidelines    Revised Cardiac Risk Index

## 2021-02-10 NOTE — H&P (VIEW-ONLY)
[facility]  [address] [address]  Phone: [phone]  Primary Provider: No Ref-Primary, Physician        PREOPERATIVE EVALUATION:  Today's date: 2/10/2021    Roge Agarwal is a 60 year old male who presents for a preoperative evaluation.    Surgical Information:  Surgery/Procedure: HERNIORRHAPHY, INGUINAL, OPEN  Surgery Location: [place]   Surgeon: DR Foss   Surgery Date: 2/17   Time of Surgery: 9:20 AM   Where patient plans to recover: At home with family  Fax number for surgical facility: Note does not need to be faxed, will be available electronically in Epic.    Type of Anesthesia Anticipated: Monitor anesthesia care     Assessment & Plan     The proposed surgical procedure is considered INTERMEDIATE risk.    Preop general physical exam    - Basic metabolic panel  (Ca, Cl, CO2, Creat, Gluc, K, Na, BUN)  - EKG 12-lead complete w/read - Clinics    Bilateral inguinal hernia without obstruction or gangrene, recurrence not specified      Special screening for malignant neoplasms, colon    - Fecal colorectal cancer screen (FIT); Future    Longstanding persistent atrial fibrillation (H)  -EKG showed rate controlled afib, patient asymptomatic   - Basic metabolic panel  (Ca, Cl, CO2, Creat, Gluc, K, Na, BUN)  - EKG 12-lead complete w/read - Clinics      Medication Instructions:   - dabigatran (Pradaxa): Bleeding risk is moderate or high for this procedure AND CrCl (>=) 50 mL/min. HOLD 2-3 days before surgery.    - ACE/ARB: May be continued on the day of surgery.    - Diuretics: HOLD on the day of surgery.    RECOMMENDATION:  APPROVAL GIVEN to proceed with proposed procedure, without further diagnostic evaluation.    Review of the result(s) of each unique test - EKG, heart ECHO     Subjective     HPI related to upcoming procedure: bilateral inguinal hernia     Preop Questions 2/10/2021   1. Have you ever had a heart attack or stroke? YES , stroke, years ago    2. Have you  ever had surgery on your heart or blood vessels, such as a stent placement, a coronary artery bypass, or surgery on an artery in your head, neck, heart, or legs? No, but had heart ablation for afib    3. Do you have chest pain with activity? No   4. Do you have a history of  heart failure? YES , recent ECHO was normal, BNP was normal    5. Do you currently have a cold, bronchitis or symptoms of other infection? No   6. Do you have a cough, shortness of breath, or wheezing? No   7. Do you or anyone in your family have previous history of blood clots? No   8. Do you or does anyone in your family have a serious bleeding problem such as prolonged bleeding following surgeries or cuts? No   9. Have you ever had problems with anemia or been told to take iron pills? No   10. Have you had any abnormal blood loss such as black, tarry or bloody stools? No   11. Have you ever had a blood transfusion? No   12. Are you willing to have a blood transfusion if it is medically needed before, during, or after your surgery? Yes   13. Have you or any of your relatives ever had problems with anesthesia? No   14. Do you have sleep apnea, excessive snoring or daytime drowsiness? No   15. Do you have any artifical heart valves or other implanted medical devices like a pacemaker, defibrillator, or continuous glucose monitor? No   16. Do you have artificial joints? No   17. Are you allergic to latex? No       Preoperative Review of :   reviewed - controlled substances reflected in medication list.      Status of Chronic Conditions:  A-FIB - Patient has a longstanding history of chronic A-fib currently on rate control. Current treatment regimen includes Dabigitran for stroke prevention and denies significant symptoms of lightheadedness, palpitations or dyspnea.     CHF - Patient has a longstanding history of moderate-severe CHF. Exacerbating conditions include hypertension. Currently the patient's condition is stable, asymptomatic.  Current treatment regimen includes Angiotensin 2 receptor blocker, beta blocker, digoxin and diuretic. The patient denies chest pain, edema, orthopnea, SOB or recent weight gain. Last Echocardiogram 12/03/2020, EKG 02/10/21  Left Ventricle  Left ventricular wall thickness is normal. Left ventricular size is normal.  LVEF 51% based on biplane 2D tracing. Diastolic function not assessed due to  atrial fibrillation. Borderline (EF 50-55%) reduced left ventricular function  is present. No regional wall motion abnormalities are seen.     Right Ventricle  Global right ventricular function is normal. Mild right ventricular dilation  is present.     Atria  Mild biatrial enlargement is present.     Mitral Valve  Mild mitral insufficiency is present.        Aortic Valve  Aortic valve is normal in structure and function.     Tricuspid Valve  Trace tricuspid insufficiency is present. The right ventricular systolic  pressure is approximated at 26.0 mmHg plus the right atrial pressure.     Pulmonic Valve  Trace to mild pulmonic insufficiency is present.     Vessels  The thoracic aorta is normal. IVC diameter <2.1 cm collapsing >50% with sniff  suggests a normal RA pressure of 3 mmHg.     Pericardium  No pericardial effusion is present.    HYPERTENSION - Patient has longstanding history of HTN , currently denies any symptoms referable to elevated blood pressure. Specifically denies chest pain, palpitations, dyspnea, orthopnea, PND or peripheral edema. Blood pressure readings have been in normal range. Current medication regimen is as listed below. Patient denies any side effects of medication.       Review of Systems  CONSTITUTIONAL: NEGATIVE for fever, chills, change in weight  INTEGUMENTARY/SKIN: NEGATIVE for worrisome rashes, moles or lesions  EYES: NEGATIVE for vision changes or irritation  ENT/MOUTH: NEGATIVE for ear, mouth and throat problems  RESP: NEGATIVE for significant cough or SOB  BREAST: NEGATIVE for masses,  tenderness or discharge  CV: NEGATIVE for chest pain, palpitations or peripheral edema  GI: NEGATIVE for nausea, abdominal pain, heartburn, or change in bowel habits  : NEGATIVE for frequency, dysuria, or hematuria  MUSCULOSKELETAL: NEGATIVE for significant arthralgias or myalgia  NEURO: NEGATIVE for weakness, dizziness or paresthesias  ENDOCRINE: NEGATIVE for temperature intolerance, skin/hair changes  HEME: NEGATIVE for bleeding problems  PSYCHIATRIC: NEGATIVE for changes in mood or affect    Patient Active Problem List    Diagnosis Date Noted     Non-recurrent bilateral inguinal hernia without obstruction or gangrene 02/01/2021     Priority: Medium     Added automatically from request for surgery [id number]       S/P ablation of atrial flutter 02/25/2020     Priority: Medium     Atrial flutter, unspecified type (H) 01/03/2020     Priority: Medium     Added automatically from request for surgery [id number]       Atrial flutter (H) 01/02/2020     Priority: Medium     Atrial flutter with rapid ventricular response (H) 12/31/2019     Priority: Medium     Atrial fibrillation with RVR (H) 12/31/2019     Priority: Medium     Elevated troponin 12/31/2019     Priority: Medium     Elevated d-dimer 12/31/2019     Priority: Medium     Atypical chest pain 12/31/2019     Priority: Medium     Elevated lactic acid level 12/31/2019     Priority: Medium     Epistaxis 06/02/2017     Priority: Medium     Systolic CHF (H)      Priority: Medium     EF of 15-30% 4/2016, up to 50% by 6/1/2017.       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  Seen on EKG 2007       Alcohol use 12/16/2007     Priority: Medium     Essential hypertension 12/16/2007     Priority: Medium     Cerebral hemorrhage (H) 12/16/2007     Priority: Medium     Overview:   CT HEAD W/O CONTRAST, 12/14/07    IMPRESSION:  1.  THERE IS A 2.3 X 1.5 CM PARENCHYMAL HEMORRHAGE IN THE RIGHT BASAL  GANGLIA WITH SURROUNDING EDEMA.       Tobacco abuse 12/16/2007     Priority: Medium      Past Medical History:   Diagnosis Date     Atrial fibrillation (H) 2016    One time episode noted after his finger accident, required cardioversion     Atrial fibrillation with RVR (H) 4/6/2016     Hemorrhagic stroke (H) 12/16/2007    2.3 X 1.5 CM PARENCHYMAL HEMORRHAGE IN THE RIGHT BASAL GANGLIA     HTN (hypertension)      Systolic CHF (H)     EF of 15-30% in 2016     Past Surgical History:   Procedure Laterality Date     AMPUTATE FINGER(S) Right     Right 3rd distal finger after work injury     ANESTHESIA CARDIOVERSION N/A 2/25/2020    Procedure: CARDIOVERSION;  Surgeon: GENERIC ANESTHESIA PROVIDER;  Location:  OR     EP ABLATION ATRIAL FLUTTER N/A 2/25/2020    Procedure: EP ABLATION ATRIAL FLUTTER;  Surgeon: Jan Romeo MD;  Location:  HE[facility] C[facility] LAB     Current Outpatient Medications   Medication Sig Dispense Refill     amLODIPine (NORVASC) 5 MG tablet Take 2 tablets (10 mg) by mouth daily 180 tablet 3     carvedilol (COREG) 12.5 MG tablet Take 1 tablet (12.5 mg) by mouth 2 times daily (with meals) 180 tablet 3     dabigatran ANTICOAGULANT (PRADAXA) 150 MG capsule Take 1 capsule (150 mg) by mouth 2 times daily Store in original 's bottle or blister pack; use within 120 days of opening. 180 capsule 3     digoxin (LANOXIN) 125 MCG tablet Take 1 tablet (125 mcg) by mouth daily 90 tablet 3     folic acid (FOLVITE) 1 MG tablet Take 1 tablet (1 mg) by mouth daily 90 tablet 3     furosemide (LASIX) 20 MG tablet Take 2 tablets (40 mg) by mouth 2 times daily 360 tablet 3      "multivitamin w/minerals (THERA-VIT-M) tablet Take 1 tablet by mouth daily 30 tablet 11     sacubitril-valsartan (ENTRESTO)  MG per tablet Take 1 tablet by mouth 2 times daily 180 tablet 3     thiamine (B-1) 100 MG tablet Take 1 tablet (100 mg) by mouth daily 90 tablet 3     oxyCODONE (ROXICODONE) 5 MG tablet Take 1 tablet (5 mg) by mouth every 6 hours as needed for pain 10 tablet 0     oxyCODONE (ROXICODONE) 5 MG tablet Take 1 tablet (5 mg) by mouth as needed for pain (Patient not taking: Reported on 2/10/2021) 6 tablet 0     oxyCODONE (ROXICODONE) 5 MG tablet Take 1 tablet (5 mg) by mouth every 6 hours as needed for pain (Patient not taking: Reported on 2/10/2021) 6 tablet 0     oxyCODONE (ROXICODONE) 5 MG tablet Take 1 tablet (5 mg) by mouth every 6 hours as needed for breakthrough pain or pain (Patient not taking: Reported on 2/10/2021) 6 tablet 0       No Known Allergies     Social History     Tobacco Use     Smoking status: Never Smoker     Smokeless tobacco: Current User     Types: Chew   Substance Use Topics     Alcohol use: Not Currently     Comment: 12/30/2019       History   Drug Use Unknown         Objective     /84 (BP Location: Right arm, Patient Position: Sitting, Cuff Size: Adult Large)   Pulse 82   Temp 97.9  F (36.6  C) (Tympanic)   Resp 16   Ht 1.854 m (6' 1\")   Wt 90.4 kg (199 lb 3.2 oz)   SpO2 99%   BMI 26.28 kg/m      Physical Exam    GENERAL APPEARANCE: healthy, alert and no distress     EYES: EOMI,  PERRL     HENT: ear canals and TM's normal and nose and mouth without ulcers or lesions     NECK: no adenopathy, no asymmetry, masses, or scars and thyroid normal to palpation     RESP: lungs clear to auscultation - no rales, rhonchi or wheezes     CV: no murmur, click or rub, peripheral pulses strong and irregularly irregular rhythm, no peripheral edema     MS: extremities normal- no gross deformities noted, no evidence of inflammation in joints, FROM in all extremities.     " SKIN: no suspicious lesions or rashes     NEURO: Normal strength and tone, sensory exam grossly normal, mentation intact and speech normal     PSYCH: mentation appears normal. and affect normal/bright     LYMPHATICS: No cervical adenopathy    Recent Labs   Lab Test 01/12/21  0957 10/09/20  1516 02/25/20  1115 02/25/20  1115 01/06/20  0458 01/06/20  0458   HGB 14.9 13.9   < > 14.0   < > 14.6    201   < > 161   < > 157   INR  --   --   --  1.32*  --  1.58*    134   < >  --    < > 137   POTASSIUM 3.9 3.7   < >  --    < > 4.0   CR 0.84 0.81   < >  --    < > 1.04    < > = values in this interval not displayed.        Diagnostics:  Recent Results (from the past 24 hour(s))   Basic metabolic panel  (Ca, Cl, CO2, Creat, Gluc, K, Na, BUN)    Collection Time: 02/10/21  1:31 PM   Result Value Ref Range    Sodium 138 133 - 144 mmol/L    Potassium 3.8 3.4 - 5.3 mmol/L    Chloride 105 94 - 109 mmol/L    Carbon Dioxide 30 20 - 32 mmol/L    Anion Gap 3 3 - 14 mmol/L    Glucose 79 70 - 99 mg/dL    Urea Nitrogen 13 7 - 30 mg/dL    Creatinine 0.76 0.66 - 1.25 mg/dL    GFR Estimate >90 >60 mL/min/[1.73_m2]    GFR Estimate If Black >90 >60 mL/min/[1.73_m2]    Calcium 8.9 8.5 - 10.1 mg/dL      EKG: atrial fibrillation, rate 79, no LVH by voltage criteria, unchanged from previous tracings    Revised Cardiac Risk Index (RCRI):  The patient has the following serious cardiovascular risks for perioperative complications:   - No serious cardiac risks = 0 points     RCRI Interpretation: 0 points: Class I (very low risk - 0.4% complication rate)             Signed Electronically by: DANY Yanez CNP  Copy of this evaluation report is provided to requesting physician.    [facility]    Revised Cardiac Risk Index

## 2021-02-15 ENCOUNTER — ALLIED HEALTH/NURSE VISIT (OUTPATIENT)
Dept: FAMILY MEDICINE | Facility: CLINIC | Age: 61
End: 2021-02-15
Payer: COMMERCIAL

## 2021-02-15 DIAGNOSIS — T30.0 BURN: Primary | ICD-10-CM

## 2021-02-15 DIAGNOSIS — Z11.59 ENCOUNTER FOR SCREENING FOR OTHER VIRAL DISEASES: ICD-10-CM

## 2021-02-15 DIAGNOSIS — Z48.00 CHANGE OF DRESSING: ICD-10-CM

## 2021-02-15 LAB
SARS-COV-2 RNA RESP QL NAA+PROBE: NORMAL
SPECIMEN SOURCE: NORMAL

## 2021-02-15 PROCEDURE — U0005 INFEC AGEN DETEC AMPLI PROBE: HCPCS | Performed by: SURGERY

## 2021-02-15 PROCEDURE — U0003 INFECTIOUS AGENT DETECTION BY NUCLEIC ACID (DNA OR RNA); SEVERE ACUTE RESPIRATORY SYNDROME CORONAVIRUS 2 (SARS-COV-2) (CORONAVIRUS DISEASE [COVID-19]), AMPLIFIED PROBE TECHNIQUE, MAKING USE OF HIGH THROUGHPUT TECHNOLOGIES AS DESCRIBED BY CMS-2020-01-R: HCPCS | Performed by: SURGERY

## 2021-02-15 PROCEDURE — 99207 PR NO CHARGE NURSE ONLY: CPT
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
## 2021-02-16 ENCOUNTER — ANESTHESIA EVENT (OUTPATIENT)
Dept: SURGERY | Facility: CLINIC | Age: 61
End: 2021-02-16

## 2021-02-16 ENCOUNTER — TELEPHONE (OUTPATIENT)
Dept: LAB | Facility: CLINIC | Age: 61
End: 2021-02-16

## 2021-02-16 DIAGNOSIS — K40.20 NON-RECURRENT BILATERAL INGUINAL HERNIA WITHOUT OBSTRUCTION OR GANGRENE: Primary | ICD-10-CM

## 2021-02-16 LAB
LABORATORY COMMENT REPORT: ABNORMAL
SARS-COV-2 RNA RESP QL NAA+PROBE: POSITIVE
SPECIMEN SOURCE: ABNORMAL

## 2021-02-16 RX ORDER — CEFAZOLIN SODIUM 1 G/3ML
1 INJECTION, POWDER, FOR SOLUTION INTRAMUSCULAR; INTRAVENOUS SEE ADMIN INSTRUCTIONS
Status: CANCELLED | OUTPATIENT
Start: 2021-02-16

## 2021-02-16 RX ORDER — CEFAZOLIN SODIUM 2 G/100ML
2 INJECTION, SOLUTION INTRAVENOUS
Status: CANCELLED | OUTPATIENT
Start: 2021-02-16

## 2021-02-16 RX ORDER — GABAPENTIN 300 MG/1
300 CAPSULE ORAL ONCE
Status: CANCELLED | OUTPATIENT
Start: 2021-02-16 | End: 2021-02-16

## 2021-02-16 RX ORDER — ACETAMINOPHEN 325 MG/1
975 TABLET ORAL ONCE
Status: CANCELLED | OUTPATIENT
Start: 2021-02-16 | End: 2021-02-16

## 2021-02-16 RX ORDER — LIDOCAINE 40 MG/G
CREAM TOPICAL
Status: CANCELLED | OUTPATIENT
Start: 2021-02-16

## 2021-02-16 RX ORDER — SODIUM CHLORIDE, SODIUM LACTATE, POTASSIUM CHLORIDE, CALCIUM CHLORIDE 600; 310; 30; 20 MG/100ML; MG/100ML; MG/100ML; MG/100ML
INJECTION, SOLUTION INTRAVENOUS CONTINUOUS
Status: CANCELLED | OUTPATIENT
Start: 2021-02-16

## 2021-02-16 NOTE — TELEPHONE ENCOUNTER
"-Coronavirus (COVID-19) Notification    Caller Name (Patient, parent, daughter/son, grandparent, etc)  Yvonne, daughter, medical power of   Consent to communicate on file    Reason for call  Notify of Positive Coronavirus (COVID-19) lab results, assess symptoms,  review [facility] recommendations    Lab Result    Lab test:  2019-nCoV rRt-PCR or SARS-CoV-2 PCR    Oropharyngeal AND/OR nasopharyngeal swabs is POSITIVE for 2019-nCoV RNA/SARS-COV-2 PCR (COVID-19 virus)    RN Recommendations/Instructions per [facility] Coronavirus COVID-19 recommendations    Brief introduction script  Introduce self then review script:  \"I am calling on behalf of [employer].  We were notified that your Coronavirus test (COVID-19) for was POSITIVE for the virus.  I have some information to relay to you but first I wanted to mention that the [place] Dept of Health will be contacting you shortly [it's possible [facility] already called Patient] to talk to you more about how you are feeling and other people you have had contact with who might now also have the virus.  Also,  [facility] is Partnering with the [facility] for Covid-19 research, you may be contacted directly by research staff.\"    Assessment (Inquire about Patient's current symptoms)   Assessment   Current Symptoms at time of phone call: (if no symptoms, document No symptoms] Sore throat  No new symptoms, patient feels fine.   Symptoms onset (if applicable) 1/26/2021     If at time of call, Patients symptoms hare worsened, the Patient should contact 911 or have someone drive them to Emergency Dept promptly:      If Patient calling 911, inform 911 personal that you have tested positive for the Coronavirus (COVID-19).  Place mask on and await 911 to arrive.    If Emergency Dept, If possible, please have another adult drive you to the Emergency Dept but you need to wear mask when in contact with other people.      Monoclonal Antibody Administration    You " "may be eligible to receive a new treatment with a monoclonal antibody for preventing hospitalization in patients at high risk for complications from COVID-19.   This medication is still experimental and available on a limited basis; it is given through an IV and must be given at an infusion center. Please note that not all people who are eligible will receive the medication since it is in limited supply.     Are you interested in being considered for this medication?  No.   Does the patient fit the criteria: Patient declined    If patient qualifies based on above criteria:  \"We will contact you if you are selected to receive the medication in the next 1-2 days.   This is time sensitive and if you are not selected in the next 1-2 days, you will not receive the medication.  If you do not receive a call to schedule, you have not been selected.\"    Review information with Patient    Your result was positive. This means you have COVID-19 (coronavirus).  We have sent you a letter that reviews the information that I'll be reviewing with you now.    How can I protect others?    If you have symptoms: stay home and away from others (self-isolate) until:    You've had no fever--and no medicine that reduces fever--for 1 full day (24 hours). And       Your other symptoms have gotten better. For example, your cough or breathing has improved. And     At least 10 days have passed since your symptoms started. (If you've been told by a doctor that you have a weak immune system, wait 20 days.)     If you don't have symptoms: Stay home and away from others (self-isolate) until at least 10 days have passed since your first positive COVID-19 test. (Date test collected)    During this time:    Stay in your own room, including for meals. Use your own bathroom if you can.    Stay away from others in your home. No hugging, kissing or shaking hands. No visitors.     Don't go to work, school or anywhere else.     Clean  high touch  surfaces " often (doorknobs, counters, handles, etc.). Use a household cleaning spray or wipes. You'll find a full list on the EPA website at www.epa.gov/pesticide-registration/list-n-disinfectants-use-against-sars-cov-2.     Cover your mouth and nose with a mask, tissue or other face covering to avoid spreading germs.    Wash your hands and face often with soap and water.    Make a list of people you have been in close contact with recently, even if either of you wore a face covering.   ; Start your list from 2 days before you became ill or had a positive test.  ; Include anyone that was within 6 feet of you for a cumulative total of 15 minutes or more in 24 hours. (Example: if you sat next to Richardson for 5 minutes in the morning and 10 minutes in the afternoon, then you were in close contact for 15 minutes total that day. Richardson would be added to your list.)    A public health worker will call or text you. It is important that you answer. They will ask you questions about possible exposures to COVID-19, such as people you have been in direct contact with and places you have visited.    Tell the people on your list that you have COVID-19; they should stay away from others for 14 days starting from the last time they were in contact with you (unless you are told something different from a public health worker).     Caregivers in these groups are at risk for severe illness due to COVID-19:  o People 65 years and older  o People who live in a nursing home or long-term care facility  o People with chronic disease (lung, heart, cancer, diabetes, kidney, liver, immunologic)  o People who have a weakened immune system, including those who:  - Are in cancer treatment  - Take medicine that weakens the immune system, such as corticosteroids  - Had a bone marrow or organ transplant  - Have an immune deficiency  - Have poorly controlled HIV or AIDS  - Are obese (body mass index of 40 or higher)  - Smoke regularly    Caregivers should wear  gloves while washing dishes, handling laundry and cleaning bedrooms and bathrooms.    Wash and dry laundry with special caution. Don't shake dirty laundry, and use the warmest water setting you can.    If you have a weakened immune system, ask your doctor about other actions you should take.    For more tips, go to www.cdc.gov/coronavirus/2019-ncov/downloads/10Things.pdf.    You should not go back to work until you meet the guidelines above for ending your home isolation. You don't need to be retested for COVID-19 before going back to work--studies show that you won't spread the virus if it's been at least 10 days since your symptoms started (or 20 days, if you have a weak immune system).    Employers: This document serves as formal notice of your employee's medical guidelines for going back to work. They must meet the above guidelines before going back to work in person.    How can I take care of myself?    1. Get lots of rest. Drink extra fluids (unless a doctor has told you not to).    2. Take Tylenol (acetaminophen) for fever or pain. If you have liver or kidney problems, ask your family doctor if it's okay to take Tylenol.     Take either:     650 mg (two 325 mg pills) every 4 to 6 hours, or     1,000 mg (two 500 mg pills) every 8 hours as needed.     Note: Don't take more than 3,000 mg in one day. Acetaminophen is found in many medicines (both prescribed and over-the-counter medicines). Read all labels to be sure you don't take too much.    For children, check the Tylenol bottle for the right dose (based on their age or weight).    3. If you have other health problems (like cancer, heart failure, an organ transplant or severe kidney disease): Call your specialty clinic if you don't feel better in the next 2 days.    4. Know when to call 911: Emergency warning signs include:    Trouble breathing or shortness of breath    Pain or pressure in the chest that doesn't go away    Feeling confused like you haven't  felt before, or not being able to wake up    Bluish-colored lips or face    5. Sign up for [employer]. We know it's scary to hear that you have COVID-19. We want to track your symptoms to make sure you're okay over the next 2 weeks. Please look for an email from [employer]--this is a free, online program that we'll use to keep in touch. To sign up, follow the link in the email. Learn more at [email or web address].    Where can I get more information?    [facility]: [email or web address]    Coronavirus Basics: www.health.[facility].[place]/diseases/coronavirus/basics.html    What to Do If You're Sick: www.cdc.gov/coronavirus/2019-ncov/about/steps-when-sick.html    Ending Home Isolation: www.cdc.gov/coronavirus/2019-ncov/hcp/disposition-in-home-patients.html     Caring for Someone with COVID-19: www.cdc.gov/coronavirus/2019-ncov/if-you-are-sick/care-for-someone.html     [facility] clinical trials (COVID-19 research studies): [email or web address].[email or web address]-clinical-trials     A Positive COVID-19 letter will be sent via [employer] or the mail. (Exception, no letters sent to Presurgerical/Preprocedure Patients)    Katherin Casanova LPN

## 2021-02-17 ENCOUNTER — ANESTHESIA (OUTPATIENT)
Dept: SURGERY | Facility: CLINIC | Age: 61
End: 2021-02-17

## 2021-03-01 ENCOUNTER — TELEPHONE (OUTPATIENT)
Dept: SURGERY | Facility: CLINIC | Age: 61
End: 2021-03-01

## 2021-03-01 DIAGNOSIS — K40.20 NON-RECURRENT BILATERAL INGUINAL HERNIA WITHOUT OBSTRUCTION OR GANGRENE: Primary | ICD-10-CM

## 2021-03-01 NOTE — TELEPHONE ENCOUNTER
Abdi from [facility] states pt thinks he is having surgery on 03/03 , not on schedule, please call daughter Bernadette at [phone]

## 2021-03-02 ENCOUNTER — ANESTHESIA EVENT (OUTPATIENT)
Dept: SURGERY | Facility: CLINIC | Age: 61
End: 2021-03-02
Payer: COMMERCIAL

## 2021-03-02 ASSESSMENT — ENCOUNTER SYMPTOMS: DYSRHYTHMIAS: 1

## 2021-03-02 NOTE — ANESTHESIA PREPROCEDURE EVALUATION
Anesthesia Pre-Procedure Evaluation    Patient: Roge Agarwal   MRN: [id number] : 1960        Preoperative Diagnosis: Non-recurrent bilateral inguinal hernia without obstruction or gangrene [K40.20]   Procedure : Procedure(s):  HERNIORRHAPHY, INGUINAL, OPEN     Past Medical History:   Diagnosis Date     Atrial fibrillation (H) 2016    One time episode noted after his finger accident, required cardioversion     Atrial fibrillation with RVR (H) 2016     Hemorrhagic stroke (H) 2007    2.3 X 1.5 CM PARENCHYMAL HEMORRHAGE IN THE RIGHT BASAL GANGLIA     HTN (hypertension)      Systolic CHF (H)     EF of 15-30% in 2016      Past Surgical History:   Procedure Laterality Date     AMPUTATE FINGER(S) Right     Right 3rd distal finger after work injury     ANESTHESIA CARDIOVERSION N/A 2020    Procedure: CARDIOVERSION;  Surgeon: GENERIC ANESTHESIA PROVIDER;  Location: [facility] OR     EP ABLATION ATRIAL FLUTTER N/A 2020    Procedure: EP ABLATION ATRIAL FLUTTER;  Surgeon: Jan Romeo MD;  Location:  [facility]      No Known Allergies   Social History     Tobacco Use     Smoking status: Never Smoker     Smokeless tobacco: Current User     Types: Chew   Substance Use Topics     Alcohol use: Not Currently     Comment: 2019      Wt Readings from Last 1 Encounters:   02/10/21 90.4 kg (199 lb 3.2 oz)        Anesthesia Evaluation   Pt has had prior anesthetic.     No history of anesthetic complications       ROS/MED HX  ENT/Pulmonary: Comment: [place]  Positive COVID 2/15/21    (+) tobacco use (chews tobacco), Current use,     Neurologic:     (+) CVA, date: 2007--cerebral hemorrhage, without deficits,     Cardiovascular: Comment: Non-ischemic cardiomyopathy  Hx of atypical chest pain  S/p cardioversion   S/p ablation    (+) Dyslipidemia hypertension-----Taking blood thinners Pt has received instructions: CHF dysrhythmias, a-flutter, a-fib and PVCs, Irregular Heartbeat/Palpitations, Previous  cardiac testing   Echo: Date: 2/3/20 Results:  Echocardiogram Complete  Order: [id number]  Status: Edited Result - FINAL   Visible to patient: No (not released) Next appt: 2021 at 03:00 PM in Cardiology (Jan Romeo MD) Dx: Nonischemic cardiomyopathy (H); Heart...  Details      Reading Physician Reading Date Result Priority  Paulie Queen MD  [phone] 12/3/2020     Narrative & Impression    [id number]  [facility]  [id number]  [id number]^TOMY^FERMIN           [facility], Echocardiography Laboratory  [address] [place] [address]        Name: BRENNEN MONTES  MRN: [id number]  : 1960  Study Date: 2020 04:05 PM  Age: 60 yrs  Gender: Male  Patient Location: [facility]  Reason For Study: Nonischemic cardiomyopathy (H), Heart failure with reduced  ejection fraction  Ordering Physician: FERMIN HUITRON  Referring Physician: FERMIN HUITRON  Performed By: Mary Jo Sullivan RDCS     BSA: 2.2 m2  Height: 73 in  Weight: 207 lb  BP: 152/91 mmHg  _____________________________________________________________________________  __        Procedure  Echocardiogram with two-dimensional, color and spectral Doppler performed. The  patient's rhythm is atrial fibrillation.  _____________________________________________________________________________  __        Interpretation Summary     The patient's rhythm is atrial fibrillation.  Borderline (EF 50-55%) reduced left ventricular function is present.  Global right ventricular function is normal.  No pericardial effusion is present.  Mild biatrial enlargement is present.  IVC diameter <2.1 cm collapsing >50% with sniff suggests a normal RA pressure  of 3 mmHg.  Compared to prior, LV fxn is improved.  _____________________________________________________________________________  __        Left Ventricle  Left ventricular wall thickness is normal. Left ventricular size is normal.  LVEF 51% based on biplane 2D tracing. Diastolic function not assessed  due to  atrial fibrillation. Borderline (EF 50-55%) reduced left ventricular function  is present. No regional wall motion abnormalities are seen.     Right Ventricle  Global right ventricular function is normal. Mild right ventricular dilation  is present.     Atria  Mild biatrial enlargement is present.     Mitral Valve  Mild mitral insufficiency is present.        Aortic Valve  Aortic valve is normal in structure and function.     Tricuspid Valve  Trace tricuspid insufficiency is present. The right ventricular systolic  pressure is approximated at 26.0 mmHg plus the right atrial pressure.     Pulmonic Valve  Trace to mild pulmonic insufficiency is present.     Vessels  The thoracic aorta is normal. IVC diameter <2.1 cm collapsing >50% with sniff  suggests a normal RA pressure of 3 mmHg.     Pericardium  No pericardial effusion is present.     _____________________________________________________________________________  __  MMode/2D Measurements & Calculations  IVSd: 0.89 cm  LVIDd: 4.9 cm  LVIDs: 2.8 cm  LVPWd: 1.0 cm  FS: 42.2 %     LV mass(C)d: 166.5 grams  LV mass(C)dI: 76.3 grams/m2  Ao root diam: 3.3 cm  asc Aorta Diam: 3.7 cm  LVOT diam: 2.3 cm  LVOT area: 4.3 cm2  LA Volume (BP): 86.0 ml  LA Volume Index (BP): 39.4 ml/m2  RWT: 0.42  TAPSE: 2.5 cm           Doppler Measurements & Calculations  MV E max mario: 89.8 cm/sec  MV dec time: 0.15 sec  Ao V2 max: 103.3 cm/sec  Ao max P.0 mmHg  VAISHALI(V,D): 3.3 cm2  LV V1 max P.5 mmHg  LV V1 max: 78.8 cm/sec  LV V1 VTI: 16.4 cm  SV(LVOT): 71.0 ml  SI(LVOT): 32.5 ml/m2  PA V2 max: 98.0 cm/sec  PA max PG: 3.8 mmHg  PI end-d mario: 148.8 cm/sec  PI max mario: 193.7 cm/sec  PI max PG: 15.0 mmHg  TR max mario: 255.2 cm/sec  TR max P.0 mmHg  AV Mario Ratio (DI): 0.76  E/E' av.2  Lateral E/e': 8.1  Medial E/e': 8.4              _____________________________________________________________________________  __        Report approved by: Renee Thornton 2020  05:13 PM             Stress Test:  Date: Results:    ECG Reviewed:  Date: 2/10/21 Results:  Atrial flutter   -Left axis -anterior fascicular block.    -Old inferior infarct.     ABNORMAL     Cath:  Date: Results:      METS/Exercise Tolerance: >4 METS    Hematologic:  - neg hematologic  ROS     Musculoskeletal: Comment: Finger amputations      GI/Hepatic: Comment: Bilateral inguinal hernias   - neg GI/hepatic ROS     Renal/Genitourinary:  - neg Renal ROS     Endo:  - neg endo ROS     Psychiatric/Substance Use:     (+) alcohol abuse     Infectious Disease:  - neg infectious disease ROS     Malignancy:  - neg malignancy ROS     Other:            Physical Exam    Airway        Mallampati: I   TM distance: > 3 FB   Neck ROM: full   Mouth opening: > 3 cm    Respiratory Devices and Support         Dental     Comment: Poor dentition    (+) chipped and missing      Cardiovascular       Comment: afib   Rhythm and rate: irregular and normal     Pulmonary   pulmonary exam normal                OUTSIDE LABS:  CBC:   Lab Results   Component Value Date    WBC 6.1 01/12/2021    WBC 8.0 10/09/2020    HGB 14.9 01/12/2021    HGB 13.9 10/09/2020    HCT 45.5 01/12/2021    HCT 41.3 10/09/2020     01/12/2021     10/09/2020     BMP:   Lab Results   Component Value Date     02/10/2021     01/12/2021    POTASSIUM 3.8 02/10/2021    POTASSIUM 3.9 01/12/2021    CHLORIDE 105 02/10/2021    CHLORIDE 103 01/12/2021    CO2 30 02/10/2021    CO2 30 01/12/2021    BUN 13 02/10/2021    BUN 13 01/12/2021    CR 0.76 02/10/2021    CR 0.84 01/12/2021    GLC 79 02/10/2021    GLC 98 01/12/2021     COAGS:   Lab Results   Component Value Date    PTT 26 10/03/2017    INR 1.32 (H) 02/25/2020     POC:   Lab Results   Component Value Date    BGM 96 02/25/2020     HEPATIC:   Lab Results   Component Value Date    ALBUMIN 3.9 01/12/2021    PROTTOTAL 8.2 01/12/2021    ALT 23 01/12/2021    AST 16 01/12/2021    ALKPHOS 91 01/12/2021    BILITOTAL  0.5 01/12/2021     OTHER:   Lab Results   Component Value Date    LACT 1.0 01/01/2020    BAO 8.9 02/10/2021    PHOS 3.2 01/03/2020    MAG 2.0 02/06/2020    LIPASE 35 (L) 09/09/2020    TSH 1.29 12/31/2019       Anesthesia Plan    ASA Status:  3   NPO Status:  NPO Appropriate    Anesthesia Type: MAC (General anesthesia if necessary).     - Reason for MAC: chronic cardiopulmonary disease   Induction: Intravenous, Propofol.   Maintenance: Balanced.        Consents    Anesthesia Plan(s) and associated risks, benefits, and realistic alternatives discussed. Questions answered and patient/representative(s) expressed understanding.     - Discussed with:  Patient, Other (See Comment) (Daughter Bernadette present)         Postoperative Care    Pain management: IV analgesics, Oral pain medications, Multi-modal analgesia.   PONV prophylaxis: Ondansetron (or other 5HT-3), Dexamethasone or Solumedrol     Comments:                DANY Parr CRNA

## 2021-03-03 ENCOUNTER — APPOINTMENT (OUTPATIENT)
Dept: CARDIOLOGY | Facility: CLINIC | Age: 61
End: 2021-03-03
Attending: INTERNAL MEDICINE
Payer: COMMERCIAL

## 2021-03-03 ENCOUNTER — HOSPITAL ENCOUNTER (OUTPATIENT)
Facility: CLINIC | Age: 61
Discharge: HOME | End: 2021-03-03
Attending: SURGERY | Admitting: SURGERY
Payer: COMMERCIAL

## 2021-03-03 ENCOUNTER — ANESTHESIA (OUTPATIENT)
Dept: SURGERY | Facility: CLINIC | Age: 61
End: 2021-03-03
Payer: COMMERCIAL

## 2021-03-03 VITALS
BODY MASS INDEX: 26.37 KG/M2 | SYSTOLIC BLOOD PRESSURE: 116 MMHG | HEART RATE: 66 BPM | WEIGHT: 199 LBS | DIASTOLIC BLOOD PRESSURE: 72 MMHG | RESPIRATION RATE: 16 BRPM | OXYGEN SATURATION: 99 % | TEMPERATURE: 98 F | HEIGHT: 73 IN

## 2021-03-03 DIAGNOSIS — I48.4 ATYPICAL ATRIAL FLUTTER (H): ICD-10-CM

## 2021-03-03 DIAGNOSIS — K40.20 NON-RECURRENT BILATERAL INGUINAL HERNIA WITHOUT OBSTRUCTION OR GANGRENE: ICD-10-CM

## 2021-03-03 PROCEDURE — C1781 MESH (IMPLANTABLE): HCPCS | Performed by: SURGERY

## 2021-03-03 PROCEDURE — 370N000017 HC ANESTHESIA TECHNICAL FEE, PER MIN: Performed by: SURGERY

## 2021-03-03 PROCEDURE — 710N000012 HC RECOVERY PHASE 2, PER MINUTE: Performed by: SURGERY

## 2021-03-03 PROCEDURE — 258N000003 HC RX IP 258 OP 636: Performed by: NURSE ANESTHETIST, CERTIFIED REGISTERED

## 2021-03-03 PROCEDURE — 250N000013 HC RX MED GY IP 250 OP 250 PS 637: Performed by: NURSE ANESTHETIST, CERTIFIED REGISTERED

## 2021-03-03 PROCEDURE — 250N000011 HC RX IP 250 OP 636: Performed by: SURGERY

## 2021-03-03 PROCEDURE — 49650 LAP ING HERNIA REPAIR INIT: CPT | Mod: 50 | Performed by: SURGERY

## 2021-03-03 PROCEDURE — 250N000009 HC RX 250: Performed by: NURSE ANESTHETIST, CERTIFIED REGISTERED

## 2021-03-03 PROCEDURE — 271N000001 HC OR GENERAL SUPPLY NON-STERILE: Performed by: SURGERY

## 2021-03-03 PROCEDURE — 250N000009 HC RX 250: Performed by: SURGERY

## 2021-03-03 PROCEDURE — 272N000001 HC OR GENERAL SUPPLY STERILE: Performed by: SURGERY

## 2021-03-03 PROCEDURE — 250N000011 HC RX IP 250 OP 636: Performed by: NURSE ANESTHETIST, CERTIFIED REGISTERED

## 2021-03-03 PROCEDURE — 999N000141 HC STATISTIC PRE-PROCEDURE NURSING ASSESSMENT: Performed by: SURGERY

## 2021-03-03 PROCEDURE — 360N000075 HC SURGERY LEVEL 2, PER MIN: Performed by: SURGERY

## 2021-03-03 PROCEDURE — 49650 LAP ING HERNIA REPAIR INIT: CPT | Mod: 50 | Performed by: PHYSICIAN ASSISTANT

## 2021-03-03 DEVICE — IMPLANTABLE DEVICE: Type: IMPLANTABLE DEVICE | Site: GROIN | Status: FUNCTIONAL

## 2021-03-03 DEVICE — MESH PROGRIP 4.7X3" PARIETEX LEFT TEM1208GL: Type: IMPLANTABLE DEVICE | Site: GROIN | Status: FUNCTIONAL

## 2021-03-03 RX ORDER — GLYCOPYRROLATE 0.2 MG/ML
INJECTION, SOLUTION INTRAMUSCULAR; INTRAVENOUS PRN
Status: DISCONTINUED | OUTPATIENT
Start: 2021-03-03 | End: 2021-03-03

## 2021-03-03 RX ORDER — ONDANSETRON 4 MG/1
4 TABLET, ORALLY DISINTEGRATING ORAL EVERY 30 MIN PRN
Status: DISCONTINUED | OUTPATIENT
Start: 2021-03-03 | End: 2021-03-03 | Stop reason: HOSPADM

## 2021-03-03 RX ORDER — LIDOCAINE HYDROCHLORIDE 10 MG/ML
INJECTION, SOLUTION INFILTRATION; PERINEURAL PRN
Status: DISCONTINUED | OUTPATIENT
Start: 2021-03-03 | End: 2021-03-03

## 2021-03-03 RX ORDER — NALOXONE HYDROCHLORIDE 0.4 MG/ML
0.2 INJECTION, SOLUTION INTRAMUSCULAR; INTRAVENOUS; SUBCUTANEOUS
Status: DISCONTINUED | OUTPATIENT
Start: 2021-03-03 | End: 2021-03-03 | Stop reason: HOSPADM

## 2021-03-03 RX ORDER — SODIUM CHLORIDE, SODIUM LACTATE, POTASSIUM CHLORIDE, CALCIUM CHLORIDE 600; 310; 30; 20 MG/100ML; MG/100ML; MG/100ML; MG/100ML
INJECTION, SOLUTION INTRAVENOUS CONTINUOUS
Status: DISCONTINUED | OUTPATIENT
Start: 2021-03-03 | End: 2021-03-03 | Stop reason: HOSPADM

## 2021-03-03 RX ORDER — ONDANSETRON 2 MG/ML
4 INJECTION INTRAMUSCULAR; INTRAVENOUS EVERY 30 MIN PRN
Status: DISCONTINUED | OUTPATIENT
Start: 2021-03-03 | End: 2021-03-03 | Stop reason: HOSPADM

## 2021-03-03 RX ORDER — EPHEDRINE SULFATE 50 MG/ML
INJECTION, SOLUTION INTRAMUSCULAR; INTRAVENOUS; SUBCUTANEOUS PRN
Status: DISCONTINUED | OUTPATIENT
Start: 2021-03-03 | End: 2021-03-03

## 2021-03-03 RX ORDER — METOPROLOL TARTRATE 1 MG/ML
1-2 INJECTION, SOLUTION INTRAVENOUS EVERY 5 MIN PRN
Status: DISCONTINUED | OUTPATIENT
Start: 2021-03-03 | End: 2021-03-03 | Stop reason: HOSPADM

## 2021-03-03 RX ORDER — CEFAZOLIN SODIUM 2 G/100ML
2 INJECTION, SOLUTION INTRAVENOUS
Status: COMPLETED | OUTPATIENT
Start: 2021-03-03 | End: 2021-03-03

## 2021-03-03 RX ORDER — CEFAZOLIN SODIUM 2 G/100ML
2 INJECTION, SOLUTION INTRAVENOUS SEE ADMIN INSTRUCTIONS
Status: DISCONTINUED | OUTPATIENT
Start: 2021-03-03 | End: 2021-03-03 | Stop reason: HOSPADM

## 2021-03-03 RX ORDER — FENTANYL CITRATE 50 UG/ML
25-50 INJECTION, SOLUTION INTRAMUSCULAR; INTRAVENOUS
Status: DISCONTINUED | OUTPATIENT
Start: 2021-03-03 | End: 2021-03-03 | Stop reason: HOSPADM

## 2021-03-03 RX ORDER — DEXAMETHASONE SODIUM PHOSPHATE 4 MG/ML
INJECTION, SOLUTION INTRA-ARTICULAR; INTRALESIONAL; INTRAMUSCULAR; INTRAVENOUS; SOFT TISSUE PRN
Status: DISCONTINUED | OUTPATIENT
Start: 2021-03-03 | End: 2021-03-03

## 2021-03-03 RX ORDER — ALBUTEROL SULFATE 0.83 MG/ML
2.5 SOLUTION RESPIRATORY (INHALATION) EVERY 4 HOURS PRN
Status: DISCONTINUED | OUTPATIENT
Start: 2021-03-03 | End: 2021-03-03 | Stop reason: HOSPADM

## 2021-03-03 RX ORDER — KETAMINE HYDROCHLORIDE 10 MG/ML
INJECTION, SOLUTION INTRAMUSCULAR; INTRAVENOUS PRN
Status: DISCONTINUED | OUTPATIENT
Start: 2021-03-03 | End: 2021-03-03

## 2021-03-03 RX ORDER — HYDROMORPHONE HYDROCHLORIDE 1 MG/ML
.3-.5 INJECTION, SOLUTION INTRAMUSCULAR; INTRAVENOUS; SUBCUTANEOUS EVERY 10 MIN PRN
Status: DISCONTINUED | OUTPATIENT
Start: 2021-03-03 | End: 2021-03-03 | Stop reason: HOSPADM

## 2021-03-03 RX ORDER — LIDOCAINE 40 MG/G
CREAM TOPICAL
Status: DISCONTINUED | OUTPATIENT
Start: 2021-03-03 | End: 2021-03-03 | Stop reason: HOSPADM

## 2021-03-03 RX ORDER — HYDROXYZINE HYDROCHLORIDE 25 MG/1
25 TABLET, FILM COATED ORAL EVERY 6 HOURS PRN
Status: DISCONTINUED | OUTPATIENT
Start: 2021-03-03 | End: 2021-03-03 | Stop reason: HOSPADM

## 2021-03-03 RX ORDER — HYDROXYZINE HYDROCHLORIDE 50 MG/1
50 TABLET, FILM COATED ORAL EVERY 6 HOURS PRN
Status: DISCONTINUED | OUTPATIENT
Start: 2021-03-03 | End: 2021-03-03 | Stop reason: HOSPADM

## 2021-03-03 RX ORDER — ONDANSETRON 2 MG/ML
INJECTION INTRAMUSCULAR; INTRAVENOUS PRN
Status: DISCONTINUED | OUTPATIENT
Start: 2021-03-03 | End: 2021-03-03

## 2021-03-03 RX ORDER — GABAPENTIN 300 MG/1
300 CAPSULE ORAL ONCE
Status: COMPLETED | OUTPATIENT
Start: 2021-03-03 | End: 2021-03-03

## 2021-03-03 RX ORDER — HYDROCODONE BITARTRATE AND ACETAMINOPHEN 5; 325 MG/1; MG/1
1-2 TABLET ORAL EVERY 4 HOURS PRN
Qty: 20 TABLET | Refills: 0 | Status: SHIPPED | OUTPATIENT
Start: 2021-03-03 | End: 2022-05-26

## 2021-03-03 RX ORDER — NALOXONE HYDROCHLORIDE 0.4 MG/ML
0.4 INJECTION, SOLUTION INTRAMUSCULAR; INTRAVENOUS; SUBCUTANEOUS
Status: DISCONTINUED | OUTPATIENT
Start: 2021-03-03 | End: 2021-03-03 | Stop reason: HOSPADM

## 2021-03-03 RX ORDER — FENTANYL CITRATE 50 UG/ML
INJECTION, SOLUTION INTRAMUSCULAR; INTRAVENOUS PRN
Status: DISCONTINUED | OUTPATIENT
Start: 2021-03-03 | End: 2021-03-03

## 2021-03-03 RX ORDER — BUPIVACAINE HYDROCHLORIDE AND EPINEPHRINE 5; 5 MG/ML; UG/ML
INJECTION, SOLUTION PERINEURAL PRN
Status: DISCONTINUED | OUTPATIENT
Start: 2021-03-03 | End: 2021-03-03 | Stop reason: HOSPADM

## 2021-03-03 RX ORDER — PROPOFOL 10 MG/ML
INJECTION, EMULSION INTRAVENOUS CONTINUOUS PRN
Status: DISCONTINUED | OUTPATIENT
Start: 2021-03-03 | End: 2021-03-03

## 2021-03-03 RX ORDER — ACETAMINOPHEN 325 MG/1
975 TABLET ORAL ONCE
Status: COMPLETED | OUTPATIENT
Start: 2021-03-03 | End: 2021-03-03

## 2021-03-03 RX ADMIN — KETAMINE HYDROCHLORIDE 20 MG: 10 INJECTION INTRAMUSCULAR; INTRAVENOUS at 10:27

## 2021-03-03 RX ADMIN — CEFAZOLIN SODIUM 2 G: 2 INJECTION, SOLUTION INTRAVENOUS at 10:12

## 2021-03-03 RX ADMIN — SODIUM CHLORIDE, POTASSIUM CHLORIDE, SODIUM LACTATE AND CALCIUM CHLORIDE: 600; 310; 30; 20 INJECTION, SOLUTION INTRAVENOUS at 09:59

## 2021-03-03 RX ADMIN — LIDOCAINE HYDROCHLORIDE 100 MG: 10 INJECTION, SOLUTION INFILTRATION; PERINEURAL at 12:08

## 2021-03-03 RX ADMIN — LIDOCAINE HYDROCHLORIDE 1 ML: 10 INJECTION, SOLUTION EPIDURAL; INFILTRATION; INTRACAUDAL; PERINEURAL at 09:59

## 2021-03-03 RX ADMIN — SODIUM CHLORIDE, POTASSIUM CHLORIDE, SODIUM LACTATE AND CALCIUM CHLORIDE: 600; 310; 30; 20 INJECTION, SOLUTION INTRAVENOUS at 11:15

## 2021-03-03 RX ADMIN — MIDAZOLAM HYDROCHLORIDE 2 MG: 1 INJECTION, SOLUTION INTRAMUSCULAR; INTRAVENOUS at 10:15

## 2021-03-03 RX ADMIN — FENTANYL CITRATE 100 MCG: 50 INJECTION, SOLUTION INTRAMUSCULAR; INTRAVENOUS at 10:15

## 2021-03-03 RX ADMIN — MIDAZOLAM HYDROCHLORIDE 2 MG: 1 INJECTION, SOLUTION INTRAMUSCULAR; INTRAVENOUS at 10:11

## 2021-03-03 RX ADMIN — ONDANSETRON 4 MG: 2 INJECTION INTRAMUSCULAR; INTRAVENOUS at 12:17

## 2021-03-03 RX ADMIN — MIDAZOLAM HYDROCHLORIDE 1 MG: 1 INJECTION, SOLUTION INTRAMUSCULAR; INTRAVENOUS at 10:18

## 2021-03-03 RX ADMIN — KETAMINE HYDROCHLORIDE 10 MG: 10 INJECTION INTRAMUSCULAR; INTRAVENOUS at 11:54

## 2021-03-03 RX ADMIN — ACETAMINOPHEN 975 MG: 325 TABLET, FILM COATED ORAL at 09:58

## 2021-03-03 RX ADMIN — PROPOFOL 200 MCG/KG/MIN: 10 INJECTION, EMULSION INTRAVENOUS at 10:16

## 2021-03-03 RX ADMIN — PHENYLEPHRINE HYDROCHLORIDE 100 MCG: 10 INJECTION INTRAVENOUS at 10:50

## 2021-03-03 RX ADMIN — DEXAMETHASONE SODIUM PHOSPHATE 4 MG: 4 INJECTION, SOLUTION INTRA-ARTICULAR; INTRALESIONAL; INTRAMUSCULAR; INTRAVENOUS; SOFT TISSUE at 10:42

## 2021-03-03 RX ADMIN — PHENYLEPHRINE HYDROCHLORIDE 100 MCG: 10 INJECTION INTRAVENOUS at 10:55

## 2021-03-03 RX ADMIN — Medication 5 MG: at 10:40

## 2021-03-03 RX ADMIN — KETAMINE HYDROCHLORIDE 10 MG: 10 INJECTION INTRAMUSCULAR; INTRAVENOUS at 11:25

## 2021-03-03 RX ADMIN — GLYCOPYRROLATE 0.2 MG: 0.2 INJECTION, SOLUTION INTRAMUSCULAR; INTRAVENOUS at 10:31

## 2021-03-03 RX ADMIN — GABAPENTIN 300 MG: 300 CAPSULE ORAL at 09:58

## 2021-03-03 ASSESSMENT — MIFFLIN-ST. JEOR: SCORE: 1761.54

## 2021-03-03 ASSESSMENT — LIFESTYLE VARIABLES: TOBACCO_USE: 1

## 2021-03-03 NOTE — ANESTHESIA POSTPROCEDURE EVALUATION
Patient: Roge Agarwal    Procedure(s):  HERNIORRHAPHY, INGUINAL, OPEN    Diagnosis:Non-recurrent bilateral inguinal hernia without obstruction or gangrene [K40.20]  Diagnosis Additional Information: No value filed.    Anesthesia Type:  MAC    Note:  Disposition: Outpatient   Postop Pain Control: Uneventful            Sign Out: Well controlled pain   PONV: No   Neuro/Psych: Uneventful            Sign Out: Acceptable/Baseline neuro status   Airway/Respiratory: Uneventful            Sign Out: Acceptable/Baseline resp. status   CV/Hemodynamics: Uneventful            Sign Out: Acceptable CV status   Other NRE: NONE   DID A NON-ROUTINE EVENT OCCUR? No         Last vitals:  Vitals:    03/03/21 1229 03/03/21 1230 03/03/21 1245   BP: 95/58 92/57 99/40   Pulse: 72 67 77   Resp: 16 16 16   Temp: 36.7  C (98  F)     SpO2: 100% 100% 99%       Last vitals prior to Anesthesia Care Transfer:  CRNA VITALS  3/3/2021 1154 - 3/3/2021 1254      3/3/2021             Pulse:  65    SpO2:  98 %          Electronically Signed By: DANY Cao CRNA  March 3, 2021  1:01 PM

## 2021-03-03 NOTE — PROGRESS NOTES
"Electrophysiology Clinic Telephone Visit    Roge Agarwal is a 60 year old male who is being evaluated via a billable telephone visit.      The patient has been notified of following:     \"This telephone visit will be conducted via a call between you and your physician/provider. We have found that certain health care needs can be provided without the need for a physical exam.  This service lets us provide the care you need with a short phone conversation.  If a prescription is necessary we can send it directly to your pharmacy.  If lab work is needed we can place an order for that and you can then stop by our lab to have the test done at a later time.    If during the course of the call the physician/provider feels a telephone visit is not appropriate, you will not be charged for this service.\"   Patient has given verbal consent for Telephone visit?  Yes    HPI:   ***  Mr. 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       In 4/2016 he amputated two of his fingers with his wood saw. In the ER, he was found in a AFL with RVR and with LVEF 15%. He'd not been aware of arrhythmia but did report feeling fatigued. He had RAISSA/CV and started warfarin. His LVEF returned to 55% by several months later. He came off warfarin a month after the DCCV. He then represented to ER with 2 month history of feeing fatigued and increasingly SOB even at rest. He was found to be back in AFL with RVR 140s and echo demonstrated LVEF 30%. He was subsequently admitted. He was started on Pradaxa and amiodarone. Plan was for RAISSA/ablation; however, RAISSA demonstrated ROGELIO thrombus and ablation was cancelled. Amiodarone was stopped. He was instead placed on Metoprolol and digoxin for rate control. Stress CMRI showed NICM with " severely reduced biventricular function with a pattern of midmyocardial enhancement in the septal segments. ROGELIO thrombus also demonstrated on CMRI. No ischemia on stress portion. Echo showed LVEF 25-30%, moderately decreased RV function, and mild MR. Etiology considered likely multifactorial given tachyarrhythmia and ETOH abuse. He was counseled to stop drinking. He was started on GDMT for his depressed LVEF. He was discharged to have close follow up with EP and [facility] clinic.      EP Visit 1/24/20: He presents today to establish outpatient care. He reports feeling well. He denies any HF symptoms. He continues to be unaware of his arrhythmia. He reports he has stopped drinking. He has been following with [facility] clinic who have started him on diuretic. He denies any chest pain/pressures, dizziness, lightheadedness, worsening shortness of breath, leg/ankle swelling, PND, orthopnea, palpitations, or syncopal symptoms. Presenting 12 lead ECG shows AFL Vent Rate 72 bpm, QRS 96 ms, QTc 433 ms. Current cardiac medications include: Pradaxa, ASA, Digoxin, Lasix, Metoprolol, and Lisinopril.    He elected to pursue ablation for his AFL. On 2/25/20 he had RAISSA which showed resolution of ROGELIO thrombus. When he checked in for his AFL ablation procedure to pre-procedure unit the 12 lead ECG done there showed AF. This was first time AF was noted, as his prior ECGs had shown AFL. We discussed options with him including: cardioversion alone, cardioversion for AF and proceed with AFL ablation, considering AF/AFL ablation, or use of AAT. He elected to pursue DCCV for AF and then CTI ablation for AFL which was done on 2/25/20. He did well. He followed up with Dr. Wick in 1/2021 and was noted to be in AF. He reported some reduced exercise capacity. An echo from 12/2020 showed LVEF 50-55%, normal RV size/function, mild bi atrial enlargement. He reports feeling ***. He/She*** denies any chest pain/pressures, dizziness, lightheadedness,  worsening shortness of breath, leg/ankle swelling, PND, orthopnea, palpitations, or syncopal symptoms.  Current cardiac medications include: Norvasc, Pradaxa, Digoxin, Lasix, Metoprolol, and Entresto.        PAST MEDICAL HISTORY:  Past Medical History:   Diagnosis Date     Atrial fibrillation (H) 2016    One time episode noted after his finger accident, required cardioversion     Atrial fibrillation with RVR (H) 4/6/2016     Hemorrhagic stroke (H) 12/16/2007    2.3 X 1.5 CM PARENCHYMAL HEMORRHAGE IN THE RIGHT BASAL GANGLIA     HTN (hypertension)      Systolic CHF (H)     EF of 15-30% in 2016       CURRENT MEDICATIONS:  Current Outpatient Medications   Medication Sig Dispense Refill     amLODIPine (NORVASC) 5 MG tablet Take 2 tablets (10 mg) by mouth daily 180 tablet 3     carvedilol (COREG) 12.5 MG tablet Take 1 tablet (12.5 mg) by mouth 2 times daily (with meals) 180 tablet 3     dabigatran ANTICOAGULANT (PRADAXA) 150 MG capsule Take 1 capsule (150 mg) by mouth 2 times daily Store in original 's bottle or blister pack; use within 120 days of opening. 180 capsule 3     digoxin (LANOXIN) 125 MCG tablet Take 1 tablet (125 mcg) by mouth daily 90 tablet 3     folic acid (FOLVITE) 1 MG tablet Take 1 tablet (1 mg) by mouth daily 90 tablet 3     furosemide (LASIX) 20 MG tablet Take 2 tablets (40 mg) by mouth 2 times daily 360 tablet 3     multivitamin w/minerals (THERA-VIT-M) tablet Take 1 tablet by mouth daily 30 tablet 11     oxyCODONE (ROXICODONE) 5 MG tablet Take 1 tablet (5 mg) by mouth every 6 hours as needed for pain 10 tablet 0     sacubitril-valsartan (ENTRESTO)  MG per tablet Take 1 tablet by mouth 2 times daily 180 tablet 3     thiamine (B-1) 100 MG tablet Take 1 tablet (100 mg) by mouth daily 90 tablet 3       PAST SURGICAL HISTORY:  Past Surgical History:   Procedure Laterality Date     AMPUTATE FINGER(S) Right     Right 3rd distal finger after work injury     ANESTHESIA CARDIOVERSION N/A  2/25/2020    Procedure: CARDIOVERSION;  Surgeon: GENERIC ANESTHESIA PROVIDER;  Location: [facility] OR     EP ABLATION ATRIAL FLUTTER N/A 2/25/2020    Procedure: EP ABLATION ATRIAL FLUTTER;  Surgeon: Jan Romeo MD;  Location:  [facility]       ALLERGIES:   No Known Allergies    FAMILY HISTORY:  Family History   Problem Relation Age of Onset     Heart Disease Mother      Osteoporosis Mother      LUNG DISEASE Mother      Heart Disease Father      Hypertension Brother        SOCIAL HISTORY:  Social History     Tobacco Use     Smoking status: Never Smoker     Smokeless tobacco: Current User     Types: Chew   Substance Use Topics     Alcohol use: Not Currently     Comment: 12/30/2019     Drug use: Not Currently       ROS:  10 point ROS neg other than the symptoms noted above in the HPI.    Labs:  Reviewed.     Testing/Procedures:    12/3/20 ECHOCARDIOGRAM:   Interpretation Summary     The patient's rhythm is atrial fibrillation.  Borderline (EF 50-55%) reduced left ventricular function is present.  Global right ventricular function is normal.  No pericardial effusion is present.  Mild biatrial enlargement is present.  IVC diameter <2.1 cm collapsing >50% with sniff suggests a normal RA pressure  of 3 mmHg.  Compared to prior, LV fxn is improved.    1/2/20 ECHOCARDIOGRAM:   Interpretation Summary     The visual ejection fraction is estimated at 25-30%.  Moderately decreased right ventricular systolic function  There is moderate biatrial enlargement.  There is mild (1+) mitral regurgitation.  Probable aflutter with rapid ventricular response.  There is no comparison study available.  1/3/20 NM IRLANDA SCAN:     Only resting perfusion images were taken.     Resting images shows mild intensity basal inferior wall photopenic defect.     Gated images not performed. LV systolic function cannot be evaluated.     1/6/20 RAISSA:  Interpretation Summary  Left and right atrial appendage thrombi, measuring 1.7 x 1.4 cm and 1.5 x  1.4  cm, respectively.     Mildly dilated left ventricle with normal wall thickness. Severely decreased  LV systolic function with EF 15%-20%. Severe diffuse hypokinesis.  Global right ventricular function is moderately reduced.     Compared to the previous study dated 1/2/2020 (TTE): The left and right atrial  appendages are better-visualized and thrombi are seen in both atrial  Appendages.     1/6/20 CMRI:  1. The LV is mildly enlarged with normal wall thickness. The global systolic function is severely reduced.  The LVEF is 15%. There is severe diffuse hypokinesis.     2. The RV is normal in cavity size. The global systolic function is severely reduced. The RVEF is 20%.      3. The left atrium is severely enlarged.  The right atrium is moderately enlarged.      4. There is mild mitral regurgitation.     5. Late gadolinium enhancement imaging shows midmyocardial enhancement in the basal anteroseptum, basal  inferoseptum, mid anteroseptum, mid inferoseptum, and apical septum.  This is consistent with a  non-ischemic etiology of cardiomyopathy.     6. Regadenoson stress perfusion imaging shows no ischemia.     7. There is no pericardial effusion or thickening.     8. There is a left atrial appendage thrombus.     9. There is a left-sided pleural effusion.     CONCLUSIONS: Non-ischemic cardiomyopathy.  There is severely reduced biventricular function with a pattern  of midmyocardial enhancement in the septal segments. There is also a left atrial appendage thrombus. There  is no ischemia.         Assessment and Plan:   Mr. 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  ***  NICM Likely Tachy-induced and/or ETOH induced LVEF 25-30%, NYHA II:  1. ACEi/ARB: Continue Lisinopril.  2. BB: Continue Toprol  XL   3. Aldosterone antagonist: deferred, can consider addition will leave to [facility].  4. SCD prophylaxis: not currently indicated as he was just started on GDMT  5. Fluid status: euvolemic on exam     Atrial Flutter:  We discussed in detail with the patient management/treatment options for [place] includin. Stroke Prophylaxis:  CHADSVASC=+HF, +HTN  2, corresponding to a 2.2% annual stroke / systemic emolism event rate. indicating need for long term oral anticoagulation.  He is appropriately on Pradaxa. No bleeding issues.   2. Rate Control: Continue Toprol XL and Digoxin.   3. Rhythm Control: He had DCCV in 2016 for AFL. Now with recurrence. Has ROGELIO thrombus so avoiding rhythm control until this clears. Given history of tachy-mediated CM and recurrent AFL, he need tight rhythm control. AAT less efficacious for this and we favor ablation. Will plan to get RAISSA after being on anticoagulation for 4-6 weeks. If clear will proceed with ablation. The procedural risk of EP study and ablation were discussed in detail. Risks discussed include: vascular complications, CVA, AVB, pericardial effusion and tamponade. Even if ablation is successful anticoagulation may be needed lifelong. We also discussed that he has ~40% risk of developing AF in his lifetime and that ablation for AFL does not address this.   4. Risk Factor Management: tight BP control, GO evaluation as indicated, and continued abstinence  from ETOH.      I have reviewed the note as documented above.  This accurately captures the substance of my conversation with the patient.  The patient states understanding and is agreeable with the plan.       Telephone Visit Duration: ***    Jan Romeo MD [facility]  Cardiology - Electrophysiology          CC  FERMIN HUITRON

## 2021-03-03 NOTE — ANESTHESIA CARE TRANSFER NOTE
Patient: Roge Agarwal    Procedure(s):  HERNIORRHAPHY, INGUINAL, OPEN    Diagnosis: Non-recurrent bilateral inguinal hernia without obstruction or gangrene [K40.20]  Diagnosis Additional Information: No value filed.    Anesthesia Type:   MAC     Note:    Oropharynx: oral airway in place and spontaneously breathing  Level of Consciousness: unresponsive  Oxygen Supplementation: face mask  Level of Supplemental Oxygen (L/min / FiO2): 8  Independent Airway: airway patency satisfactory and stable  Dentition: dentition unchanged  Vital Signs Stable: post-procedure vital signs reviewed and stable  Report to RN Given: handoff report given  Patient transferred to: Phase II    Handoff Report: Identifed the Patient, Identified the Reponsible Provider, Reviewed the pertinent medical history, Discussed the surgical course, Reviewed Intra-OP anesthesia mangement and issues during anesthesia, Set expectations for post-procedure period and Allowed opportunity for questions and acknowledgement of understanding      Vitals: (Last set prior to Anesthesia Care Transfer)  CRNA VITALS  3/3/2021 1154 - 3/3/2021 1235      3/3/2021             Pulse:  65    SpO2:  98 %        Electronically Signed By: DANY Cao CRNA  March 3, 2021  12:35 PM

## 2021-03-03 NOTE — BRIEF OP NOTE
"[facility]    Brief Operative Note    Pre-operative diagnosis: Non-recurrent bilateral inguinal hernia without obstruction or gangrene [K40.20]  Post-operative diagnosis bilateral hernia (right direct, left indirect).    Procedure: Procedure(s):  HERNIORRHAPHY, INGUINAL, OPEN  Surgeon: Surgeon(s) and Role:     * Demetrio Foss MD - Primary     * Kb Otoole PA-C - Assisting  Anesthesia: Monitor Anesthesia Care   Estimated blood loss: Less than 10 ml  Drains: None  Specimens: * No specimens in log *  Findings:   Right direct hernia (Duatene mesh) Left indirect (Pro- mesh)  EBL < 10 cc.  Complications: None.  Implants:   Implant Name Type Inv. Item Serial No.  Lot No. LRB No. Used Action   Medtronic Duatene Bilayer Mesh 6cm x 13cm x 10cm     [employer] [id number] Bilateral 1 Implanted   MESH PROGRIP 4.7X3\" PARIETEX LEFT [id number] Mesh MESH PROGRIP 4.7X3\" PARIETEX LEFT [id number]  [facility] [id number]  1 Implanted           "

## 2021-03-03 NOTE — DISCHARGE INSTRUCTIONS
Wash incisions daily with soap and water. Some mild redness or swelling is expected. If draining, cover with dry gauze.    No specific heavy lifting restrictions.  Pain will be your limiting factor.  You will have more pain because we fixed both sides.  Listen to your body.  If it hurts - don't do it.    Okay to use ice pack over wound as necessary for comfort.    Use pain medication as necessary but avoid constipating side effects. Ibuprofen okay but avoid Tylenol as your pain medication already contains this.    Restart your Pradaxa on Friday March 5th.  Expect a fair amount of bruising because of this medication.    Diet as tolerated. No restrictions.    Follow up with Dr Foss in 2 weeks.                      Same Day Surgery Discharge Instructions  Special Precautions After Surgery - Adult    1. It is not unusual to feel lightheaded or faint, up to 24 hours after surgery or while taking pain medication.  If you have these symptoms; sit for a few minutes before standing and have someone assist you when getting up.  2. You should rest and relax for the next 24 hours and must have someone stay with you for at least 24 hours after your discharge.  3. DO NOT DRIVE any vehicle or operate mechanical equipment for 24 hours following the end of your surgery.  DO NOT DRIVE while taking narcotic pain medications that have been prescribed by your physician.  If you had a limb operated on, you must be able to use it fully to drive.  4. DO NOT drink alcoholic beverages for 24 hours following surgery or while taking prescription pain medication.  5. Drink clear liquids (apple juice, ginger ale, broth, 7-Up, etc.).  Progress to your regular diet as you feel able.  6. Any questions call your physician and do not make important decisions for 24 hours.       [facility]:  [phone]   Same Day Surgery [phone], Monday thru Friday 6am-9pm.

## 2021-03-04 NOTE — OP NOTE
Procedure Date: 03/03/2021      PREOPERATIVE DIAGNOSIS:  Bilateral inguinal hernias.      POSTOPERATIVE DIAGNOSIS:  Bilateral inguinal hernias (right direct hernia, left indirect hernia).      PROCEDURES PERFORMED:  Bilateral open bilateral inguinal hernia repair with mesh.      SURGEON:  Demetrio Foss MD      ASSISTANT:  SABRINA Mirza.  His assistance was necessary for retraction, hemostasis, and placement of mesh.      ANESTHESIA:  Local with monitored anesthesia care.      INDICATIONS:  This 60-year-old man presented to the surgical clinic with bilateral hernias.  The right one has been bothering him more than the left, but he wanted to go ahead and take care of both sides at once.  He is on a blood thinning agent due to atrial fibrillation, so we obtained clearance from his cardiologist as well as his primary care provider to keep him off of the blood thinner for 3 days before surgery.  Prior to the procedure, the patient and his family members were counseled about the risks, benefits and alternatives of the procedure, and they agreed to proceed.  All of their questions were answered.      DESCRIPTION OF PROCEDURE:  The patient was brought to the operating room and placed on the table in the supine position.  After sedation by Anesthesia, both groins were clipped of extraneous hairs and then prepped and draped in the usual sterile fashion.  A surgical timeout was performed at this point to identify both the patient and the proposed procedure.  All present were in agreement.      Before incision was made, both sides were marked for the incision location.  We started on the right side.  The incision line as well as the surrounding field was injected with a total of 30 mL of a mixture of 1% lidocaine with epinephrine and Marcaine without epinephrine.  The incision was then made and dissection carried sharply and using cautery through the subcutaneous tissues.  Subcutaneous veins were ligated or cauterized as  they were encountered.  Dissection was carried down through Nazanin's fascia to the level of the external oblique aponeurosis.  The aponeurosis was incised along the length of its fibers into the external ring.  Retractors were placed at this point.      The cord structures were dissected off of the pubic tubercle with blunt finger dissection.  We then took down the cremasteric fibers to better identify what was going on here.  It appeared like he had a large direct hernia, but not much of an indirect component.  We  the herniated contents from the cord all the way back to the internal ring.  There was a lipoma present, which we ligated and removed from the cord.  This was discarded.  Cord structures and the vas deferens were preserved and protected.  The ilioinguinal nerve was never seen on either side, so its integrity cannot be verified.  The cord was dissected at the ring to make sure that there was not a significant indirect component.  We saw the epigastric vessels crossing, and again no indirect component noted.  We chose to use a dual-layer polypropylene mesh by the name of Sosa.  Appropriate size was chosen.  Transversalis fascia was incised over the herniated direct hernia.  We then placed a 4 x 8 sponge into the preperitoneal space to dissect it out for placement of mesh.  The sponge was removed intact.  We then took the two-layered mesh and soaked it in irrigation fluid.  The mesh was placed so that the underlayment was opened up in the preperitoneal space covering Felix's ligament and extending medially over the internal ring.  The overlay was then opened up such that the shorter limb covered over the pubic tubercle and laterally the longer portion of it went past the cord structures.  A slit was cut laterally to accommodate the cord structures.  We then secured the mesh to the pubic tubercle using a single 0 Nurolon.  Medially and superiorly, the mesh was secured to the internal oblique  fascia.  Laterally and inferiorly, it was secured to the reflecting portion of the inguinal ligament.  The mesh was repaired around the cord to recreate the internal ring.  Only 1 suture was used at the lateral edge of the internal ring.  The rest of the mesh was simply tucked under the external oblique.  This provided a nice, secure repair.  I did not need to trim any mesh.  The wound was irrigated with copious amounts of saline, and no bleeding was seen.  The cord structures were returned to the inguinal canal and the external oblique was closed with a running 2-0 Vicryl.  Subcutaneous tissues were irrigated and closed with interrupted 3-0 Vicryl.  The skin was then closed with a running subcuticular 4-0 Monocryl.      We then turned our attention to the other side.  Initial part of the procedure was exactly same as on the right side until we had the cord structures dissected out.  On this side, there was an indirect hernia with no direct component.  There was a larger lipoma of the cord, which was  from the cord, ligated and removed.  Cord structures and vas deferens were preserved.  We then spent some time dissecting the cord at the internal ring.  The sac was easily located, but it was difficult to dissect it from nearby structures.  At one point, we did get into the sac, which helped us further clarified the anatomy.  The sac was completely dissected from the cord.  What we noted, looking inside of the peritoneum, was that there was sigmoid colon that was attached to the sac.  Instead of trying to take it down, we just closed the defect in the sac and placed it back into the preperitoneal space.  We then used a piece of ProGrip mesh.  This was irrigated and placed onto the floor of the inguinal canal.  It was secured in a similar manner as the other side, the only difference being that we used the Velcro-like portion of the mesh to help us to recreate the internal ring.  Excess mesh was trimmed off.   Laterally, the mesh was tucked under the external oblique.  Closure was identical.      Surgical glue was applied to both incisions.  The patient was then awakened from his sedation and taken back to the same-day surgery area awake and in stable condition, having tolerated the procedure well.  There were no complications.  Needle, sponge, and instrument counts were correct x 2.  Blood loss was estimated at less than 10 mL.         CINDY MADDOX MD             D: 2021   T: 2021   MT: PEDRO PABLO      Name:     BRENNEN MONTES   MRN:      [id number]        Account:        [id number]   :      1960           Procedure Date: 2021      Document: [id number]

## 2021-03-15 ENCOUNTER — OFFICE VISIT (OUTPATIENT)
Dept: SURGERY | Facility: CLINIC | Age: 61
End: 2021-03-15
Payer: COMMERCIAL

## 2021-03-15 VITALS
SYSTOLIC BLOOD PRESSURE: 156 MMHG | BODY MASS INDEX: 26.37 KG/M2 | DIASTOLIC BLOOD PRESSURE: 84 MMHG | TEMPERATURE: 97 F | WEIGHT: 199 LBS | HEART RATE: 76 BPM | HEIGHT: 73 IN

## 2021-03-15 DIAGNOSIS — Z98.890 POSTOPERATIVE STATE: Primary | ICD-10-CM

## 2021-03-15 PROCEDURE — 99024 POSTOP FOLLOW-UP VISIT: CPT | Performed by: SURGERY

## 2021-03-15 ASSESSMENT — MIFFLIN-ST. JEOR: SCORE: 1761.54

## 2021-03-15 NOTE — PATIENT INSTRUCTIONS
Per physician instructions      If you have questions or concerns on any instructions given to you by your provider today or if you need to schedule an appointment, you can reach us at [phone].

## 2021-03-15 NOTE — PROGRESS NOTES
Roge is 2 weeks status post repair of bilateral inguinal hernias.  Other than the amount of pain he experienced, he has no complaints.  He would like to go back to work.    On exam: He has a fair amount of ecchymosis which is receding, likely due to the Pradaxa he is on.  There are some mild to moderate sized seromas, but nothing that I think needs to be drained.  No evidence of recurrence or infection.    Impression: Satisfactory postoperative course    Recommendations: Return to usual activities as tolerated.  He does not need to see me in follow-up, but is certainly welcome to.  I told him that the seroma should diminish within the next 2 weeks, and he should let me know if they do not.    A return to work letter was signed such that he can return to usual activities starting tomorrow.  I will see him back as needed.    Demetrio Foss MD FACS

## 2021-03-15 NOTE — NURSING NOTE
"Initial BP (!) 156/84 (BP Location: Right arm, Patient Position: Sitting, Cuff Size: Adult Regular)   Pulse 76   Temp 97  F (36.1  C) (Tympanic)   Ht 1.854 m (6' 1\")   Wt 90.3 kg (199 lb)   BMI 26.25 kg/m   Estimated body mass index is 26.25 kg/m  as calculated from the following:    Height as of this encounter: 1.854 m (6' 1\").    Weight as of this encounter: 90.3 kg (199 lb). .    Chaya Vazquez CMA    "

## 2021-03-15 NOTE — LETTER
[facility]  [address] [address]  [phone]          March 15, 2021    RE:  Roge Agarwal                                                                                                                                                       [address] [address] [address]            To whom it may concern:    Roge Agarwal is under my professional care for hernia surgery.  He  may return to work on 3/16/21 without restrictions.  He should start off with light duty and increase as he sees fit.        Sincerely,        Demetrio Foss MD

## 2021-03-15 NOTE — LETTER
3/15/2021         RE: Roge Agarwal  [address] [address] [address]        Dear Colleague,    Thank you for referring your patient, Roge Agarwal, to the [facility]. Please see a copy of my visit note below.    Roge is 2 weeks status post repair of bilateral inguinal hernias.  Other than the amount of pain he experienced, he has no complaints.  He would like to go back to work.    On exam: He has a fair amount of ecchymosis which is receding, likely due to the Pradaxa he is on.  There are some mild to moderate sized seromas, but nothing that I think needs to be drained.  No evidence of recurrence or infection.    Impression: Satisfactory postoperative course    Recommendations: Return to usual activities as tolerated.  He does not need to see me in follow-up, but is certainly welcome to.  I told him that the seroma should diminish within the next 2 weeks, and he should let me know if they do not.    A return to work letter was signed such that he can return to usual activities starting tomorrow.  I will see him back as needed.    Demetrio Foss MD FACS      Again, thank you for allowing me to participate in the care of your patient.        Sincerely,        Demetrio Foss MD

## 2021-03-17 ENCOUNTER — TELEPHONE (OUTPATIENT)
Dept: CARDIOLOGY | Facility: CLINIC | Age: 61
End: 2021-03-17

## 2021-03-17 NOTE — LETTER
March 17, 2021      TO: Roge Agarwal  [address] [address] [facility] [address]         Dear Roge,    We have you scheduled for the following cardiology follow up appointments.     7/14/21:  8:45 am, lab at [facility]  7/16/21: 1:00 pm- appointment with Dr Wick at the [facility].     Let us know if we need to reschedule these.     It was a pleasure to see you at your last clinic visit. Please do not hesitate to call me if you have any questions or concerns.    Sincerely,    Sis Ashby RN

## 2021-03-17 NOTE — TELEPHONE ENCOUNTER
Noted pt was seen at [facility] by Dr Wick January1/12/21, - plan to followup in 6 months    Call to pts dtr Geneva.   appt next week was sched prior to January appt.   Will cancel appt next week and schedule lab and follow up in July- agreed, verbalized understanding

## 2021-03-21 DIAGNOSIS — I48.92 ATRIAL FLUTTER, UNSPECIFIED TYPE (H): ICD-10-CM

## 2021-03-23 RX ORDER — MULTIPLE VITAMINS W/ MINERALS TAB 9MG-400MCG
1 TAB ORAL DAILY
Qty: 90 TABLET | Refills: 1 | Status: SHIPPED | OUTPATIENT
Start: 2021-03-23 | End: 2021-12-02

## 2021-03-23 NOTE — TELEPHONE ENCOUNTER
multivitamin w/minerals (THERA-VIT-M) tablet  Last Written Prescription Date:  2/25/2020  Last Fill Quantity: 30,   # refills: 11  Last Office Visit : 10/9/2020  Future Office visit:  None    Routing refill request to provider for review/approval because:  Last order sent by hospitalitis.  Please send new updated order to pharm with Updated Providers signature for Pt care.      Thank you       Loyda Wilkerson RN  Central Triage Red Flags/Med Refills

## 2021-04-01 NOTE — ED NOTES
Patient Seen in: [facility]    History   Patient presents with:  Testing: Entered by patient    Stated Complaint: Covid-19 Test    HPI    36year old female who presents for covid testing. She is fully vaccinated.   The pt is having the follow Patient dropped SBP to 88/, dilt gtt dropped to 5 mg/hr. Repeat SBP 82/, Dilt gtt shut off.  NS hung. HR 64 patient in aflutter 3:1 MD aware   History   Problem Relation Age of Onset   • Hypertension Brother    • Diabetes Maternal Grandmother    • Heart Disorder Maternal Grandfather    • Cancer Paternal Grandmother    • Breast Cancer Paternal Grandmother         25s, lived til age 80   • Cancer S full vaccination - ok to return to work/school. Advised of quarantine if any further sx.     Disposition and Plan     Clinical Impression:  Encounter for screening for COVID-19  (primary encounter diagnosis)    Disposition:  Discharge    Follow-up:  Radha Coates,

## 2021-07-20 ENCOUNTER — TELEPHONE (OUTPATIENT)
Dept: CARDIOLOGY | Facility: CLINIC | Age: 61
End: 2021-07-20

## 2021-07-20 DIAGNOSIS — I48.4 ATYPICAL ATRIAL FLUTTER (H): ICD-10-CM

## 2021-07-20 DIAGNOSIS — Z78.9 ALCOHOL USE: ICD-10-CM

## 2021-07-20 RX ORDER — DABIGATRAN ETEXILATE 150 MG/1
150 CAPSULE ORAL 2 TIMES DAILY
Qty: 180 CAPSULE | Refills: 3 | Status: CANCELLED | OUTPATIENT
Start: 2021-07-20

## 2021-07-20 RX ORDER — LANOLIN ALCOHOL/MO/W.PET/CERES
100 CREAM (GRAM) TOPICAL DAILY
Qty: 90 TABLET | Refills: 3 | Status: CANCELLED | OUTPATIENT
Start: 2021-07-20

## 2021-07-20 NOTE — TELEPHONE ENCOUNTER
[facility] Call Center    Phone Message    May a detailed message be left on voicemail: yes     Reason for Call: Medication Question or concern regarding medication   Prescription Clarification  Name of Medication: sacubitril-valsartan (ENTRESTO)  MG per tablet  Prescribing Provider:  KARINE Wick   Pharmacy[id number] [place] - [address]   What on the order needs clarification? [place] called in stating Pharmacy is needing a prior auth for this medication. He has been out since yesterday, 7/19/21. Please call her back at [phone] to discuss.           Action Taken: Message routed to:  Other: SHANELLE Cardio    Travel Screening: Not Applicable

## 2021-07-21 ENCOUNTER — TELEPHONE (OUTPATIENT)
Dept: CARDIOLOGY | Facility: CLINIC | Age: 61
End: 2021-07-21

## 2021-07-21 DIAGNOSIS — I50.20 HEART FAILURE WITH REDUCED EJECTION FRACTION, NYHA CLASS III (H): ICD-10-CM

## 2021-07-21 RX ORDER — SACUBITRIL AND VALSARTAN 97; 103 MG/1; MG/1
1 TABLET, FILM COATED ORAL 2 TIMES DAILY
Qty: 180 TABLET | Refills: 3 | Status: SHIPPED | OUTPATIENT
Start: 2021-07-21 | End: 2022-06-17

## 2021-07-21 NOTE — TELEPHONE ENCOUNTER
PA Initiation    Medication: sacubitril-valsartan (ENTRESTO)  MG per tablet   Insurance Company: [employer] - Phone [phone] Fax [phone]  Pharmacy Filling the Rx: [id number] - [place], [place] - [address]  Filling Pharmacy Phone: [phone]  Filling Pharmacy Fax: [phone]  Start Date: 7/21/2021

## 2021-07-21 NOTE — TELEPHONE ENCOUNTER
Call to [facility] pharmacy after receiving refill requests, message left of refill for thiamine sent 10/23/20 good for 1 year and dapigatran sent 1/12/21 good for 1 year, should not need further authorization at this time, refills declined

## 2021-07-21 NOTE — TELEPHONE ENCOUNTER
[facility] Call Center    Phone Message    May a detailed message be left on voicemail: no     Reason for Call: Other: Mamta called from [employer] with a phone# to call for faster service to renew the prior auth for Rx sacubitril-valsartan (ENTRESTO)  MG per tablet. The phone# is [phone].     Action Taken: Message routed to:  [facility] Cardiology    Travel Screening: Not Applicable

## 2021-07-21 NOTE — TELEPHONE ENCOUNTER
Prior Authorization Retail Medication Request    Medication/Dose: Entresto  MG twice daily.  ICD code (if different than what is on RX):    Previously Tried and Failed:   Rationale:  Has been taking with good effect.    Insurance Name:    Insurance ID:   COVERAGE INFORMATION:  Primary Payor: Blue Plus Plan: [employer] Advant[employer] Ma   Subscriber: Roge Agarwal #: [id number]   Subscriber Sex: Male       Subscriber : 1960 Patient Rel'ship: Self   Subscriber Effective Date:   Member Effective Date: 3/1/2020    Subscriber ID [id number] Member ID: [id number]              Pharmacy Information (if different than what is on RX)  Name:    Phone:

## 2021-07-21 NOTE — TELEPHONE ENCOUNTER
Prior auth started on Entresto  MG twice daily. Prescription sent to pharmacy. Patient called and notified. Patient has some 49-51 MG tablets he will take, 2 twice daily, until prior auth is complete. Patient has been on Entresto for over a year.

## 2021-07-21 NOTE — TELEPHONE ENCOUNTER
Prior Authorization Approval    Authorization Effective Date: 5/9/2021  Authorization Expiration Date: 7/21/2022  Medication: sacubitril-valsartan (ENTRESTO)  MG per tablet--APPROVED  Approved Dose/Quantity:   Reference #:     Insurance Company: [employer] - Phone [phone] Fax [phone]  Expected CoPay:       CoPay Card Available:      Foundation Assistance Needed:    Which Pharmacy is filling the prescription (Not needed for infusion/clinic administered): [id number] [place] [place] - [address]  Pharmacy Notified: Yes  Patient Notified: Yes **Instructed pharmacy to notify patient when script is ready to /ship.**

## 2021-09-30 DIAGNOSIS — I50.20 HEART FAILURE WITH REDUCED EJECTION FRACTION, NYHA CLASS III (H): Primary | ICD-10-CM

## 2021-10-06 ENCOUNTER — TELEPHONE (OUTPATIENT)
Dept: CARDIOLOGY | Facility: CLINIC | Age: 61
End: 2021-10-06

## 2021-10-06 NOTE — LETTER
October 13, 2021        TO: Roge Agarwal  [address] [address] [facility] [address]         Dear Roge,    This letter is in reference to your upcoming cardiology follow up appointment scheduled with Dr. Amor Wick at the [facility].    Unfortunately, Dr. Amor Wick will be out of the office/clinic on 10/15/21.  We have rescheduled your appointment to 10/29/21 at 230pm at the [facility]  ([address] [place] [address]); please see the enclosed appointment information sheet for details.      If this appointment conflicts with your schedule, please feel free to call and reschedule at your convenience; you may reach us at [phone].      We are sorry for any inconvenience this may cause.  We look forward to seeing you soon.    Best Regards,    Your Cardiology Team  [facility]

## 2021-10-06 NOTE — TELEPHONE ENCOUNTER
Need to reschedule appt with Dr Wick 10/15    Call to pts dtr: left msg asking her tcb.    When Geneva calls back:    Please offer- change appt with Dr Wick from 10/15 to     10/29/21   2:30 pm    (waiting for template change)    We will leave lab appt on 10/13..

## 2021-10-15 ENCOUNTER — TELEPHONE (OUTPATIENT)
Dept: CARDIOLOGY | Facility: CLINIC | Age: 61
End: 2021-10-15

## 2021-10-15 NOTE — TELEPHONE ENCOUNTER
Appointment 10/29 with Dr. Wick is flagged to be rescheduled. Left a message for patient to call back to reschedule with a non- fasting lab 1-2 days prior.    REHAN Maldonado

## 2021-10-15 NOTE — TELEPHONE ENCOUNTER
Pt no showed for lab appt on 10/13- (kat appt moved from 10/15 to 10/29)    Pt needs labs prior to 10/29 appt.     Orders extended. Will call pt to attempt to schedule

## 2021-10-19 NOTE — TELEPHONE ENCOUNTER
Left a message for patient to call back to reschedule appointment with Dr. Wick.     Ronna Huffman, A

## 2021-10-21 DIAGNOSIS — I48.92 ATRIAL FLUTTER, UNSPECIFIED TYPE (H): ICD-10-CM

## 2021-10-21 DIAGNOSIS — I50.20 HEART FAILURE WITH REDUCED EJECTION FRACTION, NYHA CLASS III (H): ICD-10-CM

## 2021-10-22 NOTE — TELEPHONE ENCOUNTER
Lab. Visits no showed by patient  .. Left message to return clinic call to .  Faraz Lazaro L.P.N.

## 2021-10-25 RX ORDER — MULTIVIT-MIN/FA/LYCOPEN/LUTEIN .4-300-25
TABLET ORAL
Qty: 90 TABLET | Refills: 1 | OUTPATIENT
Start: 2021-10-25

## 2021-10-25 RX ORDER — SACUBITRIL AND VALSARTAN 97; 103 MG/1; MG/1
TABLET, FILM COATED ORAL
Qty: 180 TABLET | Refills: 3 | OUTPATIENT
Start: 2021-10-25

## 2021-10-25 NOTE — TELEPHONE ENCOUNTER
DUP:  ENTRESTO 97-103MG TABS: Last Rx: 7-21-21 for 180 tab w/3 RF    Denied, send to PCP.  PCP to fill per  protocol  Multiple Vitamins-Minerals (CEROVITE SENIOR) TABS   Pulmonary & Critical Care Inpatient Progress Note   Sonja Reyna MD     REASON FOR TODAY'S VISIT:  Recurrent left effusion    SUBJECTIVE:   1L drained by IR from left thorax  Off oxygen    Scheduled Meds:   aspirin  81 mg Oral Daily    atorvastatin  80 mg Oral Nightly    famotidine  40 mg Oral QPM    magnesium oxide  400 mg Oral BID    metoprolol tartrate  25 mg Oral BID    potassium chloride  20 mEq Oral BID WC    ticagrelor  90 mg Oral BID    sodium chloride flush  10 mL Intravenous 2 times per day    enoxaparin  30 mg Subcutaneous BID    albuterol sulfate HFA  2 puff Inhalation 4x daily    And    ipratropium  2 puff Inhalation 4x daily       Continuous Infusions:      PRN Meds:  sodium chloride flush, acetaminophen **OR** acetaminophen, polyethylene glycol, prochlorperazine    ALLERGIES:  Patient has No Known Allergies. Objective:   PHYSICAL EXAM:  /68   Pulse 90   Temp 98 °F (36.7 °C) (Oral)   Resp 14   Ht 5' 3\" (1.6 m)   Wt 119 lb 11.2 oz (54.3 kg)   LMP 11/01/2017 (Approximate)   SpO2 93%   BMI 21.20 kg/m²    Physical Exam  Constitutional:       General: She is not in acute distress. Appearance: She is well-developed. She is not diaphoretic. HENT:      Head: Normocephalic and atraumatic. Mouth/Throat:      Pharynx: No oropharyngeal exudate. Eyes:      Pupils: Pupils are equal, round, and reactive to light. Neck:      Musculoskeletal: Neck supple. Vascular: No JVD. Cardiovascular:      Heart sounds: Normal heart sounds. No murmur. No friction rub. No gallop. Pulmonary:      Effort: Pulmonary effort is normal.      Breath sounds: No wheezing or rales. Abdominal:      General: Bowel sounds are normal. There is no distension. Palpations: Abdomen is soft. Tenderness: There is no abdominal tenderness. Musculoskeletal: Normal range of motion. Lymphadenopathy:      Cervical: No cervical adenopathy. Skin:     General: Skin is warm and dry. Findings: No rash. Neurological:      Mental Status: She is alert. Cranial Nerves: No cranial nerve deficit. Comments: CN 2-12 grossly intact            Data Reviewed:   LABS:  CBC:  Recent Labs     07/10/20  1049 07/11/20  0455   WBC 10.5 8.9   HGB 12.6 11.0*   HCT 37.3 33.2*   MCV 82.8 81.8    241     BMP:  Recent Labs     07/10/20  1049 07/11/20  0455   * 140   K 3.8 3.9   CL 96* 105   CO2 25 23   BUN 11 8   CREATININE 0.6 <0.5*     LIVER PROFILE:   Recent Labs     07/10/20  1049 07/11/20  0455   AST 23 18   ALT 19 14   BILITOT 0.7 0.5   ALKPHOS 184* 146*     PT/INR:  Recent Labs     07/11/20 0455   PROTIME 16.0*   INR 1.37*     APTT:   Recent Labs     07/11/20 0455   APTT 31.7     UA:  Recent Labs     07/10/20  1200   COLORU Straw   PHUR 7.5   CLARITYU Clear   SPECGRAV 1.010   LEUKOCYTESUR Negative   UROBILINOGEN 0.2   BILIRUBINUR Negative   BLOODU Negative   GLUCOSEU Negative     No results for input(s): PHART, SNT8WUC, PO2ART in the last 72 hours. Vent Information  SpO2: 93 %    CXR personally reviewed, persistent left effusion           Assessment:     1. Recurrent left pleural effusion                      -postop related from recent CABG  2. Shortness of breath, chest pain from above  3. Centrilobular emphysema without acute exac  4.  CAD with associated CHF    Plan:      -Pleural effusion postop that is recurrent, management per thoracic surgery  -Bronchodilators for COPD, would benefit from further outpatient management of this/. No acute issues to deal with currently   -Please contact with questions, will sign off     Yuliya Sanchez MD

## 2021-11-01 DIAGNOSIS — I48.92 ATRIAL FLUTTER, UNSPECIFIED TYPE (H): ICD-10-CM

## 2021-11-03 RX ORDER — MULTIPLE VITAMINS W/ MINERALS TAB 9MG-400MCG
1 TAB ORAL DAILY
Qty: 90 TABLET | Refills: 1 | OUTPATIENT
Start: 2021-11-03

## 2021-11-03 NOTE — TELEPHONE ENCOUNTER
multivitamin w/minerals (THERA-VIT-M) tablet  Last Written Prescription Date: 3/23/21  Last Fill Quantity: 90,   # refills: 1  Last Office Visit : 1/12/21  Future Office visit: none    Routing refill request to provider for review/approval because: last filled by [facility]. No pcp assigned in epic.  Sent back to pharm to figure out .per protocol fill by pcp.

## 2021-12-01 DIAGNOSIS — I48.92 ATRIAL FLUTTER, UNSPECIFIED TYPE (H): ICD-10-CM

## 2021-12-02 RX ORDER — MULTIPLE VITAMINS W/ MINERALS TAB 9MG-400MCG
1 TAB ORAL DAILY
Qty: 90 TABLET | Refills: 3 | Status: SHIPPED | OUTPATIENT
Start: 2021-12-02 | End: 2022-06-17

## 2021-12-02 NOTE — TELEPHONE ENCOUNTER
multivitamin w/minerals (THERA-VIT-M) tablet  Last Written Prescription Date: 3/23/21  Last Fill Quantity: 90,   # refills: 1  Last Office Visit : 1/12/21  Future Office visit: none     Routing refill request to provider for review/approval because: Per protocol MVI to be filled by PCP,last filled by Delano. No PCP assigned in Epic.

## 2021-12-13 DIAGNOSIS — Z78.9 ALCOHOL USE: ICD-10-CM

## 2021-12-16 RX ORDER — LANOLIN ALCOHOL/MO/W.PET/CERES
100 CREAM (GRAM) TOPICAL DAILY
Qty: 90 TABLET | Refills: 0 | Status: SHIPPED | OUTPATIENT
Start: 2021-12-16 | End: 2022-03-21

## 2021-12-16 NOTE — TELEPHONE ENCOUNTER
THIAMINE HCL 100MG TABS  Last Written Prescription Date:  10/23/2020  Last Fill Quantity: 90,   # refills: 3  Last Office Visit : 1/12/2021  Future Office visit:  None    Routing refill request to provider for review/approval because:  Kelsea Pt   We do not fill medications for [place] [facility]  Refer to clinic for review    Loyda Wilkerson RN  Central Triage Red Flags/Med Refills

## 2022-02-02 ENCOUNTER — TELEPHONE (OUTPATIENT)
Dept: CARDIOLOGY | Facility: CLINIC | Age: 62
End: 2022-02-02
Payer: COMMERCIAL

## 2022-02-02 DIAGNOSIS — I50.22 CHRONIC SYSTOLIC HEART FAILURE (H): ICD-10-CM

## 2022-02-02 RX ORDER — CARVEDILOL 12.5 MG/1
12.5 TABLET ORAL 2 TIMES DAILY WITH MEALS
Qty: 180 TABLET | Refills: 0 | Status: SHIPPED | OUTPATIENT
Start: 2022-02-02 | End: 2022-05-10

## 2022-02-02 NOTE — TELEPHONE ENCOUNTER
[facility] Call Center    Phone Message    May a detailed message be left on voicemail: yes     Reason for Call: Medication Refill Request    Has the patient contacted the pharmacy for the refill? Yes   Name of medication being requested: carvedilol and other medications that will need refills  Provider who prescribed the medication: Delano  Pharmacy: [id number] - [place], [place] - [address]  Date medication is needed: ASAP -pt daughter scheduled appt for first avail with Delano in April states by then pt will need other meds refills too please send if possible-if meds need clarification call daughter Geneva [phone].    Action Taken: Message routed to:  Other: Cardiology    Travel Screening: Not Applicable

## 2022-02-03 RX ORDER — CARVEDILOL 12.5 MG/1
TABLET ORAL
Qty: 180 TABLET | Refills: 3 | OUTPATIENT
Start: 2022-02-03

## 2022-02-16 DIAGNOSIS — I10 ESSENTIAL HYPERTENSION: Primary | ICD-10-CM

## 2022-02-17 NOTE — TELEPHONE ENCOUNTER
[facility] Call Center    Phone Message    May a detailed message be left on voicemail: no     Reason for Call: Medication Question or concern regarding medication   Prescription Clarification  Name of Medication: amLODIPine (NORVASC) 5 MG tablet  Prescribing Provider: Amor Wick MD    Pharmacy: [id number] - [place], [place] - [address]   What on the order needs clarification?   Refill request, Roge is completely out of this medication.           Action Taken: Other: [facility]    Travel Screening: Not Applicable

## 2022-02-18 RX ORDER — AMLODIPINE BESYLATE 5 MG/1
10 TABLET ORAL DAILY
Qty: 60 TABLET | Refills: 0 | Status: SHIPPED | OUTPATIENT
Start: 2022-02-18 | End: 2022-06-17

## 2022-02-18 NOTE — TELEPHONE ENCOUNTER
AMLODIPINE BESYLATE 10MG TABS    Last Written Prescription Date:  1/12/2021  Last Fill Quantity: 180,   # refills: 3  Last Office Visit : 6/18/2020  Future Office visit:  2/18/2022  Routing refill request to provider for review/approval because:  Pt has visit with [facility]  Refer to clinic for review and refills as we do not fill medications for [facility].        Loyda Wilkerson RN  Central Triage Red Flags/Med Refills

## 2022-02-18 NOTE — TELEPHONE ENCOUNTER
Left voicemail for pt to return call to clinic and reschedule missed appointment. Sent letter to patient as well.    Bernadette Richards, Visit Facilitator

## 2022-02-18 NOTE — CONFIDENTIAL NOTE
rx refilled x1 for 1 month supply.  Pt no showed his appt with Dr. Wick 2/18.  Please call patient to reschedule.    Che Camara, RN  Cardiology Care Coordinator  [facility]  [phone] option 1

## 2022-02-21 NOTE — TELEPHONE ENCOUNTER
Spoke with Roge Bean's daughter. I scheduled him to see Dr. Wick on March 25 at 10:30am.    Bernadette Richards, Visit Facilitator

## 2022-03-03 ENCOUNTER — TELEPHONE (OUTPATIENT)
Dept: FAMILY MEDICINE | Facility: CLINIC | Age: 62
End: 2022-03-03
Payer: COMMERCIAL

## 2022-03-03 NOTE — LETTER
March 3, 2022      Roge Agarwal  [address] [address]          Dear Roge Agarwal,     At [facility] we care about your health and are committed to providing quality patient care, which includes staying current on preventative cancer screenings.  You can increase your chances of finding and treating cancers through regular screenings.      Our records show that you are due for the following screening(s):      Colonoscopy for colon cancer - Call [phone] to schedule   Recommended every ten years for everyone age 50 and older  Please take a moment to read over the enclosed information packet about colon cancer screening.   We strongly urge our patient's to consider having a colonoscopy done, which is the best screening test available and only needs to be done every 10 years if normal.      If you have a My-Chart Account, you also can schedule this appointment through there.    If you have already had one or all of the above screening tests at another facility, please call us so that we may update your chart.      Your partners in health,      Quality Committee   [facility]

## 2022-03-03 NOTE — TELEPHONE ENCOUNTER
Patient Quality Outreach    Patient is due for the following:   Colon Cancer Screening -  FIT and Colonoscopy    NEXT STEPS:   No follow up needed at this time.    Type of outreach:    Sent letter.    Next Steps:  Reach out within 90 days via Letter.    Max number of attempts reached: No. Will try again in 90 days if patient still on fail list.    Questions for provider review:    None     Vale Pop CMA

## 2022-03-16 DIAGNOSIS — Z78.9 ALCOHOL USE: ICD-10-CM

## 2022-03-16 DIAGNOSIS — I48.4 ATYPICAL ATRIAL FLUTTER (H): ICD-10-CM

## 2022-03-16 DIAGNOSIS — I50.22 CHRONIC SYSTOLIC HEART FAILURE (H): ICD-10-CM

## 2022-03-16 DIAGNOSIS — I10 ESSENTIAL HYPERTENSION: Primary | ICD-10-CM

## 2022-03-16 DIAGNOSIS — I50.23 ACUTE ON CHRONIC SYSTOLIC CONGESTIVE HEART FAILURE (H): ICD-10-CM

## 2022-03-16 NOTE — TELEPHONE ENCOUNTER
[facility] Call Center    Phone Message    May a detailed message be left on voicemail: no     Reason for Call: Medication Question or concern regarding medication   Prescription Clarification  Name of Medication: digoxin (LANOXIN) 125 MCG tablet and dabigatran ANTICOAGULANT (PRADAXA) 150 MG capsule  Prescribing Provider: Amor Wick MD    Pharmacy: [id number] - [place], [place] - [address]   What on the order needs clarification?   Pt Roge is completely out of both of these medications, high priority           Action Taken: Other: [facility]    Travel Screening: Not Applicable

## 2022-03-17 DIAGNOSIS — Z78.9 ALCOHOL USE: ICD-10-CM

## 2022-03-17 RX ORDER — DABIGATRAN ETEXILATE 150 MG/1
150 CAPSULE ORAL 2 TIMES DAILY
Qty: 180 CAPSULE | Refills: 0 | Status: SHIPPED | OUTPATIENT
Start: 2022-03-17 | End: 2022-06-17

## 2022-03-17 RX ORDER — DIGOXIN 125 MCG
125 TABLET ORAL DAILY
Qty: 90 TABLET | Refills: 0 | Status: SHIPPED | OUTPATIENT
Start: 2022-03-17 | End: 2022-06-17

## 2022-03-17 RX ORDER — AMLODIPINE BESYLATE 10 MG/1
10 TABLET ORAL DAILY
Qty: 30 TABLET | Refills: 0 | Status: SHIPPED | OUTPATIENT
Start: 2022-03-17 | End: 2022-04-20

## 2022-03-17 RX ORDER — FUROSEMIDE 20 MG
40 TABLET ORAL 2 TIMES DAILY
Qty: 360 TABLET | Refills: 0 | Status: SHIPPED | OUTPATIENT
Start: 2022-03-17 | End: 2022-06-17

## 2022-03-17 RX ORDER — FOLIC ACID 1 MG/1
1000 TABLET ORAL DAILY
Qty: 90 TABLET | Refills: 0 | Status: SHIPPED | OUTPATIENT
Start: 2022-03-17 | End: 2022-06-17

## 2022-03-17 RX ORDER — CARVEDILOL 12.5 MG/1
TABLET ORAL
Qty: 180 TABLET | Refills: 0 | OUTPATIENT
Start: 2022-03-17

## 2022-03-17 NOTE — TELEPHONE ENCOUNTER
AMLODIPINE 10MG TAB      Last Written Prescription Date:  2/18/22  Last Fill Quantity: 60,   # refills: 0  Last Office Visit : Amor Wick MD  Cardiovascular Disease  1/12/2021  [facility]  Future Office visit:  3/25/22    Routing refill request to provider for review/approval because:  Pharmacy is requesting 1 per day, last sig is showing 2 per day  Last prescribed limited quantity, no refills  BP due/abnormal  Due for creatinine  BP Readings from Last 3 Encounters:   03/15/21 (!) 156/84   03/03/21 116/72   02/10/21 138/84     Lab Test 02/10/21  1331   CR 0.76     Nothing more recent in care everywhere nor media  Future orders in queue for CMET, CBC      DIGOXIN 125MCG TABS      Last Written Prescription Date:  1/12/21  Last Fill Quantity: 90,   # refills: 3  Last Office Visit : Amor Wick MD  Cardiovascular Disease  1/12/2021  [facility]  Future Office visit:  3/25/22    Routing refill request to provider for review/approval because:  Overdue for creatinine and dig level  Future orders in queue for CMET, CBC.  No future order for dig level  90 day refill provided  Lab Test 01/07/20  0633   DIGOXIN 0.9     Lab Test 02/10/21  1331   CR 0.76       PRADAXA 150MG CAPS      Last Written Prescription Date:  1/12/21  Last Fill Quantity: 180,   # refills: 3  Last Office Visit : Amor Wick MD  Cardiovascular Disease  1/12/2021  [facility]  Future Office visit:  3/25/22    Routing refill request to provider for review/approval because:  Overdue for CBC and creatinine  Future orders in queue  90 day refill provided    Lab Test 02/10/21  1331   CR 0.76     Lab Test 01/12/21  0957   WBC 6.1   RBC 4.62   HGB 14.9   HCT 45.5       Lab Test 01/12/21  0957   WBC 6.1   RBC 4.62   HGB 14.9   HCT 45.5         Lab Test 01/12/21  0957   WBC 6.1   RBC 4.62   HGB 14.9   HCT 45.5       Lab Test 01/12/21  0957   WBC 6.1   RBC 4.62    HGB 14.9   HCT 45.5             FUROSEMIDE 20MG TABS  Last Written Prescription Date: 1/12/21  Last Fill Quantity: 90,   # refills: 3  Last Office Visit : Amor Wick MD  Cardiovascular Disease  1/12/2021  [facility]  Future Office visit:  3/25/22    Routing refill request to provider for review/approval because:  Blood pressure out of range   BP Readings from Last 3 Encounters:   03/15/21 (!) 156/84   03/03/21 116/72   02/10/21 138/84       Overdue for labs  Lab Test 02/10/21  1331   CR 0.76     Lab Test 02/10/21  1331   POTASSIUM 3.8     Lab Test 02/10/21  1331        Future orders in queue  90 day refill provided per protocol

## 2022-03-21 NOTE — TELEPHONE ENCOUNTER
thiamine (B-1) 100 MG tablet      Last Written Prescription Date:  12/16/21  Last Fill Quantity: 90,   # refills: 0  Last Office Visit : Amor Wick MD  Cardiovascular Disease  1/12/2021  [facility] Office visit:  3/25/22    Routing refill request to provider for review/approval because:  Drug not on cardiology refill protocol

## 2022-03-23 RX ORDER — LANOLIN ALCOHOL/MO/W.PET/CERES
100 CREAM (GRAM) TOPICAL DAILY
Qty: 90 TABLET | Refills: 1 | Status: SHIPPED | OUTPATIENT
Start: 2022-03-23 | End: 2022-06-17

## 2022-04-10 ENCOUNTER — HOSPITAL ENCOUNTER (EMERGENCY)
Facility: CLINIC | Age: 62
Discharge: HOME | End: 2022-04-10
Payer: COMMERCIAL

## 2022-04-10 VITALS
OXYGEN SATURATION: 94 % | RESPIRATION RATE: 14 BRPM | DIASTOLIC BLOOD PRESSURE: 82 MMHG | SYSTOLIC BLOOD PRESSURE: 150 MMHG | HEART RATE: 76 BPM | TEMPERATURE: 98 F

## 2022-04-15 DIAGNOSIS — I10 ESSENTIAL HYPERTENSION: ICD-10-CM

## 2022-04-15 DIAGNOSIS — I50.23 ACUTE ON CHRONIC SYSTOLIC CONGESTIVE HEART FAILURE (H): ICD-10-CM

## 2022-04-19 NOTE — TELEPHONE ENCOUNTER
Called Bernadette to discuss pt's surgery.  They were not scheduled but fortunately Dr. Foss is available this Wed 03/03 to do surgery.  Surgery scheduled with [facility].  Home prep was reviewed.  Pt's daughter Bernadette stated that her father is to hold his blood thinner for 3 days and resume when Dr. Foss says to.      Faina CONTE CMA     Contacted Dr. Ruvalcaba office to request surgical clearance forms. Patient needs EKG, CBC , medical clearance and CMP completed for surgery. They will be contacting patient to schedule procedure.    Patient has been contacted and informed that his orders have been placed and once they schedule him for the surgery I will schedule his appointment with Dr. Farmer

## 2022-04-19 NOTE — TELEPHONE ENCOUNTER
amLODIPine (NORVASC) 10 MG tablet  Last Written Prescription Date: 3/17/22  Last Fill Quantity: 30,   # refills: 0  Last Office Visit : 1/12/21   [facility]  Future Office visit: 6/17/22 [facility]    Routing refill request to provider for review/approval because:  Bp > 140/90, creat past due/last fill #30. Qty rf?

## 2022-04-20 RX ORDER — AMLODIPINE BESYLATE 10 MG/1
TABLET ORAL
Qty: 90 TABLET | Refills: 1 | Status: SHIPPED | OUTPATIENT
Start: 2022-04-20 | End: 2022-06-17

## 2022-04-22 ENCOUNTER — TELEPHONE (OUTPATIENT)
Dept: CARDIOLOGY | Facility: CLINIC | Age: 62
End: 2022-04-22
Payer: COMMERCIAL

## 2022-04-22 DIAGNOSIS — I50.23 ACUTE ON CHRONIC SYSTOLIC CONGESTIVE HEART FAILURE (H): Primary | ICD-10-CM

## 2022-04-22 NOTE — TELEPHONE ENCOUNTER
Left message for patient to call back to schedule labs appointment.    Vale Chavarria, [facility]

## 2022-04-25 NOTE — TELEPHONE ENCOUNTER
LVM for pt to return call to clinic scheduler to schedule lab appointment prior to 4/28 CORE.    CITLALI Rm

## 2022-04-26 NOTE — TELEPHONE ENCOUNTER
Spoke to patient daughter and scheduled patient labs in   [place] tomorrow, April 27th, at 1:00pm.    CITLALI Rm'

## 2022-04-27 ENCOUNTER — LAB (OUTPATIENT)
Dept: LAB | Facility: CLINIC | Age: 62
End: 2022-04-27
Payer: COMMERCIAL

## 2022-04-27 DIAGNOSIS — I50.23 ACUTE ON CHRONIC SYSTOLIC CONGESTIVE HEART FAILURE (H): ICD-10-CM

## 2022-04-27 LAB
ANION GAP SERPL CALCULATED.3IONS-SCNC: 5 MMOL/L (ref 3–14)
BUN SERPL-MCNC: 21 MG/DL (ref 7–30)
CALCIUM SERPL-MCNC: 8.5 MG/DL (ref 8.5–10.1)
CHLORIDE BLD-SCNC: 103 MMOL/L (ref 94–109)
CO2 SERPL-SCNC: 28 MMOL/L (ref 20–32)
CREAT SERPL-MCNC: 0.95 MG/DL (ref 0.66–1.25)
GFR SERPL CREATININE-BSD FRML MDRD: 90 ML/MIN/1.73M2
GLUCOSE BLD-MCNC: 78 MG/DL (ref 70–99)
POTASSIUM BLD-SCNC: 4.1 MMOL/L (ref 3.4–5.3)
SODIUM SERPL-SCNC: 136 MMOL/L (ref 133–144)

## 2022-04-27 PROCEDURE — 80048 BASIC METABOLIC PNL TOTAL CA: CPT

## 2022-04-27 PROCEDURE — 36415 COLL VENOUS BLD VENIPUNCTURE: CPT

## 2022-04-28 ENCOUNTER — OFFICE VISIT (OUTPATIENT)
Dept: CARDIOLOGY | Facility: CLINIC | Age: 62
End: 2022-04-28
Payer: COMMERCIAL

## 2022-04-28 VITALS
DIASTOLIC BLOOD PRESSURE: 78 MMHG | OXYGEN SATURATION: 100 % | WEIGHT: 187 LBS | BODY MASS INDEX: 24.67 KG/M2 | SYSTOLIC BLOOD PRESSURE: 127 MMHG | HEART RATE: 74 BPM

## 2022-04-28 DIAGNOSIS — I50.23 ACUTE ON CHRONIC SYSTOLIC CONGESTIVE HEART FAILURE (H): ICD-10-CM

## 2022-04-28 DIAGNOSIS — F10.11 HISTORY OF ETOH ABUSE: ICD-10-CM

## 2022-04-28 DIAGNOSIS — I42.8 NONISCHEMIC CARDIOMYOPATHY (H): ICD-10-CM

## 2022-04-28 DIAGNOSIS — I10 ESSENTIAL HYPERTENSION: ICD-10-CM

## 2022-04-28 DIAGNOSIS — I48.0 PAROXYSMAL ATRIAL FIBRILLATION (H): ICD-10-CM

## 2022-04-28 DIAGNOSIS — I50.22 CHRONIC SYSTOLIC HEART FAILURE (H): Primary | ICD-10-CM

## 2022-04-28 PROCEDURE — 99214 OFFICE O/P EST MOD 30 MIN: CPT | Performed by: NURSE PRACTITIONER

## 2022-04-28 NOTE — LETTER
April 28, 2022    Re: Roge Agarwal      To whom it may concern,     This letter is written to document that Mr. Roge Agarwal has been under the medical care of Maida Abdi of the Division of Cardiology at the [facility] outpatient clinics and the [facility] for the treatment of heart failure and hypertension. Due to his medications and health history, he may not be a candidate for jury duty at this time.    Thank you for your time and consideration in this extremely important matter.      Sincerely,    Maida Abdi NP  Division of Cardiolgy

## 2022-04-28 NOTE — PATIENT INSTRUCTIONS
Take your medicines every day, as directed    Changes made today:  No medication changes     Monitor Your Weight and Symptoms    Contact us if you:    Gain 2 pounds in one day or 5 pounds in one week  Feel more short of breath  Notice more leg swelling  Feel lightheadeded   Change your lifestyle    Limit Salt or Sodium:  2000 mg  Limit Fluids:  2000 mL or approximately 64 ounces  Eat a Heart Healthy Diet  Low in saturated fats  Stay Active:  Aim to move at least 150 minutes every  week         To Contact us    During Business Hours:  [phone], option # 1      After hours, weekends or holidays:   [phone], Option #4  Ask to speak to the On-Call Cardiologist. Inform them you are a CORE/heart failure patient at the [facility].     Use doubleTwist allows you to communicate directly with your heart team through secure messaging.  [facility] can be accessed any time on your phone, computer, or tablet.  If you need assistance, we'd be happy to help!         Keep your Heart Appointments:    Echocardiogram     Dr. Wick in June    CORE in 3 months    Dr. Romeo next available

## 2022-04-28 NOTE — NURSING NOTE
Return Appointment: Patient given instructions regarding scheduling next clinic visit. Patient demonstrated understanding of this information and agreed to call with further questions or concerns. CORE in 3 months.    Patient stated he understood all health information given and agreed to call with further questions or concerns.    Rebeca Azar RN

## 2022-04-28 NOTE — LETTER
4/28/2022      RE: Roge Agarwal  [address] [address] [address]       Dear Colleague,    Thank you for the opportunity to participate in the care of your patient, Roge Agarwal, at the [facility]ART CL[facility]IC [facility]. Please see a copy of my visit note below.      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      He went to the ER, but wasn't seen as the wait was too long. He has fatigue. Trouble sleeping.  Patient doesn't check BP's at home. Patient Weight 185 lbs with discharge Patient is at 190 lbs now .  He reports he has stopped ETOH.  Patient has been eating more.  He has no swelling in his legs and abdomen. Gets some ARSHAD but not severe.   Able to lay flat in his bed.. Patient has been drinking a lot of water. He has been eating more sodium. He has had a lot of stress lately.     Denies SOB, ARSHAD, PND, orthopnea, edema, chest pain, palpitations, lightheadedness, dizziness, near syncopal/syncopal episodes. Roge has been following salt and fluid restrictions.      PAST MEDICAL HISTORY:  Past Medical History:   Diagnosis Date     Atrial fibrillation (H) 2016    One time episode noted after his finger accident, required cardioversion     Atrial fibrillation with RVR (H) 4/6/2016     Hemorrhagic stroke (H) 12/16/2007    2.3 X 1.5 CM PARENCHYMAL HEMORRHAGE IN THE RIGHT BASAL GANGLIA     HTN (hypertension)      Systolic CHF (H)     EF of 15-30% in 2016       FAMILY HISTORY:  Family History   Problem Relation Age of Onset     Heart Disease Mother      Osteoporosis Mother      LUNG DISEASE Mother      Heart Disease Father      Hypertension Brother        SOCIAL HISTORY:  Social History     Tobacco Use     Smoking status: Never Smoker     Smokeless tobacco:  Current User     Types: Chew   Substance Use Topics     Alcohol use: Not Currently     Comment: 12/30/2019     Drug use: Not Currently           CURRENT MEDICATIONS:  Current Outpatient Medications   Medication Sig Dispense Refill     amLODIPine (NORVASC) 10 MG tablet TAKE ONE TABLET BY MOUTH ONCE DAILY 90 tablet 1     carvedilol (COREG) 12.5 MG tablet Take 1 tablet (12.5 mg) by mouth 2 times daily (with meals) 180 tablet 0     dabigatran ANTICOAGULANT (PRADAXA ANTICOAGULANT) 150 MG capsule Take 1 capsule (150 mg) by mouth 2 times daily 180 capsule 0     digoxin (LANOXIN) 125 MCG tablet Take 1 tablet (125 mcg) by mouth daily 90 tablet 0     folic acid (FOLVITE) 1 MG tablet Take 1 tablet (1,000 mcg) by mouth daily 90 tablet 0     furosemide (LASIX) 20 MG tablet Take 2 tablets (40 mg) by mouth 2 times daily 360 tablet 0     multivitamin w/minerals (THERA-VIT-M) tablet Take 1 tablet by mouth daily 90 tablet 3     sacubitril-valsartan (ENTRESTO)  MG per tablet Take 1 tablet by mouth 2 times daily 180 tablet 3     thiamine (B-1) 100 MG tablet Take 1 tablet (100 mg) by mouth daily 90 tablet 1     amLODIPine (NORVASC) 5 MG tablet Take 2 tablets (10 mg) by mouth daily 60 tablet 0     HYDROcodone-acetaminophen (NORCO) 5-325 MG tablet Take 1-2 tablets by mouth every 4 hours as needed for moderate to severe pain (Patient not taking: Reported on 3/15/2021) 20 tablet 0     oxyCODONE (ROXICODONE) 5 MG tablet Take 1 tablet (5 mg) by mouth every 6 hours as needed for pain 10 tablet 0       ROS:  Review Of Systems  Skin: negative  Eyes: negative  Ears/Nose/Throat: negative  Respiratory: No shortness of breath, dyspnea on exertion, cough, or hemoptysis  Cardiovascular: negative  Gastrointestinal: negative  Genitourinary: negative  Musculoskeletal: negative  Neurologic: negative  Psychiatric: negative  Hematologic/Lymphatic/Immunologic: negative  Endocrine: negative      EXAM:  /78 (BP Location: Left arm, Patient Position:  Sitting, Cuff Size: Adult Large)   Pulse 74   Wt 84.8 kg (187 lb)   SpO2 100%   BMI 24.67 kg/m    Home weight:  General: alert, articulate, and in no acute distress.  HEENT: normocephalic, atraumatic, anicteric sclera, EOMI, mucosa moist, no cyanosis.   Neck: neck supple.  No adenopathy, masses, or carotid bruits.  JVP 8 cm at 90 degrees  Heart: regular rhythm, normal S1/S2, no murmur, gallop, rub.  Precordium quiet with normal PMI.     Lungs: clear, no rales, ronchi, or wheezing.  No accessory muscle use, respirations unlabored.   Abdomen: soft, non-tender, bowel sounds present, no hepatomegaly  Extremities: no edema.   No cyanosis.    Neurological: alert and oriented x 3.  normal speech and affect, no gross motor deficits  Skin:  No ecchymoses, rashes, or clubbing.    Labs:  CBC RESULTS:  Lab Results   Component Value Date    WBC 6.1 01/12/2021    RBC 4.62 01/12/2021    HGB 14.9 01/12/2021    HCT 45.5 01/12/2021    MCV 99 01/12/2021    MCH 32.3 01/12/2021    MCHC 32.7 01/12/2021    RDW 12.8 01/12/2021     01/12/2021       CMP RESULTS:  Lab Results   Component Value Date     04/27/2022     02/10/2021    POTASSIUM 4.1 04/27/2022    POTASSIUM 3.8 02/10/2021    CHLORIDE 103 04/27/2022    CHLORIDE 105 02/10/2021    CO2 28 04/27/2022    CO2 30 02/10/2021    ANIONGAP 5 04/27/2022    ANIONGAP 3 02/10/2021    GLC 78 04/27/2022    GLC 79 02/10/2021    BUN 21 04/27/2022    BUN 13 02/10/2021    CR 0.95 04/27/2022    CR 0.76 02/10/2021    GFRESTIMATED 90 04/27/2022    GFRESTIMATED >90 02/10/2021    GFRESTBLACK >90 02/10/2021    BAO 8.5 04/27/2022    BAO 8.9 02/10/2021    BILITOTAL 0.5 01/12/2021    ALBUMIN 3.9 01/12/2021    ALKPHOS 91 01/12/2021    ALT 23 01/12/2021    AST 16 01/12/2021        INR RESULTS:  Lab Results   Component Value Date    INR 1.32 (H) 02/25/2020       No components found for: CK  Lab Results   Component Value Date    MAG 2.0 02/06/2020     Lab Results   Component Value Date     NTBNP 176 (H) 01/12/2021     @BRIEFLABR (dig)@    Most recent echocardiogram:  No results found for this or any previous visit (from the past 8760 hour(s)).      Assessment and Plan:    In summary,Roge is 61 year old patient is euvolemic today .  Patient hasn't been in CORE clinic for a couple years. He and his daughter state they wanted to make sure he is seen as he has had more fatigue and they were worried about his heart. We will go ahead and set up an echo to be done as well as a visit with Dr. Romeo for follow up of the atrial fibrillation. I emphasized that he needs to see a PCP for regular check ups and his daughter said she would help with set up of that appt.       1.  Chronic Systolic heart failure recovered to 50-55 % .  Stage C  NYHA Class III  ACEi/ARB: yes Entresto 97/103 mg BID   BB: yes carvedilol 12.5 mg BID   Aldosterone antagonist: contraindicated due to hyperkalemia  SCD prophylaxis: EF 50- 55%   Fluid status: euvolemic- 40 mg lasix - BID   Anticoagulation: Pradaxa  Antiplatelet:  ASA dose   NSAID use:  Contraindicated.  Avoid use.       2.  Follow-up   PCP - establish care  Echo next available  EP - follow up  CORE in 3 months             Please do not hesitate to contact me if you have any questions/concerns.     Sincerely,     DANY Coulter CNP

## 2022-04-28 NOTE — NURSING NOTE
"Chief Complaint   Patient presents with     Follow Up     Pt daughter reports patient having fatigue, recent ED visit, weight loss, pt reports 'maybe' having SOB, redness in cheeks pt daughter is worried this correlates with high BP, not sleeping, lightheaded/dizziness every once in awhile, Return CORE, 61 y.o. male with HFrEF, EF 40-45% (6/2020) with labs. ?CardioMEMS?       Initial /78 (BP Location: Left arm, Patient Position: Sitting, Cuff Size: Adult Large)   Pulse 74   Wt 84.8 kg (187 lb)   SpO2 100%   BMI 24.67 kg/m   Estimated body mass index is 24.67 kg/m  as calculated from the following:    Height as of 3/15/21: 1.854 m (6' 1\").    Weight as of this encounter: 84.8 kg (187 lb)..  BP completed using cuff size: lorraine Richards, Visit Facilitator    "

## 2022-05-09 DIAGNOSIS — I50.22 CHRONIC SYSTOLIC HEART FAILURE (H): ICD-10-CM

## 2022-05-09 NOTE — TELEPHONE ENCOUNTER
[facility] Call Center    Phone Message    May a detailed message be left on voicemail: no     Reason for Call: Medication Question or concern regarding medication   Prescription Clarification  Name of Medication: carvedilol (COREG) 12.5 MG tablet  Prescribing Provider: Amor Wick MD    Pharmacy: [facility] #[id number] - [place], [place] - [address]   What on the order needs clarification?   Refill request. Pt is completely out, high priority           Action Taken: Other: [facility]    Travel Screening: Not Applicable

## 2022-05-10 RX ORDER — CARVEDILOL 12.5 MG/1
TABLET ORAL
Qty: 180 TABLET | Refills: 0 | Status: SHIPPED | OUTPATIENT
Start: 2022-05-10 | End: 2022-06-17

## 2022-05-10 NOTE — CONFIDENTIAL NOTE
Signed Prescriptions:                        Disp   Refills    carvedilol (COREG) 12.5 MG tablet          180 ta*0        Sig: TAKE ONE TABLET BY MOUTH TWICE A DAY WITH MEALS  Authorizing Provider: FERMIN HUITRON  Ordering User: CHE CAMARA    rx filled per refill protocol.    Che Camara RN  Cardiology Care Coordinator  [facility]  [phone] option 1

## 2022-05-16 ENCOUNTER — ANCILLARY PROCEDURE (OUTPATIENT)
Dept: CARDIOLOGY | Facility: CLINIC | Age: 62
End: 2022-05-16
Attending: NURSE PRACTITIONER
Payer: COMMERCIAL

## 2022-05-16 DIAGNOSIS — I50.23 ACUTE ON CHRONIC SYSTOLIC CONGESTIVE HEART FAILURE (H): ICD-10-CM

## 2022-05-16 LAB — LVEF ECHO: NORMAL

## 2022-05-16 PROCEDURE — 93306 TTE W/DOPPLER COMPLETE: CPT | Performed by: INTERNAL MEDICINE

## 2022-05-19 ENCOUNTER — TELEPHONE (OUTPATIENT)
Dept: CARDIOLOGY | Facility: CLINIC | Age: 62
End: 2022-05-19
Payer: COMMERCIAL

## 2022-05-19 NOTE — TELEPHONE ENCOUNTER
[facility]    Phone Message    May a detailed message be left on voicemail: yes     Reason for Call: Requesting Results   Name/type of test: Echo complete  Date of test: 05.16.22  Was test done at a location other than [facility] (Please fill in the location if not [facility])?: No  Daughter Geneva is calling because they haven't heard anything back yet regarding Roge's echo results. Please give Geneva a call to discuss the results.    Action Taken: Other: Cardiology    Travel Screening: Not Applicable

## 2022-05-23 ENCOUNTER — TELEPHONE (OUTPATIENT)
Dept: CARDIOLOGY | Facility: CLINIC | Age: 62
End: 2022-05-23
Payer: COMMERCIAL

## 2022-05-23 NOTE — CONFIDENTIAL NOTE
Echo results reviewed.  Patient is scheduled with Dr. Wick and EP in June.     Maida Abdi, APRN CNP   5/18/2022 10:41 AM CDT         Echo looks to be stable . EF continues to be within normal range 55-60 %.      Che Camara, RN  Cardiology Care Coordinator  [facility]  [phone] option 1

## 2022-05-23 NOTE — TELEPHONE ENCOUNTER
[facility] Call Center    Phone Message    May a detailed message be left on voicemail: yes     Reason for Call: Appointment Intake    Referring Provider Name: Pt of Dr. Wick's    Per pt's daughter Geneva, waiting to hear results of the echocardiogram. She left a message last week.    Please call Geneva', pt's daughter [phone]. With results.    Thank you!

## 2022-05-26 ENCOUNTER — OFFICE VISIT (OUTPATIENT)
Dept: FAMILY MEDICINE | Facility: CLINIC | Age: 62
End: 2022-05-26
Payer: COMMERCIAL

## 2022-05-26 VITALS
WEIGHT: 186.2 LBS | SYSTOLIC BLOOD PRESSURE: 128 MMHG | HEART RATE: 74 BPM | OXYGEN SATURATION: 97 % | HEIGHT: 73 IN | BODY MASS INDEX: 24.68 KG/M2 | TEMPERATURE: 97.3 F | DIASTOLIC BLOOD PRESSURE: 88 MMHG | RESPIRATION RATE: 24 BRPM

## 2022-05-26 DIAGNOSIS — F41.9 ANXIETY: ICD-10-CM

## 2022-05-26 DIAGNOSIS — J04.0 ACUTE LARYNGITIS: Primary | ICD-10-CM

## 2022-05-26 PROCEDURE — 99214 OFFICE O/P EST MOD 30 MIN: CPT | Performed by: NURSE PRACTITIONER

## 2022-05-26 RX ORDER — HYDROXYZINE HYDROCHLORIDE 25 MG/1
12.5-25 TABLET, FILM COATED ORAL 3 TIMES DAILY PRN
Qty: 20 TABLET | Refills: 1 | Status: SHIPPED | OUTPATIENT
Start: 2022-05-26 | End: 2023-09-28

## 2022-05-26 ASSESSMENT — ANXIETY QUESTIONNAIRES
7. FEELING AFRAID AS IF SOMETHING AWFUL MIGHT HAPPEN: NOT AT ALL
7. FEELING AFRAID AS IF SOMETHING AWFUL MIGHT HAPPEN: NOT AT ALL
GAD7 TOTAL SCORE: 6
4. TROUBLE RELAXING: NOT AT ALL
5. BEING SO RESTLESS THAT IT IS HARD TO SIT STILL: NOT AT ALL
2. NOT BEING ABLE TO STOP OR CONTROL WORRYING: SEVERAL DAYS
8. IF YOU CHECKED OFF ANY PROBLEMS, HOW DIFFICULT HAVE THESE MADE IT FOR YOU TO DO YOUR WORK, TAKE CARE OF THINGS AT HOME, OR GET ALONG WITH OTHER PEOPLE?: EXTREMELY DIFFICULT
3. WORRYING TOO MUCH ABOUT DIFFERENT THINGS: SEVERAL DAYS
6. BECOMING EASILY ANNOYED OR IRRITABLE: SEVERAL DAYS
1. FEELING NERVOUS, ANXIOUS, OR ON EDGE: NEARLY EVERY DAY

## 2022-05-26 ASSESSMENT — PATIENT HEALTH QUESTIONNAIRE - PHQ9
10. IF YOU CHECKED OFF ANY PROBLEMS, HOW DIFFICULT HAVE THESE PROBLEMS MADE IT FOR YOU TO DO YOUR WORK, TAKE CARE OF THINGS AT HOME, OR GET ALONG WITH OTHER PEOPLE: NOT DIFFICULT AT ALL
SUM OF ALL RESPONSES TO PHQ QUESTIONS 1-9: 12
SUM OF ALL RESPONSES TO PHQ QUESTIONS 1-9: 12

## 2022-05-26 NOTE — PROGRESS NOTES
Assessment & Plan     Acute laryngitis  No acute distress, symptoms improving since onset.  ENT referral placed with significant tobacco history and ongoing symptoms.   - Adult ENT  Referral; Future    Anxiety  Will do a trial of hydroxyzine as needed for anxiety, does not want a daily medication at this time.   - hydrOXYzine (ATARAX) 25 MG tablet; Take 0.5-1 tablets (12.5-25 mg) by mouth 3 times daily as needed for itching      Depression Screening Follow Up    PHQ 5/26/2022   PHQ-9 Total Score 12   Q9: Thoughts of better off dead/self-harm past 2 weeks Not at all       Return in about 4 weeks (around 6/23/2022), or if symptoms worsen or fail to improve.    DANY Carlin CNP  [facility]    Teofilo Guan is a 61 year old who presents for the following health issues  accompanied by his daughter.    History of Present Illness       Mental Health Follow-up:  Patient presents to follow-up on Anxiety.    Patient's anxiety since last visit has been:  No change  The patient is not having other symptoms associated with anxiety.  Any significant life events: health concerns and other  Patient is feeling anxious or having panic attacks.  Patient has no concerns about alcohol or drug use.    Reason for visit:  Voice/throat for the past 3weeks, can t barely talk and severe fatigue and anxiety    He eats 0-1 servings of fruits and vegetables daily.He consumes 3 sweetened beverage(s) daily.He exercises with enough effort to increase his heart rate 9 or less minutes per day.  He exercises with enough effort to increase his heart rate 3 or less days per week.   He is taking medications regularly.    Today's PHQ-9         PHQ-9 Total Score: 12    PHQ-9 Q9 Thoughts of better off dead/self-harm past 2 weeks :   Not at all    How difficult have these problems made it for you to do your work, take care of things at home, or get along with other people: Not difficult at all  Today's  "MITALI-7 Score: 6     Chews tobacco  Significant fatigue  Dry cough-7 years-was previously on Lisinopril and switch to Losartan in 2017 unsure if changed.       Review of Systems   Constitutional, HEENT, cardiovascular, pulmonary, gi and gu systems are negative, except as otherwise noted.      Objective    /88 (BP Location: Right arm, Patient Position: Sitting, Cuff Size: Adult Regular)   Pulse 74   Temp 97.3  F (36.3  C) (Tympanic)   Resp 24   Ht 1.854 m (6' 1\")   Wt 84.5 kg (186 lb 3.2 oz)   SpO2 97%   BMI 24.57 kg/m    Body mass index is 24.57 kg/m .  Physical Exam   GENERAL: healthy, alert and no distress  HENT: ear canals and TM's normal, nose and mouth without ulcers or lesions  NECK: no adenopathy, no asymmetry, masses, or scars and thyroid normal to palpation  RESP: lungs clear to auscultation - no rales, rhonchi or wheezes  CV: regular rate and rhythm, normal S1 S2, no S3 or S4, no murmu  PSYCH: mentation appears normal, affect normal/bright            "

## 2022-06-17 ENCOUNTER — OFFICE VISIT (OUTPATIENT)
Dept: CARDIOLOGY | Facility: CLINIC | Age: 62
End: 2022-06-17
Payer: COMMERCIAL

## 2022-06-17 ENCOUNTER — TELEPHONE (OUTPATIENT)
Dept: CARDIOLOGY | Facility: CLINIC | Age: 62
End: 2022-06-17
Payer: COMMERCIAL

## 2022-06-17 VITALS
WEIGHT: 185.4 LBS | OXYGEN SATURATION: 97 % | SYSTOLIC BLOOD PRESSURE: 115 MMHG | DIASTOLIC BLOOD PRESSURE: 79 MMHG | HEART RATE: 79 BPM | BODY MASS INDEX: 24.46 KG/M2

## 2022-06-17 DIAGNOSIS — I10 ESSENTIAL HYPERTENSION: ICD-10-CM

## 2022-06-17 DIAGNOSIS — I50.22 CHRONIC SYSTOLIC HEART FAILURE (H): ICD-10-CM

## 2022-06-17 DIAGNOSIS — Z78.9 ALCOHOL USE: ICD-10-CM

## 2022-06-17 DIAGNOSIS — I50.20 HEART FAILURE WITH REDUCED EJECTION FRACTION, NYHA CLASS III (H): ICD-10-CM

## 2022-06-17 DIAGNOSIS — I48.92 ATRIAL FLUTTER, UNSPECIFIED TYPE (H): ICD-10-CM

## 2022-06-17 DIAGNOSIS — I50.23 ACUTE ON CHRONIC SYSTOLIC CONGESTIVE HEART FAILURE (H): Primary | ICD-10-CM

## 2022-06-17 DIAGNOSIS — I48.4 ATYPICAL ATRIAL FLUTTER (H): ICD-10-CM

## 2022-06-17 LAB
ALBUMIN SERPL-MCNC: 3.7 G/DL (ref 3.4–5)
ALP SERPL-CCNC: 65 U/L (ref 40–150)
ALT SERPL W P-5'-P-CCNC: 24 U/L (ref 0–70)
ANION GAP SERPL CALCULATED.3IONS-SCNC: 5 MMOL/L (ref 3–14)
AST SERPL W P-5'-P-CCNC: 18 U/L (ref 0–45)
BILIRUB SERPL-MCNC: 0.6 MG/DL (ref 0.2–1.3)
BUN SERPL-MCNC: 15 MG/DL (ref 7–30)
CALCIUM SERPL-MCNC: 8.7 MG/DL (ref 8.5–10.1)
CHLORIDE BLD-SCNC: 104 MMOL/L (ref 94–109)
CO2 SERPL-SCNC: 27 MMOL/L (ref 20–32)
CREAT SERPL-MCNC: 0.92 MG/DL (ref 0.66–1.25)
CRP SERPL-MCNC: <2.9 MG/L (ref 0–8)
ERYTHROCYTE [DISTWIDTH] IN BLOOD BY AUTOMATED COUNT: 12.5 % (ref 10–15)
GFR SERPL CREATININE-BSD FRML MDRD: >90 ML/MIN/1.73M2
GLUCOSE BLD-MCNC: 86 MG/DL (ref 70–99)
HCT VFR BLD AUTO: 40.3 % (ref 40–53)
HGB BLD-MCNC: 13.9 G/DL (ref 13.3–17.7)
MCH RBC QN AUTO: 33.1 PG (ref 26.5–33)
MCHC RBC AUTO-ENTMCNC: 34.5 G/DL (ref 31.5–36.5)
MCV RBC AUTO: 96 FL (ref 78–100)
NT-PROBNP SERPL-MCNC: 423 PG/ML (ref 0–900)
PLATELET # BLD AUTO: 242 10E3/UL (ref 150–450)
POTASSIUM BLD-SCNC: 4.1 MMOL/L (ref 3.4–5.3)
PROT SERPL-MCNC: 7.5 G/DL (ref 6.8–8.8)
RBC # BLD AUTO: 4.2 10E6/UL (ref 4.4–5.9)
SODIUM SERPL-SCNC: 136 MMOL/L (ref 133–144)
TSH SERPL DL<=0.005 MIU/L-ACNC: 1.22 MU/L (ref 0.4–4)
WBC # BLD AUTO: 8.6 10E3/UL (ref 4–11)

## 2022-06-17 PROCEDURE — 84443 ASSAY THYROID STIM HORMONE: CPT | Performed by: INTERNAL MEDICINE

## 2022-06-17 PROCEDURE — 80053 COMPREHEN METABOLIC PANEL: CPT | Performed by: INTERNAL MEDICINE

## 2022-06-17 PROCEDURE — 86140 C-REACTIVE PROTEIN: CPT | Performed by: INTERNAL MEDICINE

## 2022-06-17 PROCEDURE — 85027 COMPLETE CBC AUTOMATED: CPT | Performed by: INTERNAL MEDICINE

## 2022-06-17 PROCEDURE — 80162 ASSAY OF DIGOXIN TOTAL: CPT | Performed by: INTERNAL MEDICINE

## 2022-06-17 PROCEDURE — 36415 COLL VENOUS BLD VENIPUNCTURE: CPT | Performed by: INTERNAL MEDICINE

## 2022-06-17 PROCEDURE — 99214 OFFICE O/P EST MOD 30 MIN: CPT | Performed by: INTERNAL MEDICINE

## 2022-06-17 PROCEDURE — 83880 ASSAY OF NATRIURETIC PEPTIDE: CPT | Performed by: INTERNAL MEDICINE

## 2022-06-17 RX ORDER — SACUBITRIL AND VALSARTAN 97; 103 MG/1; MG/1
1 TABLET, FILM COATED ORAL 2 TIMES DAILY
Qty: 180 TABLET | Refills: 3 | Status: SHIPPED | OUTPATIENT
Start: 2022-06-17 | End: 2022-12-16

## 2022-06-17 RX ORDER — FOLIC ACID 1 MG/1
1000 TABLET ORAL DAILY
Qty: 90 TABLET | Refills: 3 | Status: SHIPPED | OUTPATIENT
Start: 2022-06-17 | End: 2023-08-26

## 2022-06-17 RX ORDER — LANOLIN ALCOHOL/MO/W.PET/CERES
100 CREAM (GRAM) TOPICAL DAILY
Qty: 90 TABLET | Refills: 3 | Status: SHIPPED | OUTPATIENT
Start: 2022-06-17 | End: 2023-08-02

## 2022-06-17 RX ORDER — MULTIPLE VITAMINS W/ MINERALS TAB 9MG-400MCG
1 TAB ORAL DAILY
Qty: 90 TABLET | Refills: 3 | Status: SHIPPED | OUTPATIENT
Start: 2022-06-17 | End: 2023-07-20

## 2022-06-17 RX ORDER — AMLODIPINE BESYLATE 10 MG/1
10 TABLET ORAL DAILY
Qty: 90 TABLET | Refills: 3 | Status: SHIPPED | OUTPATIENT
Start: 2022-06-17 | End: 2023-08-26

## 2022-06-17 RX ORDER — FUROSEMIDE 40 MG
40 TABLET ORAL 2 TIMES DAILY
Qty: 180 TABLET | Refills: 3 | Status: SHIPPED | OUTPATIENT
Start: 2022-06-17 | End: 2023-07-05

## 2022-06-17 RX ORDER — CARVEDILOL 12.5 MG/1
12.5 TABLET ORAL 2 TIMES DAILY WITH MEALS
Qty: 180 TABLET | Refills: 3 | Status: SHIPPED | OUTPATIENT
Start: 2022-06-17 | End: 2023-07-05

## 2022-06-17 RX ORDER — DABIGATRAN ETEXILATE 150 MG/1
150 CAPSULE ORAL 2 TIMES DAILY
Qty: 180 CAPSULE | Refills: 3 | Status: SHIPPED | OUTPATIENT
Start: 2022-06-17 | End: 2023-07-20

## 2022-06-17 RX ORDER — DIGOXIN 125 MCG
125 TABLET ORAL DAILY
Qty: 90 TABLET | Refills: 3 | Status: SHIPPED | OUTPATIENT
Start: 2022-06-17 | End: 2023-07-05

## 2022-06-17 NOTE — NURSING NOTE
Labs: Patient was given results of the laboratory testing obtained today. Patient was instructed to return for the next laboratory testing today. Patient demonstrated understanding of this information and agreed to call with further questions or concerns.     Return Appointment: Patient given instructions regarding scheduling next clinic visit. Patient demonstrated understanding of this information and agreed to call with further questions or concerns. Dr. Wick August 5.    Patient stated he understood all health information given and agreed to call with further questions or concerns.    Refills sent for all medications.    Rebeca Azar RN

## 2022-06-17 NOTE — PATIENT INSTRUCTIONS
Thank you for coming to the [facility] @ [facility]; please note the following instructions:    1. Please have labs drawn today.  2. Follow up with Dr. Wick as scheduled August 5.        If you have any questions regarding your visit please contact your care team:     Cardiology  Telephone Number   Che BLAND., RN  Sis FINK, RN   Holli TAYLOR, RMA  Ronna DIEHL, RMA  Vale Chavarria., [facility]  Bernadette BRENNER, Visit Facilitator   [phone] (option 1)   For scheduling appts:     [phone] (select option 1)       For the Device Clinic (Pacemakers and ICD's)  RN's :  Nina Shore   During business hours: [phone]    *After business hours:  [phone] (select option 4)      Normal test result notifications will be released via [employer] or mailed within 7 business days.  All other test results, will be communicated via telephone once reviewed by your cardiologist.    If you need a medication refill please contact your pharmacy.  Please allow 3 business days for your refill to be completed.    As always, thank you for trusting us with your health care needs!

## 2022-06-17 NOTE — LETTER
Date:June 27, 2022      Patient was self referred, no letter generated. Do not send.        [facility] Health Information

## 2022-06-17 NOTE — TELEPHONE ENCOUNTER
[facility] Call Center    Phone Message    May a detailed message be left on voicemail: yes     Reason for Call: Other: Roge's daughter called because a family emergency came up and they won't be able to make his 2:00 appointment. He needs this appt because he is out of meds and their are no refills left. I checked the [place] schedule and next available isn't until August. They are wondering if it's at all possible for Amor Wick to fit them into his schedule later in the day between 4:00 and 5:00. Please give them a call back to discuss if this is an option or if there's another time before Aug that he could see Roge.    Action Taken: Other: Caardiology    Travel Screening: Not Applicable

## 2022-06-17 NOTE — Clinical Note
6/17/2022      RE: Roge Agarwal  [id number] [place] [address]       Dear Colleague,    Thank you for the opportunity to participate in the care of your patient, Roge Agarwal, at the [facility]. Please see a copy of my visit note below.    June 17, 2022     I had the pleasure of seeing Roge Agarwal  in the [facility] for further evaluation and management of his heart failure.  Mr. 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  In 4/2016 he amputated two of his fingers with his wood saw. In the ER, he was found in a AFL with RVR and with LVEF 15%. He'd not been aware of arrhythmia but did report feeling fatigued. He had RAISSA/DCCV and started warfarin. His LVEF returned to 55% by several months later. He came off warfarin a month after the DCCV. He then represented to ER with 2 month history of feeing fatigued and increasingly SOB even at rest. He was found to be back in AFL with RVR 140s and echo demonstrated LVEF 30%. He was subsequently admitted. He was started on Pradaxa and amiodarone. Plan was for RAISSA/ablation; however, RAISSA demonstrated ROGELIO thrombus and ablation was cancelled. Amiodarone was stopped. He was instead placed on Metoprolol and digoxin for rate control. Stress CMRI showed NICM with severely reduced biventricular function with a pattern of midmyocardial enhancement in the septal segments. ROGELIO thrombus also demonstrated on CMRI. No ischemia on stress portion. Echo showed LVEF 25-30%, moderately decreased RV function, and mild MR. Etiology considered likely multifactorial given tachyarrhythmia and ETOH abuse. He was counseled to stop drinking. He was started on GDMT for his depressed LVEF. He was discharged to have close  follow up.   Overall I have not seen him for about a year at this time.  He has been doing relatively well however he has extreme tiredness and fatigue for the past several months.  There is no clear reason why this is happening he notes that he is taking all his medications as prescribed.  Despite appropriately trending the medications he does have some ongoing cough which even though it seems nonproductive he is coughing some in the office and it is definitely productive.  He has his coughing spells in the morning.  He continues to chew tobacco.  He has not drank any alcohol for the past 2 years.  Overall no lower extremity edema chest pain dizziness lightheadedness.  No other complaints.    PAST MEDICAL HISTORY:  Past Medical History:   Diagnosis Date     Atrial fibrillation (H) 2016    One time episode noted after his finger accident, required cardioversion     Atrial fibrillation with RVR (H) 4/6/2016     Hemorrhagic stroke (H) 12/16/2007    2.3 X 1.5 CM PARENCHYMAL HEMORRHAGE IN THE RIGHT BASAL GANGLIA     HTN (hypertension)      Systolic CHF (H)     EF of 15-30% in 2016     FAMILY HISTORY:  Family History   Problem Relation Age of Onset     Heart Disease Mother      Osteoporosis Mother      LUNG DISEASE Mother      Heart Disease Father      Hypertension Brother      SOCIAL HISTORY:  Social History     Socioeconomic History     Marital status: Single   Tobacco Use     Smoking status: Never Smoker     Smokeless tobacco: Current User     Types: Chew   Vaping Use     Vaping Use: Never used   Substance and Sexual Activity     Alcohol use: Not Currently     Comment: 12/30/2019     Drug use: Not Currently     Sexual activity: Not Currently     CURRENT MEDICATIONS:  Current Outpatient Medications   Medication     amLODIPine (NORVASC) 10 MG tablet     amLODIPine (NORVASC) 5 MG tablet     carvedilol (COREG) 12.5 MG tablet     dabigatran ANTICOAGULANT (PRADAXA ANTICOAGULANT) 150 MG capsule     digoxin (LANOXIN) 125 MCG  tablet     folic acid (FOLVITE) 1 MG tablet     furosemide (LASIX) 20 MG tablet     hydrOXYzine (ATARAX) 25 MG tablet     multivitamin w/minerals (THERA-VIT-M) tablet     sacubitril-valsartan (ENTRESTO)  MG per tablet     thiamine (B-1) 100 MG tablet     No current facility-administered medications for this visit.     ROS:   Constitutional: No fever, chills, or sweats.   ENT: No visual disturbance, ear ache, epistaxis, sore throat.   Allergies/Immunologic: Negative.   Respiratory: No cough, hemoptysis.   Cardiovascular: As per HPI.   GI: No nausea, vomiting, hematemesis, melena, or hematochezia.   : No urinary frequency, dysuria, or hematuria.   Integrument: Negative.   Psychiatric: No evidence of major depression  Neuro: No new neurological complaints at this time. Non focal  Endocrinology: Negative.   Musculoskeletal: As per HPI.      EXAM:  /79 (BP Location: Left arm, Patient Position: Sitting, Cuff Size: Adult Regular)   Pulse 79   Wt 84.1 kg (185 lb 6.4 oz)   SpO2 97%   BMI 24.46 kg/m    General: appears comfortable, alert and oriented  Head: normocephalic, atraumatic  Eyes: anicteric sclera, EOMI , PERRL  Neck: no adenopathy  Orophyarynx: moist mucosa, no lesions noted  Heart: irregular, borderline tachy, no murmurs, rubs. Estimated JVP at 6 cmH2O  Lungs: CTAB, No wheezing.   Abdomen: soft, non-tender, bowel sounds present, no hepatosplenomegaly  Extremities: No LE edema today  Skin: no open lesions noted  Neuro: grossly non-focal  Psych: no evidence of depression noted     Labs:  Lab Results   Component Value Date    WBC 6.1 01/12/2021    HGB 14.9 01/12/2021    HCT 45.5 01/12/2021     01/12/2021     04/27/2022    POTASSIUM 4.1 04/27/2022    CHLORIDE 103 04/27/2022    CO2 28 04/27/2022    BUN 21 04/27/2022    CR 0.95 04/27/2022    GLC 78 04/27/2022    DD 0.9 (H) 12/31/2019    NTBNPI 2,401 (H) 12/31/2019    NTBNP 176 (H) 01/12/2021    TROPI 0.037 01/01/2020    AST 16 01/12/2021     ALT 23 01/12/2021    ALKPHOS 91 01/12/2021    BILITOTAL 0.5 01/12/2021    INR 1.32 (H) 02/25/2020 1/2/20 ECHOCARDIOGRAM:   The visual ejection fraction is estimated at 25-30%. Moderately decreased right ventricular systolic function There is moderate biatrial enlargement.  There is mild (1+) mitral regurgitation.  Probable aflutter with rapid ventricular response.  There is no comparison study available.     1/3/20 NM IRLANDA SCAN:     Only resting perfusion images were taken.     Resting images shows mild intensity basal inferior wall photopenic defect.     Gated images not performed. LV systolic function cannot be evaluated.     1/6/20 RAISSA:  Left and right atrial appendage thrombi, measuring 1.7 x 1.4 cm and 1.5 x 1.4  cm, respectively.   Mildly dilated left ventricle with normal wall thickness. Severely decreased LV systolic function with EF 15%-20%. Severe diffuse hypokinesis.  Global right ventricular function is moderately reduced.   Compared to the previous study dated 1/2/2020 (TTE): The left and right atrial appendages are better-visualized and thrombi are seen in both atrial Appendages.     1/6/20 CMRI:  1. The LV is mildly enlarged with normal wall thickness. The global systolic function is severely reduced. The LVEF is 15%. There is severe diffuse hypokinesis.  2. The RV is normal in cavity size. The global systolic function is severely reduced. The RVEF is 20%.   3. The left atrium is severely enlarged.  The right atrium is moderately enlarged.   4. There is mild mitral regurgitation.   5. Late gadolinium enhancement imaging shows midmyocardial enhancement in the basal anteroseptum, basal inferoseptum, mid anteroseptum, mid inferoseptum, and apical septum.  This is consistent with a non-ischemic etiology of cardiomyopathy.   6. Regadenoson stress perfusion imaging shows no ischemia.   7. There is no pericardial effusion or thickening.   8. There is a left atrial appendage thrombus.   9. There is a  left-sided pleural effusion.   CONCLUSIONS: Non-ischemic cardiomyopathy.  There is severely reduced biventricular function with a pattern of midmyocardial enhancement in the septal segments. There is also a left atrial appendage thrombus. There is no ischemia.      TTE 6/2020:  Patient in tachyarrhythmia during study. Mildly (EF 40-45%) reduced left ventricular function is present.  Global right ventricular function is mildly reduced.  No significant valvular dysfunction present.  The inferior vena cava was normal in size with preserved respiratory variability.  No pericardial effusion is present.     EP ablation 2/2020:  1. Radiofrequency ablation of the cavotricuspid isthmus for CTI-dependent atrial flutter.  2. Direct current cardioversion of atrial fibrillation into sinus rhythm.  3. Fluoroscopy.  4. 3D electroanatomic mapping     TTE 12/2020:  The patient's rhythm is atrial fibrillation. Borderline (EF 50-55%) reduced left ventricular function is present.  Global right ventricular function is normal.  No pericardial effusion is present. Mild biatrial enlargement is present.  IVC diameter <2.1 cm collapsing >50% with sniff suggests a normal RA pressure of 3 mmHg.  Compared to prior, LV fxn is improved.     TTE 5/16/22:  The patient's rhythm is atrial fibrillation. Left ventricular size, wall motion and function are normal. The ejection fraction is 55-60%.  Global right ventricular function is normal.  No significant valvular abnormalities.  The inferior vena cava was normal in size with preserved respiratory variability.  No pericardial effusion is present.  Compared to prior study 12/3/2020, there is no significant change.    ASSESSMENT AND PLAN:  In summary, patient is a 59 year old gentleman with above history including recovered ejection fraction in the setting of alcohol use in the past.  He was evaluated by EP and core clinic and referred to me for heart failure management in the setting of significantly  reduced ejection fraction at about 15% due to possibly combination of tachycardia induced cardiomyopathy and alcohol use.  He quit alcohol in December 2019 and he is determined to recover his ejection fraction.  Ultimately he managed to achieve this and his ejection fraction has recovered to 55%.  He is doing very well from that standpoint.  He however continues to have very significant fatigue and tiredness which I am not exactly sure the reason for especially in the setting of recovered ejection fraction.  As such we will plan the following: We will get repeat labs done today which will include TSH as well as digoxin level.  On examination he is in atrial fibrillation however the heart rate seems to be very well controlled so I do not think that is contributing.  He will see Dr. Romeo in the very near future and see if potentially an atrial fibrillation ablation might be something to consider.  I do think overall that might be a good idea.  In addition he will also see our ENT colleagues to also see whether there is any other organic reasons for his cough especially in the setting of his longstanding tobacco chewing.  If there are no other reasons Entresto may be a consideration however I have not seen it for a long time but may consider discontinuing it.  Blood pressure and heart rate are very well controlled today we will not make any medications we will give him medication renewal for the next Several months.  I would like to see him back after he sees our EP colleagues and evaluate how he is doing.  We reviewed his labs.    I appreciate the opportunity to participate in the care of Roge Agarwal . Please do not hesitate to contact me with any further questions.     Sincerely,    Amor Wick MD     [facility] Division of Cardiology       Please do not hesitate to contact me if you have any questions/concerns.     Sincerely,     Amor Wick MD

## 2022-06-17 NOTE — PROGRESS NOTES
June 17, 2022     I had the pleasure of seeing Roge Agarwal  in the [facility] for further evaluation and management of his heart failure.  Mr. 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  In 4/2016 he amputated two of his fingers with his wood saw. In the ER, he was found in a AFL with RVR and with LVEF 15%. He'd not been aware of arrhythmia but did report feeling fatigued. He had RAISSA/DCCV and started warfarin. His LVEF returned to 55% by several months later. He came off warfarin a month after the DCCV. He then represented to ER with 2 month history of feeing fatigued and increasingly SOB even at rest. He was found to be back in AFL with RVR 140s and echo demonstrated LVEF 30%. He was subsequently admitted. He was started on Pradaxa and amiodarone. Plan was for RAISSA/ablation; however, RAISSA demonstrated ROGELIO thrombus and ablation was cancelled. Amiodarone was stopped. He was instead placed on Metoprolol and digoxin for rate control. Stress CMRI showed NICM with severely reduced biventricular function with a pattern of midmyocardial enhancement in the septal segments. ROGELIO thrombus also demonstrated on CMRI. No ischemia on stress portion. Echo showed LVEF 25-30%, moderately decreased RV function, and mild MR. Etiology considered likely multifactorial given tachyarrhythmia and ETOH abuse. He was counseled to stop drinking. He was started on GDMT for his depressed LVEF. He was discharged to have close follow up.   Overall I have not seen him for about a year at this time.  He has been doing relatively well however he has extreme tiredness and fatigue for the past several months.  There is no clear reason why this is happening he notes that he is taking all his medications as prescribed.  Despite appropriately trending the medications  he does have some ongoing cough which even though it seems nonproductive he is coughing some in the office and it is definitely productive.  He has his coughing spells in the morning.  He continues to chew tobacco.  He has not drank any alcohol for the past 2 years.  Overall no lower extremity edema chest pain dizziness lightheadedness.  No other complaints.    PAST MEDICAL HISTORY:  Past Medical History:   Diagnosis Date     Atrial fibrillation (H) 2016    One time episode noted after his finger accident, required cardioversion     Atrial fibrillation with RVR (H) 4/6/2016     Hemorrhagic stroke (H) 12/16/2007    2.3 X 1.5 CM PARENCHYMAL HEMORRHAGE IN THE RIGHT BASAL GANGLIA     HTN (hypertension)      Systolic CHF (H)     EF of 15-30% in 2016     FAMILY HISTORY:  Family History   Problem Relation Age of Onset     Heart Disease Mother      Osteoporosis Mother      LUNG DISEASE Mother      Heart Disease Father      Hypertension Brother      SOCIAL HISTORY:  Social History     Socioeconomic History     Marital status: Single   Tobacco Use     Smoking status: Never Smoker     Smokeless tobacco: Current User     Types: Chew   Vaping Use     Vaping Use: Never used   Substance and Sexual Activity     Alcohol use: Not Currently     Comment: 12/30/2019     Drug use: Not Currently     Sexual activity: Not Currently     CURRENT MEDICATIONS:  Current Outpatient Medications   Medication     amLODIPine (NORVASC) 10 MG tablet     amLODIPine (NORVASC) 5 MG tablet     carvedilol (COREG) 12.5 MG tablet     dabigatran ANTICOAGULANT (PRADAXA ANTICOAGULANT) 150 MG capsule     digoxin (LANOXIN) 125 MCG tablet     folic acid (FOLVITE) 1 MG tablet     furosemide (LASIX) 20 MG tablet     hydrOXYzine (ATARAX) 25 MG tablet     multivitamin w/minerals (THERA-VIT-M) tablet     sacubitril-valsartan (ENTRESTO)  MG per tablet     thiamine (B-1) 100 MG tablet     No current facility-administered medications for this visit.     ROS:    Constitutional: No fever, chills, or sweats.   ENT: No visual disturbance, ear ache, epistaxis, sore throat.   Allergies/Immunologic: Negative.   Respiratory: No cough, hemoptysis.   Cardiovascular: As per HPI.   GI: No nausea, vomiting, hematemesis, melena, or hematochezia.   : No urinary frequency, dysuria, or hematuria.   Integrument: Negative.   Psychiatric: No evidence of major depression  Neuro: No new neurological complaints at this time. Non focal  Endocrinology: Negative.   Musculoskeletal: As per HPI.      EXAM:  /79 (BP Location: Left arm, Patient Position: Sitting, Cuff Size: Adult Regular)   Pulse 79   Wt 84.1 kg (185 lb 6.4 oz)   SpO2 97%   BMI 24.46 kg/m    General: appears comfortable, alert and oriented  Head: normocephalic, atraumatic  Eyes: anicteric sclera, EOMI , PERRL  Neck: no adenopathy  Orophyarynx: moist mucosa, no lesions noted  Heart: irregular, borderline tachy, no murmurs, rubs. Estimated JVP at 6 cmH2O  Lungs: CTAB, No wheezing.   Abdomen: soft, non-tender, bowel sounds present, no hepatosplenomegaly  Extremities: No LE edema today  Skin: no open lesions noted  Neuro: grossly non-focal  Psych: no evidence of depression noted     Labs:  Lab Results   Component Value Date    WBC 6.1 01/12/2021    HGB 14.9 01/12/2021    HCT 45.5 01/12/2021     01/12/2021     04/27/2022    POTASSIUM 4.1 04/27/2022    CHLORIDE 103 04/27/2022    CO2 28 04/27/2022    BUN 21 04/27/2022    CR 0.95 04/27/2022    GLC 78 04/27/2022    DD 0.9 (H) 12/31/2019    NTBNPI 2,401 (H) 12/31/2019    NTBNP 176 (H) 01/12/2021    TROPI 0.037 01/01/2020    AST 16 01/12/2021    ALT 23 01/12/2021    ALKPHOS 91 01/12/2021    BILITOTAL 0.5 01/12/2021    INR 1.32 (H) 02/25/2020 1/2/20 ECHOCARDIOGRAM:   The visual ejection fraction is estimated at 25-30%. Moderately decreased right ventricular systolic function There is moderate biatrial enlargement.  There is mild (1+) mitral regurgitation.  Probable  aflutter with rapid ventricular response.  There is no comparison study available.     1/3/20 NM IRLANDA SCAN:     Only resting perfusion images were taken.     Resting images shows mild intensity basal inferior wall photopenic defect.     Gated images not performed. LV systolic function cannot be evaluated.     1/6/20 RAISSA:  Left and right atrial appendage thrombi, measuring 1.7 x 1.4 cm and 1.5 x 1.4  cm, respectively.   Mildly dilated left ventricle with normal wall thickness. Severely decreased LV systolic function with EF 15%-20%. Severe diffuse hypokinesis.  Global right ventricular function is moderately reduced.   Compared to the previous study dated 1/2/2020 (TTE): The left and right atrial appendages are better-visualized and thrombi are seen in both atrial Appendages.     1/6/20 CMRI:  1. The LV is mildly enlarged with normal wall thickness. The global systolic function is severely reduced. The LVEF is 15%. There is severe diffuse hypokinesis.  2. The RV is normal in cavity size. The global systolic function is severely reduced. The RVEF is 20%.   3. The left atrium is severely enlarged.  The right atrium is moderately enlarged.   4. There is mild mitral regurgitation.   5. Late gadolinium enhancement imaging shows midmyocardial enhancement in the basal anteroseptum, basal inferoseptum, mid anteroseptum, mid inferoseptum, and apical septum.  This is consistent with a non-ischemic etiology of cardiomyopathy.   6. Regadenoson stress perfusion imaging shows no ischemia.   7. There is no pericardial effusion or thickening.   8. There is a left atrial appendage thrombus.   9. There is a left-sided pleural effusion.   CONCLUSIONS: Non-ischemic cardiomyopathy.  There is severely reduced biventricular function with a pattern of midmyocardial enhancement in the septal segments. There is also a left atrial appendage thrombus. There is no ischemia.      TTE 6/2020:  Patient in tachyarrhythmia during study. Mildly (EF  40-45%) reduced left ventricular function is present.  Global right ventricular function is mildly reduced.  No significant valvular dysfunction present.  The inferior vena cava was normal in size with preserved respiratory variability.  No pericardial effusion is present.     EP ablation 2/2020:  1. Radiofrequency ablation of the cavotricuspid isthmus for CTI-dependent atrial flutter.  2. Direct current cardioversion of atrial fibrillation into sinus rhythm.  3. Fluoroscopy.  4. 3D electroanatomic mapping     TTE 12/2020:  The patient's rhythm is atrial fibrillation. Borderline (EF 50-55%) reduced left ventricular function is present.  Global right ventricular function is normal.  No pericardial effusion is present. Mild biatrial enlargement is present.  IVC diameter <2.1 cm collapsing >50% with sniff suggests a normal RA pressure of 3 mmHg.  Compared to prior, LV fxn is improved.     TTE 5/16/22:  The patient's rhythm is atrial fibrillation. Left ventricular size, wall motion and function are normal. The ejection fraction is 55-60%.  Global right ventricular function is normal.  No significant valvular abnormalities.  The inferior vena cava was normal in size with preserved respiratory variability.  No pericardial effusion is present.  Compared to prior study 12/3/2020, there is no significant change.    ASSESSMENT AND PLAN:  In summary, patient is a 59 year old gentleman with above history including recovered ejection fraction in the setting of alcohol use in the past.  He was evaluated by EP and core clinic and referred to me for heart failure management in the setting of significantly reduced ejection fraction at about 15% due to possibly combination of tachycardia induced cardiomyopathy and alcohol use.  He quit alcohol in December 2019 and he is determined to recover his ejection fraction.  Ultimately he managed to achieve this and his ejection fraction has recovered to 55%.  He is doing very well from that  standpoint.  He however continues to have very significant fatigue and tiredness which I am not exactly sure the reason for especially in the setting of recovered ejection fraction.  As such we will plan the following: We will get repeat labs done today which will include TSH as well as digoxin level.  On examination he is in atrial fibrillation however the heart rate seems to be very well controlled so I do not think that is contributing.  He will see Dr. Romeo in the very near future and see if potentially an atrial fibrillation ablation might be something to consider.  I do think overall that might be a good idea.  In addition he will also see our ENT colleagues to also see whether there is any other organic reasons for his cough especially in the setting of his longstanding tobacco chewing.  If there are no other reasons Entresto may be a consideration however I have not seen it for a long time but may consider discontinuing it.  Blood pressure and heart rate are very well controlled today we will not make any medications we will give him medication renewal for the next Several months.  I would like to see him back after he sees our EP colleagues and evaluate how he is doing.  We reviewed his labs.    I appreciate the opportunity to participate in the care of Roge Agarwal . Please do not hesitate to contact me with any further questions.     Sincerely,    Amor Wick MD     [facility] Division of Cardiology

## 2022-06-20 LAB — DIGOXIN SERPL-MCNC: 0.9 UG/L

## 2022-07-07 DIAGNOSIS — I50.20 HEART FAILURE WITH REDUCED EJECTION FRACTION, NYHA CLASS III (H): Primary | ICD-10-CM

## 2022-07-18 NOTE — PROGRESS NOTES
ENT Consultation    Roge Agarwal who is a 61 year old male seen in consultation at the request of Kirsten Araujo.      History of Present Illness - Roge Agarwal is a 61 year old male presents with a severe cough lasting for about 5 years.  Initially it was attributed to lisinopril which was stopped few years back but that did not seem to help cough is constant especially he lays down he feels a lot of thick phlegm that he has to bring up and he brings up small pieces of white phlegm.  He feels that right about the sternal notch.  Does not feel his chest.  He had CT pulmonary angiogram did not show any significant pathology.  His cardiac work-up was not very eventful.  It does not appear that he had a full pulmonary work-up.  Patient is a non-smoker but chews tobacco for 45 years.  He is experienced more fatigue but no true shortness of breath lately.  Denies any significant nasal congestion or drainage or allergies.      Body mass index is 24.72 kg/m .    EXAMINATION: CTA pulmonary angiogram, 1/6/2020 2:32 PM      COMPARISON: CT 12/31/2019, CT 10/5/2017     HISTORY: Patient with atrial flutter now with right and left atrial  appendage clots on the Dabigatran. Ef 15-20% so will need to be  careful with beta blocker. Please evaluate coronaries and also look  for pulmonary embolism.     TECHNIQUE: Volumetric helical acquisition of CT images of the chest  from the lung apices to the kidneys were acquired after the  administration of 80 mL of Isovue-370 IV contrast. Flash technique  with free breathing acquisition.  Post-processed multiplanar and/or  MIP reformations were obtained, archived to PACS and used in  interpretation of this study.      FINDINGS:             Contrast bolus is: adequate.  Exam is negative for acute pulmonary  embolism.        The largest right ventricle transaxial diameter is (measured from  endocardium to endocardium): 41 cm   The largest left ventricle transaxial diameter is (measured  from  endocardium to endocardium): 54 cm  RV/LV ratio is: 0.76 (if ratio greater than 1.1 then sign is  suspicious for right heart strain)  Reflux of contrast into the IVC? Is no  Paradoxical bowing of the interventricular septum to the left? no  Pericardial effusion?:not present     Chest: Thyroid gland appears unremarkable. Tracheobronchial tree  appears patent. Esophagus appears unremarkable.      Lung nodule(s) described on series 8:  -Groundglass nodule of the right middle lobe measuring 5 mm (Image:  182), likely benign.     No evidence of lung infection. Small left pleural effusion with  associated atelectasis. The pleura appears unremarkable.. No  pneumothorax. Atherosclerotic calcification of the coronaries and  great vessels. Heart size is mildly enlarged. No contrast within the  left heart which can be seen in the setting of low ejection fraction.  No significant pericardial effusion..  Visualized thoracic aorta and  main pulmonary artery diameters appear within normal limits. Normal 3  vessel branching pattern of the great vessels.   There are no  visualized pathologically enlarged mediastinal, hilar or axillary  lymph nodes.      Abdomen: Fluid attenuating focus of the right kidney, likely simple  renal cyst.     Bones and Soft Tissues: No suspicious osseous lesion. No suspicious  mass.                                                                            IMPRESSION:   1. No evidence of pulmonary embolism or right heart strain.  2. Small left pleural effusion with associated atelectasis.  3. Cardiomegaly with absence of contrast within the left heart.  Findings can be seen in setting of heart failure or may be due to  bolus timing.        EXAMINATION: CTA pulmonary angiogram, 1/6/2020 2:32 PM      COMPARISON: CT 12/31/2019, CT 10/5/2017     HISTORY: Patient with atrial flutter now with right and left atrial  appendage clots on the Dabigatran. Ef 15-20% so will need to be  careful with beta blocker.  Please evaluate coronaries and also look  for pulmonary embolism.     TECHNIQUE: Volumetric helical acquisition of CT images of the chest  from the lung apices to the kidneys were acquired after the  administration of 80 mL of Isovue-370 IV contrast. Flash technique  with free breathing acquisition.  Post-processed multiplanar and/or  MIP reformations were obtained, archived to PACS and used in  interpretation of this study.      FINDINGS:             Contrast bolus is: adequate.  Exam is negative for acute pulmonary  embolism.        The largest right ventricle transaxial diameter is (measured from  endocardium to endocardium): 41 cm   The largest left ventricle transaxial diameter is (measured from  endocardium to endocardium): 54 cm  RV/LV ratio is: 0.76 (if ratio greater than 1.1 then sign is  suspicious for right heart strain)  Reflux of contrast into the IVC? Is no  Paradoxical bowing of the interventricular septum to the left? no  Pericardial effusion?:not present     Chest: Thyroid gland appears unremarkable. Tracheobronchial tree  appears patent. Esophagus appears unremarkable.      Lung nodule(s) described on series 8:  -Groundglass nodule of the right middle lobe measuring 5 mm (Image:  182), likely benign.     No evidence of lung infection. Small left pleural effusion with  associated atelectasis. The pleura appears unremarkable.. No  pneumothorax. Atherosclerotic calcification of the coronaries and  great vessels. Heart size is mildly enlarged. No contrast within the  left heart which can be seen in the setting of low ejection fraction.  No significant pericardial effusion..  Visualized thoracic aorta and  main pulmonary artery diameters appear within normal limits. Normal 3  vessel branching pattern of the great vessels.   There are no  visualized pathologically enlarged mediastinal, hilar or axillary  lymph nodes.      Abdomen: Fluid attenuating focus of the right kidney, likely simple  renal  cyst.     Bones and Soft Tissues: No suspicious osseous lesion. No suspicious  mass.                                                                            IMPRESSION:   1. No evidence of pulmonary embolism or right heart strain.  2. Small left pleural effusion with associated atelectasis.  3. Cardiomegaly with absence of contrast within the left heart.  Findings can be seen in setting of heart failure or may be due to  bolus timing.        BP Readings from Last 1 Encounters:   08/01/22 122/72       BP noted to be well controlled today in office.     Roge IS a smoker/uses chewing tobacco.  Roge is not ready to quit      Past Medical History -   Past Medical History:   Diagnosis Date     Atrial fibrillation (H) 2016    One time episode noted after his finger accident, required cardioversion     Atrial fibrillation with RVR (H) 4/6/2016     Hemorrhagic stroke (H) 12/16/2007    2.3 X 1.5 CM PARENCHYMAL HEMORRHAGE IN THE RIGHT BASAL GANGLIA     HTN (hypertension)      Systolic CHF (H)     EF of 15-30% in 2016       Current Medications -   Current Outpatient Medications:      amLODIPine (NORVASC) 10 MG tablet, Take 1 tablet (10 mg) by mouth daily, Disp: 90 tablet, Rfl: 3     carvedilol (COREG) 12.5 MG tablet, Take 1 tablet (12.5 mg) by mouth 2 times daily (with meals), Disp: 180 tablet, Rfl: 3     dabigatran ANTICOAGULANT (PRADAXA ANTICOAGULANT) 150 MG capsule, Take 1 capsule (150 mg) by mouth 2 times daily, Disp: 180 capsule, Rfl: 3     digoxin (LANOXIN) 125 MCG tablet, Take 1 tablet (125 mcg) by mouth daily, Disp: 90 tablet, Rfl: 3     folic acid (FOLVITE) 1 MG tablet, Take 1 tablet (1,000 mcg) by mouth daily, Disp: 90 tablet, Rfl: 3     furosemide (LASIX) 40 MG tablet, Take 1 tablet (40 mg) by mouth 2 times daily, Disp: 180 tablet, Rfl: 3     hydrOXYzine (ATARAX) 25 MG tablet, Take 0.5-1 tablets (12.5-25 mg) by mouth 3 times daily as needed for itching, Disp: 20 tablet, Rfl: 1     multivitamin w/minerals  "(THERA-VIT-M) tablet, Take 1 tablet by mouth daily, Disp: 90 tablet, Rfl: 3     sacubitril-valsartan (ENTRESTO)  MG per tablet, Take 1 tablet by mouth 2 times daily, Disp: 180 tablet, Rfl: 3     thiamine (B-1) 100 MG tablet, Take 1 tablet (100 mg) by mouth daily, Disp: 90 tablet, Rfl: 3    Allergies - No Known Allergies    Social History -   Social History     Socioeconomic History     Marital status: Single   Tobacco Use     Smoking status: Never Smoker     Smokeless tobacco: Current User     Types: Chew   Vaping Use     Vaping Use: Never used   Substance and Sexual Activity     Alcohol use: Not Currently     Comment: 12/30/2019     Drug use: Not Currently     Sexual activity: Not Currently       Family History -   Family History   Problem Relation Age of Onset     Heart Disease Mother      Osteoporosis Mother      LUNG DISEASE Mother      Heart Disease Father      Hypertension Brother        Review of Systems - As per HPI and PMHx, otherwise review of system review of the head and neck negative. Otherwise 10+ review of system is negative    Physical Exam  /72 (BP Location: Right arm, Patient Position: Sitting, Cuff Size: Adult Regular)   Temp 97.7  F (36.5  C) (Temporal)   Ht 1.854 m (6' 1\")   Wt 85 kg (187 lb 6 oz)   BMI 24.72 kg/m    BMI: Body mass index is 24.72 kg/m .    General - The patient is well nourished and well developed, and appears to have good nutritional status.  Alert and oriented to person and place, answers questions and cooperates with examination appropriately.    SKIN - No suspicious lesions or rashes.  Respiration - No respiratory distress.  Head and Face - Normocephalic and atraumatic, with no gross asymmetry noted of the contour of the facial features.  The facial nerve is intact, with strong symmetric movements.    Voice and Breathing - The patient was breathing comfortably without the use of accessory muscles. The patients voice was clear and strong, and had appropriate " pitch and quality.    Ears - Bilateral pinna and EACs with normal appearing overlying skin. Tympanic membrane intact with good mobility on pneumatic otoscopy bilaterally. Bony landmarks of the ossicular chain are normal. The tympanic membranes are normal in appearance. No retraction, perforation, or masses.  No fluid or purulence was seen in the external canal or the middle ear.     Eyes - Extraocular movements intact.  Sclera were not icteric or injected, conjunctiva were pink and moist.    Mouth - Examination of the oral cavity showed pink, healthy oral mucosa. No lesions or ulcerations noted upon thorough inspection of the gingival labial areas.  The tongue was mobile and midline, and the dentition were in very poor state of repair significant signs of bruxism and erosion of enamel.    Throat - The walls of the oropharynx were smooth, pink, moist, symmetric, and had no lesions or ulcerations.  The tonsillar pillars and soft palate were symmetric.  The uvula was midline on elevation.    Neck - Normal midline excursion of the laryngotracheal complex during swallowing.  Full range of motion on passive movement.  Palpation of the occipital, submental, submandibular, internal jugular chain, and supraclavicular nodes did not demonstrate any abnormal lymph nodes or masses.  The carotid pulse was palpable bilaterally.  Palpation of the thyroid was soft and smooth, with no nodules or goiter appreciated.  The trachea was mobile and midline.    Nose - External contour is symmetric, no gross deflection or scars.  Nasal mucosa is pink and moist with no abnormal mucus.  The septum was midline and non-obstructive, turbinates of normal size and position.  No polyps, masses, or purulence noted on examination.    Neuro - Nonfocal neuro exam is normal, CN 2 through 12 intact, normal gait and muscle tone.      Performed in clinic today:  Attempts at mirror laryngoscopy were not possible due to gag reflex.  Therefore I proceeded with  a fiberoptic examination.  First I sprayed both sides of the nose with a mixture of lidocaine and neosynephrine.  I then passed the scope through the nasal cavity.  The nasal cavity was unremarkable.  The nasopharynx was mucosally covered and symmetric.  The Eustachian tube openings were unobstructed.  Going further down I had a clear view of the base of tongue which had normal appearing lingual tonsillar tissue.  The base of tongue was free of lesions, and the vallecula was open.  The epiglottis was smooth and mucosally covered.  The supraglottic larynx was then clearly visualized.  The vocal cords moved smoothly and symmetrically, they were pearly white and no lesions were seen.  The pyriform sinuses were open, and the limited view of the postcricoid region did not show any lesions.  Main findings were some secretions coming from nasopharynx thick secretions hanging around the vallecula epiglottis.  They were quite clear just thickened.  Ambu disposable scope      A/P - Roge Agarwal is a 61 year old male with nonspecific cough possibly somewhat neurogenic nature but also thick secretions appreciated as potential irritant.  Right now we will start patient empirically on gabapentin low-dose 100 mg 3 times daily for neurogenic aspect of the cough Tessalon Perles 200 mg 3 times daily as needed for the next several weeks and finally guaifenesin 600 mg twice daily to thin out secretions.  Patient promises to keep on with good hydration.  He will return in 3 weeks.    Froy De Los Santos MD

## 2022-07-18 NOTE — PROGRESS NOTES
"History of Present Illness - Roge Agarwal is a 61 year old male presenting in clinic today for a recheck on Patient presents with:  Consult    ***    Present Symptoms include: {ENT ASSOCIATED SX:595251} and they are   {ENT SEVERITY STANDIN} .  Roge denies {ENT ASSOCIATED SX:843828}.      There is no height or weight on file to calculate BMI.    {Weight Management Plan -- Delete if patient has a normal BMI:964698}    BP Readings from Last 1 Encounters:   22 115/79       {htnspecialty:414472}    Roge {IS:228529} a smoker/uses chewing tobacco.  Roge {QUITTIN::\"is not ready to quit\"}      Past Medical History -   Past Medical History:   Diagnosis Date     Atrial fibrillation (H) 2016    One time episode noted after his finger accident, required cardioversion     Atrial fibrillation with RVR (H) 2016     Hemorrhagic stroke (H) 2007    2.3 X 1.5 CM PARENCHYMAL HEMORRHAGE IN THE RIGHT BASAL GANGLIA     HTN (hypertension)      Systolic CHF (H)     EF of 15-30% in 2016       Current Medications -   Current Outpatient Medications:      amLODIPine (NORVASC) 10 MG tablet, Take 1 tablet (10 mg) by mouth daily, Disp: 90 tablet, Rfl: 3     carvedilol (COREG) 12.5 MG tablet, Take 1 tablet (12.5 mg) by mouth 2 times daily (with meals), Disp: 180 tablet, Rfl: 3     dabigatran ANTICOAGULANT (PRADAXA ANTICOAGULANT) 150 MG capsule, Take 1 capsule (150 mg) by mouth 2 times daily, Disp: 180 capsule, Rfl: 3     digoxin (LANOXIN) 125 MCG tablet, Take 1 tablet (125 mcg) by mouth daily, Disp: 90 tablet, Rfl: 3     folic acid (FOLVITE) 1 MG tablet, Take 1 tablet (1,000 mcg) by mouth daily, Disp: 90 tablet, Rfl: 3     furosemide (LASIX) 40 MG tablet, Take 1 tablet (40 mg) by mouth 2 times daily, Disp: 180 tablet, Rfl: 3     hydrOXYzine (ATARAX) 25 MG tablet, Take 0.5-1 tablets (12.5-25 mg) by mouth 3 times daily as needed for itching, Disp: 20 tablet, Rfl: 1     multivitamin w/minerals (THERA-VIT-M) " tablet, Take 1 tablet by mouth daily, Disp: 90 tablet, Rfl: 3     sacubitril-valsartan (ENTRESTO)  MG per tablet, Take 1 tablet by mouth 2 times daily, Disp: 180 tablet, Rfl: 3     thiamine (B-1) 100 MG tablet, Take 1 tablet (100 mg) by mouth daily, Disp: 90 tablet, Rfl: 3    Allergies - No Known Allergies    Social History -   Social History     Socioeconomic History     Marital status: Single   Tobacco Use     Smoking status: Never Smoker     Smokeless tobacco: Current User     Types: Chew   Vaping Use     Vaping Use: Never used   Substance and Sexual Activity     Alcohol use: Not Currently     Comment: 12/30/2019     Drug use: Not Currently     Sexual activity: Not Currently       Family History -   Family History   Problem Relation Age of Onset     Heart Disease Mother      Osteoporosis Mother      LUNG DISEASE Mother      Heart Disease Father      Hypertension Brother        Review of Systems - As per HPI and PMHx, otherwise review of system review of the head and neck negative. Otherwise 10+ review of system is negative    Physical Exam  There were no vitals taken for this visit.  BMI: There is no height or weight on file to calculate BMI.    General - The patient is well nourished and well developed, and appears to have good nutritional status.  Alert and oriented to person and place, answers questions and cooperates with examination appropriately.    SKIN - No suspicious lesions or rashes.  Respiration - No respiratory distress.  Head and Face - Normocephalic and atraumatic, with no gross asymmetry noted of the contour of the facial features.  The facial nerve is intact, with strong symmetric movements.    Voice and Breathing - The patient was breathing comfortably without the use of accessory muscles. The patients voice was clear and strong, and had appropriate pitch and quality.    Ears - Bilateral pinna and EACs with normal appearing overlying skin. Tympanic membrane intact with good mobility on  "pneumatic otoscopy bilaterally. Bony landmarks of the ossicular chain are normal. The tympanic membranes are normal in appearance. No retraction, perforation, or masses.  No fluid or purulence was seen in the external canal or the middle ear.     Eyes - Extraocular movements intact.  Sclera were not icteric or injected, conjunctiva were pink and moist.    Mouth - Examination of the oral cavity showed pink, healthy oral mucosa. No lesions or ulcerations noted.  The tongue was mobile and midline, and the dentition were in good condition.      Throat - The walls of the oropharynx were smooth, pink, moist, symmetric, and had no lesions or ulcerations.  The tonsillar pillars and soft palate were symmetric. Tonsils are {ENT TONSILS:820961}. The uvula was midline on elevation.    Neck - Normal midline excursion of the laryngotracheal complex during swallowing.  Full range of motion on passive movement.  Palpation of the occipital, submental, submandibular, internal jugular chain, and supraclavicular nodes did not demonstrate any abnormal lymph nodes or masses.  The carotid pulse was palpable bilaterally.  Palpation of the thyroid was soft and smooth, with no nodules or goiter appreciated.  The trachea was mobile and midline.    Nose - External contour is symmetric, no gross deflection or scars.  Nasal mucosa is pink and moist with no abnormal mucus.  The septum was midline and non-obstructive, turbinates of normal size and position.  No polyps, masses, or purulence noted on examination.    Neuro - Nonfocal neuro exam is normal, CN 2 through 12 intact, normal gait and muscle tone.      Performed in clinic today:  [facility]      A/P - Roge Agarwal is a 61 year old male Patient presents with:  Consult    ***    Roge should follow up {FOLLOW UP:674232}.      At Rgoe next appointment they {WILL:[id number]::\"will\"} need a hearing test.      Froy De Los Santos MD        "

## 2022-08-01 ENCOUNTER — OFFICE VISIT (OUTPATIENT)
Dept: OTOLARYNGOLOGY | Facility: CLINIC | Age: 62
End: 2022-08-01
Attending: NURSE PRACTITIONER
Payer: COMMERCIAL

## 2022-08-01 VITALS
BODY MASS INDEX: 24.83 KG/M2 | HEIGHT: 73 IN | SYSTOLIC BLOOD PRESSURE: 122 MMHG | TEMPERATURE: 97.7 F | DIASTOLIC BLOOD PRESSURE: 72 MMHG | WEIGHT: 187.38 LBS

## 2022-08-01 DIAGNOSIS — R05.9 COUGH: Primary | ICD-10-CM

## 2022-08-01 PROCEDURE — 31575 DIAGNOSTIC LARYNGOSCOPY: CPT | Performed by: OTOLARYNGOLOGY

## 2022-08-01 PROCEDURE — 99203 OFFICE O/P NEW LOW 30 MIN: CPT | Mod: 25 | Performed by: OTOLARYNGOLOGY

## 2022-08-01 RX ORDER — GUAIFENESIN 600 MG/1
600 TABLET, EXTENDED RELEASE ORAL 2 TIMES DAILY
Qty: 60 TABLET | Refills: 3 | Status: SHIPPED | OUTPATIENT
Start: 2022-08-01 | End: 2022-08-31

## 2022-08-01 RX ORDER — BENZONATATE 200 MG/1
200 CAPSULE ORAL 3 TIMES DAILY PRN
Qty: 60 CAPSULE | Refills: 0 | Status: SHIPPED | OUTPATIENT
Start: 2022-08-01 | End: 2022-08-31

## 2022-08-01 RX ORDER — GABAPENTIN 100 MG/1
100 CAPSULE ORAL 3 TIMES DAILY
Qty: 60 CAPSULE | Refills: 0 | Status: SHIPPED | OUTPATIENT
Start: 2022-08-01 | End: 2023-09-28

## 2022-08-01 ASSESSMENT — PAIN SCALES - GENERAL: PAINLEVEL: NO PAIN (0)

## 2022-08-01 NOTE — LETTER
8/1/2022         RE: Roge Agarwal  [address] [facility] [address]        Dear Colleague,    Thank you for referring your patient, Roge Agarwal, to the [facility]. Please see a copy of my visit note below.    ENT Consultation    Roge Agarwal who is a 61 year old male seen in consultation at the request of Kirsten Araujo.      History of Present Illness - Roge Agarwal is a 61 year old male presents with a severe cough lasting for about 5 years.  Initially it was attributed to lisinopril which was stopped few years back but that did not seem to help cough is constant especially he lays down he feels a lot of thick phlegm that he has to bring up and he brings up small pieces of white phlegm.  He feels that right about the sternal notch.  Does not feel his chest.  He had CT pulmonary angiogram did not show any significant pathology.  His cardiac work-up was not very eventful.  It does not appear that he had a full pulmonary work-up.  Patient is a non-smoker but chews tobacco for 45 years.  He is experienced more fatigue but no true shortness of breath lately.  Denies any significant nasal congestion or drainage or allergies.      Body mass index is 24.72 kg/m .    EXAMINATION: CTA pulmonary angiogram, 1/6/2020 2:32 PM      COMPARISON: CT 12/31/2019, CT 10/5/2017     HISTORY: Patient with atrial flutter now with right and left atrial  appendage clots on the Dabigatran. Ef 15-20% so will need to be  careful with beta blocker. Please evaluate coronaries and also look  for pulmonary embolism.     TECHNIQUE: Volumetric helical acquisition of CT images of the chest  from the lung apices to the kidneys were acquired after the  administration of 80 mL of Isovue-370 IV contrast. Flash technique  with free breathing acquisition.  Post-processed multiplanar and/or  MIP reformations were obtained, archived to PACS and used in  interpretation of this study.      FINDINGS:             Contrast  bolus is: adequate.  Exam is negative for acute pulmonary  embolism.        The largest right ventricle transaxial diameter is (measured from  endocardium to endocardium): 41 cm   The largest left ventricle transaxial diameter is (measured from  endocardium to endocardium): 54 cm  RV/LV ratio is: 0.76 (if ratio greater than 1.1 then sign is  suspicious for right heart strain)  Reflux of contrast into the IVC? Is no  Paradoxical bowing of the interventricular septum to the left? no  Pericardial effusion?:not present     Chest: Thyroid gland appears unremarkable. Tracheobronchial tree  appears patent. Esophagus appears unremarkable.      Lung nodule(s) described on series 8:  -Groundglass nodule of the right middle lobe measuring 5 mm (Image:  182), likely benign.     No evidence of lung infection. Small left pleural effusion with  associated atelectasis. The pleura appears unremarkable.. No  pneumothorax. Atherosclerotic calcification of the coronaries and  great vessels. Heart size is mildly enlarged. No contrast within the  left heart which can be seen in the setting of low ejection fraction.  No significant pericardial effusion..  Visualized thoracic aorta and  main pulmonary artery diameters appear within normal limits. Normal 3  vessel branching pattern of the great vessels.   There are no  visualized pathologically enlarged mediastinal, hilar or axillary  lymph nodes.      Abdomen: Fluid attenuating focus of the right kidney, likely simple  renal cyst.     Bones and Soft Tissues: No suspicious osseous lesion. No suspicious  mass.                                                                            IMPRESSION:   1. No evidence of pulmonary embolism or right heart strain.  2. Small left pleural effusion with associated atelectasis.  3. Cardiomegaly with absence of contrast within the left heart.  Findings can be seen in setting of heart failure or may be due to  bolus timing.        EXAMINATION: CTA  pulmonary angiogram, 1/6/2020 2:32 PM      COMPARISON: CT 12/31/2019, CT 10/5/2017     HISTORY: Patient with atrial flutter now with right and left atrial  appendage clots on the Dabigatran. Ef 15-20% so will need to be  careful with beta blocker. Please evaluate coronaries and also look  for pulmonary embolism.     TECHNIQUE: Volumetric helical acquisition of CT images of the chest  from the lung apices to the kidneys were acquired after the  administration of 80 mL of Isovue-370 IV contrast. Flash technique  with free breathing acquisition.  Post-processed multiplanar and/or  MIP reformations were obtained, archived to PACS and used in  interpretation of this study.      FINDINGS:             Contrast bolus is: adequate.  Exam is negative for acute pulmonary  embolism.        The largest right ventricle transaxial diameter is (measured from  endocardium to endocardium): 41 cm   The largest left ventricle transaxial diameter is (measured from  endocardium to endocardium): 54 cm  RV/LV ratio is: 0.76 (if ratio greater than 1.1 then sign is  suspicious for right heart strain)  Reflux of contrast into the IVC? Is no  Paradoxical bowing of the interventricular septum to the left? no  Pericardial effusion?:not present     Chest: Thyroid gland appears unremarkable. Tracheobronchial tree  appears patent. Esophagus appears unremarkable.      Lung nodule(s) described on series 8:  -Groundglass nodule of the right middle lobe measuring 5 mm (Image:  182), likely benign.     No evidence of lung infection. Small left pleural effusion with  associated atelectasis. The pleura appears unremarkable.. No  pneumothorax. Atherosclerotic calcification of the coronaries and  great vessels. Heart size is mildly enlarged. No contrast within the  left heart which can be seen in the setting of low ejection fraction.  No significant pericardial effusion..  Visualized thoracic aorta and  main pulmonary artery diameters appear within normal  limits. Normal 3  vessel branching pattern of the great vessels.   There are no  visualized pathologically enlarged mediastinal, hilar or axillary  lymph nodes.      Abdomen: Fluid attenuating focus of the right kidney, likely simple  renal cyst.     Bones and Soft Tissues: No suspicious osseous lesion. No suspicious  mass.                                                                            IMPRESSION:   1. No evidence of pulmonary embolism or right heart strain.  2. Small left pleural effusion with associated atelectasis.  3. Cardiomegaly with absence of contrast within the left heart.  Findings can be seen in setting of heart failure or may be due to  bolus timing.        BP Readings from Last 1 Encounters:   08/01/22 122/72       BP noted to be well controlled today in office.     Roge IS a smoker/uses chewing tobacco.  Roge is not ready to quit      Past Medical History -   Past Medical History:   Diagnosis Date     Atrial fibrillation (H) 2016    One time episode noted after his finger accident, required cardioversion     Atrial fibrillation with RVR (H) 4/6/2016     Hemorrhagic stroke (H) 12/16/2007    2.3 X 1.5 CM PARENCHYMAL HEMORRHAGE IN THE RIGHT BASAL GANGLIA     HTN (hypertension)      Systolic CHF (H)     EF of 15-30% in 2016       Current Medications -   Current Outpatient Medications:      amLODIPine (NORVASC) 10 MG tablet, Take 1 tablet (10 mg) by mouth daily, Disp: 90 tablet, Rfl: 3     carvedilol (COREG) 12.5 MG tablet, Take 1 tablet (12.5 mg) by mouth 2 times daily (with meals), Disp: 180 tablet, Rfl: 3     dabigatran ANTICOAGULANT (PRADAXA ANTICOAGULANT) 150 MG capsule, Take 1 capsule (150 mg) by mouth 2 times daily, Disp: 180 capsule, Rfl: 3     digoxin (LANOXIN) 125 MCG tablet, Take 1 tablet (125 mcg) by mouth daily, Disp: 90 tablet, Rfl: 3     folic acid (FOLVITE) 1 MG tablet, Take 1 tablet (1,000 mcg) by mouth daily, Disp: 90 tablet, Rfl: 3     furosemide (LASIX) 40 MG tablet,  "Take 1 tablet (40 mg) by mouth 2 times daily, Disp: 180 tablet, Rfl: 3     hydrOXYzine (ATARAX) 25 MG tablet, Take 0.5-1 tablets (12.5-25 mg) by mouth 3 times daily as needed for itching, Disp: 20 tablet, Rfl: 1     multivitamin w/minerals (THERA-VIT-M) tablet, Take 1 tablet by mouth daily, Disp: 90 tablet, Rfl: 3     sacubitril-valsartan (ENTRESTO)  MG per tablet, Take 1 tablet by mouth 2 times daily, Disp: 180 tablet, Rfl: 3     thiamine (B-1) 100 MG tablet, Take 1 tablet (100 mg) by mouth daily, Disp: 90 tablet, Rfl: 3    Allergies - No Known Allergies    Social History -   Social History     Socioeconomic History     Marital status: Single   Tobacco Use     Smoking status: Never Smoker     Smokeless tobacco: Current User     Types: Chew   Vaping Use     Vaping Use: Never used   Substance and Sexual Activity     Alcohol use: Not Currently     Comment: 12/30/2019     Drug use: Not Currently     Sexual activity: Not Currently       Family History -   Family History   Problem Relation Age of Onset     Heart Disease Mother      Osteoporosis Mother      LUNG DISEASE Mother      Heart Disease Father      Hypertension Brother        Review of Systems - As per HPI and PMHx, otherwise review of system review of the head and neck negative. Otherwise 10+ review of system is negative    Physical Exam  /72 (BP Location: Right arm, Patient Position: Sitting, Cuff Size: Adult Regular)   Temp 97.7  F (36.5  C) (Temporal)   Ht 1.854 m (6' 1\")   Wt 85 kg (187 lb 6 oz)   BMI 24.72 kg/m    BMI: Body mass index is 24.72 kg/m .    General - The patient is well nourished and well developed, and appears to have good nutritional status.  Alert and oriented to person and place, answers questions and cooperates with examination appropriately.    SKIN - No suspicious lesions or rashes.  Respiration - No respiratory distress.  Head and Face - Normocephalic and atraumatic, with no gross asymmetry noted of the contour of the " facial features.  The facial nerve is intact, with strong symmetric movements.    Voice and Breathing - The patient was breathing comfortably without the use of accessory muscles. The patients voice was clear and strong, and had appropriate pitch and quality.    Ears - Bilateral pinna and EACs with normal appearing overlying skin. Tympanic membrane intact with good mobility on pneumatic otoscopy bilaterally. Bony landmarks of the ossicular chain are normal. The tympanic membranes are normal in appearance. No retraction, perforation, or masses.  No fluid or purulence was seen in the external canal or the middle ear.     Eyes - Extraocular movements intact.  Sclera were not icteric or injected, conjunctiva were pink and moist.    Mouth - Examination of the oral cavity showed pink, healthy oral mucosa. No lesions or ulcerations noted upon thorough inspection of the gingival labial areas.  The tongue was mobile and midline, and the dentition were in very poor state of repair significant signs of bruxism and erosion of enamel.    Throat - The walls of the oropharynx were smooth, pink, moist, symmetric, and had no lesions or ulcerations.  The tonsillar pillars and soft palate were symmetric.  The uvula was midline on elevation.    Neck - Normal midline excursion of the laryngotracheal complex during swallowing.  Full range of motion on passive movement.  Palpation of the occipital, submental, submandibular, internal jugular chain, and supraclavicular nodes did not demonstrate any abnormal lymph nodes or masses.  The carotid pulse was palpable bilaterally.  Palpation of the thyroid was soft and smooth, with no nodules or goiter appreciated.  The trachea was mobile and midline.    Nose - External contour is symmetric, no gross deflection or scars.  Nasal mucosa is pink and moist with no abnormal mucus.  The septum was midline and non-obstructive, turbinates of normal size and position.  No polyps, masses, or purulence noted  on examination.    Neuro - Nonfocal neuro exam is normal, CN 2 through 12 intact, normal gait and muscle tone.      Performed in clinic today:  Attempts at mirror laryngoscopy were not possible due to gag reflex.  Therefore I proceeded with a fiberoptic examination.  First I sprayed both sides of the nose with a mixture of lidocaine and neosynephrine.  I then passed the scope through the nasal cavity.  The nasal cavity was unremarkable.  The nasopharynx was mucosally covered and symmetric.  The Eustachian tube openings were unobstructed.  Going further down I had a clear view of the base of tongue which had normal appearing lingual tonsillar tissue.  The base of tongue was free of lesions, and the vallecula was open.  The epiglottis was smooth and mucosally covered.  The supraglottic larynx was then clearly visualized.  The vocal cords moved smoothly and symmetrically, they were pearly white and no lesions were seen.  The pyriform sinuses were open, and the limited view of the postcricoid region did not show any lesions.  Main findings were some secretions coming from nasopharynx thick secretions hanging around the vallecula epiglottis.  They were quite clear just thickened.  Ambu disposable scope      A/P - Roge Agarwal is a 61 year old male with nonspecific cough possibly somewhat neurogenic nature but also thick secretions appreciated as potential irritant.  Right now we will start patient empirically on gabapentin low-dose 100 mg 3 times daily for neurogenic aspect of the cough Tessalon Perles 200 mg 3 times daily as needed for the next several weeks and finally guaifenesin 600 mg twice daily to thin out secretions.  Patient promises to keep on with good hydration.  He will return in 3 weeks.    Froy De Los Santos MD        Again, thank you for allowing me to participate in the care of your patient.        Sincerely,        Froy De Los Santos MD, MD

## 2022-08-03 ENCOUNTER — TELEPHONE (OUTPATIENT)
Dept: CARDIOLOGY | Facility: CLINIC | Age: 62
End: 2022-08-03

## 2022-08-03 NOTE — TELEPHONE ENCOUNTER
Called and spoke to patient daughter, Geneva, to reschedule 8/5 Dr. Wick appointment. Geneva stated Roge is feeling fine with no concerns and asked to be scheduled in January. Writer scheduled Dr. Wick in January with labs 2 days prior.    Geneva asked for more information regarding a thyroid test that Roge had done. She stated they never got results and she would like results on this test. I told her I would communicate that with the nurse.    CITLALI Rm

## 2022-11-27 NOTE — PROGRESS NOTES
Pt up to BR and had large BM. Pt states comfort and wishes to go back to sleep. VSS.   Regular rate and rhythm, Heart sounds S1 S2 present, no murmurs, rubs or gallops

## 2022-12-16 DIAGNOSIS — I50.20 HEART FAILURE WITH REDUCED EJECTION FRACTION, NYHA CLASS III (H): ICD-10-CM

## 2022-12-16 RX ORDER — SACUBITRIL AND VALSARTAN 97; 103 MG/1; MG/1
1 TABLET, FILM COATED ORAL 2 TIMES DAILY
Qty: 180 TABLET | Refills: 0 | Status: SHIPPED | OUTPATIENT
Start: 2022-12-16 | End: 2023-03-27

## 2022-12-16 NOTE — TELEPHONE ENCOUNTER
sacubitril-valsartan (ENTRESTO)  MG per tablet    Entresto is on back order.   Please send an alternative medication for Pt care.  Thank you     Loyda Wilkerson RN  Central Triage Red Flags/Med Refills

## 2023-01-11 DIAGNOSIS — I48.0 PAROXYSMAL ATRIAL FIBRILLATION (H): ICD-10-CM

## 2023-01-11 DIAGNOSIS — E78.2 MIXED HYPERLIPIDEMIA: ICD-10-CM

## 2023-01-11 DIAGNOSIS — I50.20 HEART FAILURE WITH REDUCED EJECTION FRACTION, NYHA CLASS III (H): Primary | ICD-10-CM

## 2023-01-11 DIAGNOSIS — I50.22 CHRONIC SYSTOLIC HEART FAILURE (H): ICD-10-CM

## 2023-01-11 DIAGNOSIS — I10 BENIGN ESSENTIAL HYPERTENSION: ICD-10-CM

## 2023-02-01 ENCOUNTER — TELEPHONE (OUTPATIENT)
Dept: CARDIOLOGY | Facility: CLINIC | Age: 63
End: 2023-02-01
Payer: COMMERCIAL

## 2023-02-01 NOTE — TELEPHONE ENCOUNTER
Called to let patient know about rescheduled appt on 2/3/23. No answer - LVM.    Jewels Salamanca CMA ([facility])     none

## 2023-02-01 NOTE — TELEPHONE ENCOUNTER
Pt missed last appt with Dr Wick.   Follow up scheduled Friday 2/3- will do labs same day if pt is able to make this appt.     Will call to confirm

## 2023-02-02 NOTE — TELEPHONE ENCOUNTER
Called patient again to let him know about rescheduled appt. No answer - LVM.    Jewels Salamanca CMA ([facility])

## 2023-03-23 DIAGNOSIS — I50.20 HEART FAILURE WITH REDUCED EJECTION FRACTION, NYHA CLASS III (H): ICD-10-CM

## 2023-03-27 RX ORDER — SACUBITRIL AND VALSARTAN 97; 103 MG/1; MG/1
1 TABLET, FILM COATED ORAL 2 TIMES DAILY
Qty: 180 TABLET | Refills: 0 | Status: SHIPPED | OUTPATIENT
Start: 2023-03-27 | End: 2023-06-20

## 2023-06-15 DIAGNOSIS — I50.20 HEART FAILURE WITH REDUCED EJECTION FRACTION, NYHA CLASS III (H): ICD-10-CM

## 2023-06-20 RX ORDER — SACUBITRIL AND VALSARTAN 97; 103 MG/1; MG/1
1 TABLET, FILM COATED ORAL 2 TIMES DAILY
Qty: 180 TABLET | Refills: 3 | Status: SHIPPED | OUTPATIENT
Start: 2023-06-20

## 2023-06-29 DIAGNOSIS — I50.22 CHRONIC SYSTOLIC HEART FAILURE (H): ICD-10-CM

## 2023-06-29 DIAGNOSIS — I48.92 ATRIAL FLUTTER, UNSPECIFIED TYPE (H): ICD-10-CM

## 2023-06-29 DIAGNOSIS — I50.23 ACUTE ON CHRONIC SYSTOLIC CONGESTIVE HEART FAILURE (H): ICD-10-CM

## 2023-06-29 DIAGNOSIS — I48.4 ATYPICAL ATRIAL FLUTTER (H): ICD-10-CM

## 2023-07-05 ENCOUNTER — TELEPHONE (OUTPATIENT)
Dept: CARDIOLOGY | Facility: CLINIC | Age: 63
End: 2023-07-05
Payer: COMMERCIAL

## 2023-07-05 DIAGNOSIS — E78.2 MIXED HYPERLIPIDEMIA: Primary | ICD-10-CM

## 2023-07-05 DIAGNOSIS — I42.8 NONISCHEMIC CARDIOMYOPATHY (H): ICD-10-CM

## 2023-07-05 DIAGNOSIS — I48.4 ATYPICAL ATRIAL FLUTTER (H): ICD-10-CM

## 2023-07-05 DIAGNOSIS — I50.22 CHRONIC SYSTOLIC HEART FAILURE (H): ICD-10-CM

## 2023-07-05 DIAGNOSIS — I50.20 HEART FAILURE WITH REDUCED EJECTION FRACTION, NYHA CLASS III (H): ICD-10-CM

## 2023-07-05 RX ORDER — FUROSEMIDE 40 MG
40 TABLET ORAL 2 TIMES DAILY
Qty: 60 TABLET | Refills: 0 | Status: SHIPPED | OUTPATIENT
Start: 2023-07-05 | End: 2023-08-26

## 2023-07-05 RX ORDER — MULTIVIT-MIN/FA/LYCOPEN/LUTEIN .4-300-25
TABLET ORAL
Qty: 90 TABLET | OUTPATIENT
Start: 2023-07-05

## 2023-07-05 RX ORDER — CARVEDILOL 12.5 MG/1
12.5 TABLET ORAL 2 TIMES DAILY WITH MEALS
Qty: 60 TABLET | Refills: 0 | Status: SHIPPED | OUTPATIENT
Start: 2023-07-05 | End: 2023-08-26

## 2023-07-05 RX ORDER — DIGOXIN 125 MCG
125 TABLET ORAL DAILY
Qty: 30 TABLET | Refills: 0 | Status: SHIPPED | OUTPATIENT
Start: 2023-07-05 | End: 2023-08-26

## 2023-07-05 NOTE — TELEPHONE ENCOUNTER
DIGOXIN 125MCG TABS      Last Written Prescription Date:  6-17-22  Last Fill Quantity: 90,   # refills: 3  Last Office Visit : 6-17-22  Future Office visit:  none    6-17-22  clinic note:. Follow up with Dr. Wick as scheduled August 5.    Routing refill request to provider for review/approval because:   FYI: Overdue labs: Dig, cr  Past due appt  Multiple cancel/no show appts      CARVEDILOL 12.5MG TABS      Last Written Prescription Date:  6-17-22  Last Fill Quantity: 180,   # refills: 3  Routing refill request to provider for review/approval because:  Past due appt  Multiple cancel/no show appts    FUROSEMIDE 40MG TABS      Last Written Prescription Date:  6-17-22  Last Fill Quantity: 180,   # refills: 3  Routing refill request to provider for review/approval because:  FYI: Overdue labs: NaK, cr  Past due appt  Multiple cancel/no show appts      CEROVITE SENIOR  TABS      Last Written Prescription Date:  6-17-22  Last Fill Quantity: 90,   # refills: 3  Routing refill request to provider for review/approval because:  Per protocol- send to PCP, no PCP listed  ? RF  Past due appt  Multiple cancel/no show appts

## 2023-07-05 NOTE — TELEPHONE ENCOUNTER
Orders extended.   Letter sent    30 day supply of medications sent to pharmacy.     No further refills without fasting labs and a follow up visit with cardiology.

## 2023-07-05 NOTE — LETTER
July 5, 2023      TO: Roge Agarwal  [address] [address]         Dear Roge,    We have sent a 30 day supply of your medications ordered by Dr Wick. You are overdue for a follow up visit and lab test.    We have sent you a letter previously with no rescheduling of an appointment or needed lab tests.     If you have established care with a different cardiologist and/or prefer to have your primary care physician refill these medications-- please contact those providers for refills.     We will also try to reach you by telephone.     We will not be able to refill any further medications without fasting blood work and a follow up visit.     Please do not hesitate to call us if you have any questions or concerns.    Sincerely,    [facility]- Cardiology  [place] location.

## 2023-07-06 DIAGNOSIS — I48.92 ATRIAL FLUTTER, UNSPECIFIED TYPE (H): ICD-10-CM

## 2023-07-06 NOTE — TELEPHONE ENCOUNTER
Call to pts dtr: (pt does not have phone): left msg to inform that 30 day supply of medications sent.     Pt will need to schedule labs and follow up visit with Cardiology for further refill.    Pt could have PCP refill if he is not going to follow up with cardiology.    Pt can see any cardiologist: Dr Wick, Dr Morales, Dr Brantley or Dr Ramirez.   Contact number left.

## 2023-07-07 NOTE — TELEPHONE ENCOUNTER
Second message left for pt tcb to schedule lab and follow up with Cardiology.   (letter already sent)

## 2023-07-10 NOTE — TELEPHONE ENCOUNTER
Called to schedule labs and follow up with Heart Failure Cardiology. No answer - LVM.    Jewels Salamanca CMA ([facility])

## 2023-07-11 RX ORDER — MULTIVIT-MIN/FA/LYCOPEN/LUTEIN .4-300-25
TABLET ORAL
Qty: 90 TABLET | Refills: 3 | OUTPATIENT
Start: 2023-07-11

## 2023-07-16 DIAGNOSIS — I48.4 ATYPICAL ATRIAL FLUTTER (H): ICD-10-CM

## 2023-07-17 DIAGNOSIS — I48.92 ATRIAL FLUTTER, UNSPECIFIED TYPE (H): ICD-10-CM

## 2023-07-20 RX ORDER — DABIGATRAN ETEXILATE 150 MG/1
150 CAPSULE ORAL 2 TIMES DAILY
Qty: 60 CAPSULE | Refills: 0 | Status: SHIPPED | OUTPATIENT
Start: 2023-07-20 | End: 2023-08-26

## 2023-07-20 RX ORDER — MULTIVIT-MIN/FA/LYCOPEN/LUTEIN .4-300-25
TABLET ORAL
Qty: 90 TABLET | Refills: 3 | Status: SHIPPED | OUTPATIENT
Start: 2023-07-20 | End: 2023-07-25

## 2023-07-20 NOTE — TELEPHONE ENCOUNTER
Last Clinic Visit: 6- 8- - NEXT APPOINTMENT  Labs being done 8/15/23 in [place]    30 DAY SELINA REFILL SENT TO PHARMACY

## 2023-07-25 DIAGNOSIS — I48.92 ATRIAL FLUTTER, UNSPECIFIED TYPE (H): ICD-10-CM

## 2023-07-25 RX ORDER — MULTIVIT-MIN/FA/LYCOPEN/LUTEIN .4-300-25
1 TABLET ORAL DAILY
Qty: 90 TABLET | Refills: 0 | Status: SHIPPED | OUTPATIENT
Start: 2023-07-25

## 2023-07-29 DIAGNOSIS — Z78.9 ALCOHOL USE: ICD-10-CM

## 2023-08-02 RX ORDER — LANOLIN ALCOHOL/MO/W.PET/CERES
100 CREAM (GRAM) TOPICAL DAILY
Qty: 90 TABLET | Refills: 3 | Status: SHIPPED | OUTPATIENT
Start: 2023-08-02

## 2023-08-02 NOTE — TELEPHONE ENCOUNTER
thiamine (B-1) 100 MG tablet      Last Written Prescription Date:  6/17/2022  Last Fill Quantity: 90,   # refills: 3  Last Office Visit : 6/17/2022  [place]  Future Office visit:  8/18/2023    Routing refill request to provider for review/approval because:  Medication not on the Cardiology refill protocol.

## 2023-08-18 DIAGNOSIS — I10 ESSENTIAL HYPERTENSION: ICD-10-CM

## 2023-08-18 DIAGNOSIS — I50.22 CHRONIC SYSTOLIC HEART FAILURE (H): ICD-10-CM

## 2023-08-18 DIAGNOSIS — I48.4 ATYPICAL ATRIAL FLUTTER (H): ICD-10-CM

## 2023-08-18 DIAGNOSIS — I50.23 ACUTE ON CHRONIC SYSTOLIC CONGESTIVE HEART FAILURE (H): ICD-10-CM

## 2023-08-18 DIAGNOSIS — Z78.9 ALCOHOL USE: ICD-10-CM

## 2023-08-23 NOTE — TELEPHONE ENCOUNTER
FOLIC ACID 1MG TABS       Last Written Prescription Date:  6-17-22  Last Fill Quantity: 90,   # refills: 3  Last Office Visit : 6-17-22  Future Office visit:  none    Routing refill request to provider for review/approval because:  Med not on cards protocol  Past due appt    CARVEDILOL 12.5MG TABS       Last Written Prescription Date:  7-5-23  Last Fill Quantity: 60,   # refills: 0  DIGOXIN 125MCG TABS       Last Written Prescription Date:  7-5-23  Last Fill Quantity: 30,   # refills: 0  FUROSEMIDE 40MG TABS       Last Written Prescription Date:  7-5-23  Last Fill Quantity: 60,   # refills: 0  PRADAXA 150MG CAPS      Last Written Prescription Date:  7-10-23  Last Fill Quantity: 60,   # refills: 0  Routing refill request to provider for review/approval because:  Overdue lab: Cr, NaK, Dig, CBC  Previous damien RF- no future appt    AMLODIPINE 10MG TAB   Last Written Prescription Date:  6-17-22  Last Fill Quantity: 90,   # refills: 3    Routing refill request to provider for review/approval because:  Overdue BP, lab: Cr, CBC  No future appt

## 2023-08-24 NOTE — TELEPHONE ENCOUNTER
RN can not fill Rx request since patient has cancelled/no showed multiple times.  Dr. Wick, please address refill requests.  Deny or approve refills?  Has not been seen for 1 1/2 years.  Patient does not have a follow-up scheduled.  Also attempted to contact patient, no answer, detailed message stating that he needs an appointment and labs to refills and to call back to discuss.  This patient can be seen by Dr. Shey Leal, Aug. 28th.    Che Camara, JOHANA  Cardiology Care Coordinator  [facility]  [phone] option 1

## 2023-08-26 RX ORDER — FOLIC ACID 1 MG/1
1000 TABLET ORAL DAILY
Qty: 90 TABLET | Refills: 3 | Status: SHIPPED | OUTPATIENT
Start: 2023-08-26

## 2023-08-26 RX ORDER — ATENOLOL 100 MG/1
150 TABLET ORAL 2 TIMES DAILY
Qty: 60 CAPSULE | Refills: 3 | Status: SHIPPED | OUTPATIENT
Start: 2023-08-26

## 2023-08-26 RX ORDER — DIGOXIN 125 MCG
TABLET ORAL
Qty: 30 TABLET | Refills: 3 | Status: SHIPPED | OUTPATIENT
Start: 2023-08-26

## 2023-08-26 RX ORDER — AMLODIPINE BESYLATE 10 MG/1
10 TABLET ORAL DAILY
Qty: 90 TABLET | Refills: 3 | Status: SHIPPED | OUTPATIENT
Start: 2023-08-26

## 2023-08-26 RX ORDER — FUROSEMIDE 40 MG
TABLET ORAL
Qty: 60 TABLET | Refills: 3 | Status: SHIPPED | OUTPATIENT
Start: 2023-08-26

## 2023-08-26 RX ORDER — CARVEDILOL 12.5 MG/1
TABLET ORAL
Qty: 60 TABLET | Refills: 3 | Status: SHIPPED | OUTPATIENT
Start: 2023-08-26

## 2023-09-28 ENCOUNTER — OFFICE VISIT (OUTPATIENT)
Dept: FAMILY MEDICINE | Facility: CLINIC | Age: 63
End: 2023-09-28
Payer: COMMERCIAL

## 2023-09-28 VITALS
OXYGEN SATURATION: 98 % | HEIGHT: 72 IN | BODY MASS INDEX: 24.23 KG/M2 | SYSTOLIC BLOOD PRESSURE: 130 MMHG | WEIGHT: 178.9 LBS | TEMPERATURE: 98 F | HEART RATE: 78 BPM | DIASTOLIC BLOOD PRESSURE: 78 MMHG | RESPIRATION RATE: 20 BRPM

## 2023-09-28 DIAGNOSIS — Z13.9 ENCOUNTER FOR SCREENING INVOLVING SOCIAL DETERMINANTS OF HEALTH (SDOH): ICD-10-CM

## 2023-09-28 DIAGNOSIS — F41.1 GAD (GENERALIZED ANXIETY DISORDER): ICD-10-CM

## 2023-09-28 DIAGNOSIS — R35.0 URINARY FREQUENCY: ICD-10-CM

## 2023-09-28 DIAGNOSIS — E78.2 MULTIPLE-TYPE HYPERLIPIDEMIA: ICD-10-CM

## 2023-09-28 DIAGNOSIS — Z12.11 SCREEN FOR COLON CANCER: ICD-10-CM

## 2023-09-28 DIAGNOSIS — Z00.00 ROUTINE GENERAL MEDICAL EXAMINATION AT A HEALTH CARE FACILITY: Primary | ICD-10-CM

## 2023-09-28 DIAGNOSIS — Z11.59 NEED FOR HEPATITIS C SCREENING TEST: ICD-10-CM

## 2023-09-28 DIAGNOSIS — R05.3 CHRONIC COUGH: ICD-10-CM

## 2023-09-28 DIAGNOSIS — Z13.220 LIPID SCREENING: ICD-10-CM

## 2023-09-28 DIAGNOSIS — Z23 NEED FOR SHINGLES VACCINE: ICD-10-CM

## 2023-09-28 DIAGNOSIS — Z11.4 SCREENING FOR HIV (HUMAN IMMUNODEFICIENCY VIRUS): ICD-10-CM

## 2023-09-28 DIAGNOSIS — H91.93 HEARING DIFFICULTY OF BOTH EARS: ICD-10-CM

## 2023-09-28 DIAGNOSIS — Z12.5 SCREENING FOR PROSTATE CANCER: ICD-10-CM

## 2023-09-28 DIAGNOSIS — Z23 NEED FOR VACCINATION: ICD-10-CM

## 2023-09-28 LAB
ALBUMIN SERPL BCG-MCNC: 4.3 G/DL (ref 3.5–5.2)
ALBUMIN UR-MCNC: NEGATIVE MG/DL
ALP SERPL-CCNC: 65 U/L (ref 40–129)
ALT SERPL W P-5'-P-CCNC: 18 U/L (ref 0–70)
ANION GAP SERPL CALCULATED.3IONS-SCNC: 12 MMOL/L (ref 7–15)
APPEARANCE UR: CLEAR
AST SERPL W P-5'-P-CCNC: 23 U/L (ref 0–45)
BILIRUB SERPL-MCNC: 0.6 MG/DL
BILIRUB UR QL STRIP: NEGATIVE
BUN SERPL-MCNC: 15.1 MG/DL (ref 8–23)
CALCIUM SERPL-MCNC: 9.2 MG/DL (ref 8.8–10.2)
CHLORIDE SERPL-SCNC: 100 MMOL/L (ref 98–107)
CHOLEST SERPL-MCNC: 185 MG/DL
COLOR UR AUTO: YELLOW
CREAT SERPL-MCNC: 1 MG/DL (ref 0.67–1.17)
DIGOXIN SERPL-MCNC: 0.4 NG/ML (ref 0.6–2)
EGFRCR SERPLBLD CKD-EPI 2021: 84 ML/MIN/1.73M2
ERYTHROCYTE [DISTWIDTH] IN BLOOD BY AUTOMATED COUNT: 12.6 % (ref 10–15)
GLUCOSE SERPL-MCNC: 87 MG/DL (ref 70–99)
GLUCOSE UR STRIP-MCNC: NEGATIVE MG/DL
HCO3 SERPL-SCNC: 26 MMOL/L (ref 22–29)
HCT VFR BLD AUTO: 43.5 % (ref 40–53)
HDLC SERPL-MCNC: 37 MG/DL
HGB BLD-MCNC: 14.2 G/DL (ref 13.3–17.7)
HGB UR QL STRIP: NEGATIVE
HIV 1+2 AB+HIV1 P24 AG SERPL QL IA: NONREACTIVE
KETONES UR STRIP-MCNC: NEGATIVE MG/DL
LDLC SERPL CALC-MCNC: 134 MG/DL
LEUKOCYTE ESTERASE UR QL STRIP: NEGATIVE
MAGNESIUM SERPL-MCNC: 2.2 MG/DL (ref 1.7–2.3)
MCH RBC QN AUTO: 31.9 PG (ref 26.5–33)
MCHC RBC AUTO-ENTMCNC: 32.6 G/DL (ref 31.5–36.5)
MCV RBC AUTO: 98 FL (ref 78–100)
NITRATE UR QL: NEGATIVE
NONHDLC SERPL-MCNC: 148 MG/DL
NT-PROBNP SERPL-MCNC: 683 PG/ML (ref 0–900)
PH UR STRIP: 6 [PH] (ref 5–7)
PLATELET # BLD AUTO: 190 10E3/UL (ref 150–450)
POTASSIUM SERPL-SCNC: 4.3 MMOL/L (ref 3.4–5.3)
PROT SERPL-MCNC: 7.6 G/DL (ref 6.4–8.3)
PSA SERPL DL<=0.01 NG/ML-MCNC: 0.38 NG/ML (ref 0–4.5)
RBC # BLD AUTO: 4.45 10E6/UL (ref 4.4–5.9)
SODIUM SERPL-SCNC: 138 MMOL/L (ref 135–145)
SP GR UR STRIP: 1.01 (ref 1–1.03)
TRIGL SERPL-MCNC: 71 MG/DL
TSH SERPL DL<=0.005 MIU/L-ACNC: 1.17 UIU/ML (ref 0.3–4.2)
UROBILINOGEN UR STRIP-ACNC: 0.2 E.U./DL
WBC # BLD AUTO: 7.6 10E3/UL (ref 4–11)

## 2023-09-28 PROCEDURE — 85027 COMPLETE CBC AUTOMATED: CPT | Performed by: STUDENT IN AN ORGANIZED HEALTH CARE EDUCATION/TRAINING PROGRAM

## 2023-09-28 PROCEDURE — 80061 LIPID PANEL: CPT | Performed by: STUDENT IN AN ORGANIZED HEALTH CARE EDUCATION/TRAINING PROGRAM

## 2023-09-28 PROCEDURE — 87389 HIV-1 AG W/HIV-1&-2 AB AG IA: CPT | Performed by: STUDENT IN AN ORGANIZED HEALTH CARE EDUCATION/TRAINING PROGRAM

## 2023-09-28 PROCEDURE — 80053 COMPREHEN METABOLIC PANEL: CPT | Performed by: STUDENT IN AN ORGANIZED HEALTH CARE EDUCATION/TRAINING PROGRAM

## 2023-09-28 PROCEDURE — 90750 HZV VACC RECOMBINANT IM: CPT | Performed by: STUDENT IN AN ORGANIZED HEALTH CARE EDUCATION/TRAINING PROGRAM

## 2023-09-28 PROCEDURE — G0103 PSA SCREENING: HCPCS | Performed by: STUDENT IN AN ORGANIZED HEALTH CARE EDUCATION/TRAINING PROGRAM

## 2023-09-28 PROCEDURE — 99214 OFFICE O/P EST MOD 30 MIN: CPT | Mod: 25 | Performed by: STUDENT IN AN ORGANIZED HEALTH CARE EDUCATION/TRAINING PROGRAM

## 2023-09-28 PROCEDURE — 84443 ASSAY THYROID STIM HORMONE: CPT | Performed by: STUDENT IN AN ORGANIZED HEALTH CARE EDUCATION/TRAINING PROGRAM

## 2023-09-28 PROCEDURE — 81003 URINALYSIS AUTO W/O SCOPE: CPT | Performed by: STUDENT IN AN ORGANIZED HEALTH CARE EDUCATION/TRAINING PROGRAM

## 2023-09-28 PROCEDURE — 36415 COLL VENOUS BLD VENIPUNCTURE: CPT | Performed by: STUDENT IN AN ORGANIZED HEALTH CARE EDUCATION/TRAINING PROGRAM

## 2023-09-28 PROCEDURE — 86803 HEPATITIS C AB TEST: CPT | Performed by: STUDENT IN AN ORGANIZED HEALTH CARE EDUCATION/TRAINING PROGRAM

## 2023-09-28 PROCEDURE — 83880 ASSAY OF NATRIURETIC PEPTIDE: CPT | Performed by: STUDENT IN AN ORGANIZED HEALTH CARE EDUCATION/TRAINING PROGRAM

## 2023-09-28 PROCEDURE — 80162 ASSAY OF DIGOXIN TOTAL: CPT | Performed by: STUDENT IN AN ORGANIZED HEALTH CARE EDUCATION/TRAINING PROGRAM

## 2023-09-28 PROCEDURE — 99396 PREV VISIT EST AGE 40-64: CPT | Mod: 25 | Performed by: STUDENT IN AN ORGANIZED HEALTH CARE EDUCATION/TRAINING PROGRAM

## 2023-09-28 PROCEDURE — 83735 ASSAY OF MAGNESIUM: CPT | Performed by: STUDENT IN AN ORGANIZED HEALTH CARE EDUCATION/TRAINING PROGRAM

## 2023-09-28 PROCEDURE — 90471 IMMUNIZATION ADMIN: CPT | Performed by: STUDENT IN AN ORGANIZED HEALTH CARE EDUCATION/TRAINING PROGRAM

## 2023-09-28 RX ORDER — BUSPIRONE HYDROCHLORIDE 10 MG/1
10 TABLET ORAL 3 TIMES DAILY
Qty: 180 TABLET | Refills: 1 | Status: SHIPPED | OUTPATIENT
Start: 2023-09-28

## 2023-09-28 ASSESSMENT — ENCOUNTER SYMPTOMS
ARTHRALGIAS: 0
MYALGIAS: 0
PARESTHESIAS: 0
CONSTIPATION: 0
SORE THROAT: 0
ABDOMINAL PAIN: 0
DIARRHEA: 0
SHORTNESS OF BREATH: 0
NERVOUS/ANXIOUS: 1
JOINT SWELLING: 0
NAUSEA: 0
HEMATOCHEZIA: 0
FEVER: 0
COUGH: 1
PALPITATIONS: 0
FREQUENCY: 1
WEAKNESS: 0
CHILLS: 0
HEMATURIA: 0
DIZZINESS: 1
EYE PAIN: 0
HEADACHES: 0
DYSURIA: 0
HEARTBURN: 0

## 2023-09-28 ASSESSMENT — PAIN SCALES - GENERAL: PAINLEVEL: NO PAIN (0)

## 2023-09-28 NOTE — COMMUNITY RESOURCES LIST (ENGLISH)
09/28/2023   [facility] [employer]  N/A  For questions about this resource list or additional care needs, please contact your primary care clinic or care manager.  Phone: [phone]   Email: N/A   Address: [facility] [place] [address]   Hours: N/A        Financial Stability       Rent and mortgage payment assistance  1  [employer] Distance: 15.62 miles      In-Person, Phone/Virtual   [address] [place] [address]  Language: English  Hours: Mon - Sun Appt. Only  Fees: Free   Phone: (908) 276-1106 Email: han@[email or web address].com Website: http://www.[facility] Hospitalpinghand.org     2  [employer] [place] ([employer]) - Emergency Assistance Distance: 20.54 miles      In-Person   [address] [place] [address]  Language: English, Nepali  Hours: Mon 10:00 AM - 1:30 PM Appt. Only, Wed 10:00 AM - 1:30 PM Appt. Only, Thu 4:30 PM - 6:30 PM Appt. Only, Fri 10:00 AM - 1:30 PM Appt. Only  Fees: Free   Phone: (576) 163-7904 Email: info@[employer].org Website: http://www.[employer].org          Food and Nutrition       Food pantry  3  [facility] [employer] [facility]) [facility] Distance: 8.45 miles      Pickup   [address] [place] [address]  Language: English  Hours: Tue 2:30 PM - 5:00 PM  Fees: Free   Phone: (994) 877-5201 Website: http://Kasenna.org/     4  [facility]'s Pantry Distance: 9.96 miles      In-Person   [address] [place] [address]  Language: English  Hours: Sat 8:30 AM - 11:30 AM  Fees: Free   Phone: (653) 667-3848 Email: contact@[employer].org Website: http://[employer]mn[employer].org/     SNAP application assistance  5  [facility] Distance: 7.53 miles      In-Person, Phone/Virtual   [address] [place] [address] [place] [address]  Language: English  Hours: Mon - Fri 8:00 AM - 4:30 PM  Fees: Free   Phone: (220) 722-7194 Email:  wendy@co.[facility]. Website: https://www.[facility]./170/Family-Services     6  [place] [employer] [place] ([facility]) - [place] Office Distance: 11.32 miles      In-Person, Phone/Virtual   [address] [place] [address]  Language: English  Hours: Mon - Fri 8:00 AM - 4:30 PM  Fees: Free   Phone: (539) 951-3275 Email: esther@[facility] Website: https://www.[facility]/agency-information     Soup kitchen or free meals  7  [facility] - Family Table Meals - Family Table Meal Distance: 19.54 miles      [facility]   [address] [place] [address]  Language: English  Hours: Thu 5:00 PM - 6:30 PM  Fees: Free   Phone: (995) 119-6215 Email: irving@[facility].[employer] Website: http://www.[facility].SocialBrowse     8  [facility] Meals Distance: 19.74 miles      In-Person   [address] [place] [address]  Language: English  Hours: Wed 5:30 PM - 6:15 PM  Fees: Free   Phone: (274) 195-4805 Email: alonzo@[employer] Website: http://www.[facility]eroGarfield.org/          Important Numbers & Websites       Emergency Services   911  [facility]   311  Poison Control   (669) 778-8476  Suicide Prevention Lifeline   (635) 240-1548 (TALK)  Child Abuse Hotline   (428) 351-6220 (4-A-Child)  Sexual Assault Hotline   (320) 733-7608 (HOPE)  National Runaway Safeline   (260) 537-9868 (RUNAWAY)  All-Options Talkline   (294) 439-8230  Substance Abuse Referral   (828) 614-5398 (HELP)

## 2023-09-28 NOTE — LETTER
October 2, 2023      Roge Agarwal  [address] [facility] [address]        Dear ,    We are writing to inform you of your test results.    Celeste Roge Agarwal,     It is a pleasure providing you with medical care. I have received and reviewed your lab results, and have the following recommendations:     With the exception of your lipid panel, the remainder of your blood work is within normal limits.     You were found to have elevated cholesterol. At this time, I calculate your risk for  a future heart attack or stroke to be roughly 15% in the next 10 years. To help protect yourself from future life threatening events, I suggest you: Try to limit your dietary intake of fatty, greasy, oily, fried, take out, and fast food when possible. Also, I would suggest you cut back on red and processed meats, sodium and sugar-sweetened foods and beverages. Regarding exercise, please get at least 30 minutes of CONTINUOUS moderate intensity aerobic exercise at least 3 times per week.     Additionally, since your risk for a heart attack or stroke is higher than 10% I will be sending over prescription for medication called Lipitor/atorvastatin 40 mg by mouth nightly.  Sometimes these medication can cause muscle aches.  If you do experience any muscle aches please let me know as medication adjustments may likely be required.     I kindly request that you make a follow-up appointment in 6 months to ensure that you are adhering to adequate lifestyle and dietary changes as well as rechecking your cholesterol levels.     Resulted Orders   Comprehensive metabolic panel   Result Value Ref Range    Sodium 138 135 - 145 mmol/L      Comment:      Reference intervals for this test were updated on 09/26/2023 to more accurately reflect our healthy population. There may be differences in the flagging of prior results with similar values performed with this method. Interpretation of those prior results can be made in the context  of the updated reference intervals.     Potassium 4.3 3.4 - 5.3 mmol/L    Carbon Dioxide (CO2) 26 22 - 29 mmol/L    Anion Gap 12 7 - 15 mmol/L    Urea Nitrogen 15.1 8.0 - 23.0 mg/dL    Creatinine 1.00 0.67 - 1.17 mg/dL    GFR Estimate 84 >60 mL/min/1.73m2    Calcium 9.2 8.8 - 10.2 mg/dL    Chloride 100 98 - 107 mmol/L    Glucose 87 70 - 99 mg/dL    Alkaline Phosphatase 65 40 - 129 U/L    AST 23 0 - 45 U/L      Comment:      Reference intervals for this test were updated on 6/12/2023 to more accurately reflect our healthy population. There may be differences in the flagging of prior results with similar values performed with this method. Interpretation of those prior results can be made in the context of the updated reference intervals.    ALT 18 0 - 70 U/L      Comment:      Reference intervals for this test were updated on 6/12/2023 to more accurately reflect our healthy population. There may be differences in the flagging of prior results with similar values performed with this method. Interpretation of those prior results can be made in the context of the updated reference intervals.      Protein Total 7.6 6.4 - 8.3 g/dL    Albumin 4.3 3.5 - 5.2 g/dL    Bilirubin Total 0.6 <=1.2 mg/dL   Lipid panel reflex to direct LDL Fasting   Result Value Ref Range    Cholesterol 185 <200 mg/dL    Triglycerides 71 <150 mg/dL    Direct Measure HDL 37 (L) >=40 mg/dL    LDL Cholesterol Calculated 134 (H) <=100 mg/dL    Non HDL Cholesterol 148 (H) <130 mg/dL    Narrative    Cholesterol  Desirable:  <200 mg/dL    Triglycerides  Normal:  Less than 150 mg/dL  Borderline High:  150-199 mg/dL  High:  200-499 mg/dL  Very High:  Greater than or equal to 500 mg/dL    Direct Measure HDL  Female:  Greater than or equal to 50 mg/dL   Male:  Greater than or equal to 40 mg/dL    LDL Cholesterol  Desirable:  <100mg/dL  Above Desirable:  100-129 mg/dL   Borderline High:  130-159 mg/dL   High:  160-189 mg/dL   Very High:  >= 190 mg/dL    Non HDL  Cholesterol  Desirable:  130 mg/dL  Above Desirable:  130-159 mg/dL  Borderline High:  160-189 mg/dL  High:  190-219 mg/dL  Very High:  Greater than or equal to 220 mg/dL   HIV Antigen Antibody Combo   Result Value Ref Range    HIV Antigen Antibody Combo Nonreactive Nonreactive      Comment:      HIV-1 p24 Ag & HIV-1/HIV-2 Ab Not Detected   Hepatitis C Screen Reflex to HCV RNA Quant and Genotype   Result Value Ref Range    Hepatitis C Antibody Nonreactive Nonreactive    Narrative    Assay performance characteristics have not been established for newborns, infants, and children.   PSA, screen   Result Value Ref Range    Prostate Specific Antigen Screen 0.38 0.00 - 4.50 ng/mL    Narrative    This result is obtained using the Roche Elecsys total PSA method on the lulu [id number] immunoassay analyzer. Results obtained with different assay methods or kits cannot be used interchangeably.   UA Macroscopic with reflex to Microscopic and Culture - Lab Collect   Result Value Ref Range    Color Urine Yellow Colorless, Straw, Light Yellow, Yellow    Appearance Urine Clear Clear    Glucose Urine Negative Negative mg/dL    Bilirubin Urine Negative Negative    Ketones Urine Negative Negative mg/dL    Specific Gravity Urine 1.015 1.003 - 1.035    Blood Urine Negative Negative    pH Urine 6.0 5.0 - 7.0    Protein Albumin Urine Negative Negative mg/dL    Urobilinogen Urine 0.2 0.2, 1.0 E.U./dL    Nitrite Urine Negative Negative    Leukocyte Esterase Urine Negative Negative    Narrative    Microscopic not indicated       If you have any questions or concerns, please call the clinic at the number listed above.       Sincerely,      Charity Moreland MD

## 2023-09-28 NOTE — PROGRESS NOTES
SUBJECTIVE:   CC: Roge is an 63 year old who presents for preventative health visit.       9/28/2023    10:50 AM   Additional Questions   Roomed by Ayala BRENNER LPN   Accompanied by Daughter         9/28/2023    10:50 AM   Patient Reported Additional Medications   Patient reports taking the following new medications no new meds       Healthy Habits:     Getting at least 3 servings of Calcium per day:  NO    Bi-annual eye exam:  NO    Dental care twice a year:  NO    Sleep apnea or symptoms of sleep apnea:  None    Diet:  Regular (no restrictions)    Frequency of exercise:  None    Duration of exercise:  N/A    Taking medications regularly:  Yes    Barriers to taking medications:  None    Medication side effects:  Lightheadedness    Additional concerns today:  Yes    Today's PHQ-2 Score:       9/28/2023    10:47 AM   PHQ-2 ( 1999 [employer])   Q1: Little interest or pleasure in doing things 0   Q2: Feeling down, depressed or hopeless 0   PHQ-2 Score 0   Q1: Little interest or pleasure in doing things Not at all   Q2: Feeling down, depressed or hopeless Not at all   PHQ-2 Score 0       Chronic Illness   Chronic illness concerns: congestion in chest   Onset: 3-5 years  Fever: no  Chills/Sweats: no  Headache (location?): no  Sinus Pressure:no but has congestion every morning  Conjunctivitis:  no  Ear Pain: no  Rhinorrhea: YES  Congestion: YES  Sore Throat: no    Cough: YES - only when laying down at night or when he laughs  Wheeze: YES  Decreased Appetite: no  Nausea: no  Vomiting: no  Diarrhea:  no  Dysuria/Freq.: no  Fatigue/Achiness: no  Sick/Strep Exposure: no     Therapies Tried and outcome: OTC Claritin doesn't help   Have you ever done Advance Care Planning? (For example, a Health Directive, POLST, or a discussion with a medical provider or your loved ones about your wishes): No, advance care planning information given to patient to review.  Patient plans to discuss their wishes with loved ones or provider.   "    Social History     Tobacco Use    Smoking status: Never    Smokeless tobacco: Current     Types: Chew    Tobacco comments:     2 tins per wk   Substance Use Topics    Alcohol use: Not Currently     Comment: 12/30/2019 9/28/2023    10:47 AM   Alcohol Use   Prescreen: >3 drinks/day or >7 drinks/week? Not Applicable       Last PSA:   PSA   Date Value Ref Range Status   10/09/2020 0.49 0 - 4 ug/L Final     Comment:     Assay Method:  Chemiluminescence using Siemens Vista analyzer       Reviewed orders with patient. Reviewed health maintenance and updated orders accordingly - Yes  Lab work is in process    Reviewed and updated as needed this visit by clinical staff   Tobacco  Allergies  Meds              Reviewed and updated as needed this visit by Provider                   Review of Systems   Constitutional:  Negative for chills and fever.   HENT:  Positive for congestion and hearing loss. Negative for ear pain and sore throat.    Eyes:  Negative for pain and visual disturbance.   Respiratory:  Positive for cough. Negative for shortness of breath.    Cardiovascular:  Negative for chest pain, palpitations and peripheral edema.   Gastrointestinal:  Negative for abdominal pain, constipation, diarrhea, heartburn, hematochezia and nausea.   Genitourinary:  Positive for frequency and urgency. Negative for dysuria, genital sores, hematuria, impotence and penile discharge.   Musculoskeletal:  Negative for arthralgias, joint swelling and myalgias.   Skin:  Negative for rash.   Neurological:  Positive for dizziness. Negative for weakness, headaches and paresthesias.   Psychiatric/Behavioral:  Negative for mood changes. The patient is nervous/anxious.        OBJECTIVE:   /78 (BP Location: Right arm, Patient Position: Sitting, Cuff Size: Adult Regular)   Pulse 78   Temp 98  F (36.7  C) (Tympanic)   Resp 20   Ht 1.841 m (6' 0.48\")   Wt 81.1 kg (178 lb 14.4 oz)   SpO2 98%   BMI 23.94 kg/m  "     Physical Exam  Constitutional:       General: He is not in acute distress.  HENT:      Head: Normocephalic and atraumatic.      Right Ear: External ear normal.      Left Ear: External ear normal.      Mouth/Throat:      Mouth: Mucous membranes are moist.      Pharynx: Oropharynx is clear. No oropharyngeal exudate or posterior oropharyngeal erythema.   Eyes:      Extraocular Movements: Extraocular movements intact.   Cardiovascular:      Rate and Rhythm: Normal rate and regular rhythm.      Heart sounds: Normal heart sounds.   Pulmonary:      Effort: Pulmonary effort is normal. No respiratory distress.      Breath sounds: Normal breath sounds. No wheezing or rhonchi.   Abdominal:      Palpations: Abdomen is soft. There is no mass.      Tenderness: There is no abdominal tenderness.   Musculoskeletal:         General: No deformity. Normal range of motion.      Cervical back: Normal range of motion and neck supple.   Skin:     General: Skin is warm.      Findings: No rash.   Neurological:      General: No focal deficit present.      Mental Status: He is alert and oriented to person, place, and time.   Psychiatric:         Mood and Affect: Mood normal.       Diagnostic Test Results:  No recent labs on file     ASSESSMENT/PLAN:     1. Routine general medical examination at a health care facility  - Comprehensive metabolic panel (BMP + Alb, Alk Phos, ALT, AST, Total. Bili, TP); Future    2. Screen for colon cancer  > elected for FIT instead of colonoscopy, no personal or family history of colorectal cancer   - Fecal colorectal cancer screen (FIT)    3. Screening for HIV (human immunodeficiency virus)  - HIV Antigen Antibody Combo; Future  - HIV Antigen Antibody Combo    4. Need for hepatitis C screening test  - Hepatitis C Screen Reflex to HCV RNA Quant and Genotype; Future  - Hepatitis C Screen Reflex to HCV RNA Quant and Genotype    5. Need for vaccination  6. Need for shingles vaccine  - ZOSTER RECOMBINANT  "ADJUVANTED (SHINGRIX)  - declined covid and flu     7. Lipid screening  8. Multiple-type hyperlipidemia  - Lipid panel reflex to direct LDL Fasting; Future    9. Screening for prostate cancer  - PSA, screen    10. Urinary frequency  > found to have diverticulum in his bladder   - Adult Urology  Referral; Future  - UA Macroscopic with reflex to Microscopic and Culture - Lab Collect    11. MITALI (generalized anxiety disorder)  > did not notice improvement with hydroxyzine and is interested in buspar, he is aware this is to be taken three times daily   - busPIRone (BUSPAR) 10 MG tablet; Take 1 tablet (10 mg) by mouth 3 times daily  Dispense: 180 tablet; Refill: 1    12. Hearing difficulty of both ears  - Adult Audiology  Referral; Future    13. Chronic cough  > has been worked up for this in the past without any definitive answers  - denies any GERD, asthma, or allergies, low suspicion for upper airway cough syndrome   - Adult ENT  Referral; Future    14. Encounter for screening involving social determinants of health (SDoH)  > financial and housing difficulties   - Primary Care - Care Coordination Referral; Future       Patient has been advised of split billing requirements and indicates understanding: Yes      COUNSELING:   Reviewed preventive health counseling, as reflected in patient instructions  Special attention given to:        Regular exercise       Healthy diet/nutrition    Patient's daughter reports he only eats once daily, diet varies between patient admits \"I'm a bad eater\" he may eat a donut in the morning, last night he had salad and BBQ meatballs, sometimes he'll have a pizza and get frozen dinners at [employer]. States he does better on the weekends     He reports that he has never smoked. His smokeless tobacco use includes chew.            DAKOTA FARR MD  [facility]  "

## 2023-09-29 DIAGNOSIS — E78.2 MIXED HYPERLIPIDEMIA: Primary | ICD-10-CM

## 2023-09-29 LAB — HCV AB SERPL QL IA: NONREACTIVE

## 2023-09-29 RX ORDER — ATORVASTATIN CALCIUM 40 MG/1
40 TABLET, FILM COATED ORAL DAILY
Qty: 90 TABLET | Refills: 3 | Status: SHIPPED | OUTPATIENT
Start: 2023-09-29

## 2023-09-29 NOTE — RESULT ENCOUNTER NOTE
Celeste Agarwal,     It is a pleasure providing you with medical care. I have received and reviewed your lab results, and have the following recommendations:     With the exception of your lipid panel, the remainder of your blood work is within normal limits.     You were found to have elevated cholesterol. At this time, I calculate your risk for  a future heart attack or stroke to be roughly 15% in the next 10 years. To help protect yourself from future life threatening events, I suggest you: Try to limit your dietary intake of fatty, greasy, oily, fried, take out, and fast food when possible. Also, I would suggest you cut back on red and processed meats, sodium and sugar-sweetened foods and beverages. Regarding exercise, please get at least 30 minutes of CONTINUOUS moderate intensity aerobic exercise at least 3 times per week.    Additionally, since your risk for a heart attack or stroke is higher than 10% I will be sending over prescription for medication called Lipitor/atorvastatin 40 mg by mouth nightly.  Sometimes these medication can cause muscle aches.  If you do experience any muscle aches please let me know as medication adjustments may likely be required.    I kindly request that you make a follow-up appointment in 6 months to ensure that you are adhering to adequate lifestyle and dietary changes as well as rechecking your cholesterol levels.    Sincerely,     Charity Moreland MD

## 2023-10-03 ENCOUNTER — PATIENT OUTREACH (OUTPATIENT)
Dept: CARE COORDINATION | Facility: CLINIC | Age: 63
End: 2023-10-03
Payer: COMMERCIAL

## 2023-10-03 NOTE — PROGRESS NOTES
Clinic Care Coordination Contact  [facility]/Voicemail    Referral reason:   SDOH Questionnaire -  housing and food needs identified    Clinical Data: Care Coordination Outreach  Outreach attempted x 1.  Left message on patient's voicemail with call back information and requested return call.  Plan: CHW will try to reach patient again in 1-2 business days.    Minerva ERESE  Community Health Worker  [facility] Care Coordination  [facility]  [email or web address]   Office: [phone]

## 2023-10-03 NOTE — LETTER
[facility] CARE COORDINATION    October 4, 2023    Roge Agarwal  [address] [facility] [address]      Dear Roge,    I am a  clinic community health worker who works with the [facility]. I wanted to introduce myself and provide you with my contact information to be able to call me with any support or resource needs you may have.  Below is a description of clinic care coordination and how we can further assist you.       The clinic care coordination team is made up of a registered nurse, , financial resource worker and community health worker who understand the health care system. The goal of clinic care coordination is to help you manage your health and improve access to the health care system. Our team works alongside your provider to assist you in determining your health and social needs. We can help you obtain health care and community resources, providing you with necessary information and education. We can work with you through any barriers and develop a care plan that helps coordinate and strengthen the communication between you and your care team.  Our services are voluntary and are offered without charge to you personally.    Please feel free to contact me regarding care coordination and what we can offer.      We are focused on providing you with the highest-quality healthcare experience possible.    Sincerely,       Minerva REESE  Community Health Worker  [facility]  Clinic Care Coordination  [facility]  [email or web address]  [email or web address]   Office: [phone]

## 2023-10-04 NOTE — PROGRESS NOTES
Clinic Care Coordination Contact  [facility]/Voicemail     Clinical Data: Care Coordination Outreach  Outreach attempted x 2.  Left message on patient's voicemail with call back information and requested return call.  Plan: CHW will send care coordination introduction letter with care coordinator contact information and explanation of care coordination services via mail. CHW will do no further outreaches at this time.    Minerva REESE  Community Health Worker  [facility] Care Coordination  [facility]  [email or web address]   Office: [phone]

## 2023-10-18 NOTE — PROGRESS NOTES
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      He went to the ER, but wasn't seen as the wait was too long. He has fatigue. Trouble sleeping.  Patient doesn't check BP's at home. Patient Weight 185 lbs with discharge Patient is at 190 lbs now .  He reports he has stopped ETOH.  Patient has been eating more.  He has no swelling in his legs and abdomen. Gets some ARSHAD but not severe.   Able to lay flat in his bed.. Patient has been drinking a lot of water. He has been eating more sodium. He has had a lot of stress lately.     Denies SOB, ARSHAD, PND, orthopnea, edema, chest pain, palpitations, lightheadedness, dizziness, near syncopal/syncopal episodes. Roge has been following salt and fluid restrictions.      PAST MEDICAL HISTORY:  Past Medical History:   Diagnosis Date     Atrial fibrillation (H) 2016    One time episode noted after his finger accident, required cardioversion     Atrial fibrillation with RVR (H) 4/6/2016     Hemorrhagic stroke (H) 12/16/2007    2.3 X 1.5 CM PARENCHYMAL HEMORRHAGE IN THE RIGHT BASAL GANGLIA     HTN (hypertension)      Systolic CHF (H)     EF of 15-30% in 2016       FAMILY HISTORY:  Family History   Problem Relation Age of Onset     Heart Disease Mother      Osteoporosis Mother      LUNG DISEASE Mother      Heart Disease Father      Hypertension Brother        SOCIAL HISTORY:  Social History     Tobacco Use     Smoking status: Never Smoker     Smokeless tobacco: Current User     Types: Chew   Substance Use Topics     Alcohol use: Not Currently     Comment: 12/30/2019     Drug use: Not Currently           CURRENT MEDICATIONS:  Current Outpatient Medications   Medication Sig Dispense Refill     amLODIPine (NORVASC) 10 MG tablet TAKE ONE TABLET BY MOUTH ONCE DAILY 90 tablet 1     carvedilol (COREG) 12.5 MG  tablet Take 1 tablet (12.5 mg) by mouth 2 times daily (with meals) 180 tablet 0     dabigatran ANTICOAGULANT (PRADAXA ANTICOAGULANT) 150 MG capsule Take 1 capsule (150 mg) by mouth 2 times daily 180 capsule 0     digoxin (LANOXIN) 125 MCG tablet Take 1 tablet (125 mcg) by mouth daily 90 tablet 0     folic acid (FOLVITE) 1 MG tablet Take 1 tablet (1,000 mcg) by mouth daily 90 tablet 0     furosemide (LASIX) 20 MG tablet Take 2 tablets (40 mg) by mouth 2 times daily 360 tablet 0     multivitamin w/minerals (THERA-VIT-M) tablet Take 1 tablet by mouth daily 90 tablet 3     sacubitril-valsartan (ENTRESTO)  MG per tablet Take 1 tablet by mouth 2 times daily 180 tablet 3     thiamine (B-1) 100 MG tablet Take 1 tablet (100 mg) by mouth daily 90 tablet 1     amLODIPine (NORVASC) 5 MG tablet Take 2 tablets (10 mg) by mouth daily 60 tablet 0     HYDROcodone-acetaminophen (NORCO) 5-325 MG tablet Take 1-2 tablets by mouth every 4 hours as needed for moderate to severe pain (Patient not taking: Reported on 3/15/2021) 20 tablet 0     oxyCODONE (ROXICODONE) 5 MG tablet Take 1 tablet (5 mg) by mouth every 6 hours as needed for pain 10 tablet 0       ROS:  Review Of Systems  Skin: negative  Eyes: negative  Ears/Nose/Throat: negative  Respiratory: No shortness of breath, dyspnea on exertion, cough, or hemoptysis  Cardiovascular: negative  Gastrointestinal: negative  Genitourinary: negative  Musculoskeletal: negative  Neurologic: negative  Psychiatric: negative  Hematologic/Lymphatic/Immunologic: negative  Endocrine: negative      EXAM:  /78 (BP Location: Left arm, Patient Position: Sitting, Cuff Size: Adult Large)   Pulse 74   Wt 84.8 kg (187 lb)   SpO2 100%   BMI 24.67 kg/m    Home weight:  General: alert, articulate, and in no acute distress.  HEENT: normocephalic, atraumatic, anicteric sclera, EOMI, mucosa moist, no cyanosis.   Neck: neck supple.  No adenopathy, masses, or carotid bruits.  JVP 8 cm at 90  Action 3: Continue degrees  Heart: regular rhythm, normal S1/S2, no murmur, gallop, rub.  Precordium quiet with normal PMI.     Lungs: clear, no rales, ronchi, or wheezing.  No accessory muscle use, respirations unlabored.   Abdomen: soft, non-tender, bowel sounds present, no hepatomegaly  Extremities: no edema.   No cyanosis.    Neurological: alert and oriented x 3.  normal speech and affect, no gross motor deficits  Skin:  No ecchymoses, rashes, or clubbing.    Labs:  CBC RESULTS:  Lab Results   Component Value Date    WBC 6.1 01/12/2021    RBC 4.62 01/12/2021    HGB 14.9 01/12/2021    HCT 45.5 01/12/2021    MCV 99 01/12/2021    MCH 32.3 01/12/2021    MCHC 32.7 01/12/2021    RDW 12.8 01/12/2021     01/12/2021       CMP RESULTS:  Lab Results   Component Value Date     04/27/2022     02/10/2021    POTASSIUM 4.1 04/27/2022    POTASSIUM 3.8 02/10/2021    CHLORIDE 103 04/27/2022    CHLORIDE 105 02/10/2021    CO2 28 04/27/2022    CO2 30 02/10/2021    ANIONGAP 5 04/27/2022    ANIONGAP 3 02/10/2021    GLC 78 04/27/2022    GLC 79 02/10/2021    BUN 21 04/27/2022    BUN 13 02/10/2021    CR 0.95 04/27/2022    CR 0.76 02/10/2021    GFRESTIMATED 90 04/27/2022    GFRESTIMATED >90 02/10/2021    GFRESTBLACK >90 02/10/2021    BAO 8.5 04/27/2022    BAO 8.9 02/10/2021    BILITOTAL 0.5 01/12/2021    ALBUMIN 3.9 01/12/2021    ALKPHOS 91 01/12/2021    ALT 23 01/12/2021    AST 16 01/12/2021        INR RESULTS:  Lab Results   Component Value Date    INR 1.32 (H) 02/25/2020       No components found for: CK  Lab Results   Component Value Date    MAG 2.0 02/06/2020     Lab Results   Component Value Date    NTBNP 176 (H) 01/12/2021     @BRIEFLABR (dig)@    Most recent echocardiogram:  No results found for this or any previous visit (from the past 8760 hour(s)).      Assessment and Plan:    In summary,Roge is 61 year old patient is euvolemic today .  Patient hasn't been in [facility] for a couple years. He and his daughter state they  wanted to make sure he is seen as he has had more fatigue and they were worried about his heart. We will go ahead and set up an echo to be done as well as a visit with Dr. Romeo for follow up of the atrial fibrillation. I emphasized that he needs to see a PCP for regular check ups and his daughter said she would help with set up of that appt.       1.  Chronic Systolic heart failure recovered to 50-55 % .  Stage C  NYHA Class III  ACEi/ARB: yes Entresto 97/103 mg BID   BB: yes carvedilol 12.5 mg BID   Aldosterone antagonist: contraindicated due to hyperkalemia  SCD prophylaxis: EF 50- 55%   Fluid status: euvolemic- 40 mg lasix - BID   Anticoagulation: Pradaxa  Antiplatelet:  ASA dose   NSAID use:  Contraindicated.  Avoid use.       2.  Follow-up   PCP - establish care  Echo next available  EP - follow up  [facility] in 3 months       Maida SAENZ, CNP  [facility]                    Detail Level: Zone Plan: Patient advised to call if he wants a prescription of calcipotriene Preop examination

## 2023-12-09 DIAGNOSIS — I48.4 ATYPICAL ATRIAL FLUTTER (H): ICD-10-CM

## 2023-12-13 NOTE — TELEPHONE ENCOUNTER
digoxin (LANOXIN) 125 MCG tablet 30 tablet 3 8/26/2023  Last Office Visit : 6/17/2022  [facility] [place] Amor Stephens MD     Future Office visit:  0    Routing refill request to provider for review/approval because:  18 mo overdue for Office Visit, Dig level abn.    Lab Results   Component Value Date    DIGOXIN 0.4 09/28/2023    DIGOXIN 0.9 06/17/2022    DIGOXIN 0.9 01/07/2020

## 2023-12-14 RX ORDER — DIGOXIN 125 MCG
125 TABLET ORAL DAILY
Qty: 30 TABLET | Refills: 0 | OUTPATIENT
Start: 2023-12-14

## 2024-02-05 DIAGNOSIS — I48.4 ATYPICAL ATRIAL FLUTTER (H): ICD-10-CM

## 2024-02-05 DIAGNOSIS — I50.22 CHRONIC SYSTOLIC HEART FAILURE (H): ICD-10-CM

## 2024-02-05 DIAGNOSIS — I50.23 ACUTE ON CHRONIC SYSTOLIC CONGESTIVE HEART FAILURE (H): ICD-10-CM

## 2024-02-09 ENCOUNTER — CARE COORDINATION (OUTPATIENT)
Dept: CARDIOLOGY | Facility: CLINIC | Age: 64
End: 2024-02-09

## 2024-02-09 RX ORDER — FUROSEMIDE 40 MG
40 TABLET ORAL 2 TIMES DAILY
Qty: 60 TABLET | Refills: 3 | OUTPATIENT
Start: 2024-02-09

## 2024-02-09 RX ORDER — CARVEDILOL 12.5 MG/1
TABLET ORAL
Qty: 60 TABLET | Refills: 3 | OUTPATIENT
Start: 2024-02-09

## 2024-02-09 RX ORDER — ATENOLOL 100 MG/1
150 TABLET ORAL 2 TIMES DAILY
Qty: 60 CAPSULE | Refills: 3 | OUTPATIENT
Start: 2024-02-09

## 2024-02-09 RX ORDER — DIGOXIN 125 MCG
125 TABLET ORAL DAILY
Qty: 30 TABLET | Refills: 3 | OUTPATIENT
Start: 2024-02-09

## 2024-02-09 NOTE — PROGRESS NOTES
Patient has cancelled and no showed his last 5 appointments with Dr. Wick. Patient was last seen June of 2022. Refill request sent to Dr. Wick. Pharmacy was called and cardiac medications refilled for 90 days with directions to make a follow up appointment for further refills. Patient was called and voice message left with above information.

## 2024-02-09 NOTE — TELEPHONE ENCOUNTER
dabigatran ANTICOAGULANT (PRADAXA ANTICOAGULANT) 150 MG capsule 60 capsule 3 8/26/2023     Thrombin Inhibitor Agents Ycnvxk1002/05/2024 02:12 PM   Protocol Details Recent (6 mo) or future (30 days) visit within the authorizing provider's specialty     furosemide (LASIX) 40 MG tablet 60 tablet 3 8/26/2023       Diuretics (Including Combos) Protocol Vmqybv0802/05/2024 02:12 PM   Protocol Details Recent (12 mo) or future (30 days) visit within the authorizing provider's specialty          carvedilol (COREG) 12.5 MG tablet 60 tablet 3 8/26/2023     Beta-Blockers Protocol Cokfix0702/05/2024 02:12 PM   Protocol Details Recent (12 mo) or future (30 days) visit within the authorizing provider's specialty       digoxin (LANOXIN) 125 MCG tablet 30 tablet 3 8/26/2023       Cardiac Glycoside Agents Protocol Xfilde3002/05/2024 02:12 PM   Protocol Details Normal digoxin level on file in past 12 mos    Recent (6 mo) or future (30 days) visit within the authorizing provider's specialty        Last Office Visit: 6/17/22  Future Office visit:   none    Routing refill request to provider for review/approval because:  Overdue for appointment > 18 months, did not make follow up appt as asked with last refill.   Margot Rod RN  P Red Flag Triage/MRT

## 2024-02-12 ENCOUNTER — TELEPHONE (OUTPATIENT)
Dept: FAMILY MEDICINE | Facility: CLINIC | Age: 64
End: 2024-02-12

## 2024-02-12 NOTE — TELEPHONE ENCOUNTER
Patient Quality Outreach    Patient is due for the following:   Colon Cancer Screening    Next Steps:   No follow up needed at this time.    Type of outreach:    Sent letter.    Next Steps:  Reach out within 90 days via Letter.    Max number of attempts reached: Yes. Will try again in 90 days if patient still on fail list.    Questions for provider review:    None           Ayala BRENNER LPN

## 2024-02-12 NOTE — LETTER
February 12, 2024      [place]arwal  [address] [address] [place] [address]    Your team at [facility] cares about your health. We have reviewed your chart and based on our findings; we are making the following recommendations to better manage your health.     You are in particular need of attention regarding the following:     Call or MyChart message your clinic to schedule a colonoscopy, schedule/ a FIT Test, or order a Cologuard test. If you are unsure what type of test you need, please call your clinic and speak to clinic staff.   Colon cancer is now the second leading cause of cancer-related deaths in the [facility] for both men and women and there are over 130,000 new cases and 50,000 deaths per year from colon cancer. Colonoscopies can prevent 90-95% of these deaths. Problem lesions can be removed before they ever become cancer. This test is not only looking for cancer, but also getting rid of precancerous lesions.   If you are under/uninsured, we recommend you contact the [employer] Program.[employer] is a free colorectal cancer screening program that provides colonoscopies for eligible under/uninsured [place] men and women. If you are interested in receiving a free colonoscopy, please call [employer] at t [phone] (mention code [email or web address]) to see if you're eligible. Please have them send us the results.     If you have already completed these items, please contact the clinic via phone or   Told[employer]art so your care team can review and update your records. Thank you for   choosing [facility] Clinics for your healthcare needs. For any questions,   concerns, or to schedule an appointment please contact our clinic at [phone].    Healthy Regards,      Your [facility] Care Team

## 2024-03-04 NOTE — ANESTHESIA CARE TRANSFER NOTE
Patient: Roge Agarwal    Procedure(s):  CARDIOVERSION    Diagnosis: Atrial fibrillation, unspecified type (H) [I48.91]  Diagnosis Additional Information: No value filed.    Anesthesia Type:   No value filed.     Note:  Airway :Nasal Cannula  Destination: Cath Lab.  Comments: VSS.  Report given to RN.      Vitals: (Last set prior to Anesthesia Care Transfer)    CRNA VITALS  2/25/2020 1317 - 2/25/2020 1347      2/25/2020             SpO2:  100 %    Resp Rate (observed):  12    EKG:  Sinus rhythm                Electronically Signed By: DANY Sauer CRNA  February 25, 2020  1:47 PM   none

## 2024-07-22 DIAGNOSIS — I50.20 HEART FAILURE WITH REDUCED EJECTION FRACTION, NYHA CLASS III (H): ICD-10-CM

## 2024-07-22 NOTE — TELEPHONE ENCOUNTER
ENTRESTO 97-103MG TABS       Last Written Prescription Date:  6-20-23  Last Fill Quantity: 180,   # refills: 3  Last Office Visit : 6-17-22  Future Office visit:  NONE    Routing refill request to provider for review/approval because:  Past due appt

## 2024-07-23 DIAGNOSIS — I50.20 HEART FAILURE WITH REDUCED EJECTION FRACTION, NYHA CLASS III (H): ICD-10-CM

## 2024-07-23 RX ORDER — SACUBITRIL AND VALSARTAN 97; 103 MG/1; MG/1
1 TABLET, FILM COATED ORAL 2 TIMES DAILY
Qty: 180 TABLET | OUTPATIENT
Start: 2024-07-23

## 2024-07-29 RX ORDER — SACUBITRIL AND VALSARTAN 97; 103 MG/1; MG/1
1 TABLET, FILM COATED ORAL 2 TIMES DAILY
Qty: 180 TABLET | Refills: 3 | OUTPATIENT
Start: 2024-07-29

## 2024-09-16 ENCOUNTER — TELEPHONE (OUTPATIENT)
Dept: FAMILY MEDICINE | Facility: CLINIC | Age: 64
End: 2024-09-16

## 2024-09-16 NOTE — LETTER
September 16, 2024      [place]wal  [address] [address] [place] [address]    Your team at [facility] cares about your health. We have reviewed your chart and based on our findings; we are making the following recommendations to better manage your health.     You are in particular need of attention regarding the following:     Call or MyChart message your clinic to schedule a colonoscopy, schedule/ a FIT Test, or order a Cologuard test. If you are unsure what type of test you need, please call your clinic and speak to clinic staff.   Colon cancer is now the second leading cause of cancer-related deaths in the [facility] for both men and women and there are over 130,000 new cases and 50,000 deaths per year from colon cancer. Colonoscopies can prevent 90-95% of these deaths. Problem lesions can be removed before they ever become cancer. This test is not only looking for cancer, but also getting rid of precancerous lesions.   If you are under/uninsured, we recommend you contact the [employer] Program.[employer] is a free colorectal cancer screening program that provides colonoscopies for eligible under/uninsured [place] men and women. If you are interested in receiving a free colonoscopy, please call [employer] at t [phone] (mention code [email or web address]) to see if you're eligible. Please have them send us the results.   PREVENTATIVE VISIT: Annual Medicare Wellness:Schedule an Annual Medicare Wellness Exam. Please call your [facility] clinic to set up your appointment.    If you have already completed these items, please contact the clinic via phone or   [employer] so your care team can review and update your records. Thank you for   choosing [facility] for your healthcare needs. For any questions,   concerns, or to schedule an appointment please contact our clinic at [phone].    Healthy Regards,      Your [facility] Care Team

## 2024-09-16 NOTE — TELEPHONE ENCOUNTER
Patient Quality Outreach    Patient is due for the following:   Colon Cancer Screening  Physical Annual Wellness Visit    Next Steps:   Schedule a Annual Wellness Visit    Type of outreach:    Sent letter.    Next Steps:  Reach out within 90 days via Letter.    Max number of attempts reached: Yes. Will try again in 90 days if patient still on fail list.    Questions for provider review:    None           Ayala BRENNER LPN

## 2024-10-11 ENCOUNTER — TELEPHONE (OUTPATIENT)
Dept: CARDIOLOGY | Facility: CLINIC | Age: 64
End: 2024-10-11
Payer: COMMERCIAL

## 2024-10-11 NOTE — TELEPHONE ENCOUNTER
[facility] Call Center    Phone Message    May a detailed message be left on voicemail: yes     Reason for Call: Medication Refill Request    Has the patient contacted the pharmacy for the refill? Yes   Name of medication being requested:   folic acid (FOLVITE) 1 MG tablet  digoxin (LANOXIN) 125 MCG tablet  carvedilol (COREG) 12.5 MG tablet  amLODIPine (NORVASC) 10 MG tablet  dabigatran ANTICOAGULANT (PRADAXA ANTICOAGULANT) 150 MG capsule  furosemide (LASIX) 40 MG tablet  thiamine (B-1) 100 MG tablet  Multiple Vitamins-Minerals (CEROVITE SENIOR) TABS  sacubitril-valsartan (ENTRESTO)  MG per tablet    Provider who prescribed the medication: Delano  Pharmacy: [id number] - [place], [place] - [address]    Date medication is needed: OUT, Has been out for 3 months     Patient daughter stats patient has not been taking meds due to not have insurance. Please reach out to [place]        Action Taken: Other: cardiology     Travel Screening: Not Applicable    Thank you!  [facility]       Date of Service:

## 2024-10-14 NOTE — TELEPHONE ENCOUNTER
Called daughter and pharmacy to notify that patient needs to be seen before medications can be refilled. Patient has not been seen since 6/17/22.

## 2024-11-10 NOTE — PROGRESS NOTES
November 12, 2024     Dear colleagues,  We had the pleasure of seeing Roge Agarwal back in the [facility] Heart Failure clinic. As you know, he is a 64 year old male who has a past medical history significant for AFL s/p ablation 2020, NICM (tachy-mediated and ETOH) with recovery from 15% to 55%, HTN, hemorrhagic CVA 2007, and ETOH abuse.  He was last seen in clinic 2 years ago.     Summary of his cardiovascular history: In 4/2016 he amputated two of his fingers with his wood saw. In the ER, he was found in a AFL with RVR and with LVEF 15%. He'd not been aware of arrhythmia but did report feeling fatigued. He had RAISSA/DCCV and started warfarin. His LVEF returned to 55% by several months later. He came off warfarin a month after the DCCV. He then represented to ER with 2 month history of feeing fatigued and increasingly SOB even at rest. He was found to be back in AFL with RVR 140s and echo demonstrated LVEF 30%. He was subsequently admitted. He was started on Pradaxa and amiodarone. Plan was for RAISSA/ablation; however, RAISSA demonstrated ROGELIO thrombus and ablation was cancelled. Amiodarone was stopped. He was instead placed on Metoprolol and digoxin for rate control. Stress CMRI showed NICM with severely reduced biventricular function with a pattern of midmyocardial enhancement in the septal segments. ROGELIO thrombus also demonstrated on CMRI. No ischemia on stress portion. Echo showed LVEF 25-30%, moderately decreased RV function, and mild MR. Etiology considered likely multifactorial given tachyarrhythmia and ETOH abuse. He was counseled to stop drinking. He was started on GDMT for his depressed LVEF.    He was last seen in clinic approximately 2.5 years ago. He has not been taking any of his medications for the past 6 weeks as he ran out of the medications with no further refills available. He was not having any side effects to the medications. He has been sober from ETOH for the past 4.5 years.    Since  running out of the medications, he has noticed more fatigue at the end of the day. His daughter accompanies him to today's visit and corroborates that he has been more fatigued and 'slower' at work. Patient denies any chest pain or dyspnea on exertion. He has some orthostatic symptoms with rapid positional changes. No SOCORRO, abdominal distension, or orthopnea.       PAST MEDICAL HISTORY:  Past Medical History:   Diagnosis Date    Atrial fibrillation (H) 2016    One time episode noted after his finger accident, required cardioversion    Atrial fibrillation with RVR (H) 4/6/2016    Hemorrhagic stroke (H) 12/16/2007    2.3 X 1.5 CM PARENCHYMAL HEMORRHAGE IN THE RIGHT BASAL GANGLIA    HTN (hypertension)     Systolic CHF (H)     EF of 15-30% in 2016     FAMILY HISTORY:  Family History   Problem Relation Age of Onset    Heart Disease Mother     Osteoporosis Mother     LUNG DISEASE Mother     Heart Disease Father     Hypertension Brother      SOCIAL HISTORY:  Social History     Socioeconomic History    Marital status: Single   Tobacco Use    Smoking status: Never Smoker    Smokeless tobacco: Current User     Types: Chew   Vaping Use    Vaping Use: Never used   Substance and Sexual Activity    Alcohol use: Not Currently     Comment: 12/30/2019    Drug use: Not Currently    Sexual activity: Not Currently     CURRENT MEDICATIONS:  Current Outpatient Medications   Medication Sig Dispense Refill    amLODIPine (NORVASC) 10 MG tablet TAKE ONE TABLET BY MOUTH ONCE DAILY 90 tablet 3    atorvastatin (LIPITOR) 40 MG tablet Take 1 tablet (40 mg) by mouth daily 90 tablet 3    busPIRone (BUSPAR) 10 MG tablet Take 1 tablet (10 mg) by mouth 3 times daily 180 tablet 1    carvedilol (COREG) 12.5 MG tablet TAKE ONE TABLET BY MOUTH TWICE A DAY (WITH MEALS) FOR ADDITIONAL REFILLS, PLEASE SCHEDULE A FOLLOW-UP APPOINTMENT 60 tablet 3    dabigatran ANTICOAGULANT (PRADAXA ANTICOAGULANT) 150 MG capsule TAKE ONE CAPSULE BY MOUTH TWICE A DAY 60 capsule  3    digoxin (LANOXIN) 125 MCG tablet TAKE ONE TABLET BY MOUTH ONCE DAILY . FOR ADDITIONAL REFILLS, PLEASE SCHEDULE A FOLLOW-UP APPOINTMENT 30 tablet 3    folic acid (FOLVITE) 1 MG tablet TAKE ONE TABLET BY MOUTH ONCE DAILY 90 tablet 3    furosemide (LASIX) 40 MG tablet TAKE ONE TABLET BY MOUTH TWICE A DAY. FOR ADDITIONAL REFILLS, PLEASE SCHEDULE A FOLLOW-UP APPOINTMENT 60 tablet 3    Multiple Vitamins-Minerals (CEROVITE SENIOR) TABS Take 1 tablet by mouth daily 90 tablet 0    sacubitril-valsartan (ENTRESTO)  MG per tablet Take 1 tablet by mouth 2 times daily 180 tablet 3    thiamine (B-1) 100 MG tablet Take 1 tablet (100 mg) by mouth daily 90 tablet 3     No current facility-administered medications for this visit.     EXAM:  BP (!) 187/119 (BP Location: Right arm, Patient Position: Chair, Cuff Size: Adult Large)   Pulse 103   Wt 86.9 kg (191 lb 8 oz)   SpO2 98%   BMI 25.63 kg/m    General: appears comfortable, alert and oriented. Accompanied by daughter.   Head: normocephalic, atraumatic  Eyes: anicteric sclera, EOMI , PERRL  Neck: no adenopathy  Orophyarynx: moist mucosa, no lesions noted  Heart: irregular, tachycardic, no murmurs, rubs. Estimated JVP at 6 cmH2O  Lungs: CTAB, No wheezing.   Abdomen: soft, non-tender, bowel sounds present, no hepatosplenomegaly  Extremities: No LE edema today  Skin: no open lesions noted  Neuro: grossly non-focal  Psych: no evidence of depression noted     Labs:  Lab Results   Component Value Date    WBC 7.6 09/28/2023    HGB 14.2 09/28/2023    HCT 43.5 09/28/2023     09/28/2023     09/28/2023    POTASSIUM 4.3 09/28/2023    CHLORIDE 100 09/28/2023    CO2 26 09/28/2023    BUN 15.1 09/28/2023    CR 1.00 09/28/2023    GLC 87 09/28/2023    DD 0.9 (H) 12/31/2019    NTBNPI 2,401 (H) 12/31/2019    NTBNP 683 09/28/2023    TROPI 0.037 01/01/2020    AST 23 09/28/2023    ALT 18 09/28/2023    ALKPHOS 65 09/28/2023    BILITOTAL 0.6 09/28/2023    INR 1.32 (H) 02/25/2020    1/2/20 ECHOCARDIOGRAM:   The visual ejection fraction is estimated at 25-30%. Moderately decreased right ventricular systolic function There is moderate biatrial enlargement.  There is mild (1+) mitral regurgitation.  Probable aflutter with rapid ventricular response.  There is no comparison study available.     1/3/20 NM IRLANDA SCAN:    Only resting perfusion images were taken.    Resting images shows mild intensity basal inferior wall photopenic defect.    Gated images not performed. LV systolic function cannot be evaluated.     1/6/20 RAISSA:  Left and right atrial appendage thrombi, measuring 1.7 x 1.4 cm and 1.5 x 1.4  cm, respectively.   Mildly dilated left ventricle with normal wall thickness. Severely decreased LV systolic function with EF 15%-20%. Severe diffuse hypokinesis.  Global right ventricular function is moderately reduced.   Compared to the previous study dated 1/2/2020 (TTE): The left and right atrial appendages are better-visualized and thrombi are seen in both atrial Appendages.     1/6/20 CMRI:  1. The LV is mildly enlarged with normal wall thickness. The global systolic function is severely reduced. The LVEF is 15%. There is severe diffuse hypokinesis.  2. The RV is normal in cavity size. The global systolic function is severely reduced. The RVEF is 20%.   3. The left atrium is severely enlarged.  The right atrium is moderately enlarged.   4. There is mild mitral regurgitation.   5. Late gadolinium enhancement imaging shows midmyocardial enhancement in the basal anteroseptum, basal inferoseptum, mid anteroseptum, mid inferoseptum, and apical septum.  This is consistent with a non-ischemic etiology of cardiomyopathy.   6. Regadenoson stress perfusion imaging shows no ischemia.   7. There is no pericardial effusion or thickening.   8. There is a left atrial appendage thrombus.   9. There is a left-sided pleural effusion.   CONCLUSIONS: Non-ischemic cardiomyopathy.  There is severely reduced  biventricular function with a pattern of midmyocardial enhancement in the septal segments. There is also a left atrial appendage thrombus. There is no ischemia.      TTE 6/2020:  Patient in tachyarrhythmia during study. Mildly (EF 40-45%) reduced left ventricular function is present.  Global right ventricular function is mildly reduced.  No significant valvular dysfunction present.  The inferior vena cava was normal in size with preserved respiratory variability.  No pericardial effusion is present.     EP ablation 2/2020:  1. Radiofrequency ablation of the cavotricuspid isthmus for CTI-dependent atrial flutter.  2. Direct current cardioversion of atrial fibrillation into sinus rhythm.  3. Fluoroscopy.  4. 3D electroanatomic mapping     TTE 12/2020:  The patient's rhythm is atrial fibrillation. Borderline (EF 50-55%) reduced left ventricular function is present.  Global right ventricular function is normal.  No pericardial effusion is present. Mild biatrial enlargement is present.  IVC diameter <2.1 cm collapsing >50% with sniff suggests a normal RA pressure of 3 mmHg.  Compared to prior, LV fxn is improved.     TTE 5/16/22:  The patient's rhythm is atrial fibrillation. Left ventricular size, wall motion and function are normal. The ejection fraction is 55-60%.  Global right ventricular function is normal.  No significant valvular abnormalities.  The inferior vena cava was normal in size with preserved respiratory variability.  No pericardial effusion is present.  Compared to prior study 12/3/2020, there is no significant change.    ASSESSMENT AND PLAN:  64 year old male who has a past medical history significant for AFL s/p ablation 2020, NICM (tachy-mediated and ETOH) with recovery from 15% to 55%, HTN, hemorrhagic CVA 2007, and ETOH abuse.  He was last seen in clinic 2 years ago.     Patient is hypertensive and tachycardic in clinic, which likely reflects being out of his medications for the past 6 weeks. His  predominant symptom is fatigue at the end of the day. He is clinically euvolemic.  We will re-initiate patient's medication in a stepwise fashion by reducing his entresto and coreg by 50% with plans for follow-up visit in 2-3 weeks with [facility] clinic for   medication titration and re-check of labs and blood pressure.     # HFrecEF (55-60% 5/2022) secondary to NICM (tachy-mediated and ETOH)  # Atrial flutter s/p ablation    Plan:  - Re-start entresto at 49/51 BID and Coreg 6.25mg BID (this is 50% of his previously prescribed dose)   - Reduce lasix to 40mg PO daily as he has been without diuretic for several weeks and appears largely euvolemic   - Continue dabigatran and digoxin for atrial flutter  - Refills have been provided for all of his medications today   - Will plan for labs today and [facility] clinic follow-up in 2-4 weeks for further adjustment of his anti-hypertensive/GDMT regimen.   - RTC 3 months with Dr. Wick with TTE at that time     Patient seen and discussed with attending physician, Dr. Amor Wick. Please see his attestation for final plan.    Ld Martines MD  Cardiology Fellow    I have seen and evaluated the patient and agree with the assessment and plan as above.  I appreciate the opportunity to participate in the care of Mr. Agarwal. Please do not hesitate to contact me with any further questions.  I have spent a total of 40 minutes today reviewing labs, imaging studies, discussing with colleagues, face-to-face time with patient and documentation in the medical record.  The longitudinal plan of care for the diagnosis(es)/condition(s) as documented were addressed during this visit. Due to the added complexity in care, I will continue to support Sadie in the subsequent management and with ongoing continuity of care.     Sincerely,   Amor Wick MD  [facility] Division of Cardiology

## 2024-11-12 ENCOUNTER — OFFICE VISIT (OUTPATIENT)
Dept: CARDIOLOGY | Facility: CLINIC | Age: 64
End: 2024-11-12
Attending: INTERNAL MEDICINE
Payer: COMMERCIAL

## 2024-11-12 ENCOUNTER — LAB (OUTPATIENT)
Dept: LAB | Facility: CLINIC | Age: 64
End: 2024-11-12
Payer: COMMERCIAL

## 2024-11-12 VITALS
WEIGHT: 191.5 LBS | OXYGEN SATURATION: 98 % | BODY MASS INDEX: 25.63 KG/M2 | DIASTOLIC BLOOD PRESSURE: 119 MMHG | HEART RATE: 103 BPM | SYSTOLIC BLOOD PRESSURE: 187 MMHG

## 2024-11-12 DIAGNOSIS — Z78.9 ALCOHOL USE: ICD-10-CM

## 2024-11-12 DIAGNOSIS — I50.20 HEART FAILURE WITH REDUCED EJECTION FRACTION, NYHA CLASS III (H): ICD-10-CM

## 2024-11-12 DIAGNOSIS — I10 ESSENTIAL HYPERTENSION: ICD-10-CM

## 2024-11-12 DIAGNOSIS — I50.23 ACUTE ON CHRONIC SYSTOLIC CONGESTIVE HEART FAILURE (H): ICD-10-CM

## 2024-11-12 DIAGNOSIS — E78.2 MIXED HYPERLIPIDEMIA: ICD-10-CM

## 2024-11-12 DIAGNOSIS — I50.22 CHRONIC SYSTOLIC HEART FAILURE (H): ICD-10-CM

## 2024-11-12 DIAGNOSIS — I48.4 ATYPICAL ATRIAL FLUTTER (H): ICD-10-CM

## 2024-11-12 DIAGNOSIS — I48.92 ATRIAL FLUTTER, UNSPECIFIED TYPE (H): ICD-10-CM

## 2024-11-12 LAB
ALBUMIN SERPL BCG-MCNC: 4.2 G/DL (ref 3.5–5.2)
ALP SERPL-CCNC: 74 U/L (ref 40–150)
ALT SERPL W P-5'-P-CCNC: 16 U/L (ref 0–70)
ANION GAP SERPL CALCULATED.3IONS-SCNC: 8 MMOL/L (ref 7–15)
AST SERPL W P-5'-P-CCNC: 29 U/L (ref 0–45)
BILIRUB SERPL-MCNC: 0.6 MG/DL
BUN SERPL-MCNC: 10.2 MG/DL (ref 8–23)
CALCIUM SERPL-MCNC: 9.1 MG/DL (ref 8.8–10.4)
CHLORIDE SERPL-SCNC: 105 MMOL/L (ref 98–107)
CREAT SERPL-MCNC: 0.93 MG/DL (ref 0.67–1.17)
EGFRCR SERPLBLD CKD-EPI 2021: >90 ML/MIN/1.73M2
ERYTHROCYTE [DISTWIDTH] IN BLOOD BY AUTOMATED COUNT: 12.8 % (ref 10–15)
GLUCOSE SERPL-MCNC: 90 MG/DL (ref 70–99)
HCO3 SERPL-SCNC: 27 MMOL/L (ref 22–29)
HCT VFR BLD AUTO: 45.1 % (ref 40–53)
HGB BLD-MCNC: 15 G/DL (ref 13.3–17.7)
MCH RBC QN AUTO: 31.8 PG (ref 26.5–33)
MCHC RBC AUTO-ENTMCNC: 33.3 G/DL (ref 31.5–36.5)
MCV RBC AUTO: 96 FL (ref 78–100)
NT-PROBNP SERPL-MCNC: 675 PG/ML (ref 0–900)
PLATELET # BLD AUTO: 195 10E3/UL (ref 150–450)
POTASSIUM SERPL-SCNC: 4.6 MMOL/L (ref 3.4–5.3)
PROT SERPL-MCNC: 7.8 G/DL (ref 6.4–8.3)
RBC # BLD AUTO: 4.71 10E6/UL (ref 4.4–5.9)
SODIUM SERPL-SCNC: 140 MMOL/L (ref 135–145)
WBC # BLD AUTO: 8.4 10E3/UL (ref 4–11)

## 2024-11-12 PROCEDURE — 99215 OFFICE O/P EST HI 40 MIN: CPT | Mod: GC | Performed by: INTERNAL MEDICINE

## 2024-11-12 PROCEDURE — 80053 COMPREHEN METABOLIC PANEL: CPT | Performed by: PATHOLOGY

## 2024-11-12 PROCEDURE — 99213 OFFICE O/P EST LOW 20 MIN: CPT | Performed by: INTERNAL MEDICINE

## 2024-11-12 PROCEDURE — 85027 COMPLETE CBC AUTOMATED: CPT | Performed by: PATHOLOGY

## 2024-11-12 PROCEDURE — G2211 COMPLEX E/M VISIT ADD ON: HCPCS | Performed by: INTERNAL MEDICINE

## 2024-11-12 PROCEDURE — 83880 ASSAY OF NATRIURETIC PEPTIDE: CPT | Performed by: PATHOLOGY

## 2024-11-12 PROCEDURE — 36415 COLL VENOUS BLD VENIPUNCTURE: CPT | Performed by: PATHOLOGY

## 2024-11-12 RX ORDER — AMLODIPINE BESYLATE 10 MG/1
10 TABLET ORAL DAILY
Qty: 90 TABLET | Refills: 3 | Status: SHIPPED | OUTPATIENT
Start: 2024-11-12

## 2024-11-12 RX ORDER — CARVEDILOL 12.5 MG/1
6.25 TABLET ORAL 2 TIMES DAILY WITH MEALS
Qty: 90 TABLET | Refills: 3 | Status: SHIPPED | OUTPATIENT
Start: 2024-11-12

## 2024-11-12 RX ORDER — FOLIC ACID 1 MG/1
1000 TABLET ORAL DAILY
Qty: 90 TABLET | Refills: 3 | Status: SHIPPED | OUTPATIENT
Start: 2024-11-12

## 2024-11-12 RX ORDER — MULTIVIT-MIN/FA/LYCOPEN/LUTEIN .4-300-25
1 TABLET ORAL DAILY
Qty: 90 TABLET | Refills: 3 | Status: SHIPPED | OUTPATIENT
Start: 2024-11-12

## 2024-11-12 RX ORDER — ATORVASTATIN CALCIUM 40 MG/1
40 TABLET, FILM COATED ORAL DAILY
Qty: 90 TABLET | Refills: 3 | Status: SHIPPED | OUTPATIENT
Start: 2024-11-12

## 2024-11-12 RX ORDER — FUROSEMIDE 40 MG/1
40 TABLET ORAL DAILY
Qty: 90 TABLET | Refills: 3 | Status: SHIPPED | OUTPATIENT
Start: 2024-11-12

## 2024-11-12 RX ORDER — LANOLIN ALCOHOL/MO/W.PET/CERES
100 CREAM (GRAM) TOPICAL DAILY
Qty: 90 TABLET | Refills: 3 | Status: SHIPPED | OUTPATIENT
Start: 2024-11-12

## 2024-11-12 RX ORDER — DABIGATRAN ETEXILATE 150 MG/1
150 CAPSULE ORAL 2 TIMES DAILY
Qty: 90 CAPSULE | Refills: 3 | Status: SHIPPED | OUTPATIENT
Start: 2024-11-12

## 2024-11-12 RX ORDER — DIGOXIN 125 MCG
125 TABLET ORAL DAILY
Qty: 90 TABLET | Refills: 3 | Status: SHIPPED | OUTPATIENT
Start: 2024-11-12

## 2024-11-12 RX ORDER — SACUBITRIL AND VALSARTAN 97; 103 MG/1; MG/1
TABLET, FILM COATED ORAL
Qty: 90 TABLET | Refills: 3 | Status: SHIPPED | OUTPATIENT
Start: 2024-11-12

## 2024-11-12 ASSESSMENT — PAIN SCALES - GENERAL: PAINLEVEL_OUTOF10: NO PAIN (0)

## 2024-11-12 NOTE — LETTER
11/12/2024      RE: Roge Agarwal  [address]  [place] [address]       Dear Colleague,    Thank you for the opportunity to participate in the care of your patient, Roge Agarwal, at the [facility] [place] [facility]. Please see a copy of my visit note below.    November 12, 2024     Dear colleagues,  We had the pleasure of seeing Roge Agarwal back in the [facility] Heart Failure clinic. As you know, he is a 64 year old male who has a past medical history significant for AFL s/p ablation 2020, NICM (tachy-mediated and ETOH) with recovery from 15% to 55%, HTN, hemorrhagic CVA 2007, and ETOH abuse.  He was last seen in clinic 2 years ago.     Summary of his cardiovascular history: In 4/2016 he amputated two of his fingers with his wood saw. In the ER, he was found in a AFL with RVR and with LVEF 15%. He'd not been aware of arrhythmia but did report feeling fatigued. He had RAISSA/DCCV and started warfarin. His LVEF returned to 55% by several months later. He came off warfarin a month after the DCCV. He then represented to ER with 2 month history of feeing fatigued and increasingly SOB even at rest. He was found to be back in AFL with RVR 140s and echo demonstrated LVEF 30%. He was subsequently admitted. He was started on Pradaxa and amiodarone. Plan was for RAISSA/ablation; however, RAISSA demonstrated ROGELIO thrombus and ablation was cancelled. Amiodarone was stopped. He was instead placed on Metoprolol and digoxin for rate control. Stress CMRI showed NICM with severely reduced biventricular function with a pattern of midmyocardial enhancement in the septal segments. ROGELIO thrombus also demonstrated on CMRI. No ischemia on stress portion. Echo showed LVEF 25-30%, moderately decreased RV function, and mild MR. Etiology considered likely multifactorial given tachyarrhythmia and ETOH abuse. He was counseled to stop drinking. He was  started on GDMT for his depressed LVEF.    He was last seen in clinic approximately 2.5 years ago. He has not been taking any of his medications for the past 6 weeks as he ran out of the medications with no further refills available. He was not having any side effects to the medications. He has been sober from ETOH for the past 4.5 years.    Since running out of the medications, he has noticed more fatigue at the end of the day. His daughter accompanies him to today's visit and corroborates that he has been more fatigued and 'slower' at work. Patient denies any chest pain or dyspnea on exertion. He has some orthostatic symptoms with rapid positional changes. No SOCORRO, abdominal distension, or orthopnea.       PAST MEDICAL HISTORY:  Past Medical History:   Diagnosis Date     Atrial fibrillation (H) 2016    One time episode noted after his finger accident, required cardioversion     Atrial fibrillation with RVR (H) 4/6/2016     Hemorrhagic stroke (H) 12/16/2007    2.3 X 1.5 CM PARENCHYMAL HEMORRHAGE IN THE RIGHT BASAL GANGLIA     HTN (hypertension)      Systolic CHF (H)     EF of 15-30% in 2016     FAMILY HISTORY:  Family History   Problem Relation Age of Onset     Heart Disease Mother      Osteoporosis Mother      LUNG DISEASE Mother      Heart Disease Father      Hypertension Brother      SOCIAL HISTORY:  Social History     Socioeconomic History     Marital status: Single   Tobacco Use     Smoking status: Never Smoker     Smokeless tobacco: Current User     Types: Chew   Vaping Use     Vaping Use: Never used   Substance and Sexual Activity     Alcohol use: Not Currently     Comment: 12/30/2019     Drug use: Not Currently     Sexual activity: Not Currently     CURRENT MEDICATIONS:  Current Outpatient Medications   Medication Sig Dispense Refill     amLODIPine (NORVASC) 10 MG tablet TAKE ONE TABLET BY MOUTH ONCE DAILY 90 tablet 3     atorvastatin (LIPITOR) 40 MG tablet Take 1 tablet (40 mg) by mouth daily 90 tablet 3      busPIRone (BUSPAR) 10 MG tablet Take 1 tablet (10 mg) by mouth 3 times daily 180 tablet 1     carvedilol (COREG) 12.5 MG tablet TAKE ONE TABLET BY MOUTH TWICE A DAY (WITH MEALS) FOR ADDITIONAL REFILLS, PLEASE SCHEDULE A FOLLOW-UP APPOINTMENT 60 tablet 3     dabigatran ANTICOAGULANT (PRADAXA ANTICOAGULANT) 150 MG capsule TAKE ONE CAPSULE BY MOUTH TWICE A DAY 60 capsule 3     digoxin (LANOXIN) 125 MCG tablet TAKE ONE TABLET BY MOUTH ONCE DAILY . FOR ADDITIONAL REFILLS, PLEASE SCHEDULE A FOLLOW-UP APPOINTMENT 30 tablet 3     folic acid (FOLVITE) 1 MG tablet TAKE ONE TABLET BY MOUTH ONCE DAILY 90 tablet 3     furosemide (LASIX) 40 MG tablet TAKE ONE TABLET BY MOUTH TWICE A DAY. FOR ADDITIONAL REFILLS, PLEASE SCHEDULE A FOLLOW-UP APPOINTMENT 60 tablet 3     Multiple Vitamins-Minerals (CEROVITE SENIOR) TABS Take 1 tablet by mouth daily 90 tablet 0     sacubitril-valsartan (ENTRESTO)  MG per tablet Take 1 tablet by mouth 2 times daily 180 tablet 3     thiamine (B-1) 100 MG tablet Take 1 tablet (100 mg) by mouth daily 90 tablet 3     No current facility-administered medications for this visit.     EXAM:  BP (!) 187/119 (BP Location: Right arm, Patient Position: Chair, Cuff Size: Adult Large)   Pulse 103   Wt 86.9 kg (191 lb 8 oz)   SpO2 98%   BMI 25.63 kg/m    General: appears comfortable, alert and oriented. Accompanied by daughter.   Head: normocephalic, atraumatic  Eyes: anicteric sclera, EOMI , PERRL  Neck: no adenopathy  Orophyarynx: moist mucosa, no lesions noted  Heart: irregular, tachycardic, no murmurs, rubs. Estimated JVP at 6 cmH2O  Lungs: CTAB, No wheezing.   Abdomen: soft, non-tender, bowel sounds present, no hepatosplenomegaly  Extremities: No LE edema today  Skin: no open lesions noted  Neuro: grossly non-focal  Psych: no evidence of depression noted     Labs:  Lab Results   Component Value Date    WBC 7.6 09/28/2023    HGB 14.2 09/28/2023    HCT 43.5 09/28/2023     09/28/2023      09/28/2023    POTASSIUM 4.3 09/28/2023    CHLORIDE 100 09/28/2023    CO2 26 09/28/2023    BUN 15.1 09/28/2023    CR 1.00 09/28/2023    GLC 87 09/28/2023    DD 0.9 (H) 12/31/2019    NTBNPI 2,401 (H) 12/31/2019    NTBNP 683 09/28/2023    TROPI 0.037 01/01/2020    AST 23 09/28/2023    ALT 18 09/28/2023    ALKPHOS 65 09/28/2023    BILITOTAL 0.6 09/28/2023    INR 1.32 (H) 02/25/2020 1/2/20 ECHOCARDIOGRAM:   The visual ejection fraction is estimated at 25-30%. Moderately decreased right ventricular systolic function There is moderate biatrial enlargement.  There is mild (1+) mitral regurgitation.  Probable aflutter with rapid ventricular response.  There is no comparison study available.     1/3/20 NM IRLANDA SCAN:     Only resting perfusion images were taken.     Resting images shows mild intensity basal inferior wall photopenic defect.     Gated images not performed. LV systolic function cannot be evaluated.     1/6/20 RIASSA:  Left and right atrial appendage thrombi, measuring 1.7 x 1.4 cm and 1.5 x 1.4  cm, respectively.   Mildly dilated left ventricle with normal wall thickness. Severely decreased LV systolic function with EF 15%-20%. Severe diffuse hypokinesis.  Global right ventricular function is moderately reduced.   Compared to the previous study dated 1/2/2020 (TTE): The left and right atrial appendages are better-visualized and thrombi are seen in both atrial Appendages.     1/6/20 CMRI:  1. The LV is mildly enlarged with normal wall thickness. The global systolic function is severely reduced. The LVEF is 15%. There is severe diffuse hypokinesis.  2. The RV is normal in cavity size. The global systolic function is severely reduced. The RVEF is 20%.   3. The left atrium is severely enlarged.  The right atrium is moderately enlarged.   4. There is mild mitral regurgitation.   5. Late gadolinium enhancement imaging shows midmyocardial enhancement in the basal anteroseptum, basal inferoseptum, mid anteroseptum, mid  inferoseptum, and apical septum.  This is consistent with a non-ischemic etiology of cardiomyopathy.   6. Regadenoson stress perfusion imaging shows no ischemia.   7. There is no pericardial effusion or thickening.   8. There is a left atrial appendage thrombus.   9. There is a left-sided pleural effusion.   CONCLUSIONS: Non-ischemic cardiomyopathy.  There is severely reduced biventricular function with a pattern of midmyocardial enhancement in the septal segments. There is also a left atrial appendage thrombus. There is no ischemia.      TTE 6/2020:  Patient in tachyarrhythmia during study. Mildly (EF 40-45%) reduced left ventricular function is present.  Global right ventricular function is mildly reduced.  No significant valvular dysfunction present.  The inferior vena cava was normal in size with preserved respiratory variability.  No pericardial effusion is present.     EP ablation 2/2020:  1. Radiofrequency ablation of the cavotricuspid isthmus for CTI-dependent atrial flutter.  2. Direct current cardioversion of atrial fibrillation into sinus rhythm.  3. Fluoroscopy.  4. 3D electroanatomic mapping     TTE 12/2020:  The patient's rhythm is atrial fibrillation. Borderline (EF 50-55%) reduced left ventricular function is present.  Global right ventricular function is normal.  No pericardial effusion is present. Mild biatrial enlargement is present.  IVC diameter <2.1 cm collapsing >50% with sniff suggests a normal RA pressure of 3 mmHg.  Compared to prior, LV fxn is improved.     TTE 5/16/22:  The patient's rhythm is atrial fibrillation. Left ventricular size, wall motion and function are normal. The ejection fraction is 55-60%.  Global right ventricular function is normal.  No significant valvular abnormalities.  The inferior vena cava was normal in size with preserved respiratory variability.  No pericardial effusion is present.  Compared to prior study 12/3/2020, there is no significant change.    ASSESSMENT  AND PLAN:  64 year old male who has a past medical history significant for AFL s/p ablation 2020, NICM (tachy-mediated and ETOH) with recovery from 15% to 55%, HTN, hemorrhagic CVA 2007, and ETOH abuse.  He was last seen in clinic 2 years ago.     Patient is hypertensive and tachycardic in clinic, which likely reflects being out of his medications for the past 6 weeks. His predominant symptom is fatigue at the end of the day. He is clinically euvolemic.  We will re-initiate patient's medication in a stepwise fashion by reducing his entresto and coreg by 50% with plans for follow-up visit in 2-3 weeks with [facility] clinic for   medication titration and re-check of labs and blood pressure.     # HFrecEF (55-60% 5/2022) secondary to NICM (tachy-mediated and ETOH)  # Atrial flutter s/p ablation    Plan:  - Re-start entresto at 49/51 BID and Coreg 6.25mg BID (this is 50% of his previously prescribed dose)   - Reduce lasix to 40mg PO daily as he has been without diuretic for several weeks and appears largely euvolemic   - Continue dabigatran and digoxin for atrial flutter  - Refills have been provided for all of his medications today   - Will plan for labs today and [facility] clinic follow-up in 2-4 weeks for further adjustment of his anti-hypertensive/GDMT regimen.   - RTC 3 months with Dr. Wick with [facility] at that time     Patient seen and discussed with attending physician, Dr. Amor Wick. Please see his attestation for final plan.    Ld Martines MD  Cardiology Fellow    I have seen and evaluated the patient and agree with the assessment and plan as above.  I appreciate the opportunity to participate in the care of Leealisongianna Arzolaett . Please do not hesitate to contact me with any further questions.  I have spent a total of 40 minutes today reviewing labs, imaging studies, discussing with colleagues, face-to-face time with patient and documentation in the medical record.  The longitudinal plan of care for the  diagnosis(es)/condition(s) as documented were addressed during this visit. Due to the added complexity in care, I will continue to support Leeabdullahi in the subsequent management and with ongoing continuity of care.     Sincerely,   Amor Wick MD  [facility] Division of Cardiology       Please do not hesitate to contact me if you have any questions/concerns.     Sincerely,     Amor Wick MD

## 2024-11-12 NOTE — PATIENT INSTRUCTIONS
Dr. Wick recommends:    Start taking Entresto  tablets, take 1/2 tablet twice daily.    Start taking Coreg 12.5 MG tablets, take 1/2 tablet twice daily.    Take Lasix 40 MG once daily.    Labs today.(BNP, CMP, CBC)    RTN CORE, with any CORE provider, virtual, in one month for medication titration.     Follow up with Dr. Wick on February 4th, with an echocardiogram and labs prior.    Thank you for your visit today.  Please call me with any questions or concerns.   Kb Holguin RN  Cardiology Care Coordinator  [phone]     [facility] set [phone]

## 2024-11-12 NOTE — NURSING NOTE
Chief Complaint   Patient presents with    Follow Up     11/12/2024 visit with Amor Wick MD for RETURN HEART FAILURE - overdue follow up, Benign essential hypertension [I10] Mixed hyperlipidemia [E78.2] Heart failure with reduced ejection fraction, NYHA class III (H) [I50.20] Paroxysmal atrial fibrillation (H) [I48.0       Vitals were taken and medications reconciled.    Michel Rome, EMT  8:41 AM

## 2024-11-16 DIAGNOSIS — I48.4 ATYPICAL ATRIAL FLUTTER (H): ICD-10-CM

## 2024-11-16 RX ORDER — DABIGATRAN ETEXILATE 150 MG/1
150 CAPSULE ORAL 2 TIMES DAILY
Qty: 180 CAPSULE | Refills: 3 | OUTPATIENT
Start: 2024-11-16

## 2024-11-16 NOTE — TELEPHONE ENCOUNTER
dabigatran ANTICOAGULANT (PRADAXA ANTICOAGULANT) 150 MG capsule   Sent 11/12/24  #90 3 rfs.  Refused.

## 2024-12-11 DIAGNOSIS — I50.23 ACUTE ON CHRONIC SYSTOLIC CONGESTIVE HEART FAILURE (H): Primary | ICD-10-CM

## 2025-01-24 ENCOUNTER — LAB (OUTPATIENT)
Dept: LAB | Facility: CLINIC | Age: 65
End: 2025-01-24
Payer: COMMERCIAL

## 2025-01-24 DIAGNOSIS — I50.23 ACUTE ON CHRONIC SYSTOLIC CONGESTIVE HEART FAILURE (H): ICD-10-CM

## 2025-01-24 LAB
ANION GAP SERPL CALCULATED.3IONS-SCNC: 7 MMOL/L (ref 7–15)
BUN SERPL-MCNC: 12.2 MG/DL (ref 8–23)
CALCIUM SERPL-MCNC: 8.9 MG/DL (ref 8.8–10.4)
CHLORIDE SERPL-SCNC: 104 MMOL/L (ref 98–107)
CREAT SERPL-MCNC: 0.96 MG/DL (ref 0.67–1.17)
EGFRCR SERPLBLD CKD-EPI 2021: 88 ML/MIN/1.73M2
GLUCOSE SERPL-MCNC: 87 MG/DL (ref 70–99)
HCO3 SERPL-SCNC: 27 MMOL/L (ref 22–29)
POTASSIUM SERPL-SCNC: 4.6 MMOL/L (ref 3.4–5.3)
SODIUM SERPL-SCNC: 138 MMOL/L (ref 135–145)

## 2025-01-24 PROCEDURE — 80048 BASIC METABOLIC PNL TOTAL CA: CPT | Performed by: PATHOLOGY

## 2025-01-24 PROCEDURE — 36415 COLL VENOUS BLD VENIPUNCTURE: CPT | Performed by: PATHOLOGY

## 2025-02-03 NOTE — PROGRESS NOTES
February 4, 2025     Dear colleagues,  We had the pleasure of seeing Roge Agarwal back in the [facility] Heart Failure clinic. As you know, he is a 64 year old male who has a past medical history significant for AFL s/p ablation 2020, NICM (tachy-mediated and ETOH) with recovery from 15% to 55%, HTN, hemorrhagic CVA 2007, and ETOH abuse.       Summary of his cardiovascular history: In 4/2016 he amputated two of his fingers with his wood saw. In the ER, he was found in a AFL with RVR and with LVEF 15%. He'd not been aware of arrhythmia but did report feeling fatigued. He had RAISSA/DCCV and started warfarin. His LVEF returned to 55% by several months later. He came off warfarin a month after the DCCV. He then represented to ER with 2 month history of feeing fatigued and increasingly SOB even at rest. He was found to be back in AFL with RVR 140s and echo demonstrated LVEF 30%. He was subsequently admitted. He was started on Pradaxa and amiodarone. Plan was for RAISSA/ablation; however, RAISSA demonstrated ROGELIO thrombus and ablation was cancelled. Amiodarone was stopped. He was instead placed on Metoprolol and digoxin for rate control. Stress CMRI showed NICM with severely reduced biventricular function with a pattern of midmyocardial enhancement in the septal segments. ROGELIO thrombus also demonstrated on CMRI. No ischemia on stress portion. Echo showed LVEF 25-30%, moderately decreased RV function, and mild MR. Etiology considered likely multifactorial given tachyarrhythmia and ETOH abuse. He was counseled to stop drinking. He was started on GDMT for his depressed LVEF.   Currently he notes that he is doing well continues to work.  He is not depressed and gets up early in the morning then goes to work.  He feels a little bit tired and fatigued groggy by the afternoon he also notes some difficulties with walking at the time which is more pain in the foot as he says.  Otherwise no issues and takes her medications as  prescribed doing well.  Blood pressure has been running higher      PAST MEDICAL HISTORY:  Past Medical History:   Diagnosis Date    Atrial fibrillation (H) 2016    One time episode noted after his finger accident, required cardioversion    Atrial fibrillation with RVR (H) 4/6/2016    Hemorrhagic stroke (H) 12/16/2007    2.3 X 1.5 CM PARENCHYMAL HEMORRHAGE IN THE RIGHT BASAL GANGLIA    HTN (hypertension)     Systolic CHF (H)     EF of 15-30% in 2016     FAMILY HISTORY:  Family History   Problem Relation Age of Onset    Heart Disease Mother     Osteoporosis Mother     LUNG DISEASE Mother     Heart Disease Father     Hypertension Brother      SOCIAL HISTORY:  Social History     Socioeconomic History    Marital status: Single   Tobacco Use    Smoking status: Never    Smokeless tobacco: Current     Types: Chew    Tobacco comments:     2 tins per wk   Vaping Use    Vaping status: Never Used   Substance and Sexual Activity    Alcohol use: Not Currently     Comment: 12/30/2019    Drug use: Not Currently    Sexual activity: Not Currently     Social Drivers of Health     Financial Resource Strain: Low Risk  (9/28/2023)    Financial Resource Strain     Within the past 12 months, have you or your family members you live with been unable to get utilities (heat, electricity) when it was really needed?: No   Food Insecurity: High Risk (9/28/2023)    Food Insecurity     Within the past 12 months, did you worry that your food would run out before you got money to buy more?: Yes     Within the past 12 months, did the food you bought just not last and you didn t have money to get more?: Yes   Transportation Needs: Low Risk  (9/28/2023)    Transportation Needs     Within the past 12 months, has lack of transportation kept you from medical appointments, getting your medicines, non-medical meetings or appointments, work, or from getting things that you need?: No   Housing Stability: High Risk (9/28/2023)    Housing Stability     Do you  have housing? : Yes     Are you worried about losing your housing?: Yes     CURRENT MEDICATIONS:  Current Outpatient Medications   Medication Sig Dispense Refill    amLODIPine (NORVASC) 10 MG tablet Take 1 tablet (10 mg) by mouth daily. 90 tablet 3    atorvastatin (LIPITOR) 40 MG tablet Take 1 tablet (40 mg) by mouth daily. 90 tablet 3    busPIRone (BUSPAR) 10 MG tablet Take 1 tablet (10 mg) by mouth 3 times daily 180 tablet 1    carvedilol (COREG) 12.5 MG tablet Take 0.5 tablets (6.25 mg) by mouth 2 times daily (with meals). 90 tablet 3    dabigatran ANTICOAGULANT (PRADAXA ANTICOAGULANT) 150 MG capsule Take 1 capsule (150 mg) by mouth 2 times daily. 90 capsule 3    digoxin (LANOXIN) 125 MCG tablet Take 1 tablet (125 mcg) by mouth daily. 90 tablet 3    folic acid (FOLVITE) 1 MG tablet Take 1 tablet (1,000 mcg) by mouth daily. 90 tablet 3    furosemide (LASIX) 40 MG tablet Take 1 tablet (40 mg) by mouth daily. 90 tablet 3    Multiple Vitamins-Minerals (CEROVITE SENIOR) TABS Take 1 tablet by mouth daily. 90 tablet 3    sacubitril-valsartan (ENTRESTO)  MG per tablet Take 1 tablet by mouth 2 times daily. 180 tablet 3    thiamine (B-1) 100 MG tablet Take 1 tablet (100 mg) by mouth daily. 90 tablet 3     No current facility-administered medications for this visit.     EXAM:  BP (!) 149/95 (BP Location: Right arm, Patient Position: Chair, Cuff Size: Adult Regular)   Pulse 84   Wt 85 kg (187 lb 4.8 oz)   SpO2 98%   BMI 25.07 kg/m    General: appears comfortable, alert and oriented. Accompanied by daughter.   Head: normocephalic, atraumatic  Eyes: anicteric sclera, EOMI , PERRL  Neck: no adenopathy  Orophyarynx: moist mucosa, no lesions noted  Heart: irregular, tachycardic, no murmurs, rubs. Estimated JVP at 6 cmH2O  Lungs: CTAB, No wheezing.   Abdomen: soft, non-tender, bowel sounds present, no hepatosplenomegaly  Extremities: No LE edema today  Skin: no open lesions noted  Neuro: grossly non-focal  Psych: no  evidence of depression noted     Labs:  Lab Results   Component Value Date    WBC 8.4 11/12/2024    HGB 15.0 11/12/2024    HCT 45.1 11/12/2024     11/12/2024     01/24/2025    POTASSIUM 4.6 01/24/2025    CHLORIDE 104 01/24/2025    CO2 27 01/24/2025    BUN 12.2 01/24/2025    CR 0.96 01/24/2025    GLC 87 01/24/2025    DD 0.9 (H) 12/31/2019    NTBNPI 2,401 (H) 12/31/2019    NTBNP 675 11/12/2024    TROPI 0.037 01/01/2020    AST 29 11/12/2024    ALT 16 11/12/2024    ALKPHOS 74 11/12/2024    BILITOTAL 0.6 11/12/2024    INR 1.32 (H) 02/25/2020 1/2/20 ECHOCARDIOGRAM:   The visual ejection fraction is estimated at 25-30%. Moderately decreased right ventricular systolic function There is moderate biatrial enlargement.  There is mild (1+) mitral regurgitation.  Probable aflutter with rapid ventricular response.  There is no comparison study available.     1/3/20 NM IRLANDA SCAN:    Only resting perfusion images were taken.    Resting images shows mild intensity basal inferior wall photopenic defect.    Gated images not performed. LV systolic function cannot be evaluated.     1/6/20 RAISSA:  Left and right atrial appendage thrombi, measuring 1.7 x 1.4 cm and 1.5 x 1.4  cm, respectively.   Mildly dilated left ventricle with normal wall thickness. Severely decreased LV systolic function with EF 15%-20%. Severe diffuse hypokinesis.  Global right ventricular function is moderately reduced.   Compared to the previous study dated 1/2/2020 (TTE): The left and right atrial appendages are better-visualized and thrombi are seen in both atrial Appendages.     1/6/20 CMRI:  1. The LV is mildly enlarged with normal wall thickness. The global systolic function is severely reduced. The LVEF is 15%. There is severe diffuse hypokinesis.  2. The RV is normal in cavity size. The global systolic function is severely reduced. The RVEF is 20%.   3. The left atrium is severely enlarged.  The right atrium is moderately enlarged.   4. There  is mild mitral regurgitation.   5. Late gadolinium enhancement imaging shows midmyocardial enhancement in the basal anteroseptum, basal inferoseptum, mid anteroseptum, mid inferoseptum, and apical septum.  This is consistent with a non-ischemic etiology of cardiomyopathy.   6. Regadenoson stress perfusion imaging shows no ischemia.   7. There is no pericardial effusion or thickening.   8. There is a left atrial appendage thrombus.   9. There is a left-sided pleural effusion.   CONCLUSIONS: Non-ischemic cardiomyopathy.  There is severely reduced biventricular function with a pattern of midmyocardial enhancement in the septal segments. There is also a left atrial appendage thrombus. There is no ischemia.      TTE 6/2020:  Patient in tachyarrhythmia during study. Mildly (EF 40-45%) reduced left ventricular function is present.  Global right ventricular function is mildly reduced.  No significant valvular dysfunction present.  The inferior vena cava was normal in size with preserved respiratory variability.  No pericardial effusion is present.     EP ablation 2/2020:  1. Radiofrequency ablation of the cavotricuspid isthmus for CTI-dependent atrial flutter.  2. Direct current cardioversion of atrial fibrillation into sinus rhythm.  3. Fluoroscopy.  4. 3D electroanatomic mapping     TTE 12/2020:  The patient's rhythm is atrial fibrillation. Borderline (EF 50-55%) reduced left ventricular function is present.  Global right ventricular function is normal.  No pericardial effusion is present. Mild biatrial enlargement is present.  IVC diameter <2.1 cm collapsing >50% with sniff suggests a normal RA pressure of 3 mmHg.  Compared to prior, LV fxn is improved.     TTE 5/16/22:  The patient's rhythm is atrial fibrillation. Left ventricular size, wall motion and function are normal. The ejection fraction is 55-60%.  Global right ventricular function is normal.  No significant valvular abnormalities.  The inferior vena cava was  normal in size with preserved respiratory variability.  No pericardial effusion is present.  Compared to prior study 12/3/2020, there is no significant change.     TTE 2/4/25:  Left ventricular size is normal. Left ventricular function is decreased. The ejection fraction is 50-55% (borderline).  Right ventricular function, chamber size, wall motion, and thickness are normal.  No significant valvular abnormalities present.  The inferior vena cava was normal in size with preserved respiratory variability.  No pericardial effusion is present.  This study was compared with the study from 5/16/2022, LV function is modestly lower.    ASSESSMENT AND PLAN:  64 year old male who has a past medical history significant for AFL s/p ablation 2020, NICM (tachy-mediated and ETOH) with recovery from 15% to 55%, HTN, hemorrhagic CVA 2007, and ETOH abuse.    Overall denies any new complaints or issues doing relatively well.  Takes her medications as prescribed.  Now he has insurance which has been sorted.  He is back at work doing well except he gets a little bit groggy and tired by the afternoon.  He is in early patient gets up around 3:30 in the morning.  Blood pressure has been running higher including today at the echo and the clinic visit.  We will do the following  - We will do ambulatory blood pressure monitoring to ensure that he does not have hypotension during the day which could cause his symptoms  This we will discontinue amlodipine  - We will add nifedipine 60 mg daily  - Will see him back in about 6 months or so.  - Continue anticoagulation with dabigatran  - His Entresto dose has been increased recently to 97 mg twice daily dosing we will continue this  - No other changes    I appreciate the opportunity to participate in the care of Roge Agarwal . Please do not hesitate to contact me with any further questions.  I have spent a total of 40 minutes today reviewing labs, imaging studies, discussing with colleagues,  face-to-face time with patient and documentation in the medical record.  The longitudinal plan of care for the diagnosis(es)/condition(s) as documented were addressed during this visit. Due to the added complexity in care, I will continue to support Roge in the subsequent management and with ongoing continuity of care.    Sincerely,   Amor Wick MD  [facility] Division of Cardiology

## 2025-02-04 ENCOUNTER — ANCILLARY PROCEDURE (OUTPATIENT)
Dept: CARDIOLOGY | Facility: CLINIC | Age: 65
End: 2025-02-04
Attending: INTERNAL MEDICINE
Payer: COMMERCIAL

## 2025-02-04 ENCOUNTER — LAB (OUTPATIENT)
Dept: LAB | Facility: CLINIC | Age: 65
End: 2025-02-04
Attending: INTERNAL MEDICINE
Payer: COMMERCIAL

## 2025-02-04 VITALS
WEIGHT: 187.3 LBS | OXYGEN SATURATION: 98 % | BODY MASS INDEX: 25.07 KG/M2 | SYSTOLIC BLOOD PRESSURE: 156 MMHG | HEART RATE: 84 BPM | DIASTOLIC BLOOD PRESSURE: 92 MMHG

## 2025-02-04 DIAGNOSIS — I50.23 ACUTE ON CHRONIC SYSTOLIC CONGESTIVE HEART FAILURE (H): ICD-10-CM

## 2025-02-04 DIAGNOSIS — I42.8 NONISCHEMIC CARDIOMYOPATHY (H): ICD-10-CM

## 2025-02-04 DIAGNOSIS — Z86.79 S/P ABLATION OF ATRIAL FLUTTER: Primary | ICD-10-CM

## 2025-02-04 DIAGNOSIS — E78.2 MIXED HYPERLIPIDEMIA: ICD-10-CM

## 2025-02-04 DIAGNOSIS — I49.3 PVCS (PREMATURE VENTRICULAR CONTRACTIONS): ICD-10-CM

## 2025-02-04 DIAGNOSIS — R07.89 ATYPICAL CHEST PAIN: ICD-10-CM

## 2025-02-04 DIAGNOSIS — I50.22 CHRONIC SYSTOLIC HEART FAILURE (H): ICD-10-CM

## 2025-02-04 DIAGNOSIS — I48.4 ATYPICAL ATRIAL FLUTTER (H): ICD-10-CM

## 2025-02-04 DIAGNOSIS — Z98.890 S/P ABLATION OF ATRIAL FLUTTER: Primary | ICD-10-CM

## 2025-02-04 DIAGNOSIS — I48.92 ATRIAL FLUTTER, UNSPECIFIED TYPE (H): ICD-10-CM

## 2025-02-04 DIAGNOSIS — I10 ESSENTIAL HYPERTENSION: ICD-10-CM

## 2025-02-04 LAB
ALBUMIN SERPL BCG-MCNC: 4.4 G/DL (ref 3.5–5.2)
ALP SERPL-CCNC: 69 U/L (ref 40–150)
ALT SERPL W P-5'-P-CCNC: 18 U/L (ref 0–70)
ANION GAP SERPL CALCULATED.3IONS-SCNC: 9 MMOL/L (ref 7–15)
AST SERPL W P-5'-P-CCNC: 27 U/L (ref 0–45)
BILIRUB SERPL-MCNC: 0.6 MG/DL
BUN SERPL-MCNC: 14.1 MG/DL (ref 8–23)
CALCIUM SERPL-MCNC: 9 MG/DL (ref 8.8–10.4)
CHLORIDE SERPL-SCNC: 103 MMOL/L (ref 98–107)
CREAT SERPL-MCNC: 1.07 MG/DL (ref 0.67–1.17)
EGFRCR SERPLBLD CKD-EPI 2021: 77 ML/MIN/1.73M2
ERYTHROCYTE [DISTWIDTH] IN BLOOD BY AUTOMATED COUNT: 13.4 % (ref 10–15)
GLUCOSE SERPL-MCNC: 103 MG/DL (ref 70–99)
HCO3 SERPL-SCNC: 28 MMOL/L (ref 22–29)
HCT VFR BLD AUTO: 43.5 % (ref 40–53)
HGB BLD-MCNC: 14.7 G/DL (ref 13.3–17.7)
LVEF ECHO: NORMAL
MCH RBC QN AUTO: 32.1 PG (ref 26.5–33)
MCHC RBC AUTO-ENTMCNC: 33.8 G/DL (ref 31.5–36.5)
MCV RBC AUTO: 95 FL (ref 78–100)
NT-PROBNP SERPL-MCNC: 572 PG/ML (ref 0–900)
PLATELET # BLD AUTO: 207 10E3/UL (ref 150–450)
POTASSIUM SERPL-SCNC: 4.4 MMOL/L (ref 3.4–5.3)
PROT SERPL-MCNC: 7.9 G/DL (ref 6.4–8.3)
RBC # BLD AUTO: 4.58 10E6/UL (ref 4.4–5.9)
SODIUM SERPL-SCNC: 140 MMOL/L (ref 135–145)
WBC # BLD AUTO: 8.6 10E3/UL (ref 4–11)

## 2025-02-04 PROCEDURE — 36415 COLL VENOUS BLD VENIPUNCTURE: CPT | Performed by: PATHOLOGY

## 2025-02-04 PROCEDURE — 93306 TTE W/DOPPLER COMPLETE: CPT | Performed by: STUDENT IN AN ORGANIZED HEALTH CARE EDUCATION/TRAINING PROGRAM

## 2025-02-04 PROCEDURE — 99215 OFFICE O/P EST HI 40 MIN: CPT | Mod: 25 | Performed by: INTERNAL MEDICINE

## 2025-02-04 PROCEDURE — G2211 COMPLEX E/M VISIT ADD ON: HCPCS | Performed by: INTERNAL MEDICINE

## 2025-02-04 PROCEDURE — 99213 OFFICE O/P EST LOW 20 MIN: CPT | Performed by: INTERNAL MEDICINE

## 2025-02-04 PROCEDURE — 85027 COMPLETE CBC AUTOMATED: CPT | Performed by: PATHOLOGY

## 2025-02-04 PROCEDURE — 80053 COMPREHEN METABOLIC PANEL: CPT | Performed by: PATHOLOGY

## 2025-02-04 PROCEDURE — 83880 ASSAY OF NATRIURETIC PEPTIDE: CPT | Performed by: PATHOLOGY

## 2025-02-04 RX ADMIN — Medication 5 ML: at 09:01

## 2025-02-04 ASSESSMENT — PAIN SCALES - GENERAL: PAINLEVEL_OUTOF10: NO PAIN (0)

## 2025-02-04 NOTE — LETTER
2/4/2025      RE: Roge Agarwal  [address]  [place] [address]       Dear Colleague,    Thank you for the opportunity to participate in the care of your patient, Roge Agarwal, at the [facility] [place] [facility]. Please see a copy of my visit note below.    February 4, 2025     Dear colleagues,  We had the pleasure of seeing Roge Agarwal back in the [facility] Heart Failure clinic. As you know, he is a 64 year old male who has a past medical history significant for AFL s/p ablation 2020, NICM (tachy-mediated and ETOH) with recovery from 15% to 55%, HTN, hemorrhagic CVA 2007, and ETOH abuse.       Summary of his cardiovascular history: In 4/2016 he amputated two of his fingers with his wood saw. In the ER, he was found in a AFL with RVR and with LVEF 15%. He'd not been aware of arrhythmia but did report feeling fatigued. He had RAISSA/DCCV and started warfarin. His LVEF returned to 55% by several months later. He came off warfarin a month after the DCCV. He then represented to ER with 2 month history of feeing fatigued and increasingly SOB even at rest. He was found to be back in AFL with RVR 140s and echo demonstrated LVEF 30%. He was subsequently admitted. He was started on Pradaxa and amiodarone. Plan was for RAISSA/ablation; however, RAISSA demonstrated ROGELIO thrombus and ablation was cancelled. Amiodarone was stopped. He was instead placed on Metoprolol and digoxin for rate control. Stress CMRI showed NICM with severely reduced biventricular function with a pattern of midmyocardial enhancement in the septal segments. ROGELIO thrombus also demonstrated on CMRI. No ischemia on stress portion. Echo showed LVEF 25-30%, moderately decreased RV function, and mild MR. Etiology considered likely multifactorial given tachyarrhythmia and ETOH abuse. He was counseled to stop drinking. He was started on GDMT for his depressed LVEF.    Currently he notes that he is doing well continues to work.  He is not depressed and gets up early in the morning then goes to work.  He feels a little bit tired and fatigued groggy by the afternoon he also notes some difficulties with walking at the time which is more pain in the foot as he says.  Otherwise no issues and takes her medications as prescribed doing well.  Blood pressure has been running higher      PAST MEDICAL HISTORY:  Past Medical History:   Diagnosis Date     Atrial fibrillation (H) 2016    One time episode noted after his finger accident, required cardioversion     Atrial fibrillation with RVR (H) 4/6/2016     Hemorrhagic stroke (H) 12/16/2007    2.3 X 1.5 CM PARENCHYMAL HEMORRHAGE IN THE RIGHT BASAL GANGLIA     HTN (hypertension)      Systolic CHF (H)     EF of 15-30% in 2016     FAMILY HISTORY:  Family History   Problem Relation Age of Onset     Heart Disease Mother      Osteoporosis Mother      LUNG DISEASE Mother      Heart Disease Father      Hypertension Brother      SOCIAL HISTORY:  Social History     Socioeconomic History     Marital status: Single   Tobacco Use     Smoking status: Never     Smokeless tobacco: Current     Types: Chew     Tobacco comments:     2 tins per wk   Vaping Use     Vaping status: Never Used   Substance and Sexual Activity     Alcohol use: Not Currently     Comment: 12/30/2019     Drug use: Not Currently     Sexual activity: Not Currently     Social Drivers of Health     Financial Resource Strain: Low Risk  (9/28/2023)    Financial Resource Strain      Within the past 12 months, have you or your family members you live with been unable to get utilities (heat, electricity) when it was really needed?: No   Food Insecurity: High Risk (9/28/2023)    Food Insecurity      Within the past 12 months, did you worry that your food would run out before you got money to buy more?: Yes      Within the past 12 months, did the food you bought just not last and you didn t have  money to get more?: Yes   Transportation Needs: Low Risk  (9/28/2023)    Transportation Needs      Within the past 12 months, has lack of transportation kept you from medical appointments, getting your medicines, non-medical meetings or appointments, work, or from getting things that you need?: No   Housing Stability: High Risk (9/28/2023)    Housing Stability      Do you have housing? : Yes      Are you worried about losing your housing?: Yes     CURRENT MEDICATIONS:  Current Outpatient Medications   Medication Sig Dispense Refill     amLODIPine (NORVASC) 10 MG tablet Take 1 tablet (10 mg) by mouth daily. 90 tablet 3     atorvastatin (LIPITOR) 40 MG tablet Take 1 tablet (40 mg) by mouth daily. 90 tablet 3     busPIRone (BUSPAR) 10 MG tablet Take 1 tablet (10 mg) by mouth 3 times daily 180 tablet 1     carvedilol (COREG) 12.5 MG tablet Take 0.5 tablets (6.25 mg) by mouth 2 times daily (with meals). 90 tablet 3     dabigatran ANTICOAGULANT (PRADAXA ANTICOAGULANT) 150 MG capsule Take 1 capsule (150 mg) by mouth 2 times daily. 90 capsule 3     digoxin (LANOXIN) 125 MCG tablet Take 1 tablet (125 mcg) by mouth daily. 90 tablet 3     folic acid (FOLVITE) 1 MG tablet Take 1 tablet (1,000 mcg) by mouth daily. 90 tablet 3     furosemide (LASIX) 40 MG tablet Take 1 tablet (40 mg) by mouth daily. 90 tablet 3     Multiple Vitamins-Minerals (CEROVITE SENIOR) TABS Take 1 tablet by mouth daily. 90 tablet 3     sacubitril-valsartan (ENTRESTO)  MG per tablet Take 1 tablet by mouth 2 times daily. 180 tablet 3     thiamine (B-1) 100 MG tablet Take 1 tablet (100 mg) by mouth daily. 90 tablet 3     No current facility-administered medications for this visit.     EXAM:  BP (!) 149/95 (BP Location: Right arm, Patient Position: Chair, Cuff Size: Adult Regular)   Pulse 84   Wt 85 kg (187 lb 4.8 oz)   SpO2 98%   BMI 25.07 kg/m    General: appears comfortable, alert and oriented. Accompanied by daughter.   Head: normocephalic,  atraumatic  Eyes: anicteric sclera, EOMI , PERRL  Neck: no adenopathy  Orophyarynx: moist mucosa, no lesions noted  Heart: irregular, tachycardic, no murmurs, rubs. Estimated JVP at 6 cmH2O  Lungs: CTAB, No wheezing.   Abdomen: soft, non-tender, bowel sounds present, no hepatosplenomegaly  Extremities: No LE edema today  Skin: no open lesions noted  Neuro: grossly non-focal  Psych: no evidence of depression noted     Labs:  Lab Results   Component Value Date    WBC 8.4 11/12/2024    HGB 15.0 11/12/2024    HCT 45.1 11/12/2024     11/12/2024     01/24/2025    POTASSIUM 4.6 01/24/2025    CHLORIDE 104 01/24/2025    CO2 27 01/24/2025    BUN 12.2 01/24/2025    CR 0.96 01/24/2025    GLC 87 01/24/2025    DD 0.9 (H) 12/31/2019    NTBNPI 2,401 (H) 12/31/2019    NTBNP 675 11/12/2024    TROPI 0.037 01/01/2020    AST 29 11/12/2024    ALT 16 11/12/2024    ALKPHOS 74 11/12/2024    BILITOTAL 0.6 11/12/2024    INR 1.32 (H) 02/25/2020 1/2/20 ECHOCARDIOGRAM:   The visual ejection fraction is estimated at 25-30%. Moderately decreased right ventricular systolic function There is moderate biatrial enlargement.  There is mild (1+) mitral regurgitation.  Probable aflutter with rapid ventricular response.  There is no comparison study available.     1/3/20 NM IRLANDA SCAN:     Only resting perfusion images were taken.     Resting images shows mild intensity basal inferior wall photopenic defect.     Gated images not performed. LV systolic function cannot be evaluated.     1/6/20 RAISSA:  Left and right atrial appendage thrombi, measuring 1.7 x 1.4 cm and 1.5 x 1.4  cm, respectively.   Mildly dilated left ventricle with normal wall thickness. Severely decreased LV systolic function with EF 15%-20%. Severe diffuse hypokinesis.  Global right ventricular function is moderately reduced.   Compared to the previous study dated 1/2/2020 (TTE): The left and right atrial appendages are better-visualized and thrombi are seen in both atrial  Appendages.     1/6/20 CMRI:  1. The LV is mildly enlarged with normal wall thickness. The global systolic function is severely reduced. The LVEF is 15%. There is severe diffuse hypokinesis.  2. The RV is normal in cavity size. The global systolic function is severely reduced. The RVEF is 20%.   3. The left atrium is severely enlarged.  The right atrium is moderately enlarged.   4. There is mild mitral regurgitation.   5. Late gadolinium enhancement imaging shows midmyocardial enhancement in the basal anteroseptum, basal inferoseptum, mid anteroseptum, mid inferoseptum, and apical septum.  This is consistent with a non-ischemic etiology of cardiomyopathy.   6. Regadenoson stress perfusion imaging shows no ischemia.   7. There is no pericardial effusion or thickening.   8. There is a left atrial appendage thrombus.   9. There is a left-sided pleural effusion.   CONCLUSIONS: Non-ischemic cardiomyopathy.  There is severely reduced biventricular function with a pattern of midmyocardial enhancement in the septal segments. There is also a left atrial appendage thrombus. There is no ischemia.      TTE 6/2020:  Patient in tachyarrhythmia during study. Mildly (EF 40-45%) reduced left ventricular function is present.  Global right ventricular function is mildly reduced.  No significant valvular dysfunction present.  The inferior vena cava was normal in size with preserved respiratory variability.  No pericardial effusion is present.     EP ablation 2/2020:  1. Radiofrequency ablation of the cavotricuspid isthmus for CTI-dependent atrial flutter.  2. Direct current cardioversion of atrial fibrillation into sinus rhythm.  3. Fluoroscopy.  4. 3D electroanatomic mapping     TTE 12/2020:  The patient's rhythm is atrial fibrillation. Borderline (EF 50-55%) reduced left ventricular function is present.  Global right ventricular function is normal.  No pericardial effusion is present. Mild biatrial enlargement is present.  IVC  diameter <2.1 cm collapsing >50% with sniff suggests a normal RA pressure of 3 mmHg.  Compared to prior, LV fxn is improved.     TTE 5/16/22:  The patient's rhythm is atrial fibrillation. Left ventricular size, wall motion and function are normal. The ejection fraction is 55-60%.  Global right ventricular function is normal.  No significant valvular abnormalities.  The inferior vena cava was normal in size with preserved respiratory variability.  No pericardial effusion is present.  Compared to prior study 12/3/2020, there is no significant change.     TTE 2/4/25:  Left ventricular size is normal. Left ventricular function is decreased. The ejection fraction is 50-55% (borderline).  Right ventricular function, chamber size, wall motion, and thickness are normal.  No significant valvular abnormalities present.  The inferior vena cava was normal in size with preserved respiratory variability.  No pericardial effusion is present.  This study was compared with the study from 5/16/2022, LV function is modestly lower.    ASSESSMENT AND PLAN:  64 year old male who has a past medical history significant for AFL s/p ablation 2020, NICM (tachy-mediated and ETOH) with recovery from 15% to 55%, HTN, hemorrhagic CVA 2007, and ETOH abuse.    Overall denies any new complaints or issues doing relatively well.  Takes her medications as prescribed.  Now he has insurance which has been sorted.  He is back at work doing well except he gets a little bit groggy and tired by the afternoon.  He is in early patient gets up around 3:30 in the morning.  Blood pressure has been running higher including today at the echo and the clinic visit.  We will do the following  - We will do ambulatory blood pressure monitoring to ensure that he does not have hypotension during the day which could cause his symptoms  This we will discontinue amlodipine  - We will add nifedipine 60 mg daily  - Will see him back in about 6 months or so.  - Continue  anticoagulation with dabigatran  - His Entresto dose has been increased recently to 97 mg twice daily dosing we will continue this  - No other changes    I appreciate the opportunity to participate in the care of Roge Agarwal . Please do not hesitate to contact me with any further questions.  I have spent a total of 40 minutes today reviewing labs, imaging studies, discussing with colleagues, face-to-face time with patient and documentation in the medical record.  The longitudinal plan of care for the diagnosis(es)/condition(s) as documented were addressed during this visit. Due to the added complexity in care, I will continue to support Roge in the subsequent management and with ongoing continuity of care.    Sincerely,   Amor Wick MD  [facility] Division of Cardiology         Please do not hesitate to contact me if you have any questions/concerns.     Sincerely,     Amor Wick MD

## 2025-02-04 NOTE — PATIENT INSTRUCTIONS
Dr. Wick recommends:    Stop taking Amlodipine.    Start taking Nifedipine ER 60 MG once daily.    24 hour ambulatory BP monitor.    Follow up with Dr. Brantley as already scheduled in May.    Thank you for your visit today.  Please call me with any questions or concerns.   Kb Holguin RN  Cardiology Care Coordinator  [phone]

## 2025-02-11 ENCOUNTER — ANCILLARY PROCEDURE (OUTPATIENT)
Dept: CARDIOLOGY | Facility: CLINIC | Age: 65
End: 2025-02-11
Attending: INTERNAL MEDICINE
Payer: COMMERCIAL

## 2025-02-11 DIAGNOSIS — I10 ESSENTIAL HYPERTENSION: ICD-10-CM

## 2025-02-11 PROCEDURE — 93784 AMBL BP MNTR W/SOFTWARE: CPT | Performed by: INTERNAL MEDICINE

## 2025-02-26 ENCOUNTER — TELEPHONE (OUTPATIENT)
Dept: CARDIOLOGY | Facility: CLINIC | Age: 65
End: 2025-02-26
Payer: COMMERCIAL

## 2025-02-26 DIAGNOSIS — I10 ESSENTIAL HYPERTENSION: Primary | ICD-10-CM

## 2025-02-26 RX ORDER — NIFEDIPINE 30 MG
30 TABLET, EXTENDED RELEASE ORAL DAILY
Qty: 90 TABLET | Refills: 3 | COMMUNITY
Start: 2025-02-26

## 2025-02-26 NOTE — TELEPHONE ENCOUNTER
Health Call Center    Phone Message    May a detailed message be left on voicemail: yes     Reason for Call: Medication Question or concern regarding medication   Prescription Clarification  Name of Medication: NIFEdipine ER (ADALAT CC) 60 MG 24 hr tablet   Prescribing Provider: Amor Wick MD    Pharmacy: N/A   What on the order needs clarification? Pt's daughter- Jessa called with a medication update. She states pt was started on this medication at his last visit on 2/4/25. She reports pt feels that the medication is too strong. She states pt feels lightheaded while on the med, he is struggling at work often feeling like he will pass out. She is requesting for recommendations. Please advise    Jessa is also requesting a call back with the results of pt's 24 Hour Blood Pressure Monitor. Pt returned monitor but did not hear back about results.    Action Taken: Other: CARDIO    Travel Screening: Not Applicable     Date of Service:

## 2025-02-26 NOTE — TELEPHONE ENCOUNTER
Dr. Wick reviewed the results of 24 hour BP monitor and message on patient. It is recommended that patient try taking Nifedipine ER 30 MG daily. That is 1/2 of his current dose. He will send an update early next week.   Date: 2/26/2025    Time of Call: 12:26 PM     Diagnosis:  dizziness     [ VORB ] Ordering provider: Dr. Wick  Order: Decrease Nifedipine ER to 30 MG once daily.     Order received by: Kb Holguin RN     Follow-up/additional notes: Called and spoke with Jessa and she is agreeable to plan. They will send us an update early next week.

## 2025-05-19 ENCOUNTER — TELEPHONE (OUTPATIENT)
Dept: FAMILY MEDICINE | Facility: CLINIC | Age: 65
End: 2025-05-19
Payer: COMMERCIAL

## 2025-05-19 NOTE — LETTER
May 19, 2025    Roge Agarwal    [address]  [place] [address]          Celeste Guan,     Your care team at [facility] values your health and well-being. After reviewing your chart, we have identified recommendation(s) to help you better manage your health.    It's time for your Annual Wellness visit. During your visit, we'll discuss your health, well-being, and any questions you may have related to preventive care. We'll also review any recommended tests, exams, or screenings you might need. To schedule please call your clinic at [phone] or use your [employer] account.    It's time to ensure your blood pressure is being well managed. If you have an electronic home blood pressure monitor, you can share your results by calling us. Alternatively, you can visit your nearest [facility] for a blood pressure check.    If you recently had or are having any of these services soon, please contact the clinic via phone or [employer] so that your care team can update your records.    If you have any questions or concerns, please contact our clinic. Thank you for continuing to trust us with your care.    Sincerely,    Your [facility] Care Team            Electronically signed

## 2025-06-03 NOTE — TELEPHONE ENCOUNTER
Called patient to change appointment time from 3:30 to 3:15 with DR. Yousif. Number not in service.    Patient rescheduled to 4 pm 6/17. Daughter notified.    Vale Padgett, CMA

## 2025-06-11 DIAGNOSIS — I50.23 ACUTE ON CHRONIC SYSTOLIC CONGESTIVE HEART FAILURE (H): Primary | ICD-10-CM

## (undated) DEVICE — SU VICRYL 2-0 REEL 54" UND J286G

## (undated) DEVICE — SU VICRYL 2-0 CT-1 27" UND J259H

## (undated) DEVICE — CATH THERMOCOOL SMARTTOUCH SF DF CURVE

## (undated) DEVICE — INTRO CATH 12CM 8.5FR FST-CATH

## (undated) DEVICE — GLOVE PROTEXIS W/NEU-THERA 8.0  2D73TE80

## (undated) DEVICE — PREP CHLORAPREP 26ML TINTED ORANGE  260815

## (undated) DEVICE — PACK LAPAROTOMY CUSTOM LAKES

## (undated) DEVICE — INTRO SHEATH MICRO PLATINUM TIP 4FRX40CM 7274

## (undated) DEVICE — BLADE CLIPPER 4406

## (undated) DEVICE — SHEATH HEMO FAST CATH RAMP 406965

## (undated) DEVICE — SYR BULB IRRIG 50ML LATEX FREE 0035280

## (undated) DEVICE — KIT VENOUS FLUSH 60210177

## (undated) DEVICE — STOCKING SLEEVE COMPRESSION CALF MED

## (undated) DEVICE — DECANTER VIAL 2006S

## (undated) DEVICE — ADH SKIN CLOSURE PREMIERPRO EXOFIN 1.0ML 3470

## (undated) DEVICE — SU VICRYL 3-0 SH 27" UND J416H

## (undated) DEVICE — TUBE SET SMARKABLATE IRRIGATION

## (undated) DEVICE — DRAIN PENROSE 0.50"X18" LATEX FREE GR203

## (undated) DEVICE — INTRODUCER SHEATH FAST-CATH CATH-LOCK 7FRX12CM 406702

## (undated) DEVICE — PAD DEFIBRILLATOR ELECTRODE 4.25INX6IN ADULT 2001M-C

## (undated) DEVICE — SU MONOCRYL 4-0 PS-2 18" UND Y496G

## (undated) DEVICE — SU VICRYL 3-0 CT-1 36" J944H

## (undated) DEVICE — PATCH CARTO 3 EXTERNAL REFERENCE 3D MAPPING CREFP6

## (undated) DEVICE — GOWN XLG DISP 9545

## (undated) DEVICE — PACK HEART LEFT CUSTOM

## (undated) DEVICE — SU VICRYL 0 CT-1 CR 8X27" UND JJ41G

## (undated) RX ORDER — LIDOCAINE HYDROCHLORIDE 10 MG/ML
INJECTION, SOLUTION INFILTRATION; PERINEURAL
Status: DISPENSED
Start: 2021-03-03

## (undated) RX ORDER — ONDANSETRON 2 MG/ML
INJECTION INTRAMUSCULAR; INTRAVENOUS
Status: DISPENSED
Start: 2021-03-03

## (undated) RX ORDER — CEFAZOLIN SODIUM 2 G/100ML
INJECTION, SOLUTION INTRAVENOUS
Status: DISPENSED
Start: 2021-03-03

## (undated) RX ORDER — AMINOPHYLLINE 25 MG/ML
INJECTION, SOLUTION INTRAVENOUS
Status: DISPENSED
Start: 2020-01-06

## (undated) RX ORDER — LIDOCAINE HYDROCHLORIDE 10 MG/ML
INJECTION, SOLUTION EPIDURAL; INFILTRATION; INTRACAUDAL; PERINEURAL
Status: DISPENSED
Start: 2021-03-03

## (undated) RX ORDER — REGADENOSON 0.08 MG/ML
INJECTION, SOLUTION INTRAVENOUS
Status: DISPENSED
Start: 2020-01-06

## (undated) RX ORDER — FENTANYL CITRATE 50 UG/ML
INJECTION, SOLUTION INTRAMUSCULAR; INTRAVENOUS
Status: DISPENSED
Start: 2020-01-06

## (undated) RX ORDER — LIDOCAINE HYDROCHLORIDE 20 MG/ML
JELLY TOPICAL
Status: DISPENSED
Start: 2020-10-02

## (undated) RX ORDER — PROPOFOL 10 MG/ML
INJECTION, EMULSION INTRAVENOUS
Status: DISPENSED
Start: 2021-03-03

## (undated) RX ORDER — PHENYLEPHRINE HYDROCHLORIDE 10 MG/ML
INJECTION INTRAVENOUS
Status: DISPENSED
Start: 2021-03-03

## (undated) RX ORDER — FENTANYL CITRATE 50 UG/ML
INJECTION, SOLUTION INTRAMUSCULAR; INTRAVENOUS
Status: DISPENSED
Start: 2020-02-25

## (undated) RX ORDER — ACETAMINOPHEN 325 MG/1
TABLET ORAL
Status: DISPENSED
Start: 2021-03-03

## (undated) RX ORDER — GLYCOPYRROLATE 0.2 MG/ML
INJECTION, SOLUTION INTRAMUSCULAR; INTRAVENOUS
Status: DISPENSED
Start: 2021-03-03

## (undated) RX ORDER — DIPHENHYDRAMINE HYDROCHLORIDE 50 MG/ML
INJECTION INTRAMUSCULAR; INTRAVENOUS
Status: DISPENSED
Start: 2020-02-25

## (undated) RX ORDER — LIDOCAINE HYDROCHLORIDE 20 MG/ML
SOLUTION OROPHARYNGEAL
Status: DISPENSED
Start: 2020-01-06

## (undated) RX ORDER — DEXAMETHASONE SODIUM PHOSPHATE 4 MG/ML
INJECTION, SOLUTION INTRA-ARTICULAR; INTRALESIONAL; INTRAMUSCULAR; INTRAVENOUS; SOFT TISSUE
Status: DISPENSED
Start: 2021-03-03

## (undated) RX ORDER — BUPIVACAINE HYDROCHLORIDE AND EPINEPHRINE 5; 5 MG/ML; UG/ML
INJECTION, SOLUTION EPIDURAL; INTRACAUDAL; PERINEURAL
Status: DISPENSED
Start: 2021-03-03

## (undated) RX ORDER — HYDRALAZINE HYDROCHLORIDE 20 MG/ML
INJECTION INTRAMUSCULAR; INTRAVENOUS
Status: DISPENSED
Start: 2020-02-25

## (undated) RX ORDER — REGADENOSON 0.08 MG/ML
INJECTION, SOLUTION INTRAVENOUS
Status: DISPENSED
Start: 2020-01-02

## (undated) RX ORDER — LIDOCAINE HYDROCHLORIDE 20 MG/ML
SOLUTION OROPHARYNGEAL
Status: DISPENSED
Start: 2020-02-25

## (undated) RX ORDER — FENTANYL CITRATE 50 UG/ML
INJECTION, SOLUTION INTRAMUSCULAR; INTRAVENOUS
Status: DISPENSED
Start: 2021-03-03

## (undated) RX ORDER — CEFAZOLIN SODIUM 2 G/100ML
INJECTION, SOLUTION INTRAVENOUS
Status: DISPENSED
Start: 2020-02-25

## (undated) RX ORDER — EPHEDRINE SULFATE 50 MG/ML
INJECTION, SOLUTION INTRAMUSCULAR; INTRAVENOUS; SUBCUTANEOUS
Status: DISPENSED
Start: 2021-03-03

## (undated) RX ORDER — SODIUM CHLORIDE 9 MG/ML
INJECTION, SOLUTION INTRAVENOUS
Status: DISPENSED
Start: 2020-02-25

## (undated) RX ORDER — GABAPENTIN 300 MG/1
CAPSULE ORAL
Status: DISPENSED
Start: 2021-03-03